# Patient Record
Sex: MALE | Race: BLACK OR AFRICAN AMERICAN | Employment: UNEMPLOYED | URBAN - METROPOLITAN AREA
[De-identification: names, ages, dates, MRNs, and addresses within clinical notes are randomized per-mention and may not be internally consistent; named-entity substitution may affect disease eponyms.]

---

## 2017-08-03 ENCOUNTER — HOSPITAL ENCOUNTER (INPATIENT)
Age: 22
LOS: 7 days | Discharge: HOME | DRG: 750 | End: 2017-08-10
Attending: EMERGENCY MEDICINE | Admitting: PSYCHIATRY & NEUROLOGY
Payer: MEDICAID

## 2017-08-03 DIAGNOSIS — F99 MENTAL HEALTH DISORDER: Primary | ICD-10-CM

## 2017-08-03 LAB
ALBUMIN SERPL BCP-MCNC: 4.1 G/DL (ref 3.4–5)
ALBUMIN/GLOB SERPL: 1.1 {RATIO} (ref 0.8–1.7)
ALP SERPL-CCNC: 88 U/L (ref 45–117)
ALT SERPL-CCNC: 32 U/L (ref 16–61)
AMPHET UR QL SCN: POSITIVE
ANION GAP BLD CALC-SCNC: 7 MMOL/L (ref 3–18)
AST SERPL W P-5'-P-CCNC: 38 U/L (ref 15–37)
BARBITURATES UR QL SCN: NEGATIVE
BASOPHILS # BLD AUTO: 0 K/UL (ref 0–0.06)
BASOPHILS # BLD: 0 % (ref 0–2)
BENZODIAZ UR QL: NEGATIVE
BILIRUB SERPL-MCNC: 0.5 MG/DL (ref 0.2–1)
BUN SERPL-MCNC: 14 MG/DL (ref 7–18)
BUN/CREAT SERPL: 13 (ref 12–20)
CALCIUM SERPL-MCNC: 9 MG/DL (ref 8.5–10.1)
CANNABINOIDS UR QL SCN: NEGATIVE
CHLORIDE SERPL-SCNC: 103 MMOL/L (ref 100–108)
CO2 SERPL-SCNC: 30 MMOL/L (ref 21–32)
COCAINE UR QL SCN: NEGATIVE
CREAT SERPL-MCNC: 1.06 MG/DL (ref 0.6–1.3)
DIFFERENTIAL METHOD BLD: ABNORMAL
EOSINOPHIL # BLD: 0.1 K/UL (ref 0–0.4)
EOSINOPHIL NFR BLD: 1 % (ref 0–5)
ERYTHROCYTE [DISTWIDTH] IN BLOOD BY AUTOMATED COUNT: 12.6 % (ref 11.6–14.5)
ETHANOL SERPL-MCNC: <3 MG/DL (ref 0–3)
GLOBULIN SER CALC-MCNC: 3.6 G/DL (ref 2–4)
GLUCOSE SERPL-MCNC: 86 MG/DL (ref 74–99)
HCT VFR BLD AUTO: 46.1 % (ref 36–48)
HDSCOM,HDSCOM: ABNORMAL
HGB BLD-MCNC: 16 G/DL (ref 13–16)
LYMPHOCYTES # BLD AUTO: 30 % (ref 21–52)
LYMPHOCYTES # BLD: 2.3 K/UL (ref 0.9–3.6)
MCH RBC QN AUTO: 30.1 PG (ref 24–34)
MCHC RBC AUTO-ENTMCNC: 34.7 G/DL (ref 31–37)
MCV RBC AUTO: 86.8 FL (ref 74–97)
METHADONE UR QL: NEGATIVE
MONOCYTES # BLD: 0.9 K/UL (ref 0.05–1.2)
MONOCYTES NFR BLD AUTO: 11 % (ref 3–10)
NEUTS SEG # BLD: 4.4 K/UL (ref 1.8–8)
NEUTS SEG NFR BLD AUTO: 58 % (ref 40–73)
OPIATES UR QL: NEGATIVE
PCP UR QL: NEGATIVE
PLATELET # BLD AUTO: 248 K/UL (ref 135–420)
PMV BLD AUTO: 9.7 FL (ref 9.2–11.8)
POTASSIUM SERPL-SCNC: 3.8 MMOL/L (ref 3.5–5.5)
PROT SERPL-MCNC: 7.7 G/DL (ref 6.4–8.2)
RBC # BLD AUTO: 5.31 M/UL (ref 4.7–5.5)
SODIUM SERPL-SCNC: 140 MMOL/L (ref 136–145)
WBC # BLD AUTO: 7.8 K/UL (ref 4.6–13.2)

## 2017-08-03 PROCEDURE — 74011250637 HC RX REV CODE- 250/637: Performed by: PSYCHIATRY & NEUROLOGY

## 2017-08-03 PROCEDURE — 99283 EMERGENCY DEPT VISIT LOW MDM: CPT

## 2017-08-03 PROCEDURE — 65220000005 HC RM SEMIPRIVATE PSYCH 3 OR 4 BED

## 2017-08-03 PROCEDURE — 80307 DRUG TEST PRSMV CHEM ANLYZR: CPT | Performed by: PHYSICIAN ASSISTANT

## 2017-08-03 PROCEDURE — 85025 COMPLETE CBC W/AUTO DIFF WBC: CPT | Performed by: PHYSICIAN ASSISTANT

## 2017-08-03 PROCEDURE — 80053 COMPREHEN METABOLIC PANEL: CPT | Performed by: PHYSICIAN ASSISTANT

## 2017-08-03 RX ORDER — TRAZODONE HYDROCHLORIDE 50 MG/1
50 TABLET ORAL
Status: DISCONTINUED | OUTPATIENT
Start: 2017-08-03 | End: 2017-08-10 | Stop reason: HOSPADM

## 2017-08-03 RX ORDER — DEXTROAMPHETAMINE SACCHARATE, AMPHETAMINE ASPARTATE, DEXTROAMPHETAMINE SULFATE AND AMPHETAMINE SULFATE 5; 5; 5; 5 MG/1; MG/1; MG/1; MG/1
40 TABLET ORAL DAILY
COMMUNITY
End: 2017-08-10

## 2017-08-03 RX ORDER — HALOPERIDOL 5 MG/1
5 TABLET ORAL
Status: DISCONTINUED | OUTPATIENT
Start: 2017-08-03 | End: 2017-08-10 | Stop reason: HOSPADM

## 2017-08-03 RX ORDER — HALOPERIDOL 5 MG/ML
5 INJECTION INTRAMUSCULAR
Status: DISCONTINUED | OUTPATIENT
Start: 2017-08-03 | End: 2017-08-10 | Stop reason: HOSPADM

## 2017-08-03 RX ORDER — CLONIDINE HYDROCHLORIDE 0.1 MG/1
0.1 TABLET ORAL
COMMUNITY
End: 2017-08-10

## 2017-08-03 RX ORDER — BENZTROPINE MESYLATE 1 MG/1
0.5 TABLET ORAL 2 TIMES DAILY
Status: DISCONTINUED | OUTPATIENT
Start: 2017-08-03 | End: 2017-08-04

## 2017-08-03 RX ORDER — IBUPROFEN 400 MG/1
400 TABLET ORAL
Status: DISCONTINUED | OUTPATIENT
Start: 2017-08-03 | End: 2017-08-10 | Stop reason: HOSPADM

## 2017-08-03 RX ORDER — LORAZEPAM 2 MG/ML
1-2 INJECTION INTRAMUSCULAR
Status: DISCONTINUED | OUTPATIENT
Start: 2017-08-03 | End: 2017-08-10 | Stop reason: HOSPADM

## 2017-08-03 RX ORDER — LORAZEPAM 1 MG/1
1-2 TABLET ORAL
Status: DISCONTINUED | OUTPATIENT
Start: 2017-08-03 | End: 2017-08-10 | Stop reason: HOSPADM

## 2017-08-03 RX ADMIN — BENZTROPINE MESYLATE 0.5 MG: 1 TABLET ORAL at 20:02

## 2017-08-03 RX ADMIN — LORAZEPAM 1 MG: 1 TABLET ORAL at 18:22

## 2017-08-03 RX ADMIN — HALOPERIDOL 5 MG: 5 TABLET ORAL at 18:22

## 2017-08-03 RX ADMIN — RISPERIDONE 1.5 MG: 1 TABLET ORAL at 20:01
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
## 2017-08-03 NOTE — BSMART NOTE
ACTIVITIES THERAPY PROGRESS NOTE    Group time:1220    The patient was not disturbed for group at staff discretion.

## 2017-08-03 NOTE — ED NOTES
Pt denies SI/HI. Hearing voices. \" Voices are not telling me to harm my self or other people. I just need my psych medications. \"

## 2017-08-03 NOTE — IP AVS SNAPSHOT
[address] Patient: Natanael Paulson MRN: [id number] :1995 You are allergic to the following No active allergies Recent Documentation Height Weight BMI Smoking Status 1.956 m 71.7 kg 18.74 kg/m2 Never Smoker Emergency Contacts Name Discharge Info Relation Home Work Mobile Piedad Lowery(Grandmother)  Other Relative [6] [phone] About your hospitalization You were admitted on:  August 3, 2017 You last received care in the:  [facility] You were discharged on:  August 10, 2017 Unit phone number:  [phone] Why you were hospitalized Your primary diagnosis was:  Schizophrenia (Hcc) Providers Seen During Your Hospitalizations Provider Role Specialty Primary office phone Ariana Espinal MD Attending Provider Emergency Medicine [phone] Naga Mccullough MD Attending Provider Psychiatry [phone] Your Primary Care Physician (PCP) Primary Care Physician Office Phone Office Fax NONE ** None ** ** None ** Follow-up Information Follow up With Details Comments Contact Info None   None (395) Patient stated that they have no PCP Pt will return to [place] [address]. . At [address] [place] [address], [facility]. .. for med management with Marie VARNER on 17 @ 10:15am .. Then later same day Adelina SANTOSW @ 2:15pm for therapy Current Discharge Medication List  
  
START taking these medications Dose & Instructions Dispensing Information Comments Morning Noon Evening Bedtime  
 benztropine 0.5 mg tablet Commonly known as:  COGENTIN Your last dose was: Your next dose is:    
   
   
 Dose:  0.5 mg Take 1 Tab by mouth two (2) times a day. Indications: Parkinsonism Quantity:  60 Tab Refills:  0 traZODone 50 mg tablet Commonly known as:  Trei Homme Your last dose was: Your next dose is:    
   
   
 Dose:  50 mg Take 1 Tab by mouth nightly as needed for Sleep. Indications: insomnia associated with depression Quantity:  30 Tab Refills:  0 CONTINUE these medications which have CHANGED Dose & Instructions Dispensing Information Comments Morning Noon Evening Bedtime * risperiDONE 1 mg tablet Commonly known as:  RisperDAL What changed:   
- medication strength 
- how much to take - when to take this - Another medication with the same name was removed. Continue taking this medication, and follow the directions you see here. Your last dose was: Your next dose is:    
   
   
 Dose:  1 mg Take 1 Tab by mouth daily. Indications: SCHIZOPHRENIA Quantity:  30 Tab Refills:  0  
     
   
   
   
  
 * risperiDONE 2 mg tablet Commonly known as:  RisperDAL What changed: You were already taking a medication with the same name, and this prescription was added. Make sure you understand how and when to take each. Replaces:  risperiDONE 0.5 mg disintegrating tablet Your last dose was: Your next dose is:    
   
   
 Dose:  2 mg Take 1 Tab by mouth nightly. Indications: SCHIZOPHRENIA Quantity:  30 Tab Refills:  0  
     
   
   
   
  
 * Notice: This list has 2 medication(s) that are the same as other medications prescribed for you. Read the directions carefully, and ask your doctor or other care provider to review them with you. STOP taking these medications ADDERALL 20 mg tablet Generic drug:  dextroamphetamine-amphetamine  
   
  
 cloNIDine HCl 0.1 mg tablet Commonly known as:  CATAPRES  
   
  
 risperiDONE 0.5 mg disintegrating tablet Commonly known as:  RisperDAL m-tabs Replaced by:  risperiDONE 2 mg tablet You also have another medication with the same name that you need to continue taking as instructed. Where to Get Your Medications Information on where to get these meds will be given to you by the nurse or doctor. ! Ask your nurse or doctor about these medications  
  benztropine 0.5 mg tablet  
 risperiDONE 1 mg tablet  
 risperiDONE 2 mg tablet  
 traZODone 50 mg tablet Discharge Instructions BEHAVIORAL HEALTH NURSING DISCHARGE NOTE The following personal items collected during your admission are returned to you:  
Dental Appliance:   
Vision: Visual Aid: None Hearing Aid:   
Jewelry:   
Clothing:   
Other Valuables: Other Valuables: None Valuables sent to safe:   
 
 
PATIENT INSTRUCTIONS: 
 
 
 
 
The discharge information has been reviewed with the patient. The patient verbalized understanding. Patient armband removed and shredded Discharge Orders None Introducing [address]! Wisam Garcia introduces "[employer] patient portal. Now you can access parts of your medical record, email your doctor's office, and request medication refills online. 1. In your internet browser, go to [email or web address] 2. Click on the First Time User? Click Here link in the Sign In box. You will see the New Member Sign Up page. 3. Enter your "[employer] Access Code exactly as it appears below. You will not need to use this code after youve completed the sign-up process. If you do not sign up before the expiration date, you must request a new code. · "[employer] Access Code: [email or web address] Expires: 11/2/2017  7:48 AM 
 
4. Enter the last four digits of your Social Security Number (xxxx) and Date of Birth (mm/dd/yyyy) as indicated and click Submit. You will be taken to the next sign-up page. 5. Create a "[employer] ID. This will be your "[employer] login ID and cannot be changed, so think of one that is secure and easy to remember. 6. Create a [employer] password. You can change your password at any time. 7. Enter your Password Reset Question and Answer. This can be used at a later time if you forget your password. 8. Enter your e-mail address. You will receive e-mail notification when new information is available in [address] 9. Click Sign Up. You can now view and download portions of your medical record. 10. Click the Download Summary menu link to download a portable copy of your medical information. If you have questions, please visit the Frequently Asked Questions section of the [employer] website. Remember, [employer] is NOT to be used for urgent needs. For medical emergencies, dial 911. Now available from your iPhone and Android! General Information Please provide this summary of care documentation to your next provider. Patient Signature:  ____________________________________________________________ Date:  ____________________________________________________________  
  
[facility] Provider Signature:  ____________________________________________________________ Date:  ____________________________________________________________

## 2017-08-03 NOTE — BH NOTES
GROUP THERAPY PROGRESS NOTE    Ruben Taylor is participating in Recreational Therapy.      Group time: 30 minutes    Personal goal for participation: relaxation   Goal orientation: relaxation    Group therapy participation: minimal    Therapeutic interventions reviewed and discussed: PT was encouraged by staff he listened to musn    Impression of participation:anxious

## 2017-08-03 NOTE — BH NOTES
Patient continued to pace on the unit till his Aunt arrived for visitation, patient was able to receive Haldol 5 mg PO PRN and Lorazepam 1 mg PO PRN for psychosis, patient ate 30% of his dinner during visitation.  Will continue to monitor for safety

## 2017-08-03 NOTE — PROGRESS NOTES
Patient admitted at this time accompanied by his aunt. Patient states he is not depressed or suicidal. Stated he was hearing voices, very guarded. Denies visual hallucinations, some thought blocking noted . Patient denies using drugs or drinking. Stated he was taking his medications. Oriented to unit and guidelines.

## 2017-08-03 NOTE — IP AVS SNAPSHOT
Summary of Care Report The Summary of Care report has been created to help improve care coordination. Users with access to [employer] or [address] (Web-based application) may access additional patient information including the Discharge Summary. If you are not currently a [address] user and need more information, please call the number listed below in the [place] [address] section and ask to be connected with Medical Records. Facility Information Name Address Phone [address] [facility]  [address] [facility] [address] [phone] Patient Information Patient Name Sex VERNELL Burdick ([id number]) Male 1995 Discharge Information Admitting Provider Service Area Unit Sabrina Rivas MD / [address]  [phone] Discharge Provider Discharge Date/Time Discharge Disposition Destination Sabrina Rivas MD / [phone] 8/10/2017 (Pending) AHR (none) Patient Language Language ENGLISH [13] Hospital Problems as of 8/10/2017  Reviewed: 2015  8:40 AM by Vibha Arizmendi MD  
  
  
  
 Class Noted - Resolved Last Modified POA Active Problems * (Principal)Schizophrenia ([facility] [address]  2015 - Present 2017 by Sabrina Rivas MD Yes Entered by Kasey Edwards Non-Hospital Problems as of 8/10/2017  Reviewed: 2015  8:40 AM by Vibha Arizmendi MD  
 None You are allergic to the following No active allergies Current Discharge Medication List  
  
START taking these medications Dose & Instructions Dispensing Information Comments  
 benztropine 0.5 mg tablet Commonly known as:  COGENTIN Dose:  0.5 mg Take 1 Tab by mouth two (2) times a day. Indications: Parkinsonism Quantity:  60 Tab Refills:  0  
   
 traZODone 50 mg tablet Commonly known as:  Gwendlyn Lean  Dose:  50 mg  
 Take 1 Tab by mouth nightly as needed for Sleep. Indications: insomnia associated with depression Quantity:  30 Tab Refills:  0 CONTINUE these medications which have CHANGED Dose & Instructions Dispensing Information Comments * risperiDONE 1 mg tablet Commonly known as:  RisperDAL What changed:   
- medication strength 
- how much to take - when to take this - Another medication with the same name was removed. Continue taking this medication, and follow the directions you see here. Dose:  1 mg Take 1 Tab by mouth daily. Indications: SCHIZOPHRENIA Quantity:  30 Tab Refills:  0  
   
 * risperiDONE 2 mg tablet Commonly known as:  RisperDAL What changed: You were already taking a medication with the same name, and this prescription was added. Make sure you understand how and when to take each. Replaces:  risperiDONE 0.5 mg disintegrating tablet Dose:  2 mg Take 1 Tab by mouth nightly. Indications: SCHIZOPHRENIA Quantity:  30 Tab Refills:  0  
   
 * Notice: This list has 2 medication(s) that are the same as other medications prescribed for you. Read the directions carefully, and ask your doctor or other care provider to review them with you. STOP taking these medications Comments ADDERALL 20 mg tablet Generic drug:  dextroamphetamine-amphetamine  
   
   
 cloNIDine HCl 0.1 mg tablet Commonly known as:  CATAPRES  
   
   
 risperiDONE 0.5 mg disintegrating tablet Commonly known as:  RisperDAL m-tabs Replaced by:  risperiDONE 2 mg tablet You also have another medication with the same name that you need to continue taking as instructed. Follow-up Information Follow up With Details Comments Contact Info None   None (395) Patient stated that they have no PCP Pt will return to [place] [address]. . At [address], [address] [facility] [address] ..  for med management with Abena Blanca Jozef Anika FNP on 8/22/17 @ 10:15am .. Then later same day Julieth Simons LCSW @ 2:15pm for therapy Discharge Instructions BEHAVIORAL HEALTH NURSING DISCHARGE NOTE The following personal items collected during your admission are returned to you:  
Dental Appliance:   
Vision: Visual Aid: None Hearing Aid:   
Jewelry:   
Clothing:   
Other Valuables: Other Valuables: None Valuables sent to safe:   
 
 
PATIENT INSTRUCTIONS: 
 
 
 
 
The discharge information has been reviewed with the patient. The patient verbalized understanding. Patient armband removed and shredded Chart Review Routing History Recipient Method Report Sent By Bassem Ybarra MD  
Fax: [phone] Phone: [phone] Fax IP Auto Routed Donnell Santa [[id number]] 1/18/2015  6:18 AM 01/18/2015 Brissa Ybarra MD  
Fax: [phone] Phone: [phone] Fax IP Auto Routed Donnell Santa [[id number]] 2/9/2015  5:52 PM 02/09/2015 Brissa Ybarra MD  
Fax: [phone] Phone: [phone] Fax IP Auto Routed Donnell Santa [[id number]] 3/4/2015  8:29 AM 03/04/2015

## 2017-08-03 NOTE — ED TRIAGE NOTES
Pt arrived to triage alert & oriented times 2 person & place with aunt. Pt denies SI & HI. Pt has not been taking his psyche meds. Pt is confused in triage & unable to answer all the questions asked.

## 2017-08-03 NOTE — BH NOTES
Patient observed pacing continuously back and forth on the unit. Patient continue to respond to internal stimuli, when asked if he was okay patient states \"I want some cookies\" Patient was encouraged to speak to this writer if feelings of self harm or harm towards others arise.  Will continue to monitor

## 2017-08-03 NOTE — ED PROVIDER NOTES
HPI Comments: 26yo male presenting to ED with hx of schizophrenia, ADHD, anxiety disorder presenting to ED with aunt for evaluation of hallucinations and psychiatric medication refill. Denies HI, SI.  States he has been off his meds for a while and needs to get back on. Aunt reports noting increased abnormal behavior from patient and states he has gone missing for long periods of time with no idea where he goes. Past Medical History:  No date: ADHD (attention deficit hyperactivity disorder)  No date: Anxiety disorder  No date: Depression  No date: Mood disorder (HCC)  No date: Psychiatric disorder  No date: Psychotic disorder  No date: Trauma      Comment: \"His mother  this past November\"       Patient is a 23 y.o. male presenting with mental health disorder. The history is provided by the patient. Mental Health Problem          Past Medical History:   Diagnosis Date    ADHD (attention deficit hyperactivity disorder)     Anxiety disorder     Depression     Mood disorder (HCC)     Psychiatric disorder     Psychotic disorder     Trauma     \"His mother  this past November\"       History reviewed. No pertinent surgical history. Family History:   Problem Relation Age of Onset    Schizophrenia Mother     Schizophrenia Brother     Schizophrenia Sister        Social History     Social History    Marital status: SINGLE     Spouse name: N/A    Number of children: N/A    Years of education: N/A     Occupational History    Not on file. Social History Main Topics    Smoking status: Never Smoker    Smokeless tobacco: Never Used    Alcohol use No    Drug use: No    Sexual activity: No     Other Topics Concern    Not on file     Social History Narrative         ALLERGIES: Review of patient's allergies indicates no known allergies.     Review of Systems    Vitals:    17 0734   BP: 115/78   Pulse: 94   Resp: 17   Temp: 98 °F (36.7 °C)   SpO2: 100%   Weight: 71.7 kg (158 lb)   Height: 6' 5\" (1.956 m)            Physical Exam   Constitutional: He is oriented to person, place, and time. He appears well-developed and well-nourished. HENT:   Head: Normocephalic and atraumatic. Mouth/Throat: Oropharynx is clear and moist.   Eyes: Conjunctivae are normal.   Neck: Normal range of motion. Cardiovascular: Normal rate, regular rhythm and normal heart sounds. Pulmonary/Chest: Effort normal. No respiratory distress. He has no wheezes. He has no rales. Abdominal: Soft. He exhibits no distension. Neurological: He is oriented to person, place, and time. Psychiatric: His behavior is normal. His mood appears anxious. His affect is not angry. His speech is rapid and/or pressured. Thought content is not paranoid and not delusional. He expresses impulsivity. He does not exhibit a depressed mood. He expresses no homicidal and no suicidal ideation. He expresses no suicidal plans and no homicidal plans. Nursing note and vitals reviewed.        MDM  Number of Diagnoses or Management Options    ED Course       Procedures

## 2017-08-03 NOTE — PROGRESS NOTES
TRANSFER - OUT REPORT:    Verbal report given to Americo Fry RN(name) on Rashid Skagit  being transferred to Behavior health(unit) for routine progression of care       Report consisted of patients Situation, Background, Assessment and   Recommendations(SBAR). Information from the following report(s) ED Summary and Recent Results was reviewed with the receiving nurse. Lines:       Opportunity for questions and clarification was provided.       Patient transported with:   [email or web address]

## 2017-08-03 NOTE — IP AVS SNAPSHOT
Nelly Montalvo 
 
 
 [facility] [address] [facility] [address] Patient: Ronnie Olvera MRN: [id number] :1995 Current Discharge Medication List  
  
START taking these medications Dose & Instructions Dispensing Information Comments Morning Noon Evening Bedtime  
 benztropine 0.5 mg tablet Commonly known as:  COGENTIN Your last dose was: Your next dose is:    
   
   
 Dose:  0.5 mg Take 1 Tab by mouth two (2) times a day. Indications: Parkinsonism Quantity:  60 Tab Refills:  0  
     
   
   
   
  
 traZODone 50 mg tablet Commonly known as:  Edmond Barge Your last dose was: Your next dose is:    
   
   
 Dose:  50 mg Take 1 Tab by mouth nightly as needed for Sleep. Indications: insomnia associated with depression Quantity:  30 Tab Refills:  0 CONTINUE these medications which have CHANGED Dose & Instructions Dispensing Information Comments Morning Noon Evening Bedtime * risperiDONE 1 mg tablet Commonly known as:  RisperDAL What changed:   
- medication strength 
- how much to take - when to take this - Another medication with the same name was removed. Continue taking this medication, and follow the directions you see here. Your last dose was: Your next dose is:    
   
   
 Dose:  1 mg Take 1 Tab by mouth daily. Indications: SCHIZOPHRENIA Quantity:  30 Tab Refills:  0  
     
   
   
   
  
 * risperiDONE 2 mg tablet Commonly known as:  RisperDAL What changed: You were already taking a medication with the same name, and this prescription was added. Make sure you understand how and when to take each. Replaces:  risperiDONE 0.5 mg disintegrating tablet Your last dose was: Your next dose is:    
   
   
 Dose:  2 mg Take 1 Tab by mouth nightly. Indications: SCHIZOPHRENIA Quantity:  30 Tab Refills:  0 * Notice: This list has 2 medication(s) that are the same as other medications prescribed for you. Read the directions carefully, and ask your doctor or other care provider to review them with you. STOP taking these medications ADDERALL 20 mg tablet Generic drug:  dextroamphetamine-amphetamine  
   
  
 cloNIDine HCl 0.1 mg tablet Commonly known as:  CATAPRES  
   
  
 risperiDONE 0.5 mg disintegrating tablet Commonly known as:  RisperDAL m-tabs Replaced by:  risperiDONE 2 mg tablet You also have another medication with the same name that you need to continue taking as instructed. Where to Get Your Medications Information on where to get these meds will be given to you by the nurse or doctor. ! Ask your nurse or doctor about these medications  
  benztropine 0.5 mg tablet  
 risperiDONE 1 mg tablet  
 risperiDONE 2 mg tablet  
 traZODone 50 mg tablet

## 2017-08-03 NOTE — H&P
History and Physical        Patient: Bety Hawk               Sex: male          DOA: 8/3/2017         YOB: 1995      Age:  23 y.o.        LOS:  LOS: 0 days        HPI:     Bety Hawk is a 23 y.o. male who was admitted under TDO experiencing delusional thinking and paranoia. Active Problems:    Schizophrenia ([address] (2015)        Past Medical History:   Diagnosis Date    ADHD (attention deficit hyperactivity disorder)     Anxiety disorder     Depression     Mood disorder (HCC)     Psychiatric disorder     Psychotic disorder     Trauma     \"His mother  this past November\"       History reviewed. No pertinent surgical history. Family History   Problem Relation Age of Onset    Schizophrenia Mother     Schizophrenia Brother     Schizophrenia Sister        Social History     Social History    Marital status: SINGLE     Spouse name: N/A    Number of children: NONE    Years of education:      Social History Main Topics    Smoking status: Never Smoker    Smokeless tobacco: Never Used    Alcohol use No    Drug use: No    Sexual activity: No     Other Topics Concern    None     Social History Narrative   Patient states he lives with his brother and his sister in law. States his appetite and sleep have been okay. He receives disability. Prior to Admission medications    Medication Sig Start Date End Date Taking? Authorizing Provider   risperiDONE (RISPERDAL M-TABS) 0.5 mg disintegrating tablet Take 1 Tab by mouth daily. 3/2/15   Erin Pope MD   risperiDONE (RISPERDAL M-TABS) 3 mg TbDL Take 1 Tab by mouth nightly. 3/2/15   Erin Pope MD   risperiDONE (RISPERDAL) 3 mg tablet Take 1 Tab by mouth nightly. 2/6/15   Erin Pope MD       No Known Allergies    Review of Systems  A comprehensive review of systems was negative.       Physical Exam:      Visit Vitals    /78 (BP 1 Location: Left arm, BP Patient Position: At rest)    Pulse 94    Temp 98 °F (36.7 °C)    Resp 17    Ht 6' 5\" (1.956 m)    Wt 158 lb (71.7 kg)    SpO2 100%    BMI 18.74 kg/m2       Physical Exam:    General:  Alert, well developed, well nourished AA male, cooperative, no distress, appears stated age. Delusional thinking evidenced during exam as patient repeatedly referenced \"may people trying to rape his wife\". Eyes:  Conjunctivae/corneas clear. PERRL, EOMs intact. Fundi benign   Ears:  Normal TMs and external ear canals both ears. Nose: Nares normal. Septum midline. Mucosa normal. No drainage or sinus tenderness. Mouth/Throat: Lips, mucosa, and tongue normal. Teeth with poor dentition and gums normal.   Neck: Supple, symmetrical, trachea midline, no adenopathy, thyroid: no enlargement/tenderness/nodules, no carotid bruit and no JVD. Back:   Symmetric, no curvature. ROM normal. No CVA tenderness. Lungs:   Clear to auscultation bilaterally. Heart:  Regular rate and rhythm, S1, S2 normal, no murmur, click, rub or gallop. Abdomen:   Soft, non-tender. Bowel sounds normal. No masses,  No organomegaly. Extremities: Extremities normal, atraumatic, no cyanosis or edema. Pulses: 2+ and symmetric all extremities. Skin: Skin color, texture, turgor normal. No rashes or lesions   Lymph nodes: Cervical, supraclavicular, and axillary nodes normal.   Neurologic: CNII-XII intact. Normal strength, sensation and reflexes throughout.              Assessment/Plan     Delusional  Paranoia  Labs reviewed  Continue treatment per physician's orders

## 2017-08-04 PROCEDURE — 65220000005 HC RM SEMIPRIVATE PSYCH 3 OR 4 BED

## 2017-08-04 PROCEDURE — 74011250637 HC RX REV CODE- 250/637: Performed by: PSYCHIATRY & NEUROLOGY

## 2017-08-04 RX ORDER — BENZTROPINE MESYLATE 1 MG/1
0.5 TABLET ORAL 2 TIMES DAILY
Status: DISCONTINUED | OUTPATIENT
Start: 2017-08-04 | End: 2017-08-10 | Stop reason: HOSPADM

## 2017-08-04 RX ORDER — RISPERIDONE 2 MG/1
2 TABLET, FILM COATED ORAL
Status: DISCONTINUED | OUTPATIENT
Start: 2017-08-04 | End: 2017-08-07

## 2017-08-04 RX ORDER — RISPERIDONE 1 MG/1
1 TABLET, FILM COATED ORAL DAILY
Status: DISCONTINUED | OUTPATIENT
Start: 2017-08-05 | End: 2017-08-07

## 2017-08-04 RX ORDER — BENZTROPINE MESYLATE 1 MG/1
1 TABLET ORAL 2 TIMES DAILY
Status: DISCONTINUED | OUTPATIENT
Start: 2017-08-04 | End: 2017-08-04

## 2017-08-04 RX ADMIN — RISPERIDONE 1.5 MG: 1 TABLET ORAL at 08:53

## 2017-08-04 RX ADMIN — BENZTROPINE MESYLATE 0.5 MG: 1 TABLET ORAL at 08:53

## 2017-08-04 RX ADMIN — RISPERIDONE 2 MG: 2 TABLET ORAL at 20:16

## 2017-08-04 RX ADMIN — BENZTROPINE MESYLATE 0.5 MG: 1 TABLET ORAL at 20:16

## 2017-08-04 NOTE — BH NOTES
GROUP THERAPY PROGRESS NOTE    Reina Raphael is participating in Recreational Therapy. Group time: 45 min    Personal goal for participation: Using exercise as a coping tool. Goal orientation: relaxation    Group therapy participation: active    Therapeutic interventions reviewed and discussed: Maintaining good health. Impression of participation: Pt actively participated in group activity.

## 2017-08-04 NOTE — PROGRESS NOTES
NUTRITION    Nutrition Screen      RECOMMENDATIONS / PLAN:     - Add double vegetables, double meat BID  - Continue RD inpatient monitoring and evaluation      NUTRITION DIAGNOSIS & INTERVENTIONS:     [x] Meals/Snacks: general/healthful diet  [x] Coordination of nutrition care: discussed with RN     Nutrition Diagnosis:  Borderline underweight related to inadequate oral intake as evidenced by BMI 18.7 kg/m^2    ASSESSMENT:     Pt unavailable at time of visit. Has good appetite and meal intake per nursing. Pt reporting always hungry. Noted borderline underweight; BMI 18.7 kg/m^2    Average intake adequate to meet patients estimated nutritional needs:   [x] Yes     [] No      [] Unable to determine at this time    Diet: DIET REGULAR    Food Allergies: none known   Current Appetite:   [x] Good (per nursing)     [] Fair     [] Poor     [] Other:  Appetite/meal intake prior to admission:   [] Good     [] Fair     [] Poor     [x] Other: unknown    Feeding Limitations:  [] Swallowing Difficulty       [] Chewing Difficulty       [] Other   Current Meal Intake: No data found. Gastrointestinal Issues:  [] Yes    [x] No   Skin Integrity:  WDL    Pertinent Medications:  Reviewed   Labs:  Reviewed     Anthropometrics:  Ht Readings from Last 1 Encounters:   08/03/17 6' 5\" (1.956 m)       Last 3 Recorded Weights in this Encounter    08/03/17 0734   Weight: 71.7 kg (158 lb)       Body mass index is 18.74 kg/(m^2).        Weight History:  Weight gain in past 2.5 years PTA per chart hx    Weight Metrics 8/3/2017 2/26/2015 1/31/2015 1/9/2015 1/7/2015 11/29/2013   Weight 158 lb 135 lb 134 lb 7.7 oz 135 lb 131 lb 120 lb   BMI 18.74 kg/m2 - 19.47 kg/m2 19.37 kg/m2 - -       Admitting Diagnosis: Schizophrenia ([facility]  Past Medical History:   Diagnosis Date    ADHD (attention deficit hyperactivity disorder)     Anxiety disorder     Depression     Mood disorder ([facility]     Psychiatric disorder     Psychotic disorder     Trauma \"His mother  this past November\"        Education Needs:        [x] None identified  [] Identified - Not appropriate at this time  []  Identified and addressed - refer to education log  Learning Limitations:   [x] None identified  [] Identified    Cultural, Baptism & ethnic food preferences identified:  [x] None    [] Yes      ESTIMATED NUTRITION NEEDS:     3788-1053 kcal (MSJx1.2-1.3), 76-95 gm protein (0.8-1 gm/kg), 1 mL/kcal  Based on:  95 kg       [] Actual BW      [x] IBW          MONITORING & EVALUATION:     Nutrition Goal(s):   1. Po intake of meals will meet >75% of patient estimated nutritional needs within the next 7 days.   Outcome:   [] Met    []  Not Met   [x] New/Initial Goal    Monitor:  [x] Meal intake    [x] Diet tolerance    [] Supplement intake    Previous Recommendations (for follow-up assessments only):     []   Implemented       []   Not Implemented (RD to address)    [] No Recommendation Made      Discharge Planning: regular diet   [x]  Participated in care planning, discharge planning, & interdisciplinary rounds as appropriate      Nayeli Landa, [address] [place] [address]   Pager: [phone]

## 2017-08-04 NOTE — PROGRESS NOTES
Problem: Altered Thought Process (Adult/Pediatric)  Goal: *STG: Remains safe in hospital  Patient will remain safe during hospitalization. Outcome: Progressing Towards Goal  Pt remains safe free from injury during hospitalization  Goal: *STG: Complies with medication therapy  Patient will be compliant with prescribed medication daily. Outcome: Progressing Towards Goal  Pt compliant with medication therapy  Goal: *STG: Decreased hallucinations  Patient will have decrease or absence of auditory hallucinations prior to discharge. Outcome: Progressing Towards Goal  Pt denies feelings of harm to self or others, denies A/V hallucinations    Comments:   Patient is calm, quiet, bizarre behavior, compliant with medications. He is visible in milieu, eats all meals, and asks for crackers, snacks intermittently. He interacts with staff. Pt is isolative, sits in corner quietly, does not attend groups and activities with staff encouragement. He talks to self, responds to internal stimuli, denies A/V hallucinations, and denies feelings of harm to self or others. Pt is encouraged positive coping strategies. Pt remains safe free from injury. Staff continues to monitor safety and provide supportive environment.

## 2017-08-04 NOTE — BSMART NOTE
OCCUPATIONAL THERAPY PROGRESS NOTE    Group Time:  0720  Attendance: The patient attended 2/3 of group. Participation:  The patient participated with minimal elaboration in the activity. Attention:  The patient needed redirection to activity at least once. Interaction:  The patient acknowledges others or responds to questions,  with no spontaneous interaction. Patient said a friend of his  a short while ago (in response to a question about feeling pressured), accuracy unknown. Minimal elaboration. Got up and left and was looking out the window last part of group.

## 2017-08-04 NOTE — BH NOTES
Pt was very withdrawn and displayed erratic behavior throughout the shift. Pt did participate in group activity and later enjoyed a visit from his family. Staff will continue to monitor for safety and well being.

## 2017-08-04 NOTE — BSMART NOTE
ART THERAPY GROUP PROGRESS NOTE    PATIENT SCHEDULED FOR GROUP AT: 14:00    ATTENDANCE: 1/4    PARTICIPATION LEVEL:  Does not engage in the art process or gives up easily. ATTENTION LEVEL: Unable to attend to task at hand. FOCUS: Problem-solving    SYMBOLIC & THEMATIC CONTENT AS NOTED IN IMAGERY: He appeared drowsy and had difficulty understanding group directives. He had difficulty focusing on the task at hand, and left without warning after 1/4 of group.

## 2017-08-04 NOTE — BH NOTES
GROUP THERAPY PROGRESS NOTE    Deo Adam is participating in [place].      Group time: 20 minutes    Personal goal for participation: talk w/md    Goal orientation: community    Group therapy participation: active    Therapeutic interventions reviewed and discussed: rules and guidelines

## 2017-08-04 NOTE — BSMART NOTE
SW Contact:  Saw pt on unit having been familiar with him in hospital as well as outpt services. Pt mostly pacing & seemed to be responding to internal stimuli. . Reassured him all will be ok & encouraged him to comply with Dr as well as make effort to try & attend unit groups & activities. Family: Spoke to pt's guardian whom he lives with. She reports pt was doing ok until noticed past couple months he was isolating more, with minimal interaction with family. He would confirm he WAS taking his meds but unwilling to compromise on how he could show them he was doing so. ... As for pt commenting to staff he had \"girl friend\" & she may have been \"murdered\". .. Family reports he had girl he liked a lot at the beginning of the year who worked at [employer] but she had no feelings for him & mostly kept her distance, with them not aware of anyone in pt's [place] that may have been harmed.

## 2017-08-04 NOTE — H&P
[address] [facility] [address]  Inpatient Admission Note    Date of Service:  17    Historian(s): patient  Referral Source: self    Chief Complaint   \"I'm here because my girlfriend  3 days ago. \"     History of Present Illness     Deo Silva is a 23 y.o. male with a history of schizophrenia diagnosed at 14yo per pt who presents for inpatient psychiatric hospitalization after because he was experiencing worsening AH telling him to harm himself and other people. The pt shared he stopped taking risperidone about 3 days ago. When asked why he stopped his medications, he shared it was due to the death of his girlfriend. He notes she was \"jumped\" was hospitalized and subsequently . She was cremated per the pt soon after her death. He notes his  girlfriend left him with three 3year old children. They are triplets, 2 boys and 1 girl. The pt shared his children are with their maternal grandmother. The pt denies AVH at this time. He also denies thought insertion, thought withdrawal and thought broadcasting. He denies feeling as if his body is under external control. He denies feeling depressed or sad in general or due to his girlfriend's death. He denies feeling constantly angry or irritable. Pt denies SI, HI and AVH. Psychiatric Review of Systems   Depression:  Denies depressed mood, episodic tearfulness, anhedonia and feelings of guilt, hopelessness, helplessness and worthlessness. Energy is adequate. Denies any thoughts of self-harm or suicidal ideation. Anxiety: Denies any excessive worrying, social anxiety, panic attacks and OCD. Irritability: Denies low threshold of frustration or anger. Bipolar symptoms: Denies history of decreased need for sleep associated with increased energy, racing thoughts, rapid speech and risky behavior. Abuse/Trauma/PTSD: Denies history of verbal, physical or sexual abuse.   Denies avoidant behavior related to trauma triggers, flashbacks, hypervigilance or nightmares. Psychosis: Denies AVH or delusions. Medical Review of Systems     Sleep: adequate and stable  Appetite: adequate and stable  14 point review of systems was completed. Significant findings are found in the HPI or MSE. Psychiatric Treatment History     Self-injurious behavior/risky thoughts or behaviors (past suicidal ideation/attempt): Denies any prior history of thoughts of self-harm or suicidal actions. Violence/Risk to others (past homicidal ideation/attempt):    Denies any prior history of violence or homicidal ideation. Previous psychiatric medication trials: Risperdal    Previous psychiatric hospitalizations: Denies    Current therapist: In [place], [place]     Current psychiatric provider: Psychiatric NP in [place], [place]    Denies ECT in the past.     Allergies    No Known Allergies    Medical History     Past Medical History:   Diagnosis Date    ADHD (attention deficit hyperactivity disorder)     Anxiety disorder     Depression     Mood disorder ([facility]     Psychiatric disorder     Psychotic disorder     Trauma     \"His mother  this past November\"       History of neurological illness: Denies  History of head injuries: Denies     Medication(s)     No current facility-administered medications on file prior to encounter. Current Outpatient Prescriptions on File Prior to Encounter   Medication Sig Dispense Refill    risperiDONE (RISPERDAL M-TABS) 0.5 mg disintegrating tablet Take 1 Tab by mouth daily. 30 Tab 1    risperiDONE (RISPERDAL M-TABS) 3 mg TbDL Take 1 Tab by mouth nightly. 30 Tab 1    risperiDONE (RISPERDAL) 3 mg tablet Take 1 Tab by mouth nightly.  30 Tab 1       Substance Abuse History     Tobacco: denied  Alcohol: denied  Marijuana: denied  Cocaine: denied  Opiate: denied  Benzodiazepine: denied  Other: denied    Consequences: none    History of detox: none    History of substance abuse treatment: none    Family History Medical Family History  Maternal: Denies  Paternal: Denies    Psychiatric Family History  Maternal: Schizophrenia  Paternal: Denies    Family history of suicide? NO    Social History     Living Situation: lives with his aunt Alissa Estevez    Employment: Not currently working    Education: Did not complete high school. Does not have a GED. Relationships/Children: Not in a relationship. 3 children???    Legal: Denies     Spirituality/Taoist: Not asked. Vitals/Labs      Vitals:    08/03/17 0734 08/03/17 1142 08/03/17 1933 08/04/17 0804   BP: 115/78 118/76 120/80 118/78   Pulse: 94 87 86 (!) 103   Resp: 17 18 18 18   Temp: 98 °F (36.7 °C) 98.6 °F (37 °C) 98.2 °F (36.8 °C) 98.3 °F (36.8 °C)   SpO2: 100%      Weight: 71.7 kg (158 lb)      Height: 6' 5\" (1.956 m)          Labs: UDS positive amphetamines  Results for orders placed or performed during the hospital encounter of 08/03/17   CBC WITH AUTOMATED DIFF   Result Value Ref Range    WBC 7.8 4.6 - 13.2 K/uL    RBC 5.31 4.70 - 5.50 M/uL    HGB 16.0 13.0 - 16.0 g/dL    HCT 46.1 36.0 - 48.0 %    MCV 86.8 74.0 - 97.0 FL    MCH 30.1 24.0 - 34.0 PG    MCHC 34.7 31.0 - 37.0 g/dL    RDW 12.6 11.6 - 14.5 %    PLATELET 587 606 - 043 K/uL    MPV 9.7 9.2 - 11.8 FL    NEUTROPHILS 58 40 - 73 %    LYMPHOCYTES 30 21 - 52 %    MONOCYTES 11 (H) 3 - 10 %    EOSINOPHILS 1 0 - 5 %    BASOPHILS 0 0 - 2 %    ABS. NEUTROPHILS 4.4 1.8 - 8.0 K/UL    ABS. LYMPHOCYTES 2.3 0.9 - 3.6 K/UL    ABS. MONOCYTES 0.9 0.05 - 1.2 K/UL    ABS. EOSINOPHILS 0.1 0.0 - 0.4 K/UL    ABS.  BASOPHILS 0.0 0.0 - 0.06 K/UL    DF AUTOMATED     METABOLIC PANEL, COMPREHENSIVE   Result Value Ref Range    Sodium 140 136 - 145 mmol/L    Potassium 3.8 3.5 - 5.5 mmol/L    Chloride 103 100 - 108 mmol/L    CO2 30 21 - 32 mmol/L    Anion gap 7 3.0 - 18 mmol/L    Glucose 86 74 - 99 mg/dL    BUN 14 7.0 - 18 MG/DL    Creatinine 1.06 0.6 - 1.3 MG/DL    BUN/Creatinine ratio 13 12 - 20      GFR est AA >60 >60 ml/min/1.73m2 GFR est non-AA >60 >60 ml/min/1.73m2    Calcium 9.0 8.5 - 10.1 MG/DL    Bilirubin, total 0.5 0.2 - 1.0 MG/DL    ALT (SGPT) 32 16 - 61 U/L    AST (SGOT) 38 (H) 15 - 37 U/L    Alk. phosphatase 88 45 - 117 U/L    Protein, total 7.7 6.4 - 8.2 g/dL    Albumin 4.1 3.4 - 5.0 g/dL    Globulin 3.6 2.0 - 4.0 g/dL    A-G Ratio 1.1 0.8 - 1.7     DRUG SCREEN, URINE   Result Value Ref Range    BENZODIAZEPINE NEGATIVE  NEG      BARBITURATES NEGATIVE  NEG      THC (TH-CANNABINOL) NEGATIVE  NEG      OPIATES NEGATIVE  NEG      PCP(PHENCYCLIDINE) NEGATIVE  NEG      COCAINE NEGATIVE  NEG      AMPHETAMINES POSITIVE (A) NEG      METHADONE NEGATIVE  NEG      HDSCOM (NOTE)    ETHYL ALCOHOL   Result Value Ref Range    ALCOHOL(ETHYL),SERUM <3 0 - 3 MG/DL       Mental Status Examination     Appearance/Hygiene is bizarre, speaks with eyes closed. Poor eye contact. Behavior/Social Relatedness Appropriate   Musculoskeletal Gait/Station: appropriate  Tone (flaccid, cogwheeling, spastic): appropriate  Psychomotor (hyperkinetic, hypokinetic): appropriate   Involuntary movements (tics, dyskinesias, akathisia, stereotypies): none   Speech   Latency and paucity of speech noted. Speech is of low volume, and dysarthric. Mood   \"I'm fine. \"    Affect    Restricted. Thought Process [place] and disorganized.      Vagueness, incoherence, circumstantiality, tangentiality, neologisms, perseveration, flight of ideas, or self-contradictory statements not present on assessment   Thought Content and Perceptual Disturbances + delusions,   + perseveration  + thought blocking      Denies thought blocking, insertion and withdrawal. Pt denies feeling his body is under external control     Denies ideas of reference, overvalued ideas, ruminations, obsession, compulsions, and phobias     Denies self-injurious behavior (SIB), suicidal ideation (SI), aggressive behavior or homicidal ideation (HI)    Denies auditory and visual hallucinations   Sensorium and Cognition  AOx4, attention intact, memory intact, language use appropriate, and fund of knowledge age appropriate   Insight  impaired due to psychosis   Judgment impaired due to psychosis       Suicide Risk Assessment     Admission  Date/Time: 08/04/17    [x] Admission  [] Discharge     Key Factors: medication non-compliance  Current admission precipitated by suicide attempt?   []  Yes     2    [x]  No     1     Suicide Attempt History  [] Past attempts of high lethality    2 []  Past attempts of low lethality    1 [x]  No previous attempts       0   Suicidal Ideation []  Constant suicidal thoughts      2 []  Intermittent or fleeting suicidal  thoughts  1 [x]  Denies current suicidal thoughts    0   Suicide Plan   []  Has plan with actual OR potential access to planned method    2 []  Has plan without access to planned method      1 [x]  No plan            0   Plan Lethality []  Highly lethal plan (Carbon monoxide, gun, hanging, jumping)    2 []  Moderate lethality of plan          1 [x]  Low lethality of plan (biting, head banging, superficial scratching, pillow over face)  0   Safety Plan Agreement  []  Unwilling OR unable to agree due to impaired reality testing   2   [x]  Patient is ambivalent and/or guarded      1 []  Reliably agrees        0   Current Morbid Thoughts (reunion fantasies, preoccupations with death) []  Constantly     2     []  Frequently    1 [x]  Rarely    0   Elopement Risk  []  High risk     2 [x]  Moderate risk    1 []   Low risk    0   Symptoms    []  Hopeless  []  Helpless  []  Anhedonia   []  Guilt/shame  []  Anger/rage  []  Anxiety  []  Insomnia   []  Agitation   []  Impulsivity  []  5-6 symptoms present    2 []  3-4 symptoms present    1  [x]  0-2 symptoms present    0     Total Score: 3  --------------------------------------------------------------------------------------------------------------  Subjective Appraisal of Risk:  []  Patient replies not trustworthy: several non-verbal cues.  []  Patient replies questionable: trustworthy: at least 1 non-verbal cue. [x]  Patient replies appear trustworthy. Protective measures (select all that apply):  []  Successful past responses to stress  []  Spiritual/Rastafari beliefs  []  Capacity for reality testing  [x]  Positive therapeutic relationships  [x]  Social supports/connections  [x]  Positive coping skills  []  Frustration tolerance/optimism  []  Children or pets in the home  []  Sense of responsibility to family  []  Agrees to treatment plan and follow up    High Risk Diagnoses (select all that apply):  []  Depression/Bipolar Disorder  []  Dual Diagnosis  []  Cardiovascular Disease  [x]  Schizophrenia  []  Chronic Pain  []  Epilepsy  []  Cancer  []  Personality Disorder  []  HIV/AIDS  []  Multiple Sclerosis    Dangerousness Assessment (Suicide, homicide, property destruction. ..)    Risk Factors reviewed and risk assessed to be:  [] low  [x] low-moderate  [] moderate   [] moderate-high  [] high     Protection factors reviewed and risk assessed to be:  [x] low  [] low-moderate  [] moderate   [] moderate-high  [] high     Response to treatment and risk assessed to be:  [x] low  [] low-moderate  [] moderate   [] moderate-high  [] high     Support reviewed and risk assessed to be:  [x] low  [] low-moderate  [] moderate   [] moderate-high  [] high     Acceptance of Discharge and outpatient treatment reviewed and risk assessed to be:    [x] low  [] low-moderate  [] moderate   [] moderate-high  [] high   Overall risk assessed to be:  [x] low  [] low-moderate  [] moderate   [] moderate-high  [] high       Assessment and Plan     Psychiatric Diagnoses:   Schizophrenia    Medical Diagnoses:   None identified    Psychosocial and contextual factors:   Medication non-compliance  Educational barriers    Level of impairment/disability:   Severe Ricard Matte is a 23 y.o. who is currently experiencing worsening psychosis in the context of medication non-compliance. Pt takes a stimulant outside of the hospital for ADD/ADHD and was noted to decompensate further on that medication. Stimulant will be discontinued while hospitalized. Will restart pt's Risperdal at 0.5mg po Qam and 1mg po QHS. Considered switching to once daily dosing on [place] but I spoke with pt's current therapist who paying for medication will be an issue for the pt. Will check routine labs. Pt denies SI, HI and AVH. 1. Admit to locked inpatient behavioral health unit. Start milieu, group, art and occupation therapy. 2. Schizophrenia   - Continue risperidone 1mg po Qam and 2mg po QHS. - Pt was fatigued this morning due to medications. 3. EPS:             - Continue benztropine to 0.5mg po BID.            - Consider increasing to 1mg po BID. 4. Routine labs ordered and reviewed by this provider. 5. Reviewed instructions, risks, benefits and side effects. 6. Disposition: SW will assist in coordinating appts  7. Tentative date of discharge: 8-8-2017.        Kirsten Talavera MD  Psychiatrist  [facility]

## 2017-08-05 PROCEDURE — 74011250637 HC RX REV CODE- 250/637: Performed by: PSYCHIATRY & NEUROLOGY

## 2017-08-05 PROCEDURE — 65220000005 HC RM SEMIPRIVATE PSYCH 3 OR 4 BED

## 2017-08-05 RX ADMIN — RISPERIDONE 1 MG: 1 TABLET ORAL at 08:35

## 2017-08-05 RX ADMIN — BENZTROPINE MESYLATE 0.5 MG: 1 TABLET ORAL at 20:21

## 2017-08-05 RX ADMIN — BENZTROPINE MESYLATE 0.5 MG: 1 TABLET ORAL at 08:35

## 2017-08-05 RX ADMIN — RISPERIDONE 2 MG: 2 TABLET ORAL at 20:21

## 2017-08-05 NOTE — BH NOTES
GROUP THERAPY PROGRESS NOTE    Deo Rose is not participating in [place].      Group time: 30 minutes    Personal goal for participation: none    Goal orientation: community    Group therapy participation: passive    Therapeutic interventions reviewed and discussed: goals and procedures    Impression of participation: encouraged

## 2017-08-05 NOTE — BH NOTES
GROUP THERAPY PROGRESS NOTE    Oren Bolanos is participating in Recreational Therapy. Group time: 45 minutes    Goal orientation: community    Group therapy participation: active    Therapeutic interventions reviewed and discussed: Games in recreation area. Impression of participation: Patient participated in group.

## 2017-08-05 NOTE — PROGRESS NOTES
Problem: Altered Thought Process (Adult/Pediatric)  Goal: *STG: Complies with medication therapy  Patient will be compliant with prescribed medication daily. Outcome: Progressing Towards Goal  Patient has been compliant with prescribed medication. Goal: *STG: Decreased hallucinations  Patient will have decrease or absence of auditory hallucinations prior to discharge. Outcome: Progressing Towards Goal  Patient denies auditory hallucinations. Comments:   Patient approached writer stating \"you know they have poisonous candy at [facility] right\". When asked why he believed this to be true patient stated \"yeah I tasted it and it made me sick. It does something that deals with your brain. But I can't tell you because it's impossible for me to get back home, because you may want to keep me in here longer. It tasted like it had peroxide in it\". Patient verbalized initially being fearful of sleeping in room alone however advised writer this AM that he \"slept good with no problems\". Patient has been watchful and pretending to fall when walking throughout milieu this AM. Patient has been compliant with prescribed medication and has been cooperative throughout 1:1. Patient denies auditory hallucinations. Denies suicidal ideation with no safety issues noted this shift. Will continue to monitor for safety with support and education as needed throughout treatment regimen.

## 2017-08-05 NOTE — BH NOTES
Pt was pleasant and calm at the beginning of the shift. Pt later participated in evening group and enjoyed a visit from family. After being med compliant, Pt started displaying erratic behavior by pacing back and forth between the day room and his bed room. Staff asked the Pt if he was o.k. Pt stated that he could not sleep in his room because it was haunted and wanted to move to another room with a roommate. Staff encouraged the Pt to relax and ensured him that the room was not haunted and if he wanted he could sleep with the lights on if that would make him more comfortable. Staff will continue to monitor for safety and well being.

## 2017-08-06 PROCEDURE — 74011250637 HC RX REV CODE- 250/637: Performed by: PSYCHIATRY & NEUROLOGY

## 2017-08-06 PROCEDURE — 65220000005 HC RM SEMIPRIVATE PSYCH 3 OR 4 BED

## 2017-08-06 RX ADMIN — LORAZEPAM 1 MG: 1 TABLET ORAL at 20:12

## 2017-08-06 RX ADMIN — RISPERIDONE 2 MG: 2 TABLET ORAL at 20:12

## 2017-08-06 RX ADMIN — BENZTROPINE MESYLATE 0.5 MG: 1 TABLET ORAL at 20:12

## 2017-08-06 RX ADMIN — BENZTROPINE MESYLATE 0.5 MG: 1 TABLET ORAL at 08:14

## 2017-08-06 RX ADMIN — RISPERIDONE 1 MG: 1 TABLET ORAL at 08:14

## 2017-08-06 NOTE — PROGRESS NOTES
Problem: Altered Thought Process (Adult/Pediatric)  Goal: *STG: Complies with medication therapy  Patient will be compliant with prescribed medication daily. Outcome: Progressing Towards Goal  Patient has been compliant with prescribed medication. Goal: *STG: Decreased hallucinations  Patient will have decrease or absence of auditory hallucinations prior to discharge. Outcome: Progressing Towards Goal  Patient has verbalized denial of auditory hallucinations. Comments:   Patient denies auditory hallucinations and has reported feeling better than before I came in\"  stating \"before I got here I was hearing voices. They were like whispers in your ear\". Patient stated \"I was schizophrenic at [facility] and the candy was poison. I just want to know how I got home\". Patient denies suicidal ideation with no safety issues noted. Patient reported sleeping \"alright\" throughout the night. Patient has been focused on food throughout the morning, eating snacks and juice. Patient advised writer that he had stolen a \"hoody\" from [facility] stating \"I took it because I was cold and didn't have anything. Please don't tell my auntie she'll be upset but I knew you could understand\".

## 2017-08-06 NOTE — BH NOTES
PT participated in recreational group today, he complied with staff and took his medication, he consumed 100% of all meals, staff will continue to monitor for health and safety.

## 2017-08-06 NOTE — BH NOTES
Psychiatry progress note     Chart reviewed, patient interviewed. Patient stated he feels fine and denied concerns. Asked about girlfriend's death and patient doesn't want to talk about it. Feels his meds are good. Patient stated his [place]" is improving and he is \"taking stuff in\". Patient described feeling out of it prior to the hospitalization. Says he is focused on finding employment.     On exam, euthymic. Thought process - difficulty opening up; superficial/vague. No SI, HI or AVH. Insight and judgment fair.     Meds - Risperdal 1 qDay, 2 qHS, Cogentin 0.5 BID     Assessment - schizophrenia. Stabilizing. Patient having some difficulty opening up/discussing current stresses. Plan is to continue current treatment plan.

## 2017-08-06 NOTE — BH NOTES
GROUP THERAPY PROGRESS NOTE    Deo Romero is participating in [place].      Group time: 30 minutes    Personal goal for participation: See his family    Goal orientation: community    Group therapy participation: minimal    Therapeutic interventions reviewed and discussed: goals and procedures    Impression of participation: encouraged

## 2017-08-06 NOTE — BH NOTES
Psychiatry progress note    Chart reviewed, patient interviewed. Patient stated he was hospitalized because he ran away from a few days and his girlfriend . The patient stated he didn't take his meds during that time. Patient stated he does not want to talk about his girlfriend's death. Stated there was a lot that went on. Patient complained he doesn't feel like talking. Says he is back on his meds now. On exam, depressed. No SI, HI or AVH. Insight and judgment fair. Meds - Risperdal 1 qDay, 2 qHS, Cogentin 0.5 BID    Assessment - schizophrenia. Stabilizing. Will continue to monitor affective symptoms. Plan is to continue current treatment plan.

## 2017-08-06 NOTE — BH NOTES
GROUP THERAPY PROGRESS NOTE    Corinne Care is participating in Recreational Therapy. Group time: 45 minutes    Personal goal for participation: relaxation     Goal orientation: relaxation    Group therapy participation: active    Therapeutic interventions reviewed and discussed: PT sat in the activity room he relaxed quietly.     Impression of participation: calm

## 2017-08-07 PROCEDURE — 74011250637 HC RX REV CODE- 250/637: Performed by: PSYCHIATRY & NEUROLOGY

## 2017-08-07 PROCEDURE — 65220000005 HC RM SEMIPRIVATE PSYCH 3 OR 4 BED

## 2017-08-07 RX ORDER — RISPERIDONE 2 MG/1
2 TABLET, FILM COATED ORAL 2 TIMES DAILY
Status: DISCONTINUED | OUTPATIENT
Start: 2017-08-07 | End: 2017-08-08

## 2017-08-07 RX ADMIN — RISPERIDONE 1 MG: 1 TABLET ORAL at 08:22

## 2017-08-07 RX ADMIN — RISPERIDONE 2 MG: 2 TABLET ORAL at 20:10

## 2017-08-07 RX ADMIN — BENZTROPINE MESYLATE 0.5 MG: 1 TABLET ORAL at 08:24

## 2017-08-07 RX ADMIN — BENZTROPINE MESYLATE 0.5 MG: 1 TABLET ORAL at 20:10

## 2017-08-07 NOTE — BH NOTES
GROUP THERAPY PROGRESS NOTE    Deo Rhodesleticia Romero is participating in [place]. Group time: 30 minutes    Personal goal for participation: rules/ regulations    Goal orientation: community    Group therapy participation: active and minimal    Therapeutic interventions reviewed and discussed: He was receptive tot he rules and regulations during group. Impression of participation: He was alert but very quiet during group he was not a management problem during group.

## 2017-08-07 NOTE — BH NOTES
Patient received Ativan 1 mg for anxious and restless behaviors pacing throughout milieu and placing upper body over nursing station for majority of the shift. Patient has received visit from [facility] and cousin with verbalization of satisfaction however when father visited patient requested to have his father escorted out stating \"please have this gentleman assisted out please\". Patient would not elaborate or discuss why he quickly ended visit with father stating \"it's just to long\". Patient was paranoid and delusional stating \"they put poison in my food I got from [facility]. Palma Bence does it to yah know\". Patient talks to writer in a soft voice at a whisper. Patient has been focused on food continually requesting snack after snack throughout the day. Patient denies auditory hallucinations, denies suicidal ideation. Will monitor for safety.

## 2017-08-07 NOTE — BH NOTES
GROUP THERAPY PROGRESS NOTE    Deo Bates is participating in Goals Group.      Group time: 45 minutes    Personal goal for participation:  Making changes worksheet:     Goal orientation: community    Group therapy participation: active    Therapeutic interventions reviewed and discussed:     Impression of participation:

## 2017-08-07 NOTE — PROGRESS NOTES
[address]  Inpatient Progress Note     Date of Service: 17  Hospital Day: 4     Subjective/Interval History   17    Treatment Team Notes:  Notes reviewed and/or discussed and report that Toshia Hernandes is anxious and restless. Pacing unit. Aunt and cousin visited which went well. Father visited but pt had him leave. Paranoia, delusional believing food at a local gas station was poisoned. Focused on food and asking for snacks. Denies SI, HI & AVH. Patient interview: Toshia Hernandes was interviewed by this writer today. Pt is more talkative. thoughts are less disorganized. Pt still believes his girlfriend was killed. Denies he has children but notes his girlfriend was killed when she was pregnant with 3 of his children. Pt focused on how he got home last week when he was off his medications. Remains convinced he was away from home for 3 days but he was gone for 1 day. Pt asked this provider if his throat were cut, would his eyes remain open when he . Pt currently denies SI, HI and AVH. Objective     Vitals:    17 1924 17 0734 17 1904 17 0745   BP: 120/66 123/74 125/65 118/72   Pulse: 81 91 98 92   Resp: 16 18 14 18   Temp: 97.9 °F (36.6 °C) 98.1 °F (36.7 °C) 97 °F (36.1 °C) 97.6 °F (36.4 °C)   SpO2:       Weight:       Height:           Mental Status Examination     Appearance/Hygiene is unkempt   Behavior/Social Relatedness Appropriate   Musculoskeletal Gait/Station: appropriate  Tone (flaccid, cogwheeling, spastic): appropriate  Psychomotor (hyperkinetic, hypokinetic): hypoactive  Involuntary movements (tics, dyskinesias, akathisia, stereotypies): none   Speech   Rate, rhythm, volume, fluency and articulation are appropriate   Mood   anxious   Affect    Full range. Smiling & laughing appropriately.     Thought Process Bizarre but goal directed   Thought Content and Perceptual Disturbances +Delusions    Denies self-injurious behavior (SIB), suicidal ideation (SI), aggressive behavior or homicidal ideation (HI)    Denies auditory and visual hallucinations   Sensorium and Cognition  AOx4, attention intact, memory intact, language use appropriate, and fund of knowledge age appropriate   Insight  poor   Judgment poor     Results for orders placed or performed during the hospital encounter of 08/03/17   CBC WITH AUTOMATED DIFF   Result Value Ref Range    WBC 7.8 4.6 - 13.2 K/uL    RBC 5.31 4.70 - 5.50 M/uL    HGB 16.0 13.0 - 16.0 g/dL    HCT 46.1 36.0 - 48.0 %    MCV 86.8 74.0 - 97.0 FL    MCH 30.1 24.0 - 34.0 PG    MCHC 34.7 31.0 - 37.0 g/dL    RDW 12.6 11.6 - 14.5 %    PLATELET 815 601 - 774 K/uL    MPV 9.7 9.2 - 11.8 FL    NEUTROPHILS 58 40 - 73 %    LYMPHOCYTES 30 21 - 52 %    MONOCYTES 11 (H) 3 - 10 %    EOSINOPHILS 1 0 - 5 %    BASOPHILS 0 0 - 2 %    ABS. NEUTROPHILS 4.4 1.8 - 8.0 K/UL    ABS. LYMPHOCYTES 2.3 0.9 - 3.6 K/UL    ABS. MONOCYTES 0.9 0.05 - 1.2 K/UL    ABS. EOSINOPHILS 0.1 0.0 - 0.4 K/UL    ABS. BASOPHILS 0.0 0.0 - 0.06 K/UL    DF AUTOMATED     METABOLIC PANEL, COMPREHENSIVE   Result Value Ref Range    Sodium 140 136 - 145 mmol/L    Potassium 3.8 3.5 - 5.5 mmol/L    Chloride 103 100 - 108 mmol/L    CO2 30 21 - 32 mmol/L    Anion gap 7 3.0 - 18 mmol/L    Glucose 86 74 - 99 mg/dL    BUN 14 7.0 - 18 MG/DL    Creatinine 1.06 0.6 - 1.3 MG/DL    BUN/Creatinine ratio 13 12 - 20      GFR est AA >60 >60 ml/min/1.73m2    GFR est non-AA >60 >60 ml/min/1.73m2    Calcium 9.0 8.5 - 10.1 MG/DL    Bilirubin, total 0.5 0.2 - 1.0 MG/DL    ALT (SGPT) 32 16 - 61 U/L    AST (SGOT) 38 (H) 15 - 37 U/L    Alk.  phosphatase 88 45 - 117 U/L    Protein, total 7.7 6.4 - 8.2 g/dL    Albumin 4.1 3.4 - 5.0 g/dL    Globulin 3.6 2.0 - 4.0 g/dL    A-G Ratio 1.1 0.8 - 1.7     DRUG SCREEN, URINE   Result Value Ref Range    BENZODIAZEPINE NEGATIVE  NEG      BARBITURATES NEGATIVE  NEG      THC (TH-CANNABINOL) NEGATIVE  NEG      OPIATES NEGATIVE  NEG PCP(PHENCYCLIDINE) NEGATIVE  NEG      COCAINE NEGATIVE  NEG      AMPHETAMINES POSITIVE (A) NEG      METHADONE NEGATIVE  NEG      HDSCOM (NOTE)    ETHYL ALCOHOL   Result Value Ref Range    ALCOHOL(ETHYL),SERUM <3 0 - 3 MG/DL         Assessment/Plan      Psychiatric Diagnoses:   Schizophrenia     Medical Diagnoses:   None identified     Psychosocial and contextual factors:   Medication non-compliance  Educational barriers     Level of impairment/disability:   Severe Antonio D Sylvie Getting is a 23 y.o. who is currently experiencing improving psychosis in the context of medication  compliance. Remains delusional and making some bizarre statements. Pt willing to increase Risperdal. Pt denies SI, HI and AVH. 1.  Schizophrenia: improving  - Continue risperidone 1mg po Qam and 2mg po QHS. - Pt was fatigued this morning due to medications. 2. EPS:  - Continue benztropine to 0.5mg po BID. 3.  Reviewed instructions, risks, benefits and side effects of medications  4.   Disposition/Discharge Date: Home/8-9-2017      MD DR. ALEX[facility]  Psychiatry

## 2017-08-07 NOTE — PROGRESS NOTES
Problem: Altered Thought Process (Adult/Pediatric)  Goal: *STG: Remains safe in hospital  Patient will remain safe during hospitalization. Outcome: Progressing Towards Goal  Patient remains safe, denies suicidal thoughts. Goal: *STG: Complies with medication therapy  Patient will be compliant with prescribed medication daily. Outcome: Progressing Towards Goal  Medication compliance. Goal: *STG: Decreased hallucinations  Patient will have decrease or absence of auditory hallucinations prior to discharge. Outcome: Progressing Towards Goal  Denies auditory and visual hallucinations. Comments:   Patient states he is feeling better today, Not as sad, mood is calm. Denies feeling paranoid. Patient states he is not having hallucinations. Medication compliant.

## 2017-08-07 NOTE — BSMART NOTE
SW Contact:  2nd Collateral with Family: Lydia Ash again clarified that no one to her knowledge in pt's [place] has , let alone murdered. .. Also pt had \"friend\". ..  girl who worked [employer] that he liked, but she was \"not at all' interested in pt. .. As for pt comments. Tamica Rodriguez girlfriend that  had triplets\". .. grandmom confirmed her daughter had \"twins\" x6 months ago & she does take care of them daily at the house. . & occasionally babysits her x3 yr old other grand daughter. ... Relationship with his dad: Historically was minimally involved in pt's life. Carly sandoval that has his own issues\". . Until x2 yrs ago when dad started spending more time with pt, as well as pt visiting dad & even staying over there a couple nights a week. This STOPPED a couple months ago & grandmom not sure why. . With almost no contact & pt not wanting to see Dad, just like during Dad's visitation over the weekend. , when pt asked staff to have \"that gentleman to leave please\". Pt admission: There seems to be a correlation between when pt stopped taking his meds & stopped interacting with his dad about x4 months ago.

## 2017-08-07 NOTE — BSMART NOTE
ACTIVITIES THERAPY PROGRESS NOTE    Group time:1220    Attended less than 10 minutes and left to \"go to the bathroom\" and did not return.

## 2017-08-08 PROCEDURE — 74011250637 HC RX REV CODE- 250/637: Performed by: PSYCHIATRY & NEUROLOGY

## 2017-08-08 PROCEDURE — 65220000003 HC RM SEMIPRIVATE PSYCH

## 2017-08-08 RX ORDER — RISPERIDONE 2 MG/1
2 TABLET, FILM COATED ORAL
Status: DISCONTINUED | OUTPATIENT
Start: 2017-08-08 | End: 2017-08-10 | Stop reason: HOSPADM

## 2017-08-08 RX ORDER — RISPERIDONE 1 MG/1
1 TABLET, FILM COATED ORAL DAILY
Status: DISCONTINUED | OUTPATIENT
Start: 2017-08-09 | End: 2017-08-10 | Stop reason: HOSPADM

## 2017-08-08 RX ADMIN — TRAZODONE HYDROCHLORIDE 50 MG: 50 TABLET ORAL at 20:40

## 2017-08-08 RX ADMIN — RISPERIDONE 2 MG: 2 TABLET ORAL at 20:40

## 2017-08-08 RX ADMIN — RISPERIDONE 2 MG: 2 TABLET ORAL at 08:01

## 2017-08-08 RX ADMIN — BENZTROPINE MESYLATE 0.5 MG: 1 TABLET ORAL at 20:40

## 2017-08-08 RX ADMIN — BENZTROPINE MESYLATE 0.5 MG: 1 TABLET ORAL at 08:00

## 2017-08-08 NOTE — BH NOTES
GROUP THERAPY PROGRESS NOTE    Ronnie Olvera is participating in Recreational Therapy. Group time: 30 minutes    Personal goal for participation: Social, Relaxation    Goal orientation: social    Group therapy participation: minimal    Therapeutic interventions reviewed and discussed: Social,Relaxation    Impression of participation: Pt minimally participated in recreational therapy; walked around for a few minutes and then chose to watch television. Pt was eager to go downstairs for recreation, but then began to focus on what time we would return to the unit. Pt was not social with peers and minimally with this writer.

## 2017-08-08 NOTE — BSMART NOTE
SW Contact:  Focus continues to be on reality orientation with pt with some effort to clarify what contents are real without getting into a debate with him. Also encourage attendance & participation in groups to provide structure & interaction with peers. Pt still somewhat suspicious & still seems fixed on beliefs of girl friend dying & having x3 kids.

## 2017-08-08 NOTE — PROGRESS NOTES
[address]  Inpatient Progress Note     Date of Service: 08/08/17  Hospital Day: 5     Subjective/Interval History   08/08/17    Treatment Team Notes:  Notes reviewed and/or discussed and report that Kaye Terrell states he is feeling better today. Denied paranoia and hallucinations. Pacing on the unit early this morning. Appeared paranoid. Patient interview: Kaye Terrell was interviewed by this writer today. Vital signs reviewed and stable. Pt is less talkative today and seems to be in some distress. He holds his head in his hands and states he rubs his face and head frequently. Pt repeatedly states he is doing well when asked. He whispers and mumbles to himself several times during the interview. Eye contact is poor. Shared he walked into someone's room by mistake this morning as he wanted to look out of the window. Pt got up abruptly during the interview, asked for his SW and left. After leaving, he returned to the room door and asked to see his SWer again. I explained I would relay the message to him. Pt did not answer questions in regards to SI, HI and AVH. Objective     Vitals:    08/06/17 1904 08/07/17 0745 08/07/17 1846 08/08/17 0759   BP: 125/65 118/72 118/73 114/73   Pulse: 98 92 94 95   Resp: 14 18 18 16   Temp: 97 °F (36.1 °C) 97.6 °F (36.4 °C) 96.9 °F (36.1 °C) 97.5 °F (36.4 °C)   SpO2:       Weight:       Height:           Mental Status Examination     Appearance/Hygiene is unkempt, seems to be in distress. Behavior/Social Relatedness Bizarre. Restless. Musculoskeletal Gait/Station: appropriate  Tone (flaccid, cogwheeling, spastic): appropriate  Psychomotor (hyperkinetic, hypokinetic): hypoactive  Involuntary movements (tics, dyskinesias, akathisia, stereotypies): none   Speech   Rate, rhythm, volume, fluency and articulation are appropriate   Mood   anxious   Affect    Full range. Smiling & laughing appropriately.     Thought Process Bizarre but goal directed, disorganized. Thought Content and Perceptual Disturbances Difficult to determine. Pt did not answer several questions. Denies self-injurious behavior (SIB), suicidal ideation (SI), aggressive behavior or homicidal ideation (HI)    Denies auditory and visual hallucinations   Sensorium and Cognition  AOx4, attention intact, memory intact, language use appropriate, and fund of knowledge age appropriate   Insight  poor   Judgment poor     Results for orders placed or performed during the hospital encounter of 08/03/17   CBC WITH AUTOMATED DIFF   Result Value Ref Range    WBC 7.8 4.6 - 13.2 K/uL    RBC 5.31 4.70 - 5.50 M/uL    HGB 16.0 13.0 - 16.0 g/dL    HCT 46.1 36.0 - 48.0 %    MCV 86.8 74.0 - 97.0 FL    MCH 30.1 24.0 - 34.0 PG    MCHC 34.7 31.0 - 37.0 g/dL    RDW 12.6 11.6 - 14.5 %    PLATELET 774 373 - 642 K/uL    MPV 9.7 9.2 - 11.8 FL    NEUTROPHILS 58 40 - 73 %    LYMPHOCYTES 30 21 - 52 %    MONOCYTES 11 (H) 3 - 10 %    EOSINOPHILS 1 0 - 5 %    BASOPHILS 0 0 - 2 %    ABS. NEUTROPHILS 4.4 1.8 - 8.0 K/UL    ABS. LYMPHOCYTES 2.3 0.9 - 3.6 K/UL    ABS. MONOCYTES 0.9 0.05 - 1.2 K/UL    ABS. EOSINOPHILS 0.1 0.0 - 0.4 K/UL    ABS. BASOPHILS 0.0 0.0 - 0.06 K/UL    DF AUTOMATED     METABOLIC PANEL, COMPREHENSIVE   Result Value Ref Range    Sodium 140 136 - 145 mmol/L    Potassium 3.8 3.5 - 5.5 mmol/L    Chloride 103 100 - 108 mmol/L    CO2 30 21 - 32 mmol/L    Anion gap 7 3.0 - 18 mmol/L    Glucose 86 74 - 99 mg/dL    BUN 14 7.0 - 18 MG/DL    Creatinine 1.06 0.6 - 1.3 MG/DL    BUN/Creatinine ratio 13 12 - 20      GFR est AA >60 >60 ml/min/1.73m2    GFR est non-AA >60 >60 ml/min/1.73m2    Calcium 9.0 8.5 - 10.1 MG/DL    Bilirubin, total 0.5 0.2 - 1.0 MG/DL    ALT (SGPT) 32 16 - 61 U/L    AST (SGOT) 38 (H) 15 - 37 U/L    Alk.  phosphatase 88 45 - 117 U/L    Protein, total 7.7 6.4 - 8.2 g/dL    Albumin 4.1 3.4 - 5.0 g/dL    Globulin 3.6 2.0 - 4.0 g/dL    A-G Ratio 1.1 0.8 - 1.7     DRUG SCREEN, URINE Result Value Ref Range    BENZODIAZEPINE NEGATIVE  NEG      BARBITURATES NEGATIVE  NEG      THC (TH-CANNABINOL) NEGATIVE  NEG      OPIATES NEGATIVE  NEG      PCP(PHENCYCLIDINE) NEGATIVE  NEG      COCAINE NEGATIVE  NEG      AMPHETAMINES POSITIVE (A) NEG      METHADONE NEGATIVE  NEG      HDSCOM (NOTE)    ETHYL ALCOHOL   Result Value Ref Range    ALCOHOL(ETHYL),SERUM <3 0 - 3 MG/DL         Assessment/Plan      Psychiatric Diagnoses:   Schizophrenia     Medical Diagnoses:   None identified     Psychosocial and contextual factors:   Medication non-compliance  Educational barriers     Level of impairment/disability:   Severe     Antonio D Kary Skiff is a 23 y.o. who is currently experiencing improving psychosis in the context of medication  compliance. Remains delusional and making some bizarre statements. Pt willing to increase Risperdal. Pt denies SI, HI and AVH. 1.  Schizophrenia: improving   - Decrease risperidone to 1mg po Qam and 2mg po QHS. - Of note, Risperdal was increased to 2mg po BID on 8-7-17. This will be added as an addendum beatrice progress note from 8-7-17. 2. EPS:   - Consider increase benztropine to 1mg po BID. 3.  Reviewed instructions, risks, benefits and side effects of medications  4. Disposition/Discharge Date: Home/8-    5.  Will check a hemoglobin A1c, FLP and EKG as pt is on Alpesh 6, MD DR. [facility]  Psychiatry

## 2017-08-08 NOTE — PROGRESS NOTES
Patient is alert and oriented x 4; guarded/flat affect; denied thoughts of harm to self or others; when asked about hallucinations, patient did not respond; required verbal redirection for trying to hold male peer's hand, then asking why peer was moved to another room; patient received visit from family member this evening; smile and talked with \"aunt\" during visit time; patient is medication compliant; will monitor and maintain safety.

## 2017-08-08 NOTE — PROGRESS NOTES
Problem: Suicide/Homicide (Adult/Pediatric)  Goal: *STG: Remains safe in hospital  Outcome: Progressing Towards Goal  Pt has not engaged in any self injurious behaviors  Goal: *STG/LTG: Complies with medication therapy  Outcome: Progressing Towards Goal  Pt compliant with medications    Comments:   Pt has been pacing through out unit for majority of shift. Pt denies SI. Pt appears to be responding to internal stimuli as pt observed mumbling to self several times. Pt's mood euthymic with constricted affect. Pt has limited interactions with staff and has not interacted with peers. Rounds maintained Q 15 mins for safety.  Staff will continue to offer a safe and supportive environment

## 2017-08-08 NOTE — BH NOTES
GROUP THERAPY PROGRESS NOTE    Deo Lee is not  participating in Goals Group and Community. Staff encouraged pt refused  And sat close by group and was quiet with no participation.

## 2017-08-08 NOTE — BSMART NOTE
OCCUPATIONAL THERAPY PROGRESS NOTE    Group Time:  5147  Attendance:  . The patient attended 2/3 of group. Participation:  The patient participated with minimal elaboration in the activity. Attention:  The patient needed redirection to activity at least once. Interaction:  The patient acknowledges others or responds to questions,  with no spontaneous interaction. Participates primarily as asked with little detail or spontaneous comments.

## 2017-08-08 NOTE — BH NOTES
Patient has paced the hallways and day area several times during the shift, patient acts paranoid at time constantly looking back, will continue to monitor.

## 2017-08-09 LAB
CHOLEST SERPL-MCNC: 181 MG/DL
HBA1C MFR BLD: 5.2 % (ref 4.2–5.6)
HDLC SERPL-MCNC: 69 MG/DL (ref 40–60)
HDLC SERPL: 2.6 {RATIO} (ref 0–5)
LDLC SERPL CALC-MCNC: 96.6 MG/DL (ref 0–100)
LIPID PROFILE,FLP: ABNORMAL
TRIGL SERPL-MCNC: 77 MG/DL (ref ?–150)
VLDLC SERPL CALC-MCNC: 15.4 MG/DL

## 2017-08-09 PROCEDURE — 80061 LIPID PANEL: CPT | Performed by: PSYCHIATRY & NEUROLOGY

## 2017-08-09 PROCEDURE — 36415 COLL VENOUS BLD VENIPUNCTURE: CPT | Performed by: PSYCHIATRY & NEUROLOGY

## 2017-08-09 PROCEDURE — 65220000003 HC RM SEMIPRIVATE PSYCH

## 2017-08-09 PROCEDURE — 83036 HEMOGLOBIN GLYCOSYLATED A1C: CPT | Performed by: PSYCHIATRY & NEUROLOGY

## 2017-08-09 PROCEDURE — 74011250637 HC RX REV CODE- 250/637: Performed by: PSYCHIATRY & NEUROLOGY

## 2017-08-09 RX ADMIN — RISPERIDONE 2 MG: 2 TABLET ORAL at 20:06

## 2017-08-09 RX ADMIN — BENZTROPINE MESYLATE 0.5 MG: 1 TABLET ORAL at 08:10

## 2017-08-09 RX ADMIN — BENZTROPINE MESYLATE 0.5 MG: 1 TABLET ORAL at 20:06

## 2017-08-09 RX ADMIN — RISPERIDONE 1 MG: 1 TABLET ORAL at 08:12

## 2017-08-09 NOTE — BSMART NOTE
ART THERAPY GROUP PROGRESS NOTE    PATIENT SCHEDULED FOR GROUP AT: 14:45    ATTENDANCE: Full    PARTICIPATION LEVEL: Participates in the art process    ATTENTION LEVEL: Able to focus on task    FOCUS: Problem-solving    SYMBOLIC & THEMATIC CONTENT AS NOTED IN IMAGERY: He was initially calm, but became irritable near the end of group, sighing heavily and rolling his eyes (semingly in response to a hyper-verbal group member). He claimed that he was \"fine\" when inquired. He continues to present to be distant and his imagery is detached.

## 2017-08-09 NOTE — PROGRESS NOTES
conducted an initial consultation and Spiritual Assessment for Ruben Taylor, who is a 23 y.o.,male. Patients Primary Language is: [place]. According to the patients EMR Cheondoism Affiliation is: [place]. The reason the Patient came to the hospital is:   Patient Active Problem List    Diagnosis Date Noted    Schizophrenia [facility]) 01/12/2015        The  provided the following Interventions:  Initiated a relationship of care and support. Explored issues of dennis, belief, spirituality and Yarsanism/ritual needs while hospitalized. Listened empathically as patient shared. Provided chaplaincy education. Provided information about Spiritual Care Services. Offered prayer and assurance of continued prayers on patient's behalf. Chart reviewed. The following outcomes where achieved:  Patient shared limited information about both their medical narrative and spiritual journey/beliefs. Patient processed feeling about current hospitalization. Patient expressed gratitude for 's visit. Plan:  Chaplains will continue to follow and will provide pastoral care on an as needed/requested basis.  recommends bedside caregivers page  on duty if patient shows signs of acute spiritual or emotional distress.       Mercedes Blackmon  [address] [facility]  [phone]

## 2017-08-09 NOTE — BH NOTES
GROUP THERAPY PROGRESS NOTE    Deo HARINDER Quintero Clabina is participating in [place]. Group time: 30 minutes    Personal goal for participation: rules/ regulations    Goal orientation: community    Group therapy participation: minimal    Therapeutic interventions reviewed and discussed: He was quiet while group was in session. Impression of participation: The above pt was quiet but alert during group. He was pacing throughout group and appeared not a management problem during group.

## 2017-08-09 NOTE — BH NOTES
Pt has been very polite this evening; mot a management problem. \"I'm doing good\". Pt observed mumbling and whispering to himself throughout the shift; paced backed and forth between his room and the dayroom. Pt will not hold a conversation with staff, but will answer direct questions and end his statements with,\" ma'am/sir\". Pt was visited by his grandmother and cousin this shift. Pt anticipated the visit; but approximately 7 minutes into visitation, Pt became agitated and told his family that he was tired. Pt hugged and kissed his family and went back to his room. Pt was encouraged to continue his visit by staff, but Pt refused. Patient's family took his abrupt departure very well and thanked the staff for taking such good care of him. Pt did not retire to his bed, but stared out of the window in his room. Pt returned to the dayroom approximately 5 minutes after his family left. Later this evening, Pt became agitated because he has roommates. Pt was transferred to another room and stated that he felt better. Pt ate 100% of his dinner meal and snacks. Pt denies SI,HI,VH and AH. Pt is utilizing proper footwear and is free of falls. Pt contracts for safety on the unit. Will continue to monitor.

## 2017-08-09 NOTE — PROGRESS NOTES
conducted an initial consultation and Spiritual Assessment for Tata Romo, who is a 23 y.o.,male. Patients Primary Language is: [place]. According to the patients EMR Adventist Affiliation is: [place]. The reason the Patient came to the hospital is:   Patient Active Problem List    Diagnosis Date Noted    Schizophrenia [facility]) 01/12/2015        The  provided the following Interventions:  Initiated a relationship of care and support. Explored issues of dennis, belief, spirituality. Listened empathically. Offered prayer on patient's behalf. Chart reviewed. The following outcomes where achieved:  Patient processed feelings. Patient expressed gratitude for 's visit. Assessment:  Patient does not have any Judaism/cultural needs that will affect patients preferences in health care. There are no spiritual or Judaism issues which require intervention at this time. Plan:  Chaplains will continue to follow and will provide pastoral care on an as needed/requested basis.  recommends bedside caregivers page  on duty if patient shows signs of acute spiritual or emotional distress. Lory Calixto.  [facility] 9 [phone]

## 2017-08-09 NOTE — BH NOTES
ALETHEA Notes: pt has been complaint but guarded upon approach from staff. Pt ate all meals on shift. Pt hygiene  is fair. Pt did participate in spirituality group upon continuously encouragement from staff. Pt has not been a behavior problem on shift and will continue to be monitored for safety and location.

## 2017-08-10 VITALS
WEIGHT: 158 LBS | SYSTOLIC BLOOD PRESSURE: 114 MMHG | TEMPERATURE: 97.5 F | BODY MASS INDEX: 18.66 KG/M2 | OXYGEN SATURATION: 100 % | DIASTOLIC BLOOD PRESSURE: 75 MMHG | HEART RATE: 86 BPM | RESPIRATION RATE: 18 BRPM | HEIGHT: 77 IN

## 2017-08-10 PROCEDURE — 74011250637 HC RX REV CODE- 250/637: Performed by: PSYCHIATRY & NEUROLOGY

## 2017-08-10 RX ORDER — TRAZODONE HYDROCHLORIDE 50 MG/1
50 TABLET ORAL
Qty: 30 TAB | Refills: 0 | Status: SHIPPED | OUTPATIENT
Start: 2017-08-10 | End: 2017-08-21

## 2017-08-10 RX ORDER — RISPERIDONE 1 MG/1
1 TABLET, FILM COATED ORAL DAILY
Qty: 30 TAB | Refills: 0 | Status: SHIPPED | OUTPATIENT
Start: 2017-08-10 | End: 2017-08-21

## 2017-08-10 RX ORDER — RISPERIDONE 2 MG/1
2 TABLET, FILM COATED ORAL
Qty: 30 TAB | Refills: 0 | Status: SHIPPED | OUTPATIENT
Start: 2017-08-10 | End: 2017-08-21

## 2017-08-10 RX ORDER — BENZTROPINE MESYLATE 0.5 MG/1
0.5 TABLET ORAL 2 TIMES DAILY
Qty: 60 TAB | Refills: 0 | Status: SHIPPED | OUTPATIENT
Start: 2017-08-10 | End: 2017-08-21

## 2017-08-10 RX ADMIN — BENZTROPINE MESYLATE 0.5 MG: 1 TABLET ORAL at 08:27

## 2017-08-10 RX ADMIN — RISPERIDONE 1 MG: 1 TABLET ORAL at 08:27

## 2017-08-10 NOTE — PROGRESS NOTES
Problem: Altered Thought Process (Adult/Pediatric)  Goal: *STG: Remains safe in hospital  Patient will remain safe during hospitalization. Outcome: Progressing Towards Goal  Demonstrated safe behavior on unit; monitored for safety per protocol. Goal: *STG: Complies with medication therapy  Patient will be compliant with prescribed medication daily. Outcome: Progressing Towards Goal  Patient is medication compliant. Problem: Falls - Risk of  Goal: *Absence of falls  Outcome: Progressing Towards Goal  Not falls noted. Comments:   Patient is alert and oriented x 4; still bizarre with staring-like affect at times; denied thoughts of harm to self or others; verbally responds, \"all right\" when asked about progress in the hospital, appetite, personal hygiene, or if he slept throughout the night; not much to say, but verbalized that he had a nice visit with his auntie this evening; stated that he lives with his auntie and plans to return home when discharged.

## 2017-08-10 NOTE — PROGRESS NOTES
[address] [facility] [address]  Inpatient Progress Note     Date of Service: 08/10/17  Hospital Day: 7     Subjective/Interval History   08/10/17    Treatment Team Notes:  Notes reviewed and/or discussed and report that Brittany Rothman continues not to display any disruptive or aggressive behaviors. His aunt visited last evening. Pt noticed staring in the distance. His paranoia is improving. Patient interview: Brittany Rothman was interviewed by this writer today. Pt discussed grieving the loss of his close female friend. He does not want to discuss details of his loss. He also reports absent AVH. He denies any depression but does become sad when discussing his loss. He denies any SI/HI/AVH. Pt is not interested in talking about his loss to family or staff. However, he is interested in discharge. PT plans to return home with his aunt. Pt is compliant on his Risperdal 1mg/2mg without any EPS or TD symptoms. He does admit to non-compliance prior to hospitalization.        Objective     Vitals:    08/08/17 2001 08/09/17 0741 08/09/17 1917 08/10/17 0725   BP: 127/77 122/60 115/67 114/75   Pulse: 96 80 89 86   Resp: 16 18 18 18   Temp:  97.5 °F (36.4 °C) 97.7 °F (36.5 °C) 97.5 °F (36.4 °C)   SpO2:       Weight:       Height:           Mental Status Examination     Appearance/Hygiene 24 yo -american male, unkempt   Behavior/Social Relatedness Non-aggressive, glaring eye contact   Musculoskeletal Gait/Station: appropriate  Tone (flaccid, cogwheeling, spastic): appropriate  Psychomotor (hyperkinetic, hypokinetic): hypoactive  Involuntary movements (tics, dyskinesias, akathisia, stereotypies): none   Speech   Rate, rhythm, volume, fluency and articulation are appropriate   Mood   sad   Affect    stable   Thought Process linear   Thought Content and Perceptual Disturbances Denies SI/HI/AVH   Sensorium and Cognition  AOx4, grossly intact   Insight  fair   Judgment fair           Assessment/Plan Psychiatric Diagnoses:   Schizophrenia     Medical Diagnoses:   None identified     Psychosocial and contextual factors:   Medication non-compliance  Educational barriers     Level of impairment/disability:   Severe     Deo Mosley is a 23 y.o. who is currently displaying on-going symptoms of grieving without any noted psychotic symptoms. He seems to be isolative due to his grieving, not due to psychotic symptoms. PT reports thinking of his female friend periodically during the day which [facility] \"  He denies any delusions, hallucinations or paranoia. Pt is fully compliant on his regimen. He denied interest in long acting injectable. 1.  Schizophrenia: continue Risperdal 1mg/2mg  2. Continue Cogentin 1mg BID for EPS. 3.  Pt refused EKG   4. Reviewed instructions, risks, benefits and side effects of medications  5. Disposition/Discharge Date: today pending collateral from aunt.          Anna Killian MD [facility]  Psychiatry

## 2017-08-10 NOTE — BSMART NOTE
SW Contact:  Clarified for pt his d/c plan as will happen at [employer] [facility][phone]. (see fyi). He will continue to live with supportive family who help him a lot to manage his meds. Safety Plan reviewed.

## 2017-08-10 NOTE — DISCHARGE INSTRUCTIONS
BEHAVIORAL HEALTH NURSING DISCHARGE NOTE      The following personal items collected during your admission are returned to you:   Dental Appliance:    Vision: Visual Aid: None  Hearing Aid:    Jewelry:    Clothing:    Other Valuables: Other Valuables: None  Valuables sent to safe:        PATIENT INSTRUCTIONS:          The discharge information has been reviewed with the patient. The patient verbalized understanding.         Patient armband removed and shredded

## 2017-08-10 NOTE — BH NOTES
Patient was awake from 0315 to 0515. Noted to restless and guarded. Refused offer of prn medication.

## 2017-08-10 NOTE — BH NOTES
GROUP THERAPY PROGRESS NOTE    Deo Hurst is participating in [place].      Group time: 30 minutes    Personal goal for participation: discharged    Goal orientation: community    Group therapy participation: active    Therapeutic interventions reviewed and discussed: unit rules and personal goals

## 2017-08-10 NOTE — PROGRESS NOTES
Problem: Altered Thought Process (Adult/Pediatric)  Goal: *STG: Remains safe in hospital  Patient will remain safe during hospitalization. Outcome: Progressing Towards Goal  Remains safe. Goal: *STG: Complies with medication therapy  Patient will be compliant with prescribed medication daily. Outcome: Progressing Towards Goal  Medication compliant. Goal: *STG: Decreased hallucinations  Patient will have decrease or absence of auditory hallucinations prior to discharge. Outcome: Progressing Towards Goal  Denies auditory hallucinations. Comments:   Patient states he is doing better, denies auditory and visual hallucinations, sleeping better stated \" I miss my friend\". Patient states he is ready for discharge.

## 2017-08-14 NOTE — DISCHARGE SUMMARY
[facility]""  Inpatient Psychiatry   Discharge Summary     Admit date: 8/3/2017    Discharge date and time: 8/10/2017  1:22 PM    Discharge Physician: Carmenza Garcia MD    DISCHARGE DIAGNOSES     Psychiatric Diagnoses:   Schizophrenia      Medical Diagnoses:   None identified      Psychosocial and contextual factors:   Medication non-compliance  Educational barriers      Level of impairment/disability: [address] presented to the inpatient unit voluntarily for command auditory hallucinations in the setting of medication non-compliance. While hospitalized, Mr. Mecca Stacy was restarted on his home regimen of Risperdal 1mg PO qAM and Risperdal 2mg PO qHS. As his psychotic symptoms improved, Mr. Mecca Stacy discussed grieving the loss of a close female . He did not want to discuss this female friend in detail. Pt attended few groups and was not disruptive in the milieu. He complied with medication treatment but refused EKG while hospitalized. Mr. Mecca Stacy was not interested in a long acting injectable while hospitalized. DISPOSITION/FOLLOW-UP     Disposition:  Patient was discharged home with follow-up made below:     Pt will return to [place] [address]. . At [address] [place] [address], [address] [place] [address]. .. for med management with Arianne VARNER on 8/22/17 @ 10:15am .. Then later same day Olivia SANTOSW @ 2:15pm for therapy    MEDICATION CHANGES   Outpatient medications:  No current facility-administered medications on file prior to encounter. No current outpatient prescriptions on file prior to encounter.          Medications discontinued during hospitalization:  Medications Discontinued During This Encounter   Medication Reason    risperiDONE (RisperDAL) tablet 1.5 mg     benztropine (COGENTIN) tablet 0.5 mg     benztropine (COGENTIN) tablet 1 mg     risperiDONE (RisperDAL) tablet 2.5 mg     risperiDONE (RisperDAL) tablet 1 mg  risperiDONE (RisperDAL) tablet 2 mg     risperiDONE (RisperDAL) tablet 2 mg     risperiDONE (RISPERDAL) 3 mg tablet Discontinued at Discharge    risperiDONE (RISPERDAL M-TABS) 0.5 mg disintegrating tablet Discontinued at Discharge    risperiDONE (RISPERDAL M-TABS) 3 mg TbDL Discontinued at Discharge    cloNIDine HCl (CATAPRES) 0.1 mg tablet Discontinued at Discharge    dextroamphetamine-amphetamine (ADDERALL) 20 mg tablet Discontinued at Discharge    risperiDONE (RisperDAL) tablet 1 mg Patient Discharge    risperiDONE (RisperDAL) tablet 2 mg Patient Discharge    benztropine (COGENTIN) tablet 0.5 mg Patient Discharge    ibuprofen (MOTRIN) tablet 400 mg Patient Discharge    traZODone (DESYREL) tablet 50 mg Patient Discharge    LORazepam (ATIVAN) injection 1-2 mg Patient Discharge    haloperidol (HALDOL) tablet 5 mg Patient Discharge    haloperidol lactate (HALDOL) injection 5 mg Patient Discharge    LORazepam (ATIVAN) tablet 1-2 mg Patient Discharge         Discharged medication:  Discharge Medication List as of 8/10/2017 10:37 AM      START taking these medications    Details   traZODone (DESYREL) 50 mg tablet Take 1 Tab by mouth nightly as needed for Sleep. Indications: insomnia associated with depression, Print, Disp-30 Tab, R-0      benztropine (COGENTIN) 0.5 mg tablet Take 1 Tab by mouth two (2) times a day. Indications: Parkinsonism, Print, Disp-60 Tab, R-0         CONTINUE these medications which have CHANGED    Details   !! risperiDONE (RISPERDAL) 1 mg tablet Take 1 Tab by mouth daily. Indications: SCHIZOPHRENIA, Print, Disp-30 Tab, R-0      !! risperiDONE (RISPERDAL) 2 mg tablet Take 1 Tab by mouth nightly. Indications: SCHIZOPHRENIA, Print, Disp-30 Tab, R-0       !! - Potential duplicate medications found. Please discuss with provider.       STOP taking these medications       cloNIDine HCl (CATAPRES) 0.1 mg tablet Comments:   Reason for Stopping:         dextroamphetamine-amphetamine (ADDERALL) 20 mg tablet Comments:   Reason for Stopping:         risperiDONE (RISPERDAL M-TABS) 0.5 mg disintegrating tablet Comments:   Reason for Stopping:         risperiDONE (RISPERDAL M-TABS) 3 mg TbDL Comments:   Reason for Stopping:               Instructions, risks, benefits and side effects were discussed in detail prior to discharge. LABS/IMAGING DURING ADMISSION     Results for orders placed or performed during the hospital encounter of 08/03/17   CBC WITH AUTOMATED DIFF   Result Value Ref Range    WBC 7.8 4.6 - 13.2 K/uL    RBC 5.31 4.70 - 5.50 M/uL    HGB 16.0 13.0 - 16.0 g/dL    HCT 46.1 36.0 - 48.0 %    MCV 86.8 74.0 - 97.0 FL    MCH 30.1 24.0 - 34.0 PG    MCHC 34.7 31.0 - 37.0 g/dL    RDW 12.6 11.6 - 14.5 %    PLATELET 450 256 - 166 K/uL    MPV 9.7 9.2 - 11.8 FL    NEUTROPHILS 58 40 - 73 %    LYMPHOCYTES 30 21 - 52 %    MONOCYTES 11 (H) 3 - 10 %    EOSINOPHILS 1 0 - 5 %    BASOPHILS 0 0 - 2 %    ABS. NEUTROPHILS 4.4 1.8 - 8.0 K/UL    ABS. LYMPHOCYTES 2.3 0.9 - 3.6 K/UL    ABS. MONOCYTES 0.9 0.05 - 1.2 K/UL    ABS. EOSINOPHILS 0.1 0.0 - 0.4 K/UL    ABS. BASOPHILS 0.0 0.0 - 0.06 K/UL    DF AUTOMATED     METABOLIC PANEL, COMPREHENSIVE   Result Value Ref Range    Sodium 140 136 - 145 mmol/L    Potassium 3.8 3.5 - 5.5 mmol/L    Chloride 103 100 - 108 mmol/L    CO2 30 21 - 32 mmol/L    Anion gap 7 3.0 - 18 mmol/L    Glucose 86 74 - 99 mg/dL    BUN 14 7.0 - 18 MG/DL    Creatinine 1.06 0.6 - 1.3 MG/DL    BUN/Creatinine ratio 13 12 - 20      GFR est AA >60 >60 ml/min/1.73m2    GFR est non-AA >60 >60 ml/min/1.73m2    Calcium 9.0 8.5 - 10.1 MG/DL    Bilirubin, total 0.5 0.2 - 1.0 MG/DL    ALT (SGPT) 32 16 - 61 U/L    AST (SGOT) 38 (H) 15 - 37 U/L    Alk.  phosphatase 88 45 - 117 U/L    Protein, total 7.7 6.4 - 8.2 g/dL    Albumin 4.1 3.4 - 5.0 g/dL    Globulin 3.6 2.0 - 4.0 g/dL    A-G Ratio 1.1 0.8 - 1.7     DRUG SCREEN, URINE   Result Value Ref Range    BENZODIAZEPINE NEGATIVE  NEG      BARBITURATES NEGATIVE  NEG      THC (TH-CANNABINOL) NEGATIVE  NEG      OPIATES NEGATIVE  NEG      PCP(PHENCYCLIDINE) NEGATIVE  NEG      COCAINE NEGATIVE  NEG      AMPHETAMINES POSITIVE (A) NEG      METHADONE NEGATIVE  NEG      HDSCOM (NOTE)    ETHYL ALCOHOL   Result Value Ref Range    ALCOHOL(ETHYL),SERUM <3 0 - 3 MG/DL   LIPID PANEL   Result Value Ref Range    LIPID PROFILE          Cholesterol, total 181 <200 MG/DL    Triglyceride 77 <150 MG/DL    HDL Cholesterol 69 (H) 40 - 60 MG/DL    LDL, calculated 96.6 0 - 100 MG/DL    VLDL, calculated 15.4 MG/DL    CHOL/HDL Ratio 2.6 0 - 5.0     HEMOGLOBIN A1C W/O EAG   Result Value Ref Range    Hemoglobin A1c 5.2 4.2 - 5.6 %        DISCHARGE MENTAL STATUS EVALUATION     Appearance/Hygiene 22 yo -american male, unkempt   Behavior/Social Relatedness Non-aggressive, glaring eye contact   Musculoskeletal Gait/Station: appropriate  Tone (flaccid, cogwheeling, spastic): appropriate  Psychomotor (hyperkinetic, hypokinetic): hypoactive  Involuntary movements (tics, dyskinesias, akathisia, stereotypies): none   Speech                          Rate, rhythm, volume, fluency and articulation are appropriate   Mood                          sad   Affect                                                   stable   Thought Process linear   Thought Content and Perceptual Disturbances Denies SI/HI/AVH   Sensorium and Cognition              AOx4, grossly intact   Insight              fair   Judgment fair              SUICIDE RISK ASSESSMENT     [] Admission  [x] Discharge     Key Factors:   Current admission precipitated by suicide attempt?   []  Yes     2    [x]  No     1     Suicide Attempt History  [] Past attempts of high lethality    2 []  Past attempts of low lethality    1 [x]  No previous attempts       0   Suicidal Ideation []  Constant suicidal thoughts      2 []  Intermittent or fleeting suicidal  thoughts  1 [x]  Denies current suicidal thoughts    0   Suicide Plan   []  Has plan with actual OR potential access to planned method    2 []  Has plan without access to planned method      1 [x]  No plan            0   Plan Lethality []  Highly lethal plan (Carbon monoxide, gun, hanging, jumping)    2 []  Moderate lethality of plan          1 [x]  Low lethality of plan (biting, head banging, superficial scratching, pillow over face)  0   Safety Plan Agreement  []  Unwilling OR unable to agree due to impaired reality testing   2   []  Patient is ambivalent and/or guarded      1 [x]  Reliably agrees        0   Current Morbid Thoughts (reunion fantasies, preoccupations with death) []  Constantly     2     []  Frequently    1 [x]  Rarely    0   Elopement Risk  []  High risk     2 []  Moderate risk    1 [x]   Low risk    0   Symptoms    []  Hopeless  []  Helpless  []  Anhedonia   []  Guilt/shame  []  Anger/rage  []  Anxiety  []  Insomnia   []  Agitation   []  Impulsivity  []  5-6 symptoms present    2 []  3-4 symptoms present    1  [x]  0-2 symptoms present    0     Scoring Key:  10 or higher = Imminent Risk (consider 1:1)  4 - 9 = Moderate Risk (consider q 15 minute observation)Attended alcohol, tobacco, prescription and other drug psychoeducation group.   0 - 3 = Low Risk (consider q 30 minute observation)    Total Score: 1  ------------------------------------------------------------------------------------------------------------------  PLEASE ADDRESS THE FOLLOWING 5 ISSUES     Physician's Subjective Appraisal of Risk (check one):  []  Patient replies not trustworthy: several non-verbal cues. []  Patient replies questionable: trustworthy: at least 1 non-verbal cue. [x]  Patient replies appear trustworthy. Family History of Suicide?    []  Yes  [x]  No    Protective measures (select all that apply):  []  Successful past responses to stress  []  Spiritual/Episcopal beliefs  [x]  Capacity for reality testing  [x]  Positive therapeutic relationships  [x]  Social supports/connections  [x]  Positive coping skills  [x]  Frustration tolerance/optimism  []  Children or pets in the home  [x]  Sense of responsibility to family  [x]  Agrees to treatment plan and follow up    Others (list):    High Risk Diagnoses (select all that apply):  []  Depression/Bipolar Disorder  []  Dual Diagnosis  []  Cardiovascular Disease  [x]  Schizophrenia  []  Chronic Pain  []  Epilepsy  []  Cancer  []  Personality Disorder  []  HIV/AIDS  []  Multiple Sclerosis    Dangerousness Assessment (Suicide, homicide, property destruction. ..)    Risk Factors reviewed and risk assessed to be:  [] low  [x] low-moderate  [] moderate   [] moderate-high  [] high     Protection factors reviewed and risk assessed to be:  [x] low  [] low-moderate  [] moderate   [] moderate-high  [] high     Response to treatment and risk assessed to be:  [x] low  [] low-moderate  [] moderate   [] moderate-high  [] high     Support reviewed and risk assessed to be:  [x] low  [] low-moderate  [] moderate   [] moderate-high  [] high     Acceptance of Discharge and outpatient treatment reviewed and risk assessed to be:    [x] low  [] low-moderate  [] moderate   [] moderate-high  [] high   Overall risk assessed to be:  [x] low  [] low-moderate  [] moderate   [] moderate-high  [] high     Completion of discharge was greater than 30 minutes. Over 50% of today's discharge was geared towards counseling and coordination of care.           Connie Coughlin MD  Psychiatry  [facility]

## 2017-08-15 ENCOUNTER — HOSPITAL ENCOUNTER (INPATIENT)
Age: 22
LOS: 5 days | Discharge: HOME | DRG: 750 | End: 2017-08-21
Attending: EMERGENCY MEDICINE | Admitting: PSYCHIATRY & NEUROLOGY
Payer: MEDICAID

## 2017-08-15 DIAGNOSIS — R44.0 AUDITORY HALLUCINATIONS: Primary | ICD-10-CM

## 2017-08-15 LAB
AMPHET UR QL SCN: NEGATIVE
ANION GAP BLD CALC-SCNC: 8 MMOL/L (ref 3–18)
BARBITURATES UR QL SCN: NEGATIVE
BASOPHILS # BLD: 0 K/UL (ref 0–0.1)
BASOPHILS NFR BLD: 0 % (ref 0–2)
BENZODIAZ UR QL: NEGATIVE
BUN SERPL-MCNC: 16 MG/DL (ref 7–18)
BUN/CREAT SERPL: 16 (ref 12–20)
CALCIUM SERPL-MCNC: 8.9 MG/DL (ref 8.5–10.1)
CANNABINOIDS UR QL SCN: NEGATIVE
CHLORIDE SERPL-SCNC: 106 MMOL/L (ref 100–108)
CO2 SERPL-SCNC: 28 MMOL/L (ref 21–32)
COCAINE UR QL SCN: NEGATIVE
CREAT SERPL-MCNC: 1 MG/DL (ref 0.6–1.3)
DIFFERENTIAL METHOD BLD: ABNORMAL
EOSINOPHIL # BLD: 0.1 K/UL (ref 0–0.4)
EOSINOPHIL NFR BLD: 1 % (ref 0–5)
ERYTHROCYTE [DISTWIDTH] IN BLOOD BY AUTOMATED COUNT: 13.1 % (ref 11.6–14.5)
ETHANOL SERPL-MCNC: <3 MG/DL (ref 0–3)
GLUCOSE SERPL-MCNC: 90 MG/DL (ref 74–99)
HCT VFR BLD AUTO: 44.1 % (ref 36–48)
HDSCOM,HDSCOM: NORMAL
HGB BLD-MCNC: 15.5 G/DL (ref 13–16)
LYMPHOCYTES # BLD: 2.4 K/UL (ref 0.9–3.6)
LYMPHOCYTES NFR BLD: 32 % (ref 21–52)
MCH RBC QN AUTO: 30.5 PG (ref 24–34)
MCHC RBC AUTO-ENTMCNC: 35.1 G/DL (ref 31–37)
MCV RBC AUTO: 86.8 FL (ref 74–97)
METHADONE UR QL: NEGATIVE
MONOCYTES # BLD: 1.1 K/UL (ref 0.05–1.2)
MONOCYTES NFR BLD: 15 % (ref 3–10)
NEUTS SEG # BLD: 4 K/UL (ref 1.8–8)
NEUTS SEG NFR BLD: 52 % (ref 40–73)
OPIATES UR QL: NEGATIVE
PCP UR QL: NEGATIVE
PLATELET # BLD AUTO: 220 K/UL (ref 135–420)
PMV BLD AUTO: 10.1 FL (ref 9.2–11.8)
POTASSIUM SERPL-SCNC: 3.9 MMOL/L (ref 3.5–5.5)
RBC # BLD AUTO: 5.08 M/UL (ref 4.7–5.5)
SODIUM SERPL-SCNC: 142 MMOL/L (ref 136–145)
WBC # BLD AUTO: 7.7 K/UL (ref 4.6–13.2)

## 2017-08-15 PROCEDURE — 80307 DRUG TEST PRSMV CHEM ANLYZR: CPT | Performed by: PHYSICIAN ASSISTANT

## 2017-08-15 PROCEDURE — 85025 COMPLETE CBC W/AUTO DIFF WBC: CPT | Performed by: PHYSICIAN ASSISTANT

## 2017-08-15 PROCEDURE — 80048 BASIC METABOLIC PNL TOTAL CA: CPT | Performed by: PHYSICIAN ASSISTANT

## 2017-08-15 PROCEDURE — 99285 EMERGENCY DEPT VISIT HI MDM: CPT

## 2017-08-15 NOTE — ED PROVIDER NOTES
HPI Comments: 28yo M with h/o schizophrenia brought in by mother due to hallucinations and abnormal behavior. He was just discharged from [facility] last week admitted for similar issues. He admits to hearing voices. His mother reports that he disappeared for the past 3 days and her neighbor saw him in the  [address] parking lot sitting in a grocery cart. The patient is not communicative, speaks low with minimal response. He denies suicidal ideation. Denies drug use to me at this time. Patient is a 23 y.o. male presenting with mental health disorder. Mental Health Problem   The primary symptoms include hallucinations. Additional symptoms of the illness do not include no abdominal pain. Past Medical History:   Diagnosis Date    ADHD (attention deficit hyperactivity disorder)     Anxiety disorder     Depression     Mood disorder (HCC)     Psychiatric disorder     Psychotic disorder     Trauma     \"His mother  this past November\"       History reviewed. No pertinent surgical history. Family History:   Problem Relation Age of Onset    Schizophrenia Mother     Schizophrenia Brother     Schizophrenia Sister        Social History     Social History    Marital status: SINGLE     Spouse name: N/A    Number of children: N/A    Years of education: N/A     Occupational History    Not on file. Social History Main Topics    Smoking status: Never Smoker    Smokeless tobacco: Never Used    Alcohol use No    Drug use: No    Sexual activity: No     Other Topics Concern    Not on file     Social History Narrative         ALLERGIES: Review of patient's allergies indicates no known allergies. Review of Systems   Constitutional: Negative for fever. HENT: Negative for facial swelling. Eyes: Negative for visual disturbance. Respiratory: Negative for shortness of breath. Cardiovascular: Negative for chest pain. Gastrointestinal: Negative for abdominal pain.    Genitourinary: Negative for dysuria. Musculoskeletal: Negative for neck pain. Skin: Negative for rash. Neurological: Negative for dizziness. Psychiatric/Behavioral: Positive for hallucinations. Negative for confusion. All other systems reviewed and are negative. Vitals:    08/15/17 1910   BP: 127/77   Pulse: 97   Resp: 20   Temp: 98 °F (36.7 °C)   SpO2: 98%   Weight: 59 kg (130 lb)   Height: 6' (1.829 m)            Physical Exam   Constitutional: He appears well-developed and well-nourished. No distress. HENT:   Head: Normocephalic and atraumatic. Eyes: Conjunctivae are normal.   Neck: Normal range of motion. Neck supple. Cardiovascular: Normal rate. Pulmonary/Chest: Effort normal.   Abdominal: Soft. Musculoskeletal: Normal range of motion. Neurological: He is alert. Skin: Skin is warm and dry. He is not diaphoretic. Psychiatric: Thought content normal. His affect is blunt. He is withdrawn. He is noncommunicative. Nursing note and vitals reviewed. Number of Diagnoses or Management Options  Diagnosis management comments: Patient is having auditory hallucinations and has not been present in the home for the past few days. Mother is concerned that he is not taking care of himself and behaving abnormally. Vitals are stable. Labs pending. 2104: Consulted Crisis. Medically cleared. 2330: Crisis recommends CSB evaluation for TDO due to acute psychosis. 0108: CSB confirms patient will be admitted. Daily meds and diet ordered. Amount and/or Complexity of Data Reviewed  Clinical lab tests: reviewed and ordered      ED Course       Procedures    Diagnosis: No diagnosis found. Disposition: home    Follow-up Information     None          Patient's Medications   Start Taking    No medications on file   Continue Taking    BENZTROPINE (COGENTIN) 0.5 MG TABLET    Take 1 Tab by mouth two (2) times a day.  Indications: Parkinsonism    RISPERIDONE (RISPERDAL) 1 MG TABLET    Take 1 Tab by mouth daily. Indications: SCHIZOPHRENIA    RISPERIDONE (RISPERDAL) 2 MG TABLET    Take 1 Tab by mouth nightly. Indications: SCHIZOPHRENIA    TRAZODONE (DESYREL) 50 MG TABLET    Take 1 Tab by mouth nightly as needed for Sleep.  Indications: insomnia associated with depression   These Medications have changed    No medications on file   Stop Taking    No medications on file     Brad Almanzar PA-C

## 2017-08-15 NOTE — ED TRIAGE NOTES
Pt mom with pt in triage. Pt hearing voices. Pt gone for 2 - 3 days. Pt denies SI & HI.  Pt states he drank something & doesn't know if there was something in it.

## 2017-08-15 NOTE — IP AVS SNAPSHOT
Letha Saleh 
 
 
 [address] Patient: Nila Yin MRN: [id number] :1995 Current Discharge Medication List  
  
CONTINUE these medications which have CHANGED Dose & Instructions Dispensing Information Comments Morning Noon Evening Bedtime  
 benztropine 1 mg tablet Commonly known as:  COGENTIN What changed:   
- medication strength 
- how much to take Your last dose was: Your next dose is:    
   
   
 Dose:  1 mg Take 1 Tab by mouth two (2) times a day. Indications: extrapyramidal disease Quantity:  60 Tab Refills:  0  
     
   
   
   
  
 risperiDONE 2 mg tablet Commonly known as:  RisperDAL What changed:   
- medication strength 
- how much to take - when to take this - Another medication with the same name was removed. Continue taking this medication, and follow the directions you see here. Your last dose was: Your next dose is:    
   
   
 Dose:  2 mg Take 1 Tab by mouth two (2) times a day. Indications: BIPOLAR DISORDER IN REMISSION Quantity:  60 Tab Refills:  0 STOP taking these medications   
 traZODone 50 mg tablet Commonly known as:  Wang Logan Where to Get Your Medications Information on where to get these meds will be given to you by the nurse or doctor. ! Ask your nurse or doctor about these medications  
  benztropine 1 mg tablet  
 risperiDONE 2 mg tablet

## 2017-08-15 NOTE — IP AVS SNAPSHOT
[address] Patient: Elma Sherman MRN: [id number] :1995 You are allergic to the following No active allergies Recent Documentation Height Weight BMI Smoking Status 1.829 m 59 kg 17.63 kg/m2 Never Smoker Emergency Contacts Name Discharge Info Relation Home Work Mobile Piedad Lowery(Grandmother) DISCHARGE CAREGIVER [3] Other Relative [6] [phone] About your hospitalization You were admitted on:  2017 You last received care in the:  [facility] T[facility] 1 You were discharged on:  2017 Unit phone number:  [phone] Why you were hospitalized Your primary diagnosis was:  Not on File Your diagnoses also included:  Schizophrenia (Hcc) Providers Seen During Your Hospitalizations Provider Role Specialty Primary office phone Pipe Mancuso MD Attending Provider Emergency Medicine [phone] Gold Rothman MD Attending Provider Psychiatry [phone] Your Primary Care Physician (PCP) Primary Care Physician Office Phone Office Fax NONE ** None ** ** None ** Follow-up Information Follow up With Details Comments Contact Info None   None (395) Patient stated that they have no PCP Pt will return to [place] [id number]. . At [address] [place] [address], [address] [facility]. .. for med management with Chalo VARNER on 17 @ 10:15am .. Then later same day Janettegoldie Carrillos LCSW @ 2:15pm for therapy  Numbers to remember [place] [address] Emergency [phone] Suicide Prevention [phone](talk)  Pt will return to [address] [place] [address] [phone]. . At [address] [place] [address], [facility]. .. for med management with Chalo Dempsey FNP on 17 @ 10:15am .. Then later same day Janette Cocks LCSW @ 2:15pm for therapy Current Discharge Medication List  
  
CONTINUE these medications which have CHANGED Dose & Instructions Dispensing Information Comments Morning Noon Evening Bedtime  
 benztropine 1 mg tablet Commonly known as:  COGENTIN What changed:   
- medication strength 
- how much to take Your last dose was: Your next dose is:    
   
   
 Dose:  1 mg Take 1 Tab by mouth two (2) times a day. Indications: extrapyramidal disease Quantity:  60 Tab Refills:  0  
     
   
   
   
  
 risperiDONE 2 mg tablet Commonly known as:  RisperDAL What changed:   
- medication strength 
- how much to take - when to take this - Another medication with the same name was removed. Continue taking this medication, and follow the directions you see here. Your last dose was: Your next dose is:    
   
   
 Dose:  2 mg Take 1 Tab by mouth two (2) times a day. Indications: BIPOLAR DISORDER IN REMISSION Quantity:  60 Tab Refills:  0 STOP taking these medications   
 traZODone 50 mg tablet Commonly known as:  Susan Odor Where to Get Your Medications Information on where to get these meds will be given to you by the nurse or doctor. ! Ask your nurse or doctor about these medications  
  benztropine 1 mg tablet  
 risperiDONE 2 mg tablet Discharge Instructions None Discharge Orders None Introducing [facility] SERVICES! Chelsea Chavez introduces ZENT patient portal. Now you can access parts of your medical record, email your doctor's office, and request medication refills online. 1. In your internet browser, go to [email or web address] 2. Click on the First Time User? Click Here link in the Sign In box. You will see the New Member Sign Up page. 3. Enter your ZENT Access Code exactly as it appears below.  You will not need to use this code after youve completed the sign-up process. If you do not sign up before the expiration date, you must request a new code. · [employer] Access Code: [email or web address] Expires: 11/2/2017  7:48 AM 
 
4. Enter the last four digits of your Social Security Number (xxxx) and Date of Birth (mm/dd/yyyy) as indicated and click Submit. You will be taken to the next sign-up page. 5. Create a [employer] ID. This will be your [employer] login ID and cannot be changed, so think of one that is secure and easy to remember. 6. Create a [employer] password. You can change your password at any time. 7. Enter your Password Reset Question and Answer. This can be used at a later time if you forget your password. 8. Enter your e-mail address. You will receive e-mail notification when new information is available in [address] 9. Click Sign Up. You can now view and download portions of your medical record. 10. Click the Download Summary menu link to download a portable copy of your medical information. If you have questions, please visit the Frequently Asked Questions section of the [employer] website. Remember, [employer] is NOT to be used for urgent needs. For medical emergencies, dial 911. Now available from your iPhone and Android! General Information Please provide this summary of care documentation to your next provider. Patient Signature:  ____________________________________________________________ Date:  ____________________________________________________________  
  
Jade Fred Provider Signature:  ____________________________________________________________ Date:  ____________________________________________________________

## 2017-08-15 NOTE — IP AVS SNAPSHOT
Summary of Care Report The Summary of Care report has been created to help improve care coordination. Users with access to [employer] or [address] (Web-based application) may access additional patient information including the Discharge Summary. If you are not currently a [address] user and need more information, please call the number listed below in the [place] [address] section and ask to be connected with Medical Records. Facility Information Name Address Phone [address] [facility] [address] [facility] [address] [phone] Patient Information Patient Name Sex  Karolina Baker ([id number]) Male 1995 Discharge Information Admitting Provider Service Area Unit Benjamin Tesfaye MD / [address] [address]  [phone] Discharge Provider Discharge Date/Time Discharge Disposition Destination (none) 2017 (Pending) AHR (none) Patient Language Language ENGLISH [13] Hospital Problems as of 2017  Reviewed: 2015  8:40 AM by Kaleb Novak MD  
  
  
  
 Class Noted - Resolved Last Modified POA Active Problems Schizophrenia ([facility] [address]  2015 - Present 2017 by Benjamin Tesfaye MD Unknown Entered by Gale Frank Non-Hospital Problems as of 2017  Reviewed: 2015  8:40 AM by Kaleb Novak MD  
 None You are allergic to the following No active allergies Current Discharge Medication List  
  
CONTINUE these medications which have CHANGED Dose & Instructions Dispensing Information Comments  
 benztropine 1 mg tablet Commonly known as:  COGENTIN What changed:   
- medication strength 
- how much to take Dose:  1 mg Take 1 Tab by mouth two (2) times a day. Indications: extrapyramidal disease Quantity:  60 Tab Refills:  0 risperiDONE 2 mg tablet Commonly known as:  RisperDAL What changed:   
- medication strength 
- how much to take - when to take this - Another medication with the same name was removed. Continue taking this medication, and follow the directions you see here. Dose:  2 mg Take 1 Tab by mouth two (2) times a day. Indications: BIPOLAR DISORDER IN REMISSION Quantity:  60 Tab Refills:  0 STOP taking these medications Comments  
 traZODone 50 mg tablet Commonly known as:  Baylee Jose Luis Follow-up Information Follow up With Details Comments Contact Info None   None (395) Patient stated that they have no PCP Pt will return to [place] [address]. . At [address] [place] [address], [address] [place] [address]. .. for med management with Alisa Lynette FNP on 8/22/17 @ 10:15am .. Then later same day Marea Body LCSW @ 2:15pm for therapy  Numbers to remember [place] [address] Emergency [phone] Suicide Prevention [phone](talk)  Pt will return to [address] [place] [address] #[phone]. . At [address] [place] [address], [address] [place] [address]. . for med management with Alisa Lynette FNP on 8/22/17 @ 10:15am .. Then later same day Marea Body LCSW @ 2:15pm for therapy Discharge Instructions None Chart Review Routing History Recipient Method Report Sent By Nicol Zavala MD  
Fax: [phone] Phone: [phone] Fax IP Auto Routed Donnell Santa [[id number]] 1/18/2015  6:18 AM 01/18/2015 Becki Zavala MD  
Fax: [phone] Phone: [phone] Fax IP Auto Routed Donnell Santa [[id number]] 2/9/2015  5:52 PM 02/09/2015 Becki Zavala MD  
Fax: [phone] Phone: [phone] Fax IP Auto Routed Donnell Santa [[id number]] 3/4/2015  8:29 AM 03/04/2015

## 2017-08-16 PROCEDURE — 65220000003 HC RM SEMIPRIVATE PSYCH

## 2017-08-16 PROCEDURE — 74011250636 HC RX REV CODE- 250/636: Performed by: PSYCHIATRY & NEUROLOGY

## 2017-08-16 PROCEDURE — 74011250637 HC RX REV CODE- 250/637: Performed by: PSYCHIATRY & NEUROLOGY

## 2017-08-16 PROCEDURE — 74011250637 HC RX REV CODE- 250/637: Performed by: EMERGENCY MEDICINE

## 2017-08-16 RX ORDER — HYDROXYZINE PAMOATE 50 MG/1
50 CAPSULE ORAL
Status: DISCONTINUED | OUTPATIENT
Start: 2017-08-16 | End: 2017-08-21 | Stop reason: HOSPADM

## 2017-08-16 RX ORDER — RISPERIDONE 1 MG/1
1 TABLET, FILM COATED ORAL DAILY
Status: DISCONTINUED | OUTPATIENT
Start: 2017-08-16 | End: 2017-08-19

## 2017-08-16 RX ORDER — RISPERIDONE 2 MG/1
2 TABLET, FILM COATED ORAL
Status: COMPLETED | OUTPATIENT
Start: 2017-08-16 | End: 2017-08-16

## 2017-08-16 RX ORDER — LORAZEPAM 2 MG/ML
1-2 INJECTION INTRAMUSCULAR
Status: DISCONTINUED | OUTPATIENT
Start: 2017-08-16 | End: 2017-08-21 | Stop reason: HOSPADM

## 2017-08-16 RX ORDER — HALOPERIDOL 5 MG/ML
5 INJECTION INTRAMUSCULAR
Status: DISCONTINUED | OUTPATIENT
Start: 2017-08-16 | End: 2017-08-21 | Stop reason: HOSPADM

## 2017-08-16 RX ORDER — TRAZODONE HYDROCHLORIDE 50 MG/1
50 TABLET ORAL
Status: DISCONTINUED | OUTPATIENT
Start: 2017-08-16 | End: 2017-08-21 | Stop reason: HOSPADM

## 2017-08-16 RX ORDER — IBUPROFEN 600 MG/1
600 TABLET ORAL
Status: DISCONTINUED | OUTPATIENT
Start: 2017-08-16 | End: 2017-08-21 | Stop reason: HOSPADM

## 2017-08-16 RX ORDER — HALOPERIDOL 5 MG/1
5 TABLET ORAL
Status: DISCONTINUED | OUTPATIENT
Start: 2017-08-16 | End: 2017-08-21 | Stop reason: HOSPADM

## 2017-08-16 RX ORDER — BENZTROPINE MESYLATE 1 MG/1
0.5 TABLET ORAL 2 TIMES DAILY
Status: DISCONTINUED | OUTPATIENT
Start: 2017-08-16 | End: 2017-08-19

## 2017-08-16 RX ORDER — RISPERIDONE 2 MG/1
2 TABLET, FILM COATED ORAL
Status: DISCONTINUED | OUTPATIENT
Start: 2017-08-16 | End: 2017-08-19

## 2017-08-16 RX ORDER — RISPERIDONE 2 MG/1
TABLET, FILM COATED ORAL
Status: DISPENSED
Start: 2017-08-16 | End: 2017-08-16

## 2017-08-16 RX ADMIN — BENZTROPINE MESYLATE 0.5 MG: 1 TABLET ORAL at 08:54

## 2017-08-16 RX ADMIN — HALOPERIDOL LACTATE 5 MG: 5 INJECTION, SOLUTION INTRAMUSCULAR at 11:08

## 2017-08-16 RX ADMIN — HALOPERIDOL LACTATE 5 MG: 5 INJECTION, SOLUTION INTRAMUSCULAR at 21:22

## 2017-08-16 RX ADMIN — RISPERIDONE 2 MG: 2 TABLET ORAL at 01:28

## 2017-08-16 RX ADMIN — LORAZEPAM 2 MG: 2 INJECTION INTRAMUSCULAR; INTRAVENOUS at 21:22

## 2017-08-16 NOTE — PROGRESS NOTES
Patient is alert, oriented to person and place; reoriented to time; escorted to [address] [place] [address] unit by ; denied thoughts of harm to self or others; no verbal response when asked about hallucinations; exhibited periods of thought blocking, poverty of thought, loose associations, and paranoia; patient answered some questions for admission assessment, then declined to answer the other questions; tolerated well a snack, then requested to go to bed, patient oriented to room and unit routines; will monitor and maintain safe environment.

## 2017-08-16 NOTE — ED NOTES
Patient still pacing at times had an episode of crying about missing going to [place], still co-operative mother at bedside.

## 2017-08-16 NOTE — BSMART NOTE
Pt seen by Crisis in ED room [facility] by C with his Aunt ( referred to by pt and Aunt as his grandmother) present for interview. CC: bizarre behavior, off medications, psychotic. Pt was not able to answer questions with any degree of comprehension. He appears to be thought blocking and have such pre occupation with his thoughts that he is unable to respond to a question. Pt was admitted on 8/3 of this year and D/C on 8/10 for very similar circumstances. Per Aunt pt is not taking his medications, was missing for 3 days and reported seen by friends of the family at [facility] in a cart. Pt is psychotic and substantially unable to care for himself or make informed consent as to his healthcare needs. Plan: request a TDO evaluation for in pt psychiatric treatment for safety and medication management.   Plan discussed with ER MD.

## 2017-08-16 NOTE — BH NOTES
Has been observed on safety precautions as ordered. Patient has been noted  becoming more paranoid since arriving to the unit for admission. Has been  running up and down the hallway of the unit then came into the nurse's station. With lots of reassurance of his safety and reality orientation patient was kate to be redirected out of the  nurse's station. Responding to internal stimuli, eyes are rapidly moving and at times he has a  fix. stare Patient had to be redirected not to take his clothing off while in th day, affect is flat and angry. Will continue safety precautions as ordered. Provide a safe and structured environment. Establish a trusting relationship and provide reality orientation as needed.

## 2017-08-16 NOTE — PROGRESS NOTES
Patient was downstairs in recreation area start to run in area where the adolescent pts. were, attempted to run in mayes, unable to be redirected. Patient brought back to unit  offered haldol 5 mg po. Pt. refused. Patient start banging on the counter to nurse's station yelling \" Tell the police to kill me\". Security call for assistance. Security arrived to the unit. Patient was cooperative and ambulating to his room. Patient received haldol 5 mg IM. Patient apologize for his behavior.

## 2017-08-16 NOTE — BH NOTES
GROUP THERAPY PROGRESS NOTE    Ruben Taylor is participating in Recreational Therapy. Group time: 30 minutes    Personal goal for participation: fresh air break     Goal orientation: relaxation    Group therapy participation: active    Therapeutic interventions reviewed and discussed: He required frequent re-direction from staff while outside he was receptive to re-direction while outside. Impression of participation: The above pt appeared to enjoy feeling the breeze during this shift and he appeared to require frequent re-direction while outside during this group.

## 2017-08-16 NOTE — H&P
Psychiatry History and Physical    Subjective:     Date of Evaluation:  2017    Reason for Referral:  Rashid Rao was referred to the examiners from ER/MV for [facility]. History of Presenting Problem: 23Y/O AA male in NAD well developed and nourished. TDO admit. Prior admit to [facility]. Medical problems: Schizophrenia, ADHD, Depression, Mood disorder    Patient Active Problem List    Diagnosis Date Noted    Schizophrenia ([address] 2015     Past Medical History:   Diagnosis Date    ADHD (attention deficit hyperactivity disorder)     Anxiety disorder     Depression     Mood disorder (HCC)     Psychiatric disorder     Psychotic disorder     Trauma     \"His mother  this past November\"     History reviewed. No pertinent surgical history. Family History   Problem Relation Age of Onset    Schizophrenia Mother     Schizophrenia Brother     Schizophrenia Sister       Social History   Substance Use Topics    Smoking status: Never Smoker    Smokeless tobacco: Never Used    Alcohol use No     Prior to Admission medications    Medication Sig Start Date End Date Taking? Authorizing Provider   risperiDONE (RISPERDAL) 1 mg tablet Take 1 Tab by mouth daily. Indications: SCHIZOPHRENIA 8/10/17   Keith Hooper MD   risperiDONE (RISPERDAL) 2 mg tablet Take 1 Tab by mouth nightly. Indications: SCHIZOPHRENIA 8/10/17   Keith Hooper MD   traZODone (DESYREL) 50 mg tablet Take 1 Tab by mouth nightly as needed for Sleep. Indications: insomnia associated with depression 8/10/17   Keith Hooper MD   benztropine (COGENTIN) 0.5 mg tablet Take 1 Tab by mouth two (2) times a day.  Indications: Parkinsonism 8/10/17   Keith Hooper MD     No Known Allergies     Review of Systems - History obtained from chart review and the patient  General ROS: positive for  - sleep disturbance  Psychological ROS: positive for - depression, hallucinations and sleep disturbances  Respiratory ROS: no cough, shortness of breath, or wheezing  Cardiovascular ROS: no chest pain or dyspnea on exertion  Gastrointestinal ROS: no abdominal pain, change in bowel habits, or black or bloody stools  Genito-Urinary ROS: no dysuria, trouble voiding, or hematuria  Musculoskeletal ROS: negative  Neurological ROS: no TIA or stroke symptoms  Dermatological ROS: negative      Objective:     Patient Vitals for the past 8 hrs:   BP Temp Pulse Resp SpO2   08/16/17 0400 120/82 98.1 °F (36.7 °C) - 16 -   08/16/17 0140 127/67 97.2 °F (36.2 °C) 77 16 99 %       Mental Status exam: WNL except for    Sensorium  Alert and Oriented x 2   Orientation person and place   Relations guarded   Eye Contact intense   Appearance:  age appropriate and thin & gaunt looking   Motor Behavior:  gait stable and within normal limits   Speech:  normal volume   Vocabulary average   Thought Process: logical   Thought Content free of delusions and hallucinations   Suicidal ideations no plan  and no intention   Homicidal ideations no plan  and no intention   Mood:  depressed   Affect:  depressed   Memory recent  adequate   Memory remote:  adequate   Concentration:  adequate   Abstraction:  concrete   Insight:  limited   Reliability poor   Judgment:  limited           Physical Exam:   Visit Vitals    /82    Pulse 77    Temp 98.1 °F (36.7 °C)    Resp 16    Ht 6' (1.829 m)    Wt 59 kg (130 lb)    SpO2 99%    BMI 17.63 kg/m2     General appearance: alert, cooperative, no distress, appears stated age  Head: Normocephalic, without obvious abnormality, atraumatic  Eyes: negative  Ears: normal TM's and external ear canals AU  Nose: Nares normal. Septum midline. Mucosa normal. No drainage or sinus tenderness. Throat: Lips, mucosa, and tongue normal. Teeth and gums normal  Neck: supple, symmetrical, trachea midline, no adenopathy, thyroid: not enlarged, symmetric, no tenderness/mass/nodules, no carotid bruit and no JVD  Back: symmetric, no curvature.  ROM normal. No CVA tenderness. Lungs: clear to auscultation bilaterally  Chest wall: no tenderness  Heart: regular rate and rhythm, S1, S2 normal, no murmur, click, rub or gallop  Abdomen: soft, non-tender. Bowel sounds normal. No masses,  no organomegaly  Extremities: extremities normal, atraumatic, no cyanosis or edema  Pulses: 2+ and symmetric  Skin: Skin color, texture, turgor normal. No rashes or lesions  Lymph nodes: Cervical, supraclavicular, and axillary nodes normal.  Neurologic: Grossly normal        Impression:    Active Problems:    Schizophrenia ([facility] [address] (1/12/2015)          Plan:     Recommendations for Treatment/Conditions:  Psychiatric treatment recommended while in hospital  Admit to Psych services for                                                   Schizophrenia                                                  Mood disorder                                                  Depression    Referral To: Inpatient psychiatric care    Competency Statement: It is recommended that the family or other concerned individuals be consulted for substituted judgement if deemed incompetent.  [email or web address]      [employer], NP   8/16/2017 7:36 AM

## 2017-08-16 NOTE — ED NOTES
Patient given healthy choice meal and ate 100% of meal served remains with periods of restlessness makes poor eye contact.

## 2017-08-16 NOTE — H&P
[address] [facility] ADMIT NOTE-P    Name:  Radha Majano  MR#:  [id number]  :  1995  Account #:  [de-identified]  Date of Adm:  08/15/2017      HISTORY OF PRESENT ILLNESS: This is a 29-year-old   American male recently discharged from [facility] ACUTE [facility] AT [facility] in the past month, readmitted as a TDO for  worsening psychotic behaviors and disorganized thought process. Patient seen and the ED, found to have a poverty of thought, loose  associations, very paranoid, cannot contract for safety. In assessment  this morning, the patient is very disorganized, impoverished thinking,  not answering questions, acting very bizarre. He is sort of gesturing  with his hands, but not actually speaking with very poor eye contact,  not able to gain much else from initial assessment. PSYCHIATRIC HISTORY: He has been diagnosed with schizophrenia. He sees a nurse practitioner at [facility] "[employer]. He was recently  hospitalized at [facility] in the past month, but no prior  hospitalizations before that. Denies suicide attempts. Taking Risperdal  and Cogentin. PAST SURGICAL HISTORY: None. ALLERGIES: NO KNOWN DRUG ALLERGIES. FAMILY HISTORY: Mental illness. His mother was schizophrenic,  unknown on the father's side. SOCIAL HISTORY: He currently lives with his aunt Bismark Ivan. He is  single, though some chart review reports 3 children, unemployed, and  dropped out of high school, no GED. No high school diploma. No  active legal issues. Substance use: The patient denies at this time. Urine toxicology is negative, though on last admission was positive for  amphetamines. MENTAL STATUS: The patient is fairly groomed, age appropriate, 23  year-old male, tall, thin. He is bizarre in his presentation and not  cooperative, guarded. Speech is  mute. Mood is unable to assess. Affect is bizarre. Thought process is  impoverished.  Thought content unable to assess. Concentration is  poor. Insight and judgment are poor. Risk assessment, no prior  attempts, no lethality of attempt, but unable to assess current ideation  and plan. No substance use and does have housing, but very limited  social support and limited future orientation. ASSESSMENT: This is a 72-year-old [facility] American male, history of  schizophrenia, unspecified psychotic symptoms in the past as well as  likely intellectual disabilities versus ADHD who presents with  worsening behaviors after recent admission. DIAGNOSES:  1. Schizophrenia. 2. Attention deficit hyperactivity disorder by history. 3. Intellectual disabilities, rule out. PLAN:  1. Continue with inpatient psychiatric hospitalization. 2. Continue suicide assault precautions. 3. Continue inpatient group therapy. 4. We will need to contact outside providers and family for  more collateral.  5. Restart home medications for now. Will assess. 6. His labs are reviewed. 7. Social Work to help with disposition. Estimated length of stay 5-7 days. DARRIAN Moreira MD    [place] AYDE Dimas  D:  08/16/2017   12:51  T:  08/16/2017   13:16  Job #:  [id number]

## 2017-08-16 NOTE — PROGRESS NOTES
Problem: Psychosis  Goal: *STG: Remains safe in hospital  AEB no aggressive behavior toward self or others noted during hospitalization. Outcome: Progressing Towards Goal  Pt has not engaged in any self injurious behaviors  Goal: *STG/LTG: Complies with medication therapy  AEB taking all scheduled medications as ordered daily. Outcome: Progressing Towards Goal  Pt compliant with prescribed medications    Comments:   Pt pacing through out the unit. Pt is easily agitated and appears to be responding to internal stimuli as pt noted to run and jump towards window and attempting to go into locked rooms. Pt intrusive with peers and staff and requires frequent redirection. Pt is difficult to engage in meaningful conversation and appears to have some thought blocking. Rounds maintained Q 15 mins for safety.  Staff will continue to offer a safe and supportive environment

## 2017-08-16 NOTE — PROGRESS NOTES
conducted an initial consultation and Spiritual Assessment for Tata Romo, who is a 23 y.o.,male. Patients Primary Language is: [place]. According to the patients EMR Cheondoism Affiliation is: [place]. The reason the Patient came to the hospital is:   Patient Active Problem List    Diagnosis Date Noted    Schizophrenia [facility]) 01/12/2015        The  provided the following Interventions:  Initiated a relationship of care and support. Explored issues of dennis, belief, spirituality and Orthodoxy/ritual needs while hospitalized. Listened empathically. Provided chaplaincy education. Provided information about Spiritual Care Services. Offered prayer and assurance of continued prayers on patient's behalf. Chart reviewed. The following outcomes where achieved:  Patient processed feeling about current hospitalization. Assessment:  Patient does not have any Orthodoxy/cultural needs that will affect patients preferences in health care. There are no spiritual or Orthodoxy issues which require intervention at this time. Plan:  Chaplains will continue to follow and will provide pastoral care on an as needed/requested basis.  recommends bedside caregivers page  on duty if patient shows signs of acute spiritual or emotional distress.       [address] [facility]   [phone]

## 2017-08-16 NOTE — ED NOTES
Pacing mumbling to self at times remains co-operative mother at bedside awaiting crisis worker arrival to interview patient.

## 2017-08-16 NOTE — ED NOTES
Patient is awake and alert seen by crisis worker is currently being seen by Corrigo at this time. Remains with periods of restlessness.

## 2017-08-16 NOTE — BH NOTES
SW Contact:  Pt. Is a 23year old male with history of Schizophrenia , IDD and polysubstance abuse. Pt. Was admits to this facility for mental health decompensation in the community. Pt. Was psychotic, paranoid and disorganized. SW met with pt to discuss discharge planning. Pt. Stated he is currently homeless. Pt . Was disorganized had though blocking and appeared to be responding to internal stimuli. SW could not get any information from the pt. He attempted to respond to questions but could not answer them.  SW will attempt to contact pt.;s grandmother for a collateral.

## 2017-08-16 NOTE — PROGRESS NOTES
NUTRITION    Nutrition Screen      RECOMMENDATIONS / PLAN:     - Send additional protein portions with meals  - Continue inpatient monitoring and evaluation     NUTRITION DIAGNOSIS & INTERVENTIONS:     [x] Meals/Snacks: general/healthful diet-send additional protein portions with meals    Nutrition Diagnosis:  Underweight related to inadequate oral intake as evidenced by BMI 17.6. ASSESSMENT:     Pt quiet, did not answer RD questions. Observed eating lunch, ate about 50% at the time     Average intake adequate to meet patients estimated nutritional needs:   [x] Yes     [] No      [] Unable to determine at this time    Diet: DIET REGULAR    Food Allergies: unknown  Current Appetite:   [] Good     [] Fair     [] Poor     [x] Other: observed pt ate about 50% of lunch   Appetite/meal intake prior to admission:   [] Good     [] Fair     [] Poor     [x] Other: unknown  Feeding Limitations:  [] Swallowing Difficulty       [] Chewing Difficulty       [] Other   Current Meal Intake: No data found. Gastrointestinal Issues:  [] Yes    [x] No   Skin Integrity:  WDL    Pertinent Medications:  Reviewed   Labs:  Reviewed     Anthropometrics:  Ht Readings from Last 1 Encounters:   08/15/17 6' (1.829 m)       Last 3 Recorded Weights in this Encounter    08/15/17 1910   Weight: 59 kg (130 lb)       Body mass index is 17.63 kg/(m^2). Weight History: Varying weights noted. Pt not answering questions at this time.    Weight Metrics 8/15/2017 8/3/2017 2015 2015 2015 2015 2013   Weight 130 lb 158 lb 135 lb 134 lb 7.7 oz 135 lb 131 lb 120 lb   BMI 17.63 kg/m2 18.74 kg/m2 - 19.47 kg/m2 19.37 kg/m2 - -       Admitting Diagnosis: Schizophrenia ([address]  Past Medical History:   Diagnosis Date    ADHD (attention deficit hyperactivity disorder)     Anxiety disorder     Depression     Mood disorder (HCC)     Psychiatric disorder     Psychotic disorder     Trauma     \"His mother  this past November\" Education Needs:        [x] None identified  [] Identified - Not appropriate at this time  []  Identified and addressed - refer to education log  Learning Limitations:   [] None identified  [x] Identified: pt not verbal with RD at this time. Cultural, Yazidism & ethnic food preferences identified:  [x] None    [] Yes      ESTIMATED NUTRITION NEEDS:     9458-7644 kcal (MSJx1.2-1.3), 4759 gm protein (0.8-1 gm/kg), 1 mL/kcal  Based on: 59 kg       [x] Actual BW      [] IBW          MONITORING & EVALUATION:     Nutrition Goal(s):   1. Po intake of meals will meet >75% of patient estimated nutritional needs within the next 7 days.   Outcome:   [] Met    []  Not Met   [x] New/Initial Goal    Monitor:  [x] Meal intake    [x] Diet tolerance    [] Supplement intake    Previous Recommendations (for follow-up assessments only):     []   Implemented       []   Not Implemented (RD to address)    [] No Recommendation Made      Discharge Planning: regular diet   [x]  Participated in care planning, discharge planning, & interdisciplinary rounds as appropriate      Cristian Gill, [address] [place] [address]   Pager: [phone]

## 2017-08-16 NOTE — BSMART NOTE
ART THERAPY GROUP PROGRESS NOTE    Group time:8607    The patient appeared paranoid and did not respond when called and encouraged to join group several times. Instead, he stood over group members and watched, and slowly backed away continuing to watch.

## 2017-08-17 PROCEDURE — 74011250637 HC RX REV CODE- 250/637: Performed by: PSYCHIATRY & NEUROLOGY

## 2017-08-17 PROCEDURE — 65220000003 HC RM SEMIPRIVATE PSYCH

## 2017-08-17 RX ADMIN — BENZTROPINE MESYLATE 0.5 MG: 1 TABLET ORAL at 08:20

## 2017-08-17 RX ADMIN — BENZTROPINE MESYLATE 0.5 MG: 1 TABLET ORAL at 20:32

## 2017-08-17 RX ADMIN — RISPERIDONE 2 MG: 2 TABLET ORAL at 20:32

## 2017-08-17 NOTE — BH NOTES
PT was monitored by dsp during shift he was confused and disoriented he sat throughout the day and fixed his eyes on every patient that was in the day room , staff made several attempts to redirect, he did not respond, he was visited by his grandmother, he only consumed 10% of his meal he ate all his snacks, he complied and took his medication,

## 2017-08-17 NOTE — BH NOTES
Patient has been walking around unit not following direction with multiple attempts. Patient refused P.O.  scheduled medication. He was assisted to sitting position, but became anxious and stood back up. He appeared to be very paranoid and anxious and was focused on staff. He was encouraged to pull up pants in which he did attempt, but then stopped. Patient was educated on proper dress in day area with no evidence of learning. PRN medication provided IM for signs of sever psychosis and anxiety with assist of 2 M.H.T.s and second nurse for safety. Will continue to monitor patient condition and provide 1:1 as needed.

## 2017-08-17 NOTE — BH NOTES
Psychiatric Progress Note    Date: 17  Patient Name: Oren Bolanos  : 1995  MRN: [id number]  Hospital Day: 3      INTERVAL HISTORY:   Patient is improved from yesterday, more conversational, talking more to this provider, only linear for a few moments and then loses train of thought and can't speak anymore. Still weird hand gestures and acting as if he will fall at times, closes eyes intermittently and difficult to redirect. Denies hallucinations. Not suicidal/ homicidal at this time. MENTAL STATUS EXAM:  Appearance: Dressed in casual clothes with fair grooming and hygiene  Behavior: Cooperative with good eye contact  Motor: No psychomotor agitation/retardation  Speech: soft, mumbling, non-comprehensible at times  Mood: \"okay\"  Affect: drowsy, flat, bizarre  Thought Process: thought blocking, impoverished, poor abstract thinking  Thought Content: Denies SI and HI  Perception: Denies AH or VH  Concentration: poor  Memory: impaired  Cognition: Alert and oriented  Insight: Fair  Judgment: Fair    RISK ASSESSMENT:   Prior Attempts: no noted prior  Lethality of Attempts: none noted prior  Current Ideation/Plan: denies  Protective Factors: limited  Future Orientation: limited    ASSESSMENT:   This is 24 yo AAM, hx of schizophrenia who is improving, more conversational and engaged today. Denies hallucinations, but still impaired, unaware of surroundings. Diagnoses:  1. Schizophrenia. 2. Attention deficit hyperactivity disorder by history. 3. Intellectual disabilities, rule out. Plan:  1. Continue with inpatient psychiatric treatment for containment, stabilization and medication management  2. Patient is to continue with group therapy  3. Medications:  Cogentin 0.5 mg po bid  Risperdal 1 mg po qam, 2 mg po qhs  4. SW to help with disposition  5.  ELOS 5-7 days

## 2017-08-17 NOTE — BH NOTES
The case was discussed with the treating psychiatrist     Pt. Is a 23year old male with history of Schizophrenia and polysubstance abuse. Pt. Was admits to this facility for mental health decompensation in the community. BUCK Contact: BUCK met with pt. Today. Pt expressed he was sleepy. The pt. Was able to answer questions. Pt expressed he has been medication compliant in the hospital.  BUCK provided pt with mental health and substance education. BUCK encouraged pt to continue to take medication outside of the hospital once d/c. Pt. Had a short attention span, started falling asleep at the table. BUCK encouraged pt to go to his room and rest.  BUCK will contact pt.'s family for a collateral. BUCK contacted Mrs. Toña Garvey pt.'s grandmother @ [phone]. The grandmother stated pt at one time was able to remain stable on ADHD medication in addition psych medications. The pt. Per grandmother started hearing voices and wandered away from the home. Mrs. Toña Garvey stated once pt. Is stable on medications, he can return to her address. Pt. receives oupt mental health services with [facility].

## 2017-08-17 NOTE — BSMART NOTE
ACTIVITIES THERAPY PROGRESS NOTE    Group time:1220    The patient declined group. .Came for about 3-5 minutes.

## 2017-08-17 NOTE — PROGRESS NOTES
Problem: Psychosis  Goal: *STG/LTG: Complies with medication therapy  AEB taking all scheduled medications as ordered daily. Outcome: Progressing Towards Goal  Patient is compliant with medications. Goal: *STG/LTG: Demonstrates improved thought patterns as evidenced by logical and coherent speech  By discharge. Outcome: Not Progressing Towards Goal  Patient is disorganized and paranoid. Problem: Falls - Risk of  Goal: *Absence of Falls  Document Neena Fall Risk and appropriate interventions in the flowsheet. Outcome: Progressing Towards Goal  Fall Risk Interventions:    remains free from falls. Comments:   Patient out of his room for breakfast. He took his am medications without any problems. His thoughts are disorganized, continued to be guarded. Stated he was tired and returned to bed.

## 2017-08-18 PROCEDURE — 74011250637 HC RX REV CODE- 250/637: Performed by: PSYCHIATRY & NEUROLOGY

## 2017-08-18 PROCEDURE — 65220000005 HC RM SEMIPRIVATE PSYCH 3 OR 4 BED

## 2017-08-18 RX ADMIN — HALOPERIDOL 5 MG: 5 TABLET ORAL at 15:34

## 2017-08-18 RX ADMIN — HYDROXYZINE PAMOATE 50 MG: 50 CAPSULE ORAL at 15:34

## 2017-08-18 RX ADMIN — RISPERIDONE 2 MG: 2 TABLET ORAL at 20:45

## 2017-08-18 RX ADMIN — BENZTROPINE MESYLATE 0.5 MG: 1 TABLET ORAL at 20:44

## 2017-08-18 RX ADMIN — BENZTROPINE MESYLATE 0.5 MG: 1 TABLET ORAL at 08:17

## 2017-08-18 RX ADMIN — RISPERIDONE 1 MG: 1 TABLET ORAL at 08:17

## 2017-08-18 NOTE — PROGRESS NOTES
Problem: Psychosis  Goal: *STG: Seeks staff when feelings of self harm or harm towards others arise  Pt will process feelings of self harm with staff daily as needed   Outcome: Progressing Towards Goal  Patient has verbalized denial of suicidal ideation. Goal: *STG/LTG: Complies with medication therapy  AEB taking all scheduled medications as ordered daily. Outcome: Progressing Towards Goal  Patient has been compliant with prescribed medication. Comments:   Patient has been restless and anxious pacing throughout the milieu all morning. Patient required continue verbal redirection for intrusive behaviors. Patient stated he was confused from \"them putting poison in my huggy. I don't know why though\". Patient denies auditory hallucination, denies suicidal ideation with no safety issues noted. Patient has been continually on phone calling auntie and cousin to bring him clothing items. Will continue to monitor for safety with support as needed throughout treatment regimen.

## 2017-08-18 NOTE — BH NOTES
Patient appeared drowsy and lethargic throughout this shift. Patient speaks in a low tone and speech is often garbled. Patients mother visited in the evening. Will continue to monitor per safety precautions.

## 2017-08-18 NOTE — BH NOTES
Psychiatric Progress Note    Date: 17  Patient Name: Nir Martins  : 1995  MRN: [id number]  Hospital Day: 4      INTERVAL HISTORY:   Patient is more impaired from yesterday, been wandering around unit, tried to get away from staff earlier and elopement risk. Not engaged with others during the day or being too intrusive with other peers and staff members. Not able to communicate needs to me this morning, selectively mute and barely audible when he is speaking. It is difficult to assess mental status at this time, as patient not able or not agreeable to interview. He sits in chair and makes physical gestures as if he is going to sleep, then looks up with bizarre glances toward this provider. Denies hallucinations. Not suicidal/ homicidal at this time. MENTAL STATUS EXAM:  Appearance: Dressed in casual clothes with fair grooming and hygiene  Behavior: Cooperative with good eye contact  Motor: No psychomotor agitation/retardation  Speech: soft, mumbling, selectively mute  Mood: \"okay\"  Affect: drowsy, flat, bizarre  Thought Process: thought blocking, impoverished, poor abstract thinking  Thought Content: Denies SI and HI  Perception: Denies AH or VH  Concentration: poor  Memory: impaired  Cognition: Alert and oriented  Insight: Fair  Judgment: Fair    RISK ASSESSMENT:   Prior Attempts: no noted prior  Lethality of Attempts: none noted prior  Current Ideation/Plan: denies  Protective Factors: limited  Future Orientation: limited    ASSESSMENT:   This is 24 yo AAM, hx of schizophrenia who is improving, more conversational and engaged today. Denies hallucinations, but more impaired today than days prior, unaware of surroundings. Diagnoses:  1. Schizophrenia. 2. Attention deficit hyperactivity disorder by history. 3. Intellectual disabilities, rule out. Plan:  1. Continue with inpatient psychiatric treatment for containment, stabilization and medication management  2.  Patient is to continue with group therapy  3. Medications:  Cogentin 0.5 mg po bid  Risperdal 1 mg po qam, 2 mg po qhs  4. SW to help with disposition  5.  ELOS 5-7 days

## 2017-08-18 NOTE — BH NOTES
The case was discussed with the treating psychiatrist      Pt. Is a 23year old male with history of Schizophrenia and polysubstance abuse. Pt. Was admits to this facility for mental health decompensation in the community. BUCK Contact: SW met with pt. Today. SW redirected pt. For being intrusive towards her( attempting to hug) and a peer ( shadow boxing too close to pt). Pt. Stated it is going to be a revolution. I talked to my cousin. SW ask pt to elaborate. Pt walked away and attempted to shadow box close to a peer. Pt was redirected. Pt. Has been medication complaint but still appears anxious. Pt. Continues to have thought blocking and poor judgement. D/C Plan: Pt. Can return to his current address with the grandmother/great aunt. Has appointment scheduled at [facility] on 8/22/17.

## 2017-08-18 NOTE — BH NOTES
GROUP THERAPY PROGRESS NOTE    Deo HARINDER Funezhumphrey Goldberg is participating in Leisure-Creative Group. Group time: 30 min    Personal goal for participation: Social interaction and leisure activity    Goal orientation: social    Group therapy participation: minimal    Therapeutic interventions reviewed and discussed: Leisure activity and relaxation as coping skill    Impression of participation:Pt. was quiet and interacted minimally but present during activity

## 2017-08-18 NOTE — BH NOTES
Patient has been intrusive and restless with continual verbal redirection throughout the morning. Patient continuously entering peer rooms despite verbal redirection and reorientation to room. Patient received PRN's for increasing agitation, restlessness, and increasing anxiety. Patient became irritated when redirected for poor boundaries and intrusive behaviors towards peers and staff. Patient denies suicidal ideation with no safety issues noted, denies auditory hallucinations however appears to be responding to internal stimuli. Patient at times requires assurance that his peers are not watching him.

## 2017-08-19 PROCEDURE — 74011250637 HC RX REV CODE- 250/637: Performed by: PSYCHIATRY & NEUROLOGY

## 2017-08-19 PROCEDURE — 65220000005 HC RM SEMIPRIVATE PSYCH 3 OR 4 BED

## 2017-08-19 RX ORDER — BENZTROPINE MESYLATE 1 MG/1
1 TABLET ORAL 2 TIMES DAILY
Status: DISCONTINUED | OUTPATIENT
Start: 2017-08-19 | End: 2017-08-21 | Stop reason: HOSPADM

## 2017-08-19 RX ORDER — RISPERIDONE 1 MG/1
2 TABLET, ORALLY DISINTEGRATING ORAL 2 TIMES DAILY
Status: CANCELLED | OUTPATIENT
Start: 2017-08-19

## 2017-08-19 RX ADMIN — BENZTROPINE MESYLATE 1 MG: 1 TABLET ORAL at 21:00

## 2017-08-19 RX ADMIN — RISPERIDONE 1 MG: 1 TABLET ORAL at 08:20

## 2017-08-19 RX ADMIN — RISPERIDONE 3 MG: 1 TABLET, FILM COATED ORAL at 21:00

## 2017-08-19 RX ADMIN — BENZTROPINE MESYLATE 0.5 MG: 1 TABLET ORAL at 08:20

## 2017-08-19 NOTE — BH NOTES
Psychiatry progress note    Chart reviewed, patient interviewed. Patient documented as irritable/psychotic over the past 24 hours. Case discussed extensively with nursing staff. Patient observed to be noncompliant, cheeking meds. Patient stated he was hospitalized for hallucinations, because he ran away and forgot to take his med. Talked about seeing a spider on his bed prior to hospitalization. Denied psychotic symptoms or hallucinations today. Denied acute stresses or problems. Says prior to hospitalization he felt out of it, in another world but now feels better since he is back on his meds. On exam, depressed. No SI, HI or AVH. Meds - Risperdal 1 qAM, 2 qHS, Cogentin 0.5 BID    Assessment - Schizophenia. Not optimally controlled. Will discontinue Risperdal and start Risperdal-M 2 BID due to psychosis, med non-compliance, increase Cogentin to 1 BID and continue current treatment plan.

## 2017-08-19 NOTE — BH NOTES
Patient spent the majority of this shift pacing in day room and hallway. Patient often needed redirection for intrusive behavior with peers as he was often observed not giving others personal space. Patient was offered multiple activities to participate in but he declined. Patient appears to have trouble sitting for more than a few minutes. Will continue to monitor per safety precautions.

## 2017-08-20 PROCEDURE — 65220000005 HC RM SEMIPRIVATE PSYCH 3 OR 4 BED

## 2017-08-20 PROCEDURE — 74011250637 HC RX REV CODE- 250/637: Performed by: PSYCHIATRY & NEUROLOGY

## 2017-08-20 RX ORDER — RISPERIDONE 1 MG/1
2 TABLET, ORALLY DISINTEGRATING ORAL 2 TIMES DAILY
Status: DISCONTINUED | OUTPATIENT
Start: 2017-08-20 | End: 2017-08-21 | Stop reason: HOSPADM

## 2017-08-20 RX ADMIN — BENZTROPINE MESYLATE 1 MG: 1 TABLET ORAL at 20:35

## 2017-08-20 RX ADMIN — BENZTROPINE MESYLATE 1 MG: 1 TABLET ORAL at 08:03

## 2017-08-20 RX ADMIN — RISPERIDONE 3 MG: 1 TABLET, FILM COATED ORAL at 08:03

## 2017-08-20 RX ADMIN — RISPERIDONE 2 MG: 1 TABLET, ORALLY DISINTEGRATING ORAL at 20:35

## 2017-08-20 NOTE — BH NOTES
Psychiatry progress note     Chart reviewed, patient interviewed. In reviewing chart, patient was RX'd Risperdal M 2 BID but order didn't go through. Patient was given Risperdal 3 BID over past 24 hours. Patient documented as being focused on discharge over the past 24 hours. Stated he feels good and is asking about discharge. Says he is now taking his medicine and that it makes him feel better. Says he has been getting good rest. Asked what he is like without medication. Patient stated he feels \"open\" without it.     On exam, calm. Thought process vague. No SI, HI or AVH. Insight and judgment questionable.     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 Will discontinue Risperdal 3 BID and start Risperdal-M 2 BID due non-compliance and continue current treatment plan. Would recommend consulting SW for family intervention, inclusion in care as patient has poor self-report.

## 2017-08-20 NOTE — BH NOTES
GROUP THERAPY PROGRESS NOTE    Deo Rose is participating in [place].      Group time: 30 minutes    Personal goal for participation: none    Goal orientation: community    Group therapy participation: minimal    Therapeutic interventions reviewed and discussed: goals and procedures    Impression of participation: encouraged

## 2017-08-20 NOTE — PROGRESS NOTES
Problem: Psychosis  Goal: *STG: Remains safe in hospital  AEB no aggressive behavior toward self or others noted during hospitalization. Outcome: Progressing Towards Goal  Patient has remained free of harm to self and others while in facility. Goal: *STG/LTG: Complies with medication therapy  AEB taking all scheduled medications as ordered daily. Outcome: Progressing Towards Goal  Patient has been compliant with prescribed medication. Comments:   Patient was compliant with prescribed medication with mouth checked to ensure medication was swallowed related to previous attempt to conceal medication. Patient has been continually at nursing station laying across nursing station. Patient requires verbal redirection at times for wandering into peer space. Patient was focused on discharging and at one point banged on counter top shouting he wanted to go \"I want to go home I don't need to be her\". Patient was open to 1:1 and was able to identify several reasons for his admission and what he needs to accomplish to stay stable out of the hospital. \"I am trying to figure out what I need to go home and stay out of here\". Patient denies auditory hallucination, denies suicidal ideation. Will continue to monitor for safety with support as needed throughout treatment regimen.

## 2017-08-20 NOTE — BH NOTES
Patient reported having difficulty sleeping  due to his room mate snoring. He did sleep some in the day area . Has been pacing frequently .

## 2017-08-20 NOTE — PROGRESS NOTES
Problem: Psychosis  Goal: *STG: Decreased delusional thinking  AEB logical coherent speech by discharge. Outcome: Progressing Towards Goal  Patient was paranoid with verbal statements throughout the shift. Goal: *STG: Remains safe in hospital  AEB no aggressive behavior toward self or others noted during hospitalization. Outcome: Progressing Towards Goal  Patient has remained free of harm to self and others throughout the shift. Comments:   Patient has been intrusive and irritable throughout the shift at one point banging on desk demanding to be discharged. Stating \"I don't unruly to be here did my auntie tell you to give me that medicine, did she?\". Patient was observed pacing throughout day area all shift and continually eating and requesting snacks. Patient received snacks from aunt during visit and had eaten majority of snacks by the end of the evening. Patient denies auditory hallucination, denies suicidal ideation. Patient was observed on previous shift attempting to keep medication in mouth requiring continual encouragement to consume medication. MD on call notified of patient's behaviors. Will continue to monitor for safety with education and support.

## 2017-08-21 VITALS
DIASTOLIC BLOOD PRESSURE: 64 MMHG | HEART RATE: 86 BPM | WEIGHT: 130 LBS | OXYGEN SATURATION: 99 % | SYSTOLIC BLOOD PRESSURE: 111 MMHG | BODY MASS INDEX: 17.61 KG/M2 | RESPIRATION RATE: 16 BRPM | TEMPERATURE: 97.1 F | HEIGHT: 72 IN

## 2017-08-21 PROCEDURE — 74011250637 HC RX REV CODE- 250/637: Performed by: PSYCHIATRY & NEUROLOGY

## 2017-08-21 RX ORDER — BENZTROPINE MESYLATE 1 MG/1
1 TABLET ORAL 2 TIMES DAILY
Qty: 60 TAB | Refills: 0 | Status: SHIPPED | OUTPATIENT
Start: 2017-08-21 | End: 2018-06-11

## 2017-08-21 RX ORDER — RISPERIDONE 2 MG/1
2 TABLET, FILM COATED ORAL 2 TIMES DAILY
Qty: 60 TAB | Refills: 0 | Status: SHIPPED | OUTPATIENT
Start: 2017-08-21 | End: 2018-06-11

## 2017-08-21 RX ADMIN — BENZTROPINE MESYLATE 1 MG: 1 TABLET ORAL at 08:22

## 2017-08-21 RX ADMIN — RISPERIDONE 2 MG: 1 TABLET, ORALLY DISINTEGRATING ORAL at 08:22

## 2017-08-21 NOTE — PROGRESS NOTES
Problem: Psychosis  Goal: *STG: Decreased delusional thinking  AEB logical coherent speech by discharge. Variance: Patient slowly responding  Comments: Paranoid   Goal: *STG: Remains safe in hospital  AEB no aggressive behavior toward self or others noted during hospitalization. Outcome: Progressing Towards Goal  No aggressive behavior   Goal: *STG: Participates in individual and group therapy  AEB attending 3 groups daily. Outcome: Progressing Towards Goal  Participating   Goal: *STG/LTG: Complies with medication therapy  AEB taking all scheduled medications as ordered daily. Outcome: Progressing Towards Goal  Compliant     Comments:   Pt is compliant with medications and attending groups. He frequently paces the unit. Pt asked for soap which was provided. He did not however take a shower. He is very brief with his responses and walked away from this nurse during one to one. He does not spontaneously interact with peers or staff. Pt stated he is here because he ran away again. Pt frequently asks for snacks and also asked nurses for Ensure.

## 2017-08-21 NOTE — BH NOTES
Patient discharged to home, picked up by cousin. Denies thoughts to harm self or others. Patient stated \"I pray I never come back again\" Patient denies any medical complaint, discharge instructions was explained to patient and he verbalized understanding. Patient received his belongings and name band removed prior to exiting the unit.

## 2017-08-21 NOTE — BH NOTES
Patient has slept approximately for a total of only 1 hour during the night. He has been restless with frequent pacing. Patient has refused prn medication.

## 2017-08-21 NOTE — DISCHARGE SUMMARY
Psychiatric Discharge Summary    Date: 17   Patient Name: Elma Sherman  : 1995  MRN: [id number]  Admission Date: 8/15/2017  Discharge Date: 17    HISTORY OF PRESENT ILLNESS: This is a 82-year-old   American male recently discharged from [facility] ACUTE [facility] AT [facility] in the past month, readmitted as a TDO for  worsening psychotic behaviors and disorganized thought process. Patient seen and the ED, found to have a poverty of thought, loose  associations, very paranoid, cannot contract for safety. In assessment  this morning, the patient is very disorganized, impoverished thinking,  not answering questions, acting very bizarre. He is sort of gesturing  with his hands, but not actually speaking with very poor eye contact,  not able to gain much else from initial assessment. HOSPITAL COURSE:   Once on the unit, patient was initially very bizarre. He would act as if he was about to fall down and one time dropped to knees and caught himself with his hands voluntarily. He had poor eye contact and gestured as if he was high or drunk despite tox screen negative. He was restarted on home meds with no changes. Responded well with no side effects. Over a few days, he cleared and was able to communicate. Still spoke somewhat mumbled, but had good eye contact, no bizarre thinking, not psychotic. Went to groups. Slept well. Ate well while on the unit.     On day of discharge, patient was Stable, calm, cooperative, not suicidal, not homicidal, not psychotic    MENTAL STATUS EXAM:  Orientation: oriented to person, place, time, and situation  Appearance: Dressed in hospital gown with fair grooming and hygiene  Behavior: Cooperative with good eye contact  Motor: No psychomotor agitation/retardation  Speech: somewhat mumbled and soft at times  Mood: \"good\"  Affect: constricted  Thought Process: linear, goal-directed  Thought Content: Denies SI and HI  Perception: Denies AH or VH  Concentration: fair  Memory: fair  Cognition: Alert and oriented  Insight: fair  Judgment: fair    ASSESSMENT at time of discharge:   Stable, calm, cooperative, not suicidal, not homicidal, not psychotic    Diagnoses:  1. Schizophrenia. 2. Attention deficit hyperactivity disorder by history. 3. Intellectual disabilities, rule out. Discharge Instructions:  1. Continue psychiatric medications of cogentin 1 mg po bid, risperdal m tabs 2 mg po bid  2. Please make all follow up appointments with doctors and , as provided by inpatient behavioral health . 3. If you feel unsafe or begin experiencing suicidal thoughts again, please call 9-1-1 or return to the nearest emergency department. Disposition:  Home with outpatient follow-up      Dian Patterson MD

## 2017-08-21 NOTE — BH NOTES
Pt. Is a 23year old male with history of Schizophrenia and polysubstance abuse. Pt. Was admits to this facility for mental health decompensation in the community. SW Contact: SW met with pt. Today. Pt. Is denying ideations and hallucinations. Pt was encouraged to follow-up aftercare and take medications. Pt. stated he will. Pt 's family is picking him up after 5:00 today. [pt. Has an appointment on 8/23/17 @ [facility].

## 2017-08-21 NOTE — BH NOTES
PT needed redirecting from staff due to being in other Patient s private space , he was med compliant he took his medication, staff will continue to monitor PT for health and safety.

## 2017-08-21 NOTE — BH NOTES
GROUP THERAPY PROGRESS NOTE    Deo HARINDER MorleyJadonamber Araiza is participating in Goals Group.      Group time: 30 min  Personal goal for participation: Focusing on treatment    Goal orientation: personal    Group therapy participation: minimal    Therapeutic interventions reviewed and discussed: Setting daily goals and reality orientation    Impression of participation: Pt.was quiet and disinterested in group activity but attended after being prompted

## 2018-04-30 ENCOUNTER — HOSPITAL ENCOUNTER (OUTPATIENT)
Dept: INFUSION THERAPY | Age: 23
Discharge: HOME | End: 2018-04-30
Payer: MEDICAID

## 2018-04-30 VITALS
OXYGEN SATURATION: 99 % | TEMPERATURE: 97.9 F | DIASTOLIC BLOOD PRESSURE: 70 MMHG | HEART RATE: 81 BPM | SYSTOLIC BLOOD PRESSURE: 117 MMHG | RESPIRATION RATE: 18 BRPM

## 2018-04-30 PROCEDURE — 96372 THER/PROPH/DIAG INJ SC/IM: CPT

## 2018-04-30 PROCEDURE — 74011250636 HC RX REV CODE- 250/636

## 2018-04-30 RX ADMIN — ARIPIPRAZOLE 300 MG: KIT at 10:26

## 2018-04-30 NOTE — PROGRESS NOTES
[facility] Progress Note    Date: 2018    Name: Mertha Moritz    MRN: [id number]         : 1995    Abilify Injection    Mr. Benedict Logan was assessed and education was provided. Care notes were printed off and reviewed with the patient. A signed copy was scanned into the media tab. Mr. Teresita Hill vitals were reviewed and patient was observed for 5 minutes prior to treatment. Visit Vitals    /70 (BP 1 Location: Left arm, BP Patient Position: Sitting)    Pulse 81    Temp 97.9 °F (36.6 °C)    Resp 18    SpO2 99%       Ability 300 mg was administered IM in the left deltoid. No bleeding or redness noted. Band-aid applied to to the site. Mr. Benedict Logan tolerated well, and had no complaints. Patient politely declined to stay for an observation period at this time. Patient armband removed and shredded. Mr. Benedict Logan was discharged from [place] [address] in stable condition at 1030. He is to return on 2018 at 1000 for his next appointment.     Jimmy Guardado RN  2018

## 2018-05-28 ENCOUNTER — APPOINTMENT (OUTPATIENT)
Dept: INFUSION THERAPY | Age: 23
End: 2018-05-28

## 2018-05-29 ENCOUNTER — APPOINTMENT (OUTPATIENT)
Dept: INFUSION THERAPY | Age: 23
End: 2018-05-29

## 2018-05-29 ENCOUNTER — HOSPITAL ENCOUNTER (OUTPATIENT)
Dept: INFUSION THERAPY | Age: 23
Discharge: HOME | End: 2018-05-29

## 2018-06-01 ENCOUNTER — APPOINTMENT (OUTPATIENT)
Dept: INFUSION THERAPY | Age: 23
End: 2018-06-01
Payer: MEDICAID

## 2018-06-04 ENCOUNTER — HOSPITAL ENCOUNTER (OUTPATIENT)
Dept: INFUSION THERAPY | Age: 23
Discharge: HOME | End: 2018-06-04
Payer: MEDICAID

## 2018-06-04 VITALS
HEART RATE: 99 BPM | TEMPERATURE: 98.5 F | SYSTOLIC BLOOD PRESSURE: 142 MMHG | RESPIRATION RATE: 18 BRPM | DIASTOLIC BLOOD PRESSURE: 93 MMHG

## 2018-06-04 PROCEDURE — 74011250636 HC RX REV CODE- 250/636

## 2018-06-04 PROCEDURE — 96372 THER/PROPH/DIAG INJ SC/IM: CPT

## 2018-06-04 RX ADMIN — ARIPIPRAZOLE 300 MG: KIT at 14:46

## 2018-06-04 NOTE — PROGRESS NOTES
[facility] Progress Note    Date: 2018    Name: Irasema Butler    MRN: [id number]         : 1995     Abilify IM Q 28 days      Mr. Opal Steele to [facility], ambulatory at  accompanied by his mental health , Frankie Gandara. Pt was assessed and education was provided. Patient is not speaking much at all, only answered a very few questions with mumbles. Mr. Cathy Paul reports there was some difficulty in getting a correct order for this visit and, therefore, the patient's appointment was delayed by one week. Mr. Cathy Paul states the patient \"gets like this or gets aggressive\" when he does not receive his antipsychotic meds. Patient calm and cooperative, looking down at floor. Mr. Cathy Paul states the patient lives with his aunt. He states he will take the patient to his aunt's home after [facility] and that the patient will be constantly supervised. Mr. Barrera Backer vitals were reviewed and patient was observed for 5 minutes prior to treatment. Visit Vitals    BP (!) 142/93 (BP 1 Location: Left arm, BP Patient Position: Sitting)    Pulse 99    Temp 98.5 °F (36.9 °C)    Resp 18       Abilify 300 mg was administered deep IM in  Left deltoid. No irritation or bleeding noted at site. Bandaid applied. Patient declined to stay for 30 minutes observation. Mr. Opal Steele tolerated well, and had no complaints. Patient armband removed and shredded. Mr. Opal Steele was discharged from [place] [address] in stable condition at 1450. He is to return on 18 at [id number] for his next Abilify appointment.     Efraín Joshi RN  2018

## 2018-06-05 ENCOUNTER — APPOINTMENT (OUTPATIENT)
Dept: CT IMAGING | Age: 23
End: 2018-06-05
Attending: EMERGENCY MEDICINE
Payer: MEDICAID

## 2018-06-05 ENCOUNTER — HOSPITAL ENCOUNTER (OUTPATIENT)
Age: 23
Setting detail: OBSERVATION
Discharge: PSYCHIATRIC OR SUBSTANCE USE FACILITY | End: 2018-06-07
Attending: EMERGENCY MEDICINE | Admitting: INTERNAL MEDICINE
Payer: MEDICAID

## 2018-06-05 ENCOUNTER — APPOINTMENT (OUTPATIENT)
Dept: GENERAL RADIOLOGY | Age: 23
End: 2018-06-05
Attending: EMERGENCY MEDICINE
Payer: MEDICAID

## 2018-06-05 DIAGNOSIS — R41.82 ALTERED MENTAL STATUS, UNSPECIFIED ALTERED MENTAL STATUS TYPE: Primary | ICD-10-CM

## 2018-06-05 DIAGNOSIS — T43.504A: ICD-10-CM

## 2018-06-05 PROBLEM — T43.501A ANTIPSYCHOTIC OVERDOSE: Status: ACTIVE | Noted: 2018-06-05

## 2018-06-05 LAB
ALBUMIN SERPL-MCNC: 3.8 G/DL (ref 3.4–5)
ALBUMIN/GLOB SERPL: 1.2 {RATIO} (ref 0.8–1.7)
ALP SERPL-CCNC: 84 U/L (ref 45–117)
ALT SERPL-CCNC: 18 U/L (ref 16–61)
ANION GAP SERPL CALC-SCNC: 7 MMOL/L (ref 3–18)
APAP SERPL-MCNC: <2 UG/ML (ref 10–30)
APTT PPP: 25.7 SEC (ref 23–36.4)
AST SERPL-CCNC: 10 U/L (ref 15–37)
ATRIAL RATE: 93 BPM
BASOPHILS # BLD: 0 K/UL (ref 0–0.1)
BASOPHILS NFR BLD: 0 % (ref 0–2)
BILIRUB SERPL-MCNC: 0.6 MG/DL (ref 0.2–1)
BUN SERPL-MCNC: 12 MG/DL (ref 7–18)
BUN/CREAT SERPL: 13 (ref 12–20)
CALCIUM SERPL-MCNC: 8.6 MG/DL (ref 8.5–10.1)
CALCULATED P AXIS, ECG09: 72 DEGREES
CALCULATED R AXIS, ECG10: 75 DEGREES
CALCULATED T AXIS, ECG11: 68 DEGREES
CHLORIDE SERPL-SCNC: 106 MMOL/L (ref 100–108)
CO2 SERPL-SCNC: 30 MMOL/L (ref 21–32)
CREAT SERPL-MCNC: 0.94 MG/DL (ref 0.6–1.3)
DIAGNOSIS, 93000: NORMAL
DIFFERENTIAL METHOD BLD: NORMAL
EOSINOPHIL # BLD: 0 K/UL (ref 0–0.4)
EOSINOPHIL NFR BLD: 0 % (ref 0–5)
ERYTHROCYTE [DISTWIDTH] IN BLOOD BY AUTOMATED COUNT: 12.8 % (ref 11.6–14.5)
ETHANOL SERPL-MCNC: <3 MG/DL (ref 0–3)
GLOBULIN SER CALC-MCNC: 3.1 G/DL (ref 2–4)
GLUCOSE SERPL-MCNC: 101 MG/DL (ref 74–99)
HCT VFR BLD AUTO: 40.9 % (ref 36–48)
HGB BLD-MCNC: 14.3 G/DL (ref 13–16)
INR PPP: 1.1 (ref 0.8–1.2)
LACTATE BLD-SCNC: 0.4 MMOL/L (ref 0.4–2)
LYMPHOCYTES # BLD: 1.5 K/UL (ref 0.9–3.6)
LYMPHOCYTES NFR BLD: 25 % (ref 21–52)
MCH RBC QN AUTO: 28.8 PG (ref 24–34)
MCHC RBC AUTO-ENTMCNC: 35 G/DL (ref 31–37)
MCV RBC AUTO: 82.3 FL (ref 74–97)
MONOCYTES # BLD: 0.6 K/UL (ref 0.05–1.2)
MONOCYTES NFR BLD: 10 % (ref 3–10)
NEUTS SEG # BLD: 4 K/UL (ref 1.8–8)
NEUTS SEG NFR BLD: 65 % (ref 40–73)
P-R INTERVAL, ECG05: 152 MS
PLATELET # BLD AUTO: 188 K/UL (ref 135–420)
PMV BLD AUTO: 9.8 FL (ref 9.2–11.8)
POTASSIUM SERPL-SCNC: 3.2 MMOL/L (ref 3.5–5.5)
PROT SERPL-MCNC: 6.9 G/DL (ref 6.4–8.2)
PROTHROMBIN TIME: 13.5 SEC (ref 11.5–15.2)
Q-T INTERVAL, ECG07: 362 MS
QRS DURATION, ECG06: 92 MS
QTC CALCULATION (BEZET), ECG08: 450 MS
RBC # BLD AUTO: 4.97 M/UL (ref 4.7–5.5)
SALICYLATES SERPL-MCNC: <2.8 MG/DL (ref 2.8–20)
SODIUM SERPL-SCNC: 143 MMOL/L (ref 136–145)
TROPONIN I SERPL-MCNC: <0.02 NG/ML (ref 0–0.04)
VENTRICULAR RATE, ECG03: 93 BPM
WBC # BLD AUTO: 6.2 K/UL (ref 4.6–13.2)

## 2018-06-05 PROCEDURE — 83605 ASSAY OF LACTIC ACID: CPT

## 2018-06-05 PROCEDURE — 74011000250 HC RX REV CODE- 250: Performed by: INTERNAL MEDICINE

## 2018-06-05 PROCEDURE — 71045 X-RAY EXAM CHEST 1 VIEW: CPT

## 2018-06-05 PROCEDURE — 85610 PROTHROMBIN TIME: CPT | Performed by: EMERGENCY MEDICINE

## 2018-06-05 PROCEDURE — 80307 DRUG TEST PRSMV CHEM ANLYZR: CPT | Performed by: EMERGENCY MEDICINE

## 2018-06-05 PROCEDURE — 80053 COMPREHEN METABOLIC PANEL: CPT | Performed by: EMERGENCY MEDICINE

## 2018-06-05 PROCEDURE — 96374 THER/PROPH/DIAG INJ IV PUSH: CPT

## 2018-06-05 PROCEDURE — 99218 HC RM OBSERVATION: CPT

## 2018-06-05 PROCEDURE — 96376 TX/PRO/DX INJ SAME DRUG ADON: CPT

## 2018-06-05 PROCEDURE — 96375 TX/PRO/DX INJ NEW DRUG ADDON: CPT

## 2018-06-05 PROCEDURE — 96361 HYDRATE IV INFUSION ADD-ON: CPT

## 2018-06-05 PROCEDURE — 85730 THROMBOPLASTIN TIME PARTIAL: CPT | Performed by: EMERGENCY MEDICINE

## 2018-06-05 PROCEDURE — 85025 COMPLETE CBC W/AUTO DIFF WBC: CPT | Performed by: EMERGENCY MEDICINE

## 2018-06-05 PROCEDURE — 65610000006 HC RM INTENSIVE CARE

## 2018-06-05 PROCEDURE — 74011250636 HC RX REV CODE- 250/636: Performed by: EMERGENCY MEDICINE

## 2018-06-05 PROCEDURE — 87040 BLOOD CULTURE FOR BACTERIA: CPT | Performed by: EMERGENCY MEDICINE

## 2018-06-05 PROCEDURE — 99285 EMERGENCY DEPT VISIT HI MDM: CPT

## 2018-06-05 PROCEDURE — 84484 ASSAY OF TROPONIN QUANT: CPT | Performed by: EMERGENCY MEDICINE

## 2018-06-05 PROCEDURE — 93005 ELECTROCARDIOGRAM TRACING: CPT

## 2018-06-05 PROCEDURE — 70450 CT HEAD/BRAIN W/O DYE: CPT

## 2018-06-05 PROCEDURE — 74011250636 HC RX REV CODE- 250/636: Performed by: NURSE PRACTITIONER

## 2018-06-05 RX ORDER — LORAZEPAM 2 MG/ML
1-2 INJECTION INTRAMUSCULAR
Status: DISCONTINUED | OUTPATIENT
Start: 2018-06-05 | End: 2018-06-07 | Stop reason: HOSPADM

## 2018-06-05 RX ORDER — LORAZEPAM 2 MG/ML
1 INJECTION INTRAMUSCULAR
Status: COMPLETED | OUTPATIENT
Start: 2018-06-05 | End: 2018-06-05

## 2018-06-05 RX ORDER — ONDANSETRON 2 MG/ML
4 INJECTION INTRAMUSCULAR; INTRAVENOUS
Status: DISCONTINUED | OUTPATIENT
Start: 2018-06-05 | End: 2018-06-07 | Stop reason: HOSPADM

## 2018-06-05 RX ORDER — SODIUM CHLORIDE 0.9 % (FLUSH) 0.9 %
5-10 SYRINGE (ML) INJECTION EVERY 8 HOURS
Status: DISCONTINUED | OUTPATIENT
Start: 2018-06-05 | End: 2018-06-07 | Stop reason: HOSPADM

## 2018-06-05 RX ORDER — SODIUM CHLORIDE 9 MG/ML
125 INJECTION, SOLUTION INTRAVENOUS CONTINUOUS
Status: DISCONTINUED | OUTPATIENT
Start: 2018-06-05 | End: 2018-06-06

## 2018-06-05 RX ORDER — ACETAMINOPHEN 650 MG/1
650 SUPPOSITORY RECTAL
Status: DISCONTINUED | OUTPATIENT
Start: 2018-06-05 | End: 2018-06-07 | Stop reason: HOSPADM

## 2018-06-05 RX ORDER — SODIUM CHLORIDE 0.9 % (FLUSH) 0.9 %
5-10 SYRINGE (ML) INJECTION AS NEEDED
Status: DISCONTINUED | OUTPATIENT
Start: 2018-06-05 | End: 2018-06-07 | Stop reason: HOSPADM

## 2018-06-05 RX ORDER — LORAZEPAM 2 MG/ML
INJECTION INTRAMUSCULAR
Status: DISCONTINUED
Start: 2018-06-05 | End: 2018-06-05

## 2018-06-05 RX ADMIN — SODIUM CHLORIDE 125 ML/HR: 900 INJECTION, SOLUTION INTRAVENOUS at 12:59

## 2018-06-05 RX ADMIN — FAMOTIDINE 20 MG: 10 INJECTION, SOLUTION INTRAVENOUS at 21:09

## 2018-06-05 RX ADMIN — SODIUM CHLORIDE 125 ML/HR: 900 INJECTION, SOLUTION INTRAVENOUS at 20:00

## 2018-06-05 RX ADMIN — LORAZEPAM 1 MG: 2 INJECTION INTRAMUSCULAR; INTRAVENOUS at 23:16

## 2018-06-05 RX ADMIN — Medication 10 ML: at 21:58

## 2018-06-05 RX ADMIN — LORAZEPAM 1 MG: 2 INJECTION INTRAMUSCULAR; INTRAVENOUS at 13:53

## 2018-06-05 RX ADMIN — SODIUM CHLORIDE 1000 ML: 900 INJECTION, SOLUTION INTRAVENOUS at 09:24

## 2018-06-05 NOTE — IP AVS SNAPSHOT
Summary of Care Report The Summary of Care report has been created to help improve care coordination. Users with access to [employer] or "[employer] Northeast (Web-based application) may access additional patient information including the Discharge Summary. If you are not currently a [employer] user and need more information, please call the number listed below in the [place] [address] section and ask to be connected with Medical Records. Facility Information Name Address Phone [address] [facility] [address] [phone] Patient Information Patient Name Sex VERNELL Zamora ([id number]) Male 1995 Discharge Information Admitting Provider Service Area Unit Minna Lenz MD / [phone] [address] [address] / [phone] Discharge Provider Discharge Date/Time Discharge Disposition Destination (none) (none) (none) (none) Patient Language Language ENGLISH [13] Hospital Problems as of 2018  Reviewed: 2015  8:40 AM by Anya Wright MD  
  
  
  
 Class Noted - Resolved Last Modified POA Active Problems Altered mental status  2018 - Present 2018 by Tyler Hope MD Unknown Entered by Tyler Hope MD  
  Antipsychotic overdose  2018 - Present 2018 by Tyler Hope MD Unknown Entered by Tyler Hope MD  
  
Non-Hospital Problems as of 2018  Reviewed: 2015  8:40 AM by Anya Wright MD  
  
  
  
 Class Noted - Resolved Last Modified Active Problems Schizophrenia ([facility] [address]  2015 - Present 2017 by Tylor Dye MD  
  Entered by Cora Ortiz You are allergic to the following No active allergies Current Discharge Medication List  
  
ASK your doctor about these medications Dose & Instructions Dispensing Information Comments ARIPiprazole 300 mg Serr injection Commonly known as:  Phoenix Garcia Dose:  300 mg  
300 mg by IntraMUSCular route every thirty (30) days. Refills:  0  
   
 benztropine 1 mg tablet Commonly known as:  COGENTIN Dose:  1 mg Take 1 Tab by mouth two (2) times a day. Indications: extrapyramidal disease Quantity:  60 Tab Refills:  0  
   
 risperiDONE 2 mg tablet Commonly known as:  RisperDAL Dose:  2 mg Take 1 Tab by mouth two (2) times a day. Indications: BIPOLAR DISORDER IN REMISSION Quantity:  60 Tab Refills:  0 Follow-up Information None Discharge Instructions Altered Mental Status: Care Instructions Your Care Instructions Altered mental status is a change in how well your brain is working. As a result, you may be confused, be less alert than usual, or act in odd ways. This may include seeing or hearing things that aren't really there (hallucinations). A mental status change has many possible causes. For example, it may be the result of an infection, an imbalance of chemicals in the body, or a chronic disease such as diabetes or COPD. It can also be caused by things such as a head injury, taking certain medicines, or using alcohol or drugs. The doctor may do tests to look for the cause. These tests may include urine tests, blood tests, and imaging tests such as a CT scan. Sometimes a clear cause isn't found. But tests can help the doctor rule out a serious cause of your symptoms. A change in mental status can be scary. But mental status will often return to normal when the cause is treated. So it is important to get any follow-up testing or treatment the doctor has suggested. The doctor has checked you carefully, but problems can develop later. If you notice any problems or new symptoms, get medical treatment right away. Follow-up care is a key part of your treatment and safety. Be sure to make and go to all appointments, and call your doctor if you are having problems. It's also a good idea to know your test results and keep a list of the medicines you take. How can you care for yourself at home? · Be safe with medicines. Take your medicines exactly as prescribed. Call your doctor if you think you are having a problem with your medicine. · Have another adult stay with you until you are better. This can help keep you safe. Ask that person to watch for signs that your mental status is getting worse. When should you call for help? Call 911 anytime you think you may need emergency care. For example, call if: 
? · You passed out (lost consciousness). ?Call your doctor now or seek immediate medical care if: 
? · Your mental status is getting worse. ? · You have new symptoms, such as a fever, chills, or shortness of breath. ? · You do not feel safe. ? Watch closely for changes in your health, and be sure to contact your doctor if: 
? · You do not get better as expected. Where can you learn more? Go to http://rajinder-brooke.info/. Enter H554 in the search box to learn more about \"Altered Mental Status: Care Instructions. \" Current as of: October 14, 2016 Content Version: 11.4 © 3627-9052 [employer]wise[employer]. Care instructions adapted under license by [employer] (which disclaims liability or warranty for this information). If you have questions about a medical condition or this instruction, always ask your healthcare professional. [place] [address] any warranty or liability for your use of this information. Accidental Overdose of Medicine: Care Instructions Your Care Instructions Almost any medicine can cause harm if you take too much of it. You have had treatment to help your body get rid of an overdose of a medicine.  This may have been an over-the-counter medicine. Or it might be one that a doctor prescribed. It may even have been a vitamin or a supplement. During treatment, the doctor may have given you fluids and medicine. You also may have had lab tests. Then the doctor made sure that you were well enough to go home. The doctor has checked you carefully, but problems can develop later. If you notice any problems or new symptoms, get medical treatment right away. Follow-up care is a key part of your treatment and safety. Be sure to make and go to all appointments, and call your doctor if you are having problems. It's also a good idea to know your test results and keep a list of the medicines you take. How can you care for yourself at home? Home care · Drink plenty of fluids. If you have kidney, heart, or liver disease and have to limit fluids, talk with your doctor before you increase the amount of fluids you drink. · If you normally take medicines, ask your doctor when you can start taking them again. · Read the information that comes with any medicine. If you have questions, ask your doctor or pharmacist. 
Prevention · Be safe with medicines. Take your medicines exactly as prescribed or as the label directs. Call your doctor if you think you are having a problem with your medicine. · Keep your medicines in the containers they came in. Keep them with the original labels. · Find out what to do if you miss a dose of your medicine. When should you call for help? Call 911 anytime you think you may need emergency care. For example, call if: 
? · You passed out (lost consciousness). ? · You have trouble breathing. ? · You are sleepy or hard to wake up. ?Call your doctor now or seek immediate medical care if: 
? · You are vomiting. ? · You have a new or worse headache. ? · You are dizzy or lightheaded or feel like you may faint. ? Watch closely for changes in your health, and be sure to contact your doctor if: ? · You do not get better as expected. Where can you learn more? Go to http://rjainder-brooke.info/. Enter C165 in the search box to learn more about \"Accidental Overdose of Medicine: Care Instructions. \" Current as of: August 14, 2016 Content Version: 11.4 © 8081-7816 [employer]. Care instructions adapted under license by [employer] (which disclaims liability or warranty for this information). If you have questions about a medical condition or this instruction, always ask your healthcare professional. [address] any warranty or liability for your use of this information. Alcohol, Drug, or Poison Ingestion: Care Instructions Your Care Instructions A person can become very sick, or die, from swallowing or using alcohol, drugs, or poisons. Alcohol poisoning occurs when a person drinks a large amount of alcohol. Alcohol can stop nerve signals that control breathing. It can also stop the gag reflex that prevents choking. Alcohol poisoning is serious. It can lead to brain damage or death if it's not treated right away. Drugs can be used by accident or on purpose. They can be swallowed, inhaled, injected, or absorbed through the skin. Drugs include over-the-counter medicine (such as aspirin or acetaminophen) and prescription medicine. They also include vitamins and supplements. And they include illegal drugs such as cocaine and heroin. And poisons are all around us. They include household , cosmetics, houseplants, and garden chemicals. The doctor has checked you carefully, but problems can develop later. If you notice any problems or new symptoms, get medical treatment right away. Follow-up care is a key part of your treatment and safety. Be sure to make and go to all appointments, and call your doctor if you are having problems. It's also a good idea to know your test results and keep a list of the medicines you take. How can you care for yourself at home? Alcohol problems · Talk to your doctor or counselor about programs that can help you stop using alcohol. · Plan ways to avoid being tempted to drink. ¨ Get rid of all alcohol in your home. ¨ Avoid places where you tend to drink. ¨ Stay away from places or events that offer alcohol. ¨ Stay away from people who drink a lot. Drug problems · Talk to your doctor about programs that can help you stop using drugs. · Get rid of any drugs you might be tempted to misuse. · Learn how to say no when other people use drugs. · Don't spend time with people who use drugs. Poison prevention · Keep products in the containers they came in. Keep them with the original labels. · Be careful when you use cleaning products, paints, solvents, and pesticides. Read labels before use. Use a fan to move strong odors and fumes out of your home. · Do not mix cleaning products. Try to use nontoxic . These include vinegar, lemon juice, and baking soda. When should you call for help? Poison control centers, hospitals, or your doctor can give immediate advice in the case of a poisoning. The [facility] [employer] number is [phone]. Have the poison container with you so you can give complete information to the poison control center, such as what the poison or substance is, how much was taken and when. Do not try to make the person vomit. ?Call 911 anytime you think you may need emergency care. For example, call if you or someone else: 
? · Has used or currently uses alcohol or drugs and is very confused or can't stay awake. ? · Has passed out (lost consciousness). ? · Has severe trouble breathing. ? · Is having a seizure. ?Call your doctor now or seek immediate medical care if you or someone else: 
? · Has new symptoms, or is not acting normally. ? Watch closely for changes in your health, and be sure to contact your doctor if: ? · You do not get better as expected. ? · You need help with drug or alcohol problems. ? · You have problems with depression or other mental health issues. Where can you learn more? Go to http://rajinder-brooke.info/. Enter N551 in the search box to learn more about \"Alcohol, Drug, or Poison Ingestion: Care Instructions. \" Current as of: 2017 Content Version: 11.4 © 0210-7022 [employer]. Care instructions adapted under license by [employer] (which disclaims liability or warranty for this information). If you have questions about a medical condition or this instruction, always ask your healthcare professional. [place] [address] any warranty or liability for your use of this information. Patient armband removed and shredded. Novacem Activation Thank you for requesting access to Novacem. Please follow the instructions below to securely access and download your online medical record. Novacem allows you to send messages to your doctor, view your test results, renew your prescriptions, schedule appointments, and more. How Do I Sign Up? 1. In your internet browser, go to [email or web address] 
2. Click on the First Time User? Click Here link in the Sign In box. You will be redirect to the New Member Sign Up page. 3. Enter your Novacem Access Code exactly as it appears below. You will not need to use this code after youve completed the sign-up process. If you do not sign up before the expiration date, you must request a new code. Novacem Access Code: [email or web address] Expires: 2018 11:57 AM (This is the date your Novacem access code will ) 4. Enter the last four digits of your Social Security Number (xxxx) and Date of Birth (mm/dd/yyyy) as indicated and click Submit. You will be taken to the next sign-up page. 5. Create a Novacem ID.  This will be your Novacem login ID and cannot be changed, so think of one that is secure and easy to remember. 6. Create a FAST FELT password. You can change your password at any time. 7. Enter your Password Reset Question and Answer. This can be used at a later time if you forget your password. 8. Enter your e-mail address. You will receive e-mail notification when new information is available in [address] 9. Click Sign Up. You can now view and download portions of your medical record. 10. Click the Download Summary menu link to download a portable copy of your medical information. Additional Information If you have questions, please visit the Frequently Asked Questions section of the FAST FELT website at [email or web address] Remember, FAST FELT is NOT to be used for urgent needs. For medical emergencies, dial 911. DISCHARGE SUMMARY from Nurse PATIENT INSTRUCTIONS: 
 
 
F-face looks uneven A-arms unable to move or move unevenly S-speech slurred or non-existent T-time-call 911 as soon as signs and symptoms begin-DO NOT go Back to bed or wait to see if you get better-TIME IS BRAIN. Warning Signs of HEART ATTACK Call 911 if you have these symptoms: 
? Chest discomfort. Most heart attacks involve discomfort in the center of the chest that lasts more than a few minutes, or that goes away and comes back. It can feel like uncomfortable pressure, squeezing, fullness, or pain. ? Discomfort in other areas of the upper body. Symptoms can include pain or discomfort in one or both arms, the back, neck, jaw, or stomach. ? Shortness of breath with or without chest discomfort. ? Other signs may include breaking out in a cold sweat, nausea, or lightheadedness. Don't wait more than five minutes to call [address]! Fast action can save your life. Calling 911 is almost always the fastest way to get lifesaving treatment. Emergency Medical Services staff can begin treatment when they arrive  up to an hour sooner than if someone gets to the hospital by car. The discharge information has been reviewed with the patient. The patient verbalized understanding. Discharge medications reviewed with the patient and appropriate educational materials and side effects teaching were provided. ___________________________________________________________________________________________________________________________________ Chart Review Routing History Recipient Method Report Sent By Radha Barbosa MD  
Fax: [phone] Phone: [phone] Fax IP Auto Routed Donnell Santa [[id number]] 1/18/2015  6:18 AM 01/18/2015 Sintia Barbosa MD  
Fax: [phone] Phone: [phone] Fax IP Auto Routed Donnell Santa [[id number]] 2/9/2015  5:52 PM 02/09/2015 Sintia Barbosa MD  
Fax: [phone] Phone: [phone] Fax IP Auto Routed Donnell Santa [[id number]] 3/4/2015  8:29 AM 03/04/2015

## 2018-06-05 NOTE — CONSULTS
[address] [address]  MR#: [id number]  : 1995  ACCOUNT #: [de-identified]   DATE OF SERVICE: 2018    REASON FOR ADMISSION:  Overdose. HISTORY OF PRESENT ILLNESS:  The patient is a 29-year-old gentleman with a history of depression and other psychiatric disorders, who reportedly had been in his usual health up until approximately 1 to 1-1/2 weeks ago. He normally has his psychiatric medication as an injection. Reportedly, it is Abilify, which he receives injected at 300 mg once a month. It was due to be injected approximately 8-10 days ago; however, for unspecified reasons, the injection was not able to be accommodated at that time and the patient had to go an additional week without that. He had his shot, however, yesterday  Over the course of the past 7-10 days, according to his aunt with whom he lives, the patient was having increased issues at home. He at his best with his psychiatric medications appropriately in place still has issues with hearing voices; however, he was hearing more voices and was having more conversations with said voices on a more pronounced level over the course of the past 7-10 days. According to Aunt, he also was noted to be having visual hallucinations during that same time period. He was continuing to escalate through the course of the past one and a half weeks. He was able, however, to get his Abilify injection done yesterday. Last evening, he was unable to sleep, according to his aunt, and this morning, she went to go see him and he was acting more strangely than usual and was more fatigued. He was having trouble ambulating outside of the home and speech was becoming almost unintelligible. She had contacted his psychiatrist and he was instructed to be seen this morning regardless; however, due to worsening of his status, his aunt contacted EMS.   EMS arrived and the patient was almost unresponsive, only responding to painful noxious stimuli to sternal rub. It was noted that he had a bottle of Seroquel, which had 1 tablet left. It is from an old prescription, which the patient had been discontinued by his psychiatrist approximately 2-3 months ago, and these were left over from that. The number of pills in the bottle prior to yesterday is unknown. The aunt believes there may have been anywhere from 6-10. There was only 1 pill left in the bottle, which was discovered by the aunt today. The patient had blood sugar checked and vital signs otherwise were normal with EMS present. The patient received Narcan, multiple doses, without significant improvement. He was brought to the Emergency Room, where he was slightly more arousable for a period of time, then he started to become slightly more somnolent; however, he was protecting his airway and remaining with normal vital signs. He started becoming agitated and almost belligerent and did require 1 dose of Ativan and out of concern for the fact that the patient was not clearing appropriately after monitoring for 6-8 hours, consideration for admission to Intensive Care was considered. Therefore, an evaluation and consultation was required. At the time I interviewed and examined this patient, he was unable to answer any particular questions, having recently received his Ativan. His aunt, however, was present and answered all questions to the best of her ability. She denied any recent fever, chills, nausea, vomiting, diarrhea, constipation other flu-like illnesses or other particular complaints. She denied knowing that he had any issues or complaints of chest pain, lightheadedness, dizziness, headache or palpitations. PAST MEDICAL HISTORY:  Notable for the depression, history of anxiety, history of attention deficit disorder, also unspecified psychiatric/psychotic disorder, history of psychosocial trauma, reportedly, his mother passed away in 11/2017.     ALLERGIES:  NONE KNOWN. PAST SURGICAL HISTORY:  None known. MEDICATIONS:  At home, the patient reportedly was on his monthly injection of Abilify. All other medications had been discontinued. SOCIAL HISTORY:  Lives at home with his aunt. He is a lifelong nonsmoker. Reportedly, did not use any alcohol or illicit drugs. PHYSICAL EXAMINATION:  GENERAL:  Patient is currently sleeping; however, does grimace with noxious stimuli. VITAL SIGNS:  Most recent set of vital signs:  Temperature 97.5, pulse 93, blood pressure 129/87, respiratory rate of 13, oxygen saturations 100% on room air. HEENT:  Unable to assess extraocular muscles or cranial nerves; however, moist oral and ocular mucosa noted. CHEST:  Clear to auscultation anteriorly, though poor cooperation secondary to patient's somnolence. CARDIOVASCULAR:  Regular rate and rhythm. No murmurs, rubs or gallops. ABDOMEN:  Soft, positive bowel sounds present. No tenderness elicited on light palpation. EXTREMITIES:  Cool and dry. No cyanosis, clubbing, edema. Palpable pulses noted. LABORATORY DATA:  Patient had a chemistry panel performed, which was all within normal limits, with the exception of potassium level slightly low at 3.2. CBC is noted to be within normal limits. The patient had a CT scan of the head, which showed no acute intracranial process. Chest x-ray showed normal lung fields, no acute intrathoracic abnormalities. No nodules, masses or infiltrates identified. IMPRESSION:  This is a 61-year-old gentleman with overdose of unspecified etiology, unclear whether this was an accidental overdose or whether this was intention to harm himself. From all accounts available, it sounds most likely to be an accidental overdose due to the fact the patient was having issues with sleeping, though we cannot account for that completely. He is hemodynamically stable and stable from a respiratory standpoint.   No metabolic derangements and no specific abnormalities otherwise. No sign of trauma. No intracranial abnormalities otherwise. PLAN:  At this time will be as follows:  1. Admit the patient to the Intensive Care Unit for monitoring over the next 12-24 hours. 2.  Avoid sedating medications unless absolutely necessary for protection of both the patient and staff should that be required. Otherwise, will attempt to minimize over stimulation to cause the patient to be agitated in any capacity. 3.  Follow the patient's clinical status. If his neurologic status were to deteriorate or show signs of decline in mentation, patient may require endotracheal intubation for airway protection. However, I believe that at this point in time, given the time period between the likely overdose and current period, at this point, I feel he is fairly unlike to require anything else. 4.  Will need to be seen by Psychiatry in the a.m. Thank you for allowing us to participate in the care of the patient. If you have any questions, please feel free to contact me.       MD MADONNA Gonzalez / GM  D: 06/05/2018 17:15     T: 06/05/2018 17:51  JOB #: [id number]

## 2018-06-05 NOTE — ED NOTES
TRANSFER - OUT REPORT:    Verbal report given to Lola Hernandez RN(name) on [place]  being transferred to ICU room 315(unit) for routine progression of care       Report consisted of patients Situation, Background, Assessment and   Recommendations(SBAR). Information from the following report(s) MAR, Recent Results and Cardiac Rhythm sinus rhythm was reviewed with the receiving nurse. Lines:       Opportunity for questions and clarification was provided.       Patient transported with:   Monitor  Registered Nurse

## 2018-06-05 NOTE — IP AVS SNAPSHOT
Shyam Cornejo 
 
 
 [address] [facility] [address] [place] [address] Patient: Jeremy Royal MRN: [id number] :1995 About your hospitalization You were admitted on:  2018 You last received care in the:  [facility] [address] You were discharged on:  2018 Why you were hospitalized Your primary diagnosis was:  Not on File Your diagnoses also included: Altered Mental Status, Antipsychotic Overdose Follow-up Information None Your Scheduled Appointments 2018 10:30 AM EDT INFUSION 30 with HBV FAST TRACK NURSE HBV OP INFUSION ([facility]) [address]  
[phone] Note: Patient must have a  if they take medication(s) that impairs their ability to operate a motor vehicle Discharge Orders None A check johnson indicates which time of day the medication should be taken. My Medications ASK your doctor about these medications Instructions Each Dose to Equal  
 Morning Noon Evening Bedtime ARIPiprazole 300 mg Serr injection Commonly known as:  Orlando Keto Your last dose was: Your next dose is:    
   
   
 300 mg by IntraMUSCular route every thirty (30) days. 300 mg  
    
   
   
   
  
 benztropine 1 mg tablet Commonly known as:  COGENTIN Your last dose was: Your next dose is: Take 1 Tab by mouth two (2) times a day. Indications: extrapyramidal disease 1 mg  
    
   
   
   
  
 risperiDONE 2 mg tablet Commonly known as:  RisperDAL Your last dose was: Your next dose is: Take 1 Tab by mouth two (2) times a day. Indications: BIPOLAR DISORDER IN REMISSION  
 2 mg Discharge Instructions Altered Mental Status: Care Instructions Your Care Instructions Altered mental status is a change in how well your brain is working. As a result, you may be confused, be less alert than usual, or act in odd ways. This may include seeing or hearing things that aren't really there (hallucinations). A mental status change has many possible causes. For example, it may be the result of an infection, an imbalance of chemicals in the body, or a chronic disease such as diabetes or COPD. It can also be caused by things such as a head injury, taking certain medicines, or using alcohol or drugs. The doctor may do tests to look for the cause. These tests may include urine tests, blood tests, and imaging tests such as a CT scan. Sometimes a clear cause isn't found. But tests can help the doctor rule out a serious cause of your symptoms. A change in mental status can be scary. But mental status will often return to normal when the cause is treated. So it is important to get any follow-up testing or treatment the doctor has suggested. The doctor has checked you carefully, but problems can develop later. If you notice any problems or new symptoms, get medical treatment right away. Follow-up care is a key part of your treatment and safety. Be sure to make and go to all appointments, and call your doctor if you are having problems. It's also a good idea to know your test results and keep a list of the medicines you take. How can you care for yourself at home? · Be safe with medicines. Take your medicines exactly as prescribed. Call your doctor if you think you are having a problem with your medicine. · Have another adult stay with you until you are better. This can help keep you safe. Ask that person to watch for signs that your mental status is getting worse. When should you call for help? Call 911 anytime you think you may need emergency care. For example, call if: 
? · You passed out (lost consciousness). ?Call your doctor now or seek immediate medical care if: ? · Your mental status is getting worse. ? · You have new symptoms, such as a fever, chills, or shortness of breath. ? · You do not feel safe. ? Watch closely for changes in your health, and be sure to contact your doctor if: 
? · You do not get better as expected. Where can you learn more? Go to http://rajinder-brooke.info/. Enter Y427 in the search box to learn more about \"Altered Mental Status: Care Instructions. \" Current as of: October 14, 2016 Content Version: 11.4 © 1183-2354 [employer]. Care instructions adapted under license by [employer] (which disclaims liability or warranty for this information). If you have questions about a medical condition or this instruction, always ask your healthcare professional. [address] any warranty or liability for your use of this information. Accidental Overdose of Medicine: Care Instructions Your Care Instructions Almost any medicine can cause harm if you take too much of it. You have had treatment to help your body get rid of an overdose of a medicine. This may have been an over-the-counter medicine. Or it might be one that a doctor prescribed. It may even have been a vitamin or a supplement. During treatment, the doctor may have given you fluids and medicine. You also may have had lab tests. Then the doctor made sure that you were well enough to go home. The doctor has checked you carefully, but problems can develop later. If you notice any problems or new symptoms, get medical treatment right away. Follow-up care is a key part of your treatment and safety. Be sure to make and go to all appointments, and call your doctor if you are having problems. It's also a good idea to know your test results and keep a list of the medicines you take. How can you care for yourself at home? Home care · Drink plenty of fluids.  If you have kidney, heart, or liver disease and have to limit fluids, talk with your doctor before you increase the amount of fluids you drink. · If you normally take medicines, ask your doctor when you can start taking them again. · Read the information that comes with any medicine. If you have questions, ask your doctor or pharmacist. 
Prevention · Be safe with medicines. Take your medicines exactly as prescribed or as the label directs. Call your doctor if you think you are having a problem with your medicine. · Keep your medicines in the containers they came in. Keep them with the original labels. · Find out what to do if you miss a dose of your medicine. When should you call for help? Call 911 anytime you think you may need emergency care. For example, call if: 
? · You passed out (lost consciousness). ? · You have trouble breathing. ? · You are sleepy or hard to wake up. ?Call your doctor now or seek immediate medical care if: 
? · You are vomiting. ? · You have a new or worse headache. ? · You are dizzy or lightheaded or feel like you may faint. ? Watch closely for changes in your health, and be sure to contact your doctor if: 
? · You do not get better as expected. Where can you learn more? Go to http://rajinder-brooke.info/. Enter C165 in the search box to learn more about \"Accidental Overdose of Medicine: Care Instructions. \" Current as of: August 14, 2016 Content Version: 11.4 © 9989-0207 [employer]. Care instructions adapted under license by Let (which disclaims liability or warranty for this information). If you have questions about a medical condition or this instruction, always ask your healthcare professional. [place] [address] any warranty or liability for your use of this information. Alcohol, Drug, or Poison Ingestion: Care Instructions Your Care Instructions A person can become very sick, or die, from swallowing or using alcohol, drugs, or poisons. Alcohol poisoning occurs when a person drinks a large amount of alcohol. Alcohol can stop nerve signals that control breathing. It can also stop the gag reflex that prevents choking. Alcohol poisoning is serious. It can lead to brain damage or death if it's not treated right away. Drugs can be used by accident or on purpose. They can be swallowed, inhaled, injected, or absorbed through the skin. Drugs include over-the-counter medicine (such as aspirin or acetaminophen) and prescription medicine. They also include vitamins and supplements. And they include illegal drugs such as cocaine and heroin. And poisons are all around us. They include household , cosmetics, houseplants, and garden chemicals. The doctor has checked you carefully, but problems can develop later. If you notice any problems or new symptoms, get medical treatment right away. Follow-up care is a key part of your treatment and safety. Be sure to make and go to all appointments, and call your doctor if you are having problems. It's also a good idea to know your test results and keep a list of the medicines you take. How can you care for yourself at home? Alcohol problems · Talk to your doctor or counselor about programs that can help you stop using alcohol. · Plan ways to avoid being tempted to drink. ¨ Get rid of all alcohol in your home. ¨ Avoid places where you tend to drink. ¨ Stay away from places or events that offer alcohol. ¨ Stay away from people who drink a lot. Drug problems · Talk to your doctor about programs that can help you stop using drugs. · Get rid of any drugs you might be tempted to misuse. · Learn how to say no when other people use drugs. · Don't spend time with people who use drugs. Poison prevention · Keep products in the containers they came in. Keep them with the original labels.  
· Be careful when you use cleaning products, paints, solvents, and pesticides. Read labels before use. Use a fan to move strong odors and fumes out of your home. · Do not mix cleaning products. Try to use nontoxic . These include vinegar, lemon juice, and baking soda. When should you call for help? Poison control centers, hospitals, or your doctor can give immediate advice in the case of a poisoning. The [facility] [employer] number is [phone]. Have the poison container with you so you can give complete information to the poison control center, such as what the poison or substance is, how much was taken and when. Do not try to make the person vomit. ?Call 911 anytime you think you may need emergency care. For example, call if you or someone else: 
? · Has used or currently uses alcohol or drugs and is very confused or can't stay awake. ? · Has passed out (lost consciousness). ? · Has severe trouble breathing. ? · Is having a seizure. ?Call your doctor now or seek immediate medical care if you or someone else: 
? · Has new symptoms, or is not acting normally. ? Watch closely for changes in your health, and be sure to contact your doctor if: 
? · You do not get better as expected. ? · You need help with drug or alcohol problems. ? · You have problems with depression or other mental health issues. Where can you learn more? Go to http://rajinder-brooke.info/. Enter M803 in the search box to learn more about \"Alcohol, Drug, or Poison Ingestion: Care Instructions. \" Current as of: March 20, 2017 Content Version: 11.4 © 8388-4039 [employer]. Care instructions adapted under license by [employer] (which disclaims liability or warranty for this information). If you have questions about a medical condition or this instruction, always ask your healthcare professional. [address] any warranty or liability for your use of this information. Patient armband removed and shredded. MyChart Activation Thank you for requesting access to [employer]. Please follow the instructions below to securely access and download your online medical record. [employer] allows you to send messages to your doctor, view your test results, renew your prescriptions, schedule appointments, and more. How Do I Sign Up? 1. In your internet browser, go to [email or web address] 
2. Click on the First Time User? Click Here link in the Sign In box. You will be redirect to the New Member Sign Up page. 3. Enter your [employer] Access Code exactly as it appears below. You will not need to use this code after youve completed the sign-up process. If you do not sign up before the expiration date, you must request a new code. [employer] Access Code: [email or web address] Expires: 2018 11:57 AM (This is the date your [employer] access code will ) 4. Enter the last four digits of your Social Security Number (xxxx) and Date of Birth (mm/dd/yyyy) as indicated and click Submit. You will be taken to the next sign-up page. 5. Create a [employer] ID. This will be your [employer] login ID and cannot be changed, so think of one that is secure and easy to remember. 6. Create a [employer] password. You can change your password at any time. 7. Enter your Password Reset Question and Answer. This can be used at a later time if you forget your password. 8. Enter your e-mail address. You will receive e-mail notification when new information is available in [address] 9. Click Sign Up. You can now view and download portions of your medical record. 10. Click the Download Summary menu link to download a portable copy of your medical information. Additional Information If you have questions, please visit the Frequently Asked Questions section of the [employer] website at [email or web address] Remember, [employer] is NOT to be used for urgent needs. For medical emergencies, dial 911. DISCHARGE SUMMARY from Nurse PATIENT INSTRUCTIONS: 
 
 
F-face looks uneven A-arms unable to move or move unevenly S-speech slurred or non-existent T-time-call 911 as soon as signs and symptoms begin-DO NOT go Back to bed or wait to see if you get better-TIME IS BRAIN. Warning Signs of HEART ATTACK Call 911 if you have these symptoms: 
? Chest discomfort. Most heart attacks involve discomfort in the center of the chest that lasts more than a few minutes, or that goes away and comes back. It can feel like uncomfortable pressure, squeezing, fullness, or pain. ? Discomfort in other areas of the upper body. Symptoms can include pain or discomfort in one or both arms, the back, neck, jaw, or stomach. ? Shortness of breath with or without chest discomfort. ? Other signs may include breaking out in a cold sweat, nausea, or lightheadedness. Don't wait more than five minutes to call [address]! Fast action can save your life. Calling 911 is almost always the fastest way to get lifesaving treatment. Emergency Medical Services staff can begin treatment when they arrive  up to an hour sooner than if someone gets to the hospital by car. The discharge information has been reviewed with the patient. The patient verbalized understanding. Discharge medications reviewed with the patient and appropriate educational materials and side effects teaching were provided. ___________________________________________________________________________________________________________________________________ Introducing [facility]!    
 [employer] introduces [employer] patient portal. Now you can access parts of your medical record, email your doctor's office, and request medication refills online. 1. In your internet browser, go to [email or web address] 2. Click on the First Time User? Click Here link in the Sign In box. You will see the New Member Sign Up page. 3. Enter your Jingit Access Code exactly as it appears below. You will not need to use this code after youve completed the sign-up process. If you do not sign up before the expiration date, you must request a new code. · Jingit Access Code: [email or web address] Expires: 7/17/2018 11:57 AM 
 
4. Enter the last four digits of your Social Security Number (xxxx) and Date of Birth (mm/dd/yyyy) as indicated and click Submit. You will be taken to the next sign-up page. 5. Create a Jingit ID. This will be your Jingit login ID and cannot be changed, so think of one that is secure and easy to remember. 6. Create a Jingit password. You can change your password at any time. 7. Enter your Password Reset Question and Answer. This can be used at a later time if you forget your password. 8. Enter your e-mail address. You will receive e-mail notification when new information is available in [address] 9. Click Sign Up. You can now view and download portions of your medical record. 10. Click the Download Summary menu link to download a portable copy of your medical information. If you have questions, please visit the Frequently Asked Questions section of the Jingit website. Remember, Jingit is NOT to be used for urgent needs. For medical emergencies, dial 911. Now available from your iPhone and Android! Introducing Brian Ling As a [employer] patient, I wanted to make you aware of our electronic visit tool called Brian Ling. [employer] 24/7 allows you to connect within minutes with a medical provider 24 hours a day, seven days a week via a mobile device or tablet or logging into a secure website from your computer. You can access [employer] from anywhere in the [place]. A virtual visit might be right for you when you have a simple condition and feel like you just dont want to get out of bed, or cant get away from work for an appointment, when your regular [employer] provider is not available (evenings, weekends or holidays), or when youre out of town and need minor care. Electronic visits cost only $49 and if the [employer] 24/7 provider determines a prescription is needed to treat your condition, one can be electronically transmitted to a nearby pharmacy*. Please take a moment to enroll today if you have not already done so. The enrollment process is free and takes just a few minutes. To enroll, please download the [employer] 24/7 fang to your tablet or phone, or visit [email or web address] to enroll on your computer. And, as an [address] [place] [address] patient with a [employer] account, the results of your visits will be scanned into your electronic medical record and your primary care provider will be able to view the scanned results. We urge you to continue to see your regular [employer] provider for your ongoing medical care. And while your primary care provider may not be the one available when you seek a [employer] virtual visit, the peace of mind you get from getting a real diagnosis real time can be priceless. For more information on [employer], view our Frequently Asked Questions (FAQs) at [email or web address]. Sincerely, 
 
Anay Yoder MD 
Chief Medical Officer Ayde Jacquie Ratliff *:  certain medications cannot be prescribed via [employer] Unresulted Labs-Please follow up with your PCP about these lab tests Order Current Status CULTURE, BLOOD Preliminary result CULTURE, BLOOD Preliminary result Providers Seen During Your Hospitalization Provider Specialty Primary office phone Alf Holland MD Emergency Medicine [phone] Aylin Pérez MD Internal Medicine [phone] Alexi Harley MD Internal Medicine [phone] Elsi Russell MD Family Practice [phone] Your Primary Care Physician (PCP) Primary Care Physician Office Phone Office Fax NONE ** None ** ** None ** You are allergic to the following No active allergies Recent Documentation Height Weight BMI Smoking Status 1.676 m 61.7 kg 21.95 kg/m2 Never Smoker Emergency Contacts Name Discharge Info Relation Home Work Mobile Piedad Lowery(Grandmother) DISCHARGE CAREGIVER [3] Other Relative [6] [phone] [address] [13] [phone] Patient Belongings The following personal items are in your possession at time of discharge: 
  Dental Appliances: None  Visual Aid: None      Home Medications: None   Jewelry: None  Clothing: Undergarments, Pants, Belt, Shirt    Other Valuables: None Please provide this summary of care documentation to your next provider. Signatures-by signing, you are acknowledging that this After Visit Summary has been reviewed with you and you have received a copy. Patient Signature:  ____________________________________________________________ Date:  ____________________________________________________________  
  
Tavo Velázquez Provider Signature:  ____________________________________________________________ Date:  ____________________________________________________________

## 2018-06-05 NOTE — ED PROVIDER NOTES
EMERGENCY DEPARTMENT HISTORY AND PHYSICAL EXAM    8:15 AM      Date: 6/5/2018  Patient Name: Nila Yin    History of Presenting Illness     Chief Complaint   Patient presents with    Altered mental status         History Provided By: EMS    Chief Complaint: unresponsive   Duration:  Noticed this morning  Timing:  Constant  Location: generalized   Quality:   Severity:   Modifying Factors: improved with ammonia inhaler   Associated Symptoms:      Additional History (Context): Nila Yin is a 26 y.o. male with history of multiple psychiatric disorders who presents via EMS after his mother was unable to wake him up this morning. When EMS arrived at the home patient was sitting on the couch, unresponsive, with HR in 140s. He was given Narcan with no improvement, but was arousal with Ammonia inhaler. EMS report he received an injection for his psychiatric disorders and took unknown amount of Seroquel yesterday.  HPI limited, patient nonverbal.     PCP: None    Current Facility-Administered Medications   Medication Dose Route Frequency Provider Last Rate Last Dose    sodium chloride (NS) flush 5-10 mL  5-10 mL IntraVENous Q8H Jamey Charles MD   10 mL at 06/07/18 0626    sodium chloride (NS) flush 5-10 mL  5-10 mL IntraVENous PRN Jamey Charles MD        famotidine (PF) (PEPCID) 20 mg in sodium chloride 0.9% 10 mL injection  20 mg IntraVENous Q12H Jamey Charles MD   20 mg at 06/07/18 0957    acetaminophen (TYLENOL) suppository 650 mg  650 mg Rectal Q6H PRN Jamey Charles MD        ondansetron [facility]) injection 4 mg  4 mg IntraVENous Q6H PRN Jamey Charles MD        LORazepam (ATIVAN) injection 1-2 mg  1-2 mg IntraVENous Q4H PRN Sonia Fontaine NP   1 mg at 06/05/18 5006       Past History     Past Medical History:  Past Medical History:   Diagnosis Date    ADHD (attention deficit hyperactivity disorder)     Anxiety disorder     Depression     Mood disorder ([facility] [address]     Psychiatric disorder  Psychotic disorder     Trauma     \"His mother  this past November\"       Past Surgical History:  No past surgical history on file. Family History:  Family History   Problem Relation Age of Onset    Schizophrenia Mother    [facility] Schizophrenia Brother     Schizophrenia Sister        Social History:  Social History   Substance Use Topics    Smoking status: Never Smoker    Smokeless tobacco: Never Used    Alcohol use No       Allergies:  No Known Allergies      Review of Systems       Review of Systems   Unable to perform ROS: Patient unresponsive         Physical Exam     Visit Vitals    /75 (BP 1 Location: Left arm, BP Patient Position: At rest)    Pulse 74    Temp 99.5 °F (37.5 °C)    Resp 16    Ht 5' 6\" (1.676 m)    Wt 61.7 kg (136 lb)    SpO2 100%    BMI 21.95 kg/m2         Physical Exam   Constitutional: He appears well-developed and well-nourished. Drowsy but responds to tactile stimuli    HENT:   Head: Normocephalic and atraumatic. Right Ear: External ear normal.   Left Ear: External ear normal.   Nose: Nose normal.   Mouth/Throat: Oropharynx is clear and moist.   Eyes: Conjunctivae are normal. No scleral icterus. Pupils 2mm and reactive, gaze abnormal    Neck: Normal range of motion. Neck supple. No JVD present. No tracheal deviation present. No thyromegaly present. Cardiovascular: Normal rate, regular rhythm, normal heart sounds and intact distal pulses. Exam reveals no gallop and no friction rub. No murmur heard. Pulmonary/Chest: Effort normal and breath sounds normal. He exhibits no tenderness. Abdominal: Soft. Bowel sounds are normal. He exhibits no distension. There is no tenderness. There is no rebound and no guarding. Musculoskeletal: He exhibits no edema or tenderness. Moving all 4 extremities, no clear evidence of trauma    Lymphadenopathy:     He has no cervical adenopathy. Neurological: No cranial nerve deficit.  Coordination normal.   Drowsy, moving extremities, plantars down B    Skin: Skin is warm and dry. Psychiatric:   No family at the bedside, empty bottle of Seroquel noted    Nursing note and vitals reviewed. Diagnostic Study Results     Labs -  Recent Results (from the past 12 hour(s))   CBC WITH AUTOMATED DIFF    Collection Time: 06/05/18  8:16 AM   Result Value Ref Range    WBC 6.2 4.6 - 13.2 K/uL    RBC 4.97 4.70 - 5.50 M/uL    HGB 14.3 13.0 - 16.0 g/dL    HCT 40.9 36.0 - 48.0 %    MCV 82.3 74.0 - 97.0 FL    MCH 28.8 24.0 - 34.0 PG    MCHC 35.0 31.0 - 37.0 g/dL    RDW 12.8 11.6 - 14.5 %    PLATELET 573 663 - 017 K/uL    MPV 9.8 9.2 - 11.8 FL    NEUTROPHILS 65 40 - 73 %    LYMPHOCYTES 25 21 - 52 %    MONOCYTES 10 3 - 10 %    EOSINOPHILS 0 0 - 5 %    BASOPHILS 0 0 - 2 %    ABS. NEUTROPHILS 4.0 1.8 - 8.0 K/UL    ABS. LYMPHOCYTES 1.5 0.9 - 3.6 K/UL    ABS. MONOCYTES 0.6 0.05 - 1.2 K/UL    ABS. EOSINOPHILS 0.0 0.0 - 0.4 K/UL    ABS. BASOPHILS 0.0 0.0 - 0.1 K/UL    DF AUTOMATED     METABOLIC PANEL, COMPREHENSIVE    Collection Time: 06/05/18  8:16 AM   Result Value Ref Range    Sodium 143 136 - 145 mmol/L    Potassium 3.2 (L) 3.5 - 5.5 mmol/L    Chloride 106 100 - 108 mmol/L    CO2 30 21 - 32 mmol/L    Anion gap 7 3.0 - 18 mmol/L    Glucose 101 (H) 74 - 99 mg/dL    BUN 12 7.0 - 18 MG/DL    Creatinine 0.94 0.6 - 1.3 MG/DL    BUN/Creatinine ratio 13 12 - 20      GFR est AA >60 >60 ml/min/1.73m2    GFR est non-AA >60 >60 ml/min/1.73m2    Calcium 8.6 8.5 - 10.1 MG/DL    Bilirubin, total 0.6 0.2 - 1.0 MG/DL    ALT (SGPT) 18 16 - 61 U/L    AST (SGOT) 10 (L) 15 - 37 U/L    Alk.  phosphatase 84 45 - 117 U/L    Protein, total 6.9 6.4 - 8.2 g/dL    Albumin 3.8 3.4 - 5.0 g/dL    Globulin 3.1 2.0 - 4.0 g/dL    A-G Ratio 1.2 0.8 - 1.7     TROPONIN I    Collection Time: 06/05/18  8:16 AM   Result Value Ref Range    Troponin-I, Qt. <0.02 0.0 - 0.045 NG/ML   PROTHROMBIN TIME + INR    Collection Time: 06/05/18  8:16 AM   Result Value Ref Range    Prothrombin time 13.5 11.5 - 15.2 sec    INR 1.1 0.8 - 1.2     PTT    Collection Time: 06/05/18  8:16 AM   Result Value Ref Range    aPTT 25.7 13.9 - 17.2 SEC   SALICYLATE    Collection Time: 06/05/18  8:16 AM   Result Value Ref Range    Salicylate level <8.2 (L) 2.8 - 20.0 MG/DL   ACETAMINOPHEN    Collection Time: 06/05/18  8:16 AM   Result Value Ref Range    Acetaminophen level <2 (L) 10.0 - 30.0 ug/mL   ETHYL ALCOHOL    Collection Time: 06/05/18  8:16 AM   Result Value Ref Range    ALCOHOL(ETHYL),SERUM <3 0 - 3 MG/DL   POC LACTIC ACID    Collection Time: 06/05/18  8:33 AM   Result Value Ref Range    Lactic Acid (POC) 0.4 0.4 - 2.0 mmol/L   EKG, 12 LEAD, INITIAL    Collection Time: 06/05/18  8:34 AM   Result Value Ref Range    Ventricular Rate 93 BPM    Atrial Rate 93 BPM    P-R Interval 152 ms    QRS Duration 92 ms    Q-T Interval 362 ms    QTC Calculation (Bezet) 450 ms    Calculated P Axis 72 degrees    Calculated R Axis 75 degrees    Calculated T Axis 68 degrees    Diagnosis       Normal sinus rhythm  Early repolarization  Normal ECG  No previous ECGs available  Confirmed by Kathya Helms MD, Richard Ivan ([id number]) on 6/5/2018 2:07:00 PM         Radiologic Studies -   XR CHEST SNGL V   Negative chest.   As interpreted by radiology. CT HEAD WO CONT   Negative head CT. Medical Decision Making   I am the first provider for this patient. I reviewed the vital signs, available nursing notes, past medical history, past surgical history, family history and social history. Vital Signs-Reviewed the patient's vital signs. Pulse Oximetry Analysis -  99% on room air (Interpretation)    EKG: Interpreted by the EP. Time Interpreted: 8:34   Rate: 93   Rhythm: Normal Sinus Rhythm   Interpretation: Diffuse ST elevation, likely early repol.      Records Reviewed: Nursing Notes and Old Medical Records (Time of Review: 8:15 AM)    ED Course: Progress Notes, Reevaluation, and Consults:    Provider Notes (Medical Decision Making):  Pt is a 21yo male with a hx of schizophrenia presents after being found poorly responsive at home. It appears he was given abilify injection yesterday for agitation and has a bottle of seroquel at the bedside. He could have taken up to 14 tabs. Will follow tox labs, CT head, hydrate, then reevaluate. Paradise Fermin, DO 8:52 AM    9:10 AM  Patient's Aunt, who he lives with, and his mental elly worker are present in the ED. Mental health worker states that patient was \"not himself\" yesterday, before receiving his monthly Abilify shot. Aunt reports that he was unable to sleep due to hearing voices throughout the night and that she found a Seroquel bottle on the counter, with suspicion that patient had taken more than recommended. 1:22 PM  Discussed case with poison control. At this time, only supportive care is necessary. Will consider admission due to continued drowsiness. 1:45 PM  Patient became alert, agitated ans tachycardic in the 170s. Patient was not following commands and required Ativan as a chemical restraint. No hypoxia or respiratory distress. Suspect that he will remain altered for an extended period of time. Will admit to ICU. Consult:  Discussed care with Dr. Harshal Burdick, ICU. Standard discussion; including history of patients chief complaint, available diagnostic results, and treatment course. Will admit to ICU.   1:58 PM, 6/5/2018     Consult:  Discussed care with Dr. Lorenza Collado, Hospitalist. Standard discussion; including history of patients chief complaint, available diagnostic results, and treatment course. Agrees with admission. 2:02 PM, 6/5/2018       For Hospitalized Patients:    1. Hospitalization Decision Time:  The decision to hospitalize the patient was made by Dr. Debi Peter PM at  on 6/5/2018      Diagnosis     Clinical Impression:   1. Altered mental status, unspecified altered mental status type    2.  Antipsychotic overdose, undetermined intent, initial encounter        Disposition: admit to ICU    Follow-up Information     None           Current Discharge Medication List      CONTINUE these medications which have NOT CHANGED    Details   ARIPiprazole (ABILIFY MAINTENA) 300 mg serr injection 300 mg by IntraMUSCular route every thirty (30) days. benztropine (COGENTIN) 1 mg tablet Take 1 Tab by mouth two (2) times a day. Indications: extrapyramidal disease  Qty: 60 Tab, Refills: 0      risperiDONE (RISPERDAL) 2 mg tablet Take 1 Tab by mouth two (2) times a day. Indications: BIPOLAR DISORDER IN REMISSION  Qty: 60 Tab, Refills: 0           _______________________________    Attestations:  Scribe Attestation     Raynald Moritz acting as a scribe for and in the presence of Soheila Alanis MD      June 05, 2018 at 8:15 AM       Provider Attestation:      I personally performed the services described in the documentation, reviewed the documentation, as recorded by the scribe in my presence, and it accurately and completely records my words and actions.  June 05, 2018 at 8:15 AM - Soheila Alanis MD    _______________________________

## 2018-06-05 NOTE — ED TRIAGE NOTES
From home to room 9. Patient lives with aunt. She stated, yesterday, he had a monthly injection for physiatric diagnosis. ,Abilify 300mg monthly. Yesterday, he couldn't sleep so he took an unknown amount of Seroquel. Bottle had a #14 on label with one tablet left. EMS gave 4g of Narcan, , was tachycardic on scene. Here, patient is slightly aroused to sternal rub. Dr. Baker Mail at bedside.

## 2018-06-05 NOTE — ED NOTES
Awakened and became frightened and crying. Was getting to get out of bed. See MAR. Dr. Isabel Dubon at bedside.

## 2018-06-05 NOTE — IP AVS SNAPSHOT
Vanda Nixon 
 
 
 [address] Patient: Ebony Vasquez MRN: [id number] :1995 A check johnson indicates which time of day the medication should be taken. My Medications ASK your doctor about these medications Instructions Each Dose to Equal  
 Morning Noon Evening Bedtime ARIPiprazole 300 mg Serr injection Commonly known as:  Knute Peak Your last dose was: Your next dose is:    
   
   
 300 mg by IntraMUSCular route every thirty (30) days. 300 mg  
    
   
   
   
  
 benztropine 1 mg tablet Commonly known as:  COGENTIN Your last dose was: Your next dose is: Take 1 Tab by mouth two (2) times a day. Indications: extrapyramidal disease 1 mg  
    
   
   
   
  
 risperiDONE 2 mg tablet Commonly known as:  RisperDAL Your last dose was: Your next dose is: Take 1 Tab by mouth two (2) times a day. Indications: BIPOLAR DISORDER IN REMISSION  
 2 mg

## 2018-06-05 NOTE — ED NOTES
No changes since previous note. Still arouses to shoulder rub. Patient does move lower extremities. See vital sign flow sheet. Aunt at bedside.

## 2018-06-05 NOTE — H&P
History and Physical      NAME:  Brittany Rothman   :   1995   MRN:   [id number]     Date/Time:  2018     CHIEF COMPLAINT: drug overdos     HISTORY OF PRESENT ILLNESS:     Mr. Helen Adam is a 26 y.o.   male with a PMH of depression, anxiety, ADHD who presents to ED with suspected drug overdose. Patient lives with his aunt. According to the family he took unknown amount of Seroquel and Abilify. He had difficulty to sleep last night. He was unresponsive, but now waking in ED and was also agitated and he was given ativan. Unable to get history from the patient. Patient is protecting his airway and not imminent risk of respiratory compromise at this time. He is being admitted to ICU for close monitoring. Past Medical History:   Diagnosis Date    ADHD (attention deficit hyperactivity disorder)     Anxiety disorder     Depression     Mood disorder (HCC)     Psychiatric disorder     Psychotic disorder     Trauma     \"His mother  this past November\"        No past surgical history on file. Social History   Substance Use Topics    Smoking status: Never Smoker    Smokeless tobacco: Never Used    Alcohol use No        Family History   Problem Relation Age of Onset    Schizophrenia Mother     Schizophrenia Brother     Schizophrenia Sister         No Known Allergies     Prior to Admission medications    Medication Sig Start Date End Date Taking? Authorizing Provider   benztropine (COGENTIN) 1 mg tablet Take 1 Tab by mouth two (2) times a day. Indications: extrapyramidal disease 17   Anna Goodrich MD   risperiDONE (RISPERDAL) 2 mg tablet Take 1 Tab by mouth two (2) times a day.  Indications: BIPOLAR DISORDER IN REMISSION 17   Anna Goodrich MD       REVIEW OF SYSTEMS:     Unable to obtain due to change in MS      Physical Exam:    VITALS:    Vital signs reviewed; most recent are:    Visit Vitals    /87    Pulse 96    Temp 97.5 °F (36.4 °C)    Resp 13    SpO2 100%     SpO2 Readings from Last 6 Encounters:   06/05/18 100%   04/30/18 99%   08/16/17 99%   08/03/17 100%   02/26/15 97%   01/31/15 100%        No intake or output data in the 24 hours ending 06/05/18 1637       GENERAL: Not in acute distress  HEENT: pink conjunctiva, un icteric sclera,   NECK: No lymphadenopthy or thyroid swelling, JVD not seen  LYMPH: No supraclavicular or cervical or axillary nodes on both sides  CVS: S1S2, No murmurs, No gallop or rub  RS: CTA, No wheezing or crackles  Abd: Soft, non tender, not distended, No guarding, No rigidity  NEURO:  No focal neurologic deficits   Extrm: no leg edema or swelling   Skin: No rash      Labs:  Recent Results (from the past 24 hour(s))   CBC WITH AUTOMATED DIFF    Collection Time: 06/05/18  8:16 AM   Result Value Ref Range    WBC 6.2 4.6 - 13.2 K/uL    RBC 4.97 4.70 - 5.50 M/uL    HGB 14.3 13.0 - 16.0 g/dL    HCT 40.9 36.0 - 48.0 %    MCV 82.3 74.0 - 97.0 FL    MCH 28.8 24.0 - 34.0 PG    MCHC 35.0 31.0 - 37.0 g/dL    RDW 12.8 11.6 - 14.5 %    PLATELET 400 339 - 995 K/uL    MPV 9.8 9.2 - 11.8 FL    NEUTROPHILS 65 40 - 73 %    LYMPHOCYTES 25 21 - 52 %    MONOCYTES 10 3 - 10 %    EOSINOPHILS 0 0 - 5 %    BASOPHILS 0 0 - 2 %    ABS. NEUTROPHILS 4.0 1.8 - 8.0 K/UL    ABS. LYMPHOCYTES 1.5 0.9 - 3.6 K/UL    ABS. MONOCYTES 0.6 0.05 - 1.2 K/UL    ABS. EOSINOPHILS 0.0 0.0 - 0.4 K/UL    ABS.  BASOPHILS 0.0 0.0 - 0.1 K/UL    DF AUTOMATED     METABOLIC PANEL, COMPREHENSIVE    Collection Time: 06/05/18  8:16 AM   Result Value Ref Range    Sodium 143 136 - 145 mmol/L    Potassium 3.2 (L) 3.5 - 5.5 mmol/L    Chloride 106 100 - 108 mmol/L    CO2 30 21 - 32 mmol/L    Anion gap 7 3.0 - 18 mmol/L    Glucose 101 (H) 74 - 99 mg/dL    BUN 12 7.0 - 18 MG/DL    Creatinine 0.94 0.6 - 1.3 MG/DL    BUN/Creatinine ratio 13 12 - 20      GFR est AA >60 >60 ml/min/1.73m2    GFR est non-AA >60 >60 ml/min/1.73m2    Calcium 8.6 8.5 - 10.1 MG/DL Bilirubin, total 0.6 0.2 - 1.0 MG/DL    ALT (SGPT) 18 16 - 61 U/L    AST (SGOT) 10 (L) 15 - 37 U/L    Alk. phosphatase 84 45 - 117 U/L    Protein, total 6.9 6.4 - 8.2 g/dL    Albumin 3.8 3.4 - 5.0 g/dL    Globulin 3.1 2.0 - 4.0 g/dL    A-G Ratio 1.2 0.8 - 1.7     TROPONIN I    Collection Time: 06/05/18  8:16 AM   Result Value Ref Range    Troponin-I, Qt. <0.02 0.0 - 0.045 NG/ML   PROTHROMBIN TIME + INR    Collection Time: 06/05/18  8:16 AM   Result Value Ref Range    Prothrombin time 13.5 11.5 - 15.2 sec    INR 1.1 0.8 - 1.2     PTT    Collection Time: 06/05/18  8:16 AM   Result Value Ref Range    aPTT 25.7 95.6 - 45.3 SEC   SALICYLATE    Collection Time: 06/05/18  8:16 AM   Result Value Ref Range    Salicylate level <9.5 (L) 2.8 - 20.0 MG/DL   ACETAMINOPHEN    Collection Time: 06/05/18  8:16 AM   Result Value Ref Range    Acetaminophen level <2 (L) 10.0 - 30.0 ug/mL   ETHYL ALCOHOL    Collection Time: 06/05/18  8:16 AM   Result Value Ref Range    ALCOHOL(ETHYL),SERUM <3 0 - 3 MG/DL   POC LACTIC ACID    Collection Time: 06/05/18  8:33 AM   Result Value Ref Range    Lactic Acid (POC) 0.4 0.4 - 2.0 mmol/L   EKG, 12 LEAD, INITIAL    Collection Time: 06/05/18  8:34 AM   Result Value Ref Range    Ventricular Rate 93 BPM    Atrial Rate 93 BPM    P-R Interval 152 ms    QRS Duration 92 ms    Q-T Interval 362 ms    QTC Calculation (Bezet) 450 ms    Calculated P Axis 72 degrees    Calculated R Axis 75 degrees    Calculated T Axis 68 degrees    Diagnosis       Normal sinus rhythm  Early repolarization  Normal ECG  No previous ECGs available  Confirmed by Theodore Serrano MD, Timothy Linn ([id number]) on 6/5/2018 2:07:00 PM           Active Problems:    Altered mental status (6/5/2018)      Antipsychotic overdose (6/5/2018)        Assessment:       1. Altered mental status secondary to #2  2. Drug overdose  3. Depression,   4. Anxiety,   5.  ADHD     Plan:       · Admitting to ICU  · Closely monitor respiratory status  · Minimize sedatives  · Psychiatry consult  · Intensivist also consulted  · ull code  · DVT prophylaxsis    Total time:  62 minutes             _______________________________________________________________________        Attending Physician:  Othel Gowers, MD

## 2018-06-06 LAB
ALBUMIN SERPL-MCNC: 3.4 G/DL (ref 3.4–5)
ALBUMIN/GLOB SERPL: 1.2 {RATIO} (ref 0.8–1.7)
ALP SERPL-CCNC: 77 U/L (ref 45–117)
ALT SERPL-CCNC: 15 U/L (ref 16–61)
AMPHET UR QL SCN: NEGATIVE
ANION GAP SERPL CALC-SCNC: 6 MMOL/L (ref 3–18)
APPEARANCE UR: CLEAR
AST SERPL-CCNC: 8 U/L (ref 15–37)
BACTERIA URNS QL MICRO: NEGATIVE /HPF
BARBITURATES UR QL SCN: NEGATIVE
BASOPHILS # BLD: 0 K/UL (ref 0–0.1)
BASOPHILS NFR BLD: 0 % (ref 0–2)
BENZODIAZ UR QL: NEGATIVE
BILIRUB SERPL-MCNC: 0.9 MG/DL (ref 0.2–1)
BILIRUB UR QL: NEGATIVE
BUN SERPL-MCNC: 9 MG/DL (ref 7–18)
BUN/CREAT SERPL: 10 (ref 12–20)
CALCIUM SERPL-MCNC: 8.7 MG/DL (ref 8.5–10.1)
CANNABINOIDS UR QL SCN: NEGATIVE
CHLORIDE SERPL-SCNC: 110 MMOL/L (ref 100–108)
CO2 SERPL-SCNC: 28 MMOL/L (ref 21–32)
COCAINE UR QL SCN: NEGATIVE
COLOR UR: YELLOW
CREAT SERPL-MCNC: 0.91 MG/DL (ref 0.6–1.3)
DIFFERENTIAL METHOD BLD: NORMAL
EOSINOPHIL # BLD: 0.1 K/UL (ref 0–0.4)
EOSINOPHIL NFR BLD: 1 % (ref 0–5)
EPITH CASTS URNS QL MICRO: NEGATIVE /LPF (ref 0–5)
ERYTHROCYTE [DISTWIDTH] IN BLOOD BY AUTOMATED COUNT: 13.3 % (ref 11.6–14.5)
GLOBULIN SER CALC-MCNC: 2.9 G/DL (ref 2–4)
GLUCOSE BLD STRIP.AUTO-MCNC: 224 MG/DL (ref 70–110)
GLUCOSE BLD STRIP.AUTO-MCNC: 245 MG/DL (ref 70–110)
GLUCOSE SERPL-MCNC: 76 MG/DL (ref 74–99)
GLUCOSE UR STRIP.AUTO-MCNC: NEGATIVE MG/DL
HCT VFR BLD AUTO: 42.1 % (ref 36–48)
HDSCOM,HDSCOM: NORMAL
HGB BLD-MCNC: 14.5 G/DL (ref 13–16)
HGB UR QL STRIP: NEGATIVE
KETONES UR QL STRIP.AUTO: ABNORMAL MG/DL
LEUKOCYTE ESTERASE UR QL STRIP.AUTO: ABNORMAL
LYMPHOCYTES # BLD: 2 K/UL (ref 0.9–3.6)
LYMPHOCYTES NFR BLD: 21 % (ref 21–52)
MAGNESIUM SERPL-MCNC: 2.3 MG/DL (ref 1.6–2.6)
MCH RBC QN AUTO: 29.2 PG (ref 24–34)
MCHC RBC AUTO-ENTMCNC: 34.4 G/DL (ref 31–37)
MCV RBC AUTO: 84.7 FL (ref 74–97)
METHADONE UR QL: NEGATIVE
MONOCYTES # BLD: 1 K/UL (ref 0.05–1.2)
MONOCYTES NFR BLD: 10 % (ref 3–10)
MUCOUS THREADS URNS QL MICRO: ABNORMAL /LPF
NEUTS SEG # BLD: 6.4 K/UL (ref 1.8–8)
NEUTS SEG NFR BLD: 68 % (ref 40–73)
NITRITE UR QL STRIP.AUTO: NEGATIVE
OPIATES UR QL: NEGATIVE
PCP UR QL: NEGATIVE
PH UR STRIP: 6 [PH] (ref 5–8)
PHOSPHATE SERPL-MCNC: 4.1 MG/DL (ref 2.5–4.9)
PLATELET # BLD AUTO: 174 K/UL (ref 135–420)
PMV BLD AUTO: 10.1 FL (ref 9.2–11.8)
POTASSIUM SERPL-SCNC: 4.1 MMOL/L (ref 3.5–5.5)
PROT SERPL-MCNC: 6.3 G/DL (ref 6.4–8.2)
PROT UR STRIP-MCNC: NEGATIVE MG/DL
RBC # BLD AUTO: 4.97 M/UL (ref 4.7–5.5)
RBC #/AREA URNS HPF: NEGATIVE /HPF (ref 0–5)
SODIUM SERPL-SCNC: 144 MMOL/L (ref 136–145)
SP GR UR REFRACTOMETRY: 1.02 (ref 1–1.03)
UROBILINOGEN UR QL STRIP.AUTO: 1 EU/DL (ref 0.2–1)
WBC # BLD AUTO: 9.5 K/UL (ref 4.6–13.2)
WBC URNS QL MICRO: ABNORMAL /HPF (ref 0–4)

## 2018-06-06 PROCEDURE — 85025 COMPLETE CBC W/AUTO DIFF WBC: CPT | Performed by: INTERNAL MEDICINE

## 2018-06-06 PROCEDURE — 99218 HC RM OBSERVATION: CPT

## 2018-06-06 PROCEDURE — 82962 GLUCOSE BLOOD TEST: CPT

## 2018-06-06 PROCEDURE — 65270000029 HC RM PRIVATE

## 2018-06-06 PROCEDURE — 74011000250 HC RX REV CODE- 250: Performed by: INTERNAL MEDICINE

## 2018-06-06 PROCEDURE — 80053 COMPREHEN METABOLIC PANEL: CPT | Performed by: INTERNAL MEDICINE

## 2018-06-06 PROCEDURE — 74011250636 HC RX REV CODE- 250/636: Performed by: EMERGENCY MEDICINE

## 2018-06-06 PROCEDURE — 84100 ASSAY OF PHOSPHORUS: CPT | Performed by: INTERNAL MEDICINE

## 2018-06-06 PROCEDURE — 81001 URINALYSIS AUTO W/SCOPE: CPT | Performed by: EMERGENCY MEDICINE

## 2018-06-06 PROCEDURE — 80307 DRUG TEST PRSMV CHEM ANLYZR: CPT | Performed by: EMERGENCY MEDICINE

## 2018-06-06 PROCEDURE — 96376 TX/PRO/DX INJ SAME DRUG ADON: CPT

## 2018-06-06 PROCEDURE — 36415 COLL VENOUS BLD VENIPUNCTURE: CPT | Performed by: INTERNAL MEDICINE

## 2018-06-06 PROCEDURE — 96361 HYDRATE IV INFUSION ADD-ON: CPT

## 2018-06-06 PROCEDURE — 83735 ASSAY OF MAGNESIUM: CPT | Performed by: INTERNAL MEDICINE

## 2018-06-06 RX ADMIN — Medication 10 ML: at 21:41

## 2018-06-06 RX ADMIN — Medication 10 ML: at 15:44

## 2018-06-06 RX ADMIN — Medication 10 ML: at 06:09

## 2018-06-06 RX ADMIN — SODIUM CHLORIDE 125 ML/HR: 900 INJECTION, SOLUTION INTRAVENOUS at 04:25

## 2018-06-06 RX ADMIN — SODIUM CHLORIDE 125 ML/HR: 900 INJECTION, SOLUTION INTRAVENOUS at 21:49

## 2018-06-06 RX ADMIN — FAMOTIDINE 20 MG: 10 INJECTION, SOLUTION INTRAVENOUS at 08:23

## 2018-06-06 RX ADMIN — FAMOTIDINE 20 MG: 10 INJECTION, SOLUTION INTRAVENOUS at 21:41

## 2018-06-06 NOTE — ROUTINE PROCESS
TRANSFER - OUT REPORT:    Verbal report given to Ketty Haskins RN(name) on [facility]  being transferred to [address] [place] [address](unit) for routine progression of care       Report consisted of patients Situation, Background, Assessment and   Recommendations(SBAR). Information from the following report(s) SBAR, MAR and Recent Results was reviewed with the receiving nurse. Lines:   Peripheral IV 06/05/18 Left Antecubital (Active)   Site Assessment Clean, dry, & intact 6/6/2018 12:00 PM   Phlebitis Assessment 0 6/6/2018 12:00 PM   Infiltration Assessment 0 6/6/2018 12:00 PM   Dressing Status Clean, dry, & intact 6/6/2018 12:00 PM   Dressing Type Transparent;Tape 6/6/2018 12:00 PM   Hub Color/Line Status Flushed;Green 6/6/2018 12:00 PM   Action Taken Dressing changed 6/6/2018 12:00 PM   Alcohol Cap Used Yes 6/6/2018 12:00 PM        Opportunity for questions and clarification was provided.       Patient transported with:   [facility]

## 2018-06-06 NOTE — CDMP QUERY
Please clarify if this patient is being treated/managed for:    =>   acute metabolic encephalopathy  in setting of  AMS after accidental    overdose   with  sitter required. =>Other Explanation of clinical findings    =>Unable to Determine (no explanation of clinical findings)    The medical record reflects the following:    Risk:  psych history    Clinical Indicators:   AMS; Treatment:    sitter   required ;  ICU admit     Please clarify and document your clinical opinion in the progress notes and discharge summary including the definitive and/or presumptive diagnosis, (suspected or probable), related to the above clinical findings. Please include clinical findings supporting your diagnosis. If you DECLINE this query or would like to communicate with [facility], please utilize the \"[facility] message box\" at the TOP of the Progress Note on the right.       Thank you,    Lynsey Herron RN   CCDS  x [phone]

## 2018-06-06 NOTE — PROGRESS NOTES
Hospitalist Progress Note    Patient: Pema Nunez MRN: [id number]  CSN: [id number]    YOB: 1995  Age: 26 y.o. Sex: male    DOA: 6/5/2018 LOS:  LOS: 1 day          Patient denies SI or HI, denies hx suicide attempt. He doesn't think he has a gun in his home. Other than yes or no answers, he is unable to provide a meaningful history. He states he obtains meds from [facility] psychiatrists in the community. Assessment/Plan     1. Overdose ?intentional - continue suicide precautions and sitter. Psych to consult, I discussed the case with Dr. Ramiro Carlisle. 2. Acute encephalopathy ?baseline  3. Schizophrenia dx age 19  1. Admitted 2017 for worsening AH telling him to harm himself and other people, in the setting of noncompliance with risperdal.  5. ADHD by hx  6. Elevated blood glucose - check A1c  7. Full code. Resides at home with his aunt. Additional Notes:      Case discussed with:  [x]Patient  []Family  [x]Nursing  []Case Management  DVT Prophylaxis:  []Lovenox  []Hep SQ  []SCDs  []Coumadin   []On Heparin gtt    Vital signs/Intake and Output:  Visit Vitals    /81 (BP 1 Location: Left arm, BP Patient Position: At rest)    Pulse (!) 109    Temp 99.4 °F (37.4 °C)    Resp 18    Ht 5' 6\" (1.676 m)    Wt 61.7 kg (136 lb)    SpO2 96%    BMI 21.95 kg/m2     Current Shift:  06/06 0701 - 06/06 1900  In: 8268 [P.O.:420; I.V.:750]  Out: 300 [Urine:300]  Last three shifts:  06/04 1901 - 06/06 0700  In: 3629.2 [I.V.:3629.2]  Out: 800 [Urine:800]    Awake and alert. Poor eye contact.    Ncat. perrl  RRR  cta b.l  Soft nt nd nabs  No edema  No focal deficit  No rash    Medications Reviewed      Labs: Results:       Chemistry Recent Labs      06/06/18   0428  06/05/18   0816   GLU  76  101*   NA  144  143   K  4.1  3.2*   CL  110*  106   CO2  28  30   BUN  9  12   CREA  0.91  0.94   CA  8.7  8.6   AGAP  6  7   BUCR  10*  13   AP  77  84   TP  6.3*  6.9   ALB  3.4  3.8   GLOB  2.9  3.1 AGRAT  1.2  1.2      CBC w/Diff Recent Labs      06/06/18 0428  06/05/18 0816   WBC  9.5  6.2   RBC  4.97  4.97   HGB  14.5  14.3   HCT  42.1  40.9   PLT  174  188   GRANS  68  65   LYMPH  21  25   EOS  1  0      Cardiac Enzymes No results for input(s): CPK, CKND1, ZACHARY in the last 72 hours. No lab exists for component: CKRMB, TROIP   Coagulation Recent Labs      06/05/18 0816   PTP  13.5   INR  1.1   APTT  25.7       Lipid Panel Lab Results   Component Value Date/Time    Cholesterol, total 181 08/09/2017 06:10 AM    HDL Cholesterol 69 (H) 08/09/2017 06:10 AM    LDL, calculated 96.6 08/09/2017 06:10 AM    VLDL, calculated 15.4 08/09/2017 06:10 AM    Triglyceride 77 08/09/2017 06:10 AM    CHOL/HDL Ratio 2.6 08/09/2017 06:10 AM      BNP No results for input(s): BNPP in the last 72 hours.    Liver Enzymes Recent Labs      06/06/18 0428   TP  6.3*   ALB  3.4   AP  77   SGOT  8*      Thyroid Studies No results found for: T4, T3U, TSH, TSHEXT     Procedures/imaging: see electronic medical records for all procedures/Xrays and details which were not copied into this note but were reviewed prior to creation of Plan

## 2018-06-06 NOTE — PROGRESS NOTES
attended the interdisciplinary rounds for [facility], who is a 23 y.o.,male. Patients Primary Language is: [place]. According to the patients EMR Faith Affiliation is: [place]. The reason the Patient came to the hospital is:   Patient Active Problem List    Diagnosis Date Noted    Altered mental status 06/05/2018    Antipsychotic overdose 06/05/2018    Schizophrenia ([facility] [address] 01/12/2015      Plan:  Chaplains will continue to follow and will provide pastoral care on an as needed/requested basis.  recommends bedside caregivers page  on duty if patient shows signs of acute spiritual or emotional distress.     [address] [facility]  Board Certified [address] [facility]   [phone]

## 2018-06-07 ENCOUNTER — HOSPITAL ENCOUNTER (INPATIENT)
Age: 23
LOS: 4 days | Discharge: HOME | DRG: 750 | End: 2018-06-11
Attending: PSYCHIATRY & NEUROLOGY | Admitting: PSYCHIATRY & NEUROLOGY
Payer: MEDICAID

## 2018-06-07 VITALS
HEART RATE: 113 BPM | WEIGHT: 136 LBS | TEMPERATURE: 97 F | BODY MASS INDEX: 21.86 KG/M2 | RESPIRATION RATE: 16 BRPM | OXYGEN SATURATION: 100 % | HEIGHT: 66 IN | SYSTOLIC BLOOD PRESSURE: 116 MMHG | DIASTOLIC BLOOD PRESSURE: 80 MMHG

## 2018-06-07 DIAGNOSIS — F20.9 SCHIZOPHRENIA, UNSPECIFIED TYPE (HCC): Primary | ICD-10-CM

## 2018-06-07 LAB
ANION GAP SERPL CALC-SCNC: 5 MMOL/L (ref 3–18)
BASOPHILS # BLD: 0 K/UL (ref 0–0.06)
BASOPHILS NFR BLD: 0 % (ref 0–2)
BUN SERPL-MCNC: 9 MG/DL (ref 7–18)
BUN/CREAT SERPL: 13 (ref 12–20)
CALCIUM SERPL-MCNC: 8.4 MG/DL (ref 8.5–10.1)
CHLORIDE SERPL-SCNC: 108 MMOL/L (ref 100–108)
CO2 SERPL-SCNC: 28 MMOL/L (ref 21–32)
CREAT SERPL-MCNC: 0.72 MG/DL (ref 0.6–1.3)
DIFFERENTIAL METHOD BLD: ABNORMAL
EOSINOPHIL # BLD: 0.2 K/UL (ref 0–0.4)
EOSINOPHIL NFR BLD: 2 % (ref 0–5)
ERYTHROCYTE [DISTWIDTH] IN BLOOD BY AUTOMATED COUNT: 13.2 % (ref 11.6–14.5)
EST. AVERAGE GLUCOSE BLD GHB EST-MCNC: 100 MG/DL
GLUCOSE SERPL-MCNC: 86 MG/DL (ref 74–99)
HBA1C MFR BLD: 5.1 % (ref 4.2–5.6)
HCT VFR BLD AUTO: 40.2 % (ref 36–48)
HGB BLD-MCNC: 13.6 G/DL (ref 13–16)
LYMPHOCYTES # BLD: 2.6 K/UL (ref 0.9–3.6)
LYMPHOCYTES NFR BLD: 33 % (ref 21–52)
MAGNESIUM SERPL-MCNC: 2.1 MG/DL (ref 1.6–2.6)
MCH RBC QN AUTO: 29.2 PG (ref 24–34)
MCHC RBC AUTO-ENTMCNC: 33.8 G/DL (ref 31–37)
MCV RBC AUTO: 86.3 FL (ref 74–97)
MONOCYTES # BLD: 0.9 K/UL (ref 0.05–1.2)
MONOCYTES NFR BLD: 11 % (ref 3–10)
NEUTS SEG # BLD: 4.2 K/UL (ref 1.8–8)
NEUTS SEG NFR BLD: 54 % (ref 40–73)
PLATELET # BLD AUTO: 181 K/UL (ref 135–420)
PMV BLD AUTO: 10.4 FL (ref 9.2–11.8)
POTASSIUM SERPL-SCNC: 3.8 MMOL/L (ref 3.5–5.5)
RBC # BLD AUTO: 4.66 M/UL (ref 4.7–5.5)
SODIUM SERPL-SCNC: 141 MMOL/L (ref 136–145)
WBC # BLD AUTO: 7.9 K/UL (ref 4.6–13.2)

## 2018-06-07 PROCEDURE — 96376 TX/PRO/DX INJ SAME DRUG ADON: CPT

## 2018-06-07 PROCEDURE — 74011000250 HC RX REV CODE- 250: Performed by: INTERNAL MEDICINE

## 2018-06-07 PROCEDURE — 80048 BASIC METABOLIC PNL TOTAL CA: CPT | Performed by: HOSPITALIST

## 2018-06-07 PROCEDURE — 65220000003 HC RM SEMIPRIVATE PSYCH

## 2018-06-07 PROCEDURE — 83036 HEMOGLOBIN GLYCOSYLATED A1C: CPT | Performed by: HOSPITALIST

## 2018-06-07 PROCEDURE — 83735 ASSAY OF MAGNESIUM: CPT | Performed by: HOSPITALIST

## 2018-06-07 PROCEDURE — 85025 COMPLETE CBC W/AUTO DIFF WBC: CPT | Performed by: HOSPITALIST

## 2018-06-07 PROCEDURE — 99218 HC RM OBSERVATION: CPT

## 2018-06-07 PROCEDURE — 36415 COLL VENOUS BLD VENIPUNCTURE: CPT | Performed by: HOSPITALIST

## 2018-06-07 RX ORDER — LORAZEPAM 1 MG/1
1-2 TABLET ORAL
Status: DISCONTINUED | OUTPATIENT
Start: 2018-06-07 | End: 2018-06-09

## 2018-06-07 RX ORDER — ARIPIPRAZOLE 5 MG/1
10 TABLET ORAL DAILY
Status: DISCONTINUED | OUTPATIENT
Start: 2018-06-08 | End: 2018-06-09

## 2018-06-07 RX ORDER — HALOPERIDOL 5 MG/ML
5 INJECTION INTRAMUSCULAR
Status: DISCONTINUED | OUTPATIENT
Start: 2018-06-07 | End: 2018-06-11 | Stop reason: HOSPADM

## 2018-06-07 RX ORDER — IBUPROFEN 200 MG
400 TABLET ORAL
Status: DISCONTINUED | OUTPATIENT
Start: 2018-06-07 | End: 2018-06-11 | Stop reason: HOSPADM

## 2018-06-07 RX ORDER — TRAZODONE HYDROCHLORIDE 50 MG/1
50 TABLET ORAL
Status: DISCONTINUED | OUTPATIENT
Start: 2018-06-07 | End: 2018-06-11 | Stop reason: HOSPADM

## 2018-06-07 RX ORDER — HALOPERIDOL 5 MG/1
5 TABLET ORAL
Status: DISCONTINUED | OUTPATIENT
Start: 2018-06-07 | End: 2018-06-11 | Stop reason: HOSPADM

## 2018-06-07 RX ORDER — LORAZEPAM 2 MG/ML
1-2 INJECTION INTRAMUSCULAR
Status: DISCONTINUED | OUTPATIENT
Start: 2018-06-07 | End: 2018-06-09

## 2018-06-07 RX ADMIN — FAMOTIDINE 20 MG: 10 INJECTION, SOLUTION INTRAVENOUS at 09:57

## 2018-06-07 RX ADMIN — Medication 10 ML: at 06:26

## 2018-06-07 NOTE — PROGRESS NOTES
Patient arrived to unit. Vital signs taken and clothing items check. Will refer to oncoming shift for admission process.

## 2018-06-07 NOTE — ROUTINE PROCESS
Bedside and Verbal shift change report given to  [address] Gayatri (oncoming nurse) by Quentin Padgett RN (offgoing nurse). Report included the following information SBAR, Kardex, MAR and Recent Results.     SITUATION:    Code Status: Full Code   Reason for Admission: Altered mental status   Antipsychotic overdose    Major Hospital day: 2   Problem List:       Hospital Problems  Date Reviewed: 2015          Codes Class Noted POA    Altered mental status ICD-10-CM: R41.82  ICD-9-CM: 780.97  2018 Unknown        Antipsychotic overdose ICD-10-CM: T43.501A  ICD-9-CM: 969.3, E980.3  2018 Unknown              BACKGROUND:    Past Medical History:   Past Medical History:   Diagnosis Date    ADHD (attention deficit hyperactivity disorder)     Anxiety disorder     Depression     [facility] disorder ([facility] [address]     Psychiatric disorder     Psychotic disorder     Trauma     \"His mother  this past November\"         Patient taking anticoagulants no     ASSESSMENT:    Changes in Assessment Throughout Shift: none     Patient has Central Line: no Reasons if yes: n/a   Patient has Britt Cath: no Reasons if yes: n/a      Last Vitals:     Vitals:    18 1525 18 2156 18 0000 18 0400   BP:  122/82 114/64 105/63   Pulse:  (!) 101 85 95   Resp:  19 18 16   Temp:  98.7 °F (37.1 °C) 97 °F (36.1 °C) 98.7 °F (37.1 °C)   SpO2:  100% 100% 100%   Weight: 61.7 kg (136 lb)      Height: 5' 6\" (1.676 m)           IV and DRAINS (will only show if present)   Peripheral IV 18 Left Antecubital-Site Assessment: Clean, dry, & intact     WOUND (if present)   Wound Type:  none   Dressing present Dressing Present : No   Wound Concerns/Notes:  none     PAIN    Pain Assessment    Pain Intensity 1: 0 (18 0329)              Patient Stated Pain Goal: 0  o Interventions for Pain:  No complaint of pain  o Intervention effective: n/a  o Time of last intervention: n/a   o Reassessment Completed: yes      Last 3 Weights:  Last 3 Recorded Weights in this Encounter    06/06/18 1525   Weight: 61.7 kg (136 lb)     Weight change:      INTAKE/OUPUT    Current Shift:      Last three shifts: 06/05 1901 - 06/07 0700  In: 4799.2 [P.O.:420; I.V.:4379.2]  Out: 1100 [Urine:1100]     LAB RESULTS     Recent Labs      06/07/18   0220  06/06/18   0428  06/05/18   0816   WBC  7.9  9.5  6.2   HGB  13.6  14.5  14.3   HCT  40.2  42.1  40.9   PLT  181  174  188        Recent Labs      06/07/18   0220  06/06/18   0428  06/05/18   0816   NA  141  144  143   K  3.8  4.1  3.2*   GLU  86  76  101*   BUN  9  9  12   CREA  0.72  0.91  0.94   CA  8.4*  8.7  8.6   MG  2.1  2.3   --    INR   --    --   1.1       RECOMMENDATIONS AND DISCHARGE PLANNING     1. Pending tests/procedures/ Plan of Care or Other Needs: Psych consult     2. Discharge plan for patient and Needs/Barriers: TBD    3. Estimated Discharge Date: TBD Posted on Whiteboard in Patients Room: yes      4. The patient's care plan was reviewed with the oncoming nurse. \"HEALS\" SAFETY CHECK      Fall Risk    Total Score: 2    Safety Measures: Safety Measures: Bed in low position, Caregiver at bedside, Call light within reach, Bed/Chair-Wheels locked    A safety check occurred in the patient's room between off going nurse and oncoming nurse listed above.     The safety check included the below items  Area Items   H  High Alert Medications - Verify all high alert medication drips (heparin, PCA, etc.)   E  Equipment - Suction is set up for ALL patients (with yanker)  - Red plugs utilized for all equipment (IV pumps, etc.)  - WOWs wiped down at end of shift.  - Room stocked with oxygen, suction, and other unit-specific supplies   A  Alarms - Bed alarm is set for fall risk patients  - Ensure chair alarm is in place and activated if patient is up in a chair   L  Lines - Check IV for any infiltration  - Britt bag is empty if patient has a Britt   - Tubing and IV bags are labeled   S  Safety - Room is clean, patient is clean, and equipment is clean. - Hallways are clear from equipment besides carts. - Fall bracelet on for fall risk patients  - Ensure room is clear and free of clutter  - Suction is set up for ALL patients (with radha)  - Hallways are clear from equipment besides carts.    - Isolation precautions followed, supplies available outside room, sign posted     Quentin Padgett RN

## 2018-06-07 NOTE — PROGRESS NOTES
Problem: Falls - Risk of  Goal: *Absence of Falls  Document Neena Fall Risk and appropriate interventions in the flowsheet.    Outcome: Progressing Towards Goal  Fall Risk Interventions:       Mentation Interventions: Door open when patient unattended    Medication Interventions: Teach patient to arise slowly         History of Falls Interventions: Bed/chair exit alarm

## 2018-06-07 NOTE — IP AVS SNAPSHOT
[address] [facility] Patient: Brittany Rothman MRN: [id number] :1995 A check johnson indicates which time of day the medication should be taken. My Medications START taking these medications Instructions Each Dose to Equal  
 Morning Noon Evening Bedtime  
 clonazePAM 0.5 mg tablet Commonly known as:  Nicole Jones Your last dose was: Your next dose is: Take 0.5 Tabs by mouth two (2) times a day. Max Daily Amount: 0.5 mg. Indications: agitation 0.25 mg CHANGE how you take these medications Instructions Each Dose to Equal  
 Morning Noon Evening Bedtime * ARIPiprazole 300 mg Serr injection Commonly known as:  Nova Garnica What changed:  Another medication with the same name was added. Make sure you understand how and when to take each. Your last dose was: Your next dose is:    
   
   
 300 mg by IntraMUSCular route every thirty (30) days. 300 mg  
    
   
   
   
  
 * ARIPiprazole 15 mg tablet Commonly known as:  ABILIFY What changed: You were already taking a medication with the same name, and this prescription was added. Make sure you understand how and when to take each. Your last dose was: Your next dose is: Take 1 Tab by mouth daily. Indications: Schizophrenia 15 mg  
    
   
   
   
  
 * Notice: This list has 2 medication(s) that are the same as other medications prescribed for you. Read the directions carefully, and ask your doctor or other care provider to review them with you. STOP taking these medications   
 benztropine 1 mg tablet Commonly known as:  COGENTIN  
   
  
 risperiDONE 2 mg tablet Commonly known as:  RisperDAL Where to Get Your Medications Information on where to get these meds will be given to you by the nurse or doctor. ! Ask your nurse or doctor about these medications ARIPiprazole 15 mg tablet  
 clonazePAM 0.5 mg tablet

## 2018-06-07 NOTE — PROGRESS NOTES
Care Management Interventions  PCP Verified by CM: No  Mode of Transport at Discharge: Other (see comment) (wheelchair)  Transition of Care Consult (CM Consult):  Other (psychiatric unit)  Current Support Network: Relative's Home  Confirm Follow Up Transport: Other (see comment)  Discharge Location  Discharge Placement: Psychiatric Unit     Reason for Admission:   Altered mental status, antipsychotic overdose                   RRAT Score:          8           Plan for utilizing home health:     no                     Likelihood of Readmission:  Green/low                         Transition of Care Plan:          Pt has been transferred to Psychiatric unit

## 2018-06-07 NOTE — BH NOTES
GROUP THERAPY PROGRESS NOTE    Reina Raphael is participating in Recreational Therapy.      Group time: 1 hour    Personal goal for participation: Fresh    Goal orientation: social    Group therapy participation: active    Therapeutic interventions reviewed and discussed: Patient enjoyed fresh air and exercise     Impression of participation: Calm

## 2018-06-07 NOTE — IP AVS SNAPSHOT
Summary of Care Report The Summary of Care report has been created to help improve care coordination. Users with access to [employer] or [address] (Web-based application) may access additional patient information including the Discharge Summary. If you are not currently a [address] user and need more information, please call the number listed below in the [place] [address] section and ask to be connected with Medical Records. Facility Information Name Address Phone [address] [facility] [address] [phone] Patient Information Patient Name Sex  Ky Blizzard ([id number]) Male 1995 Discharge Information Admitting Provider Service Area Unit Keith Hooper MD / [address] [address]  [phone] Discharge Provider Discharge Date/Time Discharge Disposition Destination (none) 2018 (Pending) AHR (none) Patient Language Language ENGLISH [13] Hospital Problems as of 2018  Reviewed: 2015  8:40 AM by Al Valerio MD  
  
  
  
 Class Noted - Resolved Last Modified POA Active Problems * (Principal)Schizophrenia ([facility] [address]  2015 - Present 2018 by Keith Hooper MD Unknown Entered by Mony Doyle Non-Hospital Problems as of 2018  Reviewed: 2015  8:40 AM by Al Valerio MD  
  
  
  
 Class Noted - Resolved Last Modified Active Problems Altered mental status  2018 - Present 2018 by Demetrice Espitia MD  
  Entered by Demetrice Espitia MD  
  Antipsychotic overdose  2018 - Present 2018 by Demetrice Espitia MD  
  Entered by Demetrice Espitia MD  
  
You are allergic to the following No active allergies Current Discharge Medication List  
  
START taking these medications Dose & Instructions Dispensing Information Comments  
 clonazePAM 0.5 mg tablet Commonly known as:  Layman Sella Dose:  0.25 mg Take 0.5 Tabs by mouth two (2) times a day. Max Daily Amount: 0.5 mg. Indications: agitation Quantity:  30 Tab Refills:  0 CONTINUE these medications which have CHANGED Dose & Instructions Dispensing Information Comments * ARIPiprazole 300 mg Serr injection Commonly known as:  Nova Melter What changed:  Another medication with the same name was added. Make sure you understand how and when to take each. Dose:  300 mg  
300 mg by IntraMUSCular route every thirty (30) days. Refills:  0  
   
 * ARIPiprazole 15 mg tablet Commonly known as:  ABILIFY What changed: You were already taking a medication with the same name, and this prescription was added. Make sure you understand how and when to take each. Dose:  15 mg Take 1 Tab by mouth daily. Indications: Schizophrenia Quantity:  30 Tab Refills:  0  
   
 * Notice: This list has 2 medication(s) that are the same as other medications prescribed for you. Read the directions carefully, and ask your doctor or other care provider to review them with you. STOP taking these medications Comments  
 benztropine 1 mg tablet Commonly known as:  COGENTIN  
   
   
 risperiDONE 2 mg tablet Commonly known as:  RisperDAL Follow-up Information Follow up With Details Comments Contact Info None   None (395) Patient stated that they have no PCP Patient is scheduled with Sulema Abreu with [address] [place] [address] on 6/13/18 @ 5:30pm  Numbers to remember [address] [place] [address] [phone] Suicide Prevention [phone](talk) Patient is scheduled with Sulema Abreu with [address] [place] [address] on 6/13/18 @ 5:30pm 
[address] [place], [address] [facility] [phone] Discharge Instructions BEHAVIORAL HEALTH NURSING DISCHARGE NOTE The following personal items collected during your admission are returned to you:  
Dental Appliance: Dental Appliances: None Vision: Visual Aid: None Hearing Aid:   
Jewelry: Jewelry: None Clothing: Clothing: Pants, Shirt, Slippers, Undergarments, With patient (2 boxers, flip flop, 1 pants, 3 shirts, pajama pants, 2 sock) Other Valuables: Other Valuables: None Valuables sent to safe:   
 
 
PATIENT INSTRUCTIONS: 
 
 
Follow-up with Patient is scheduled with Joy Rivera with [address] [place] [address] on 18 @ 5:30pm 
[address], [address] [place], [address] [facility]      
[phone] Numbers to remember [facility] Des[facility] [phone] Suicide Prevention [phone](talk) The discharge information has been reviewed with the patient. The patient verbalized understanding. [employer] Activation Thank you for requesting access to [employer]. Please follow the instructions below to securely access and download your online medical record. [employer] allows you to send messages to your doctor, view your test results, renew your prescriptions, schedule appointments, and more. How Do I Sign Up? 1. In your internet browser, go to [email or web address] 
2. Click on the First Time User? Click Here link in the Sign In box. You will be redirect to the New Member Sign Up page. 3. Enter your [employer] Access Code exactly as it appears below. You will not need to use this code after youve completed the sign-up process. If you do not sign up before the expiration date, you must request a new code. [employer] Access Code: [email or web address] Expires: 2018 11:57 AM (This is the date your [employer] access code will ) 4.  Enter the last four digits of your Social Security Number (xxxx) and Date of Birth (mm/dd/yyyy) as indicated and click Submit. You will be taken to the next sign-up page. 5. Create a [employer] ID. This will be your [employer] login ID and cannot be changed, so think of one that is secure and easy to remember. 6. Create a [employer] password. You can change your password at any time. 7. Enter your Password Reset Question and Answer. This can be used at a later time if you forget your password. 8. Enter your e-mail address. You will receive e-mail notification when new information is available in [address] 9. Click Sign Up. You can now view and download portions of your medical record. 10. Click the Download Summary menu link to download a portable copy of your medical information. Additional Information If you have questions, please visit the Frequently Asked Questions section of the [employer] website at [email or web address] Remember, [employer] is NOT to be used for urgent needs. For medical emergencies, dial 911. Patient armband removed and shredded Chart Review Routing History Recipient Method Report Sent By Jomar Grant MD  
Fax: [phone] Phone: [phone] Fax IP Auto Routed Donnell Santa [[id number]] 1/18/2015  6:18 AM 01/18/2015 Isaiah Grant MD  
Fax: [phone] Phone: [phone] Fax IP Auto Routed Donnell Santa [[id number]] 2/9/2015  5:52 PM 02/09/2015 Isaiah Grant MD  
Fax: [phone] Phone: [phone] Fax IP Auto Routed Donnell Santa [[id number]] 3/4/2015  8:29 AM 03/04/2015

## 2018-06-07 NOTE — DISCHARGE INSTRUCTIONS
Altered Mental Status: Care Instructions  Your Care Instructions    Altered mental status is a change in how well your brain is working. As a result, you may be confused, be less alert than usual, or act in odd ways. This may include seeing or hearing things that aren't really there (hallucinations). A mental status change has many possible causes. For example, it may be the result of an infection, an imbalance of chemicals in the body, or a chronic disease such as diabetes or COPD. It can also be caused by things such as a head injury, taking certain medicines, or using alcohol or drugs. The doctor may do tests to look for the cause. These tests may include urine tests, blood tests, and imaging tests such as a CT scan. Sometimes a clear cause isn't found. But tests can help the doctor rule out a serious cause of your symptoms. A change in mental status can be scary. But mental status will often return to normal when the cause is treated. So it is important to get any follow-up testing or treatment the doctor has suggested. The doctor has checked you carefully, but problems can develop later. If you notice any problems or new symptoms, get medical treatment right away. Follow-up care is a key part of your treatment and safety. Be sure to make and go to all appointments, and call your doctor if you are having problems. It's also a good idea to know your test results and keep a list of the medicines you take. How can you care for yourself at home? · Be safe with medicines. Take your medicines exactly as prescribed. Call your doctor if you think you are having a problem with your medicine. · Have another adult stay with you until you are better. This can help keep you safe. Ask that person to watch for signs that your mental status is getting worse. When should you call for help? Call 911 anytime you think you may need emergency care. For example, call if:  ? · You passed out (lost consciousness). ?Call your doctor now or seek immediate medical care if:  ? · Your mental status is getting worse. ? · You have new symptoms, such as a fever, chills, or shortness of breath. ? · You do not feel safe. ? Watch closely for changes in your health, and be sure to contact your doctor if:  ? · You do not get better as expected. Where can you learn more? Go to http://rajinder-brooke.info/. Enter H644 in the search box to learn more about \"Altered Mental Status: Care Instructions. \"  Current as of: October 14, 2016  Content Version: 11.4  © 8303-3802 [employer]. Care instructions adapted under license by [employer] (which disclaims liability or warranty for this information). If you have questions about a medical condition or this instruction, always ask your healthcare professional. [address] any warranty or liability for your use of this information. Accidental Overdose of Medicine: Care Instructions  Your Care Instructions    Almost any medicine can cause harm if you take too much of it. You have had treatment to help your body get rid of an overdose of a medicine. This may have been an over-the-counter medicine. Or it might be one that a doctor prescribed. It may even have been a vitamin or a supplement. During treatment, the doctor may have given you fluids and medicine. You also may have had lab tests. Then the doctor made sure that you were well enough to go home. The doctor has checked you carefully, but problems can develop later. If you notice any problems or new symptoms, get medical treatment right away. Follow-up care is a key part of your treatment and safety. Be sure to make and go to all appointments, and call your doctor if you are having problems. It's also a good idea to know your test results and keep a list of the medicines you take. How can you care for yourself at home? Home care  · Drink plenty of fluids.  If you have kidney, heart, or liver disease and have to limit fluids, talk with your doctor before you increase the amount of fluids you drink. · If you normally take medicines, ask your doctor when you can start taking them again. · Read the information that comes with any medicine. If you have questions, ask your doctor or pharmacist.  Prevention  · Be safe with medicines. Take your medicines exactly as prescribed or as the label directs. Call your doctor if you think you are having a problem with your medicine. · Keep your medicines in the containers they came in. Keep them with the original labels. · Find out what to do if you miss a dose of your medicine. When should you call for help? Call 911 anytime you think you may need emergency care. For example, call if:  ? · You passed out (lost consciousness). ? · You have trouble breathing. ? · You are sleepy or hard to wake up. ?Call your doctor now or seek immediate medical care if:  ? · You are vomiting. ? · You have a new or worse headache. ? · You are dizzy or lightheaded or feel like you may faint. ? Watch closely for changes in your health, and be sure to contact your doctor if:  ? · You do not get better as expected. Where can you learn more? Go to http://rajinder-brooke.info/. Enter C165 in the search box to learn more about \"Accidental Overdose of Medicine: Care Instructions. \"  Current as of: August 14, 2016  Content Version: 11.4  © 5439-5472 [employer]. Care instructions adapted under license by [employer] (which disclaims liability or warranty for this information). If you have questions about a medical condition or this instruction, always ask your healthcare professional. [place] [address] any warranty or liability for your use of this information.          Alcohol, Drug, or Poison Ingestion: Care Instructions  Your Care Instructions    A person can become very sick, or die, from swallowing or using alcohol, drugs, or poisons. Alcohol poisoning occurs when a person drinks a large amount of alcohol. Alcohol can stop nerve signals that control breathing. It can also stop the gag reflex that prevents choking. Alcohol poisoning is serious. It can lead to brain damage or death if it's not treated right away. Drugs can be used by accident or on purpose. They can be swallowed, inhaled, injected, or absorbed through the skin. Drugs include over-the-counter medicine (such as aspirin or acetaminophen) and prescription medicine. They also include vitamins and supplements. And they include illegal drugs such as cocaine and heroin. And poisons are all around us. They include household , cosmetics, houseplants, and garden chemicals. The doctor has checked you carefully, but problems can develop later. If you notice any problems or new symptoms, get medical treatment right away. Follow-up care is a key part of your treatment and safety. Be sure to make and go to all appointments, and call your doctor if you are having problems. It's also a good idea to know your test results and keep a list of the medicines you take. How can you care for yourself at home? Alcohol problems  · Talk to your doctor or counselor about programs that can help you stop using alcohol. · Plan ways to avoid being tempted to drink. ¨ Get rid of all alcohol in your home. ¨ Avoid places where you tend to drink. ¨ Stay away from places or events that offer alcohol. ¨ Stay away from people who drink a lot. Drug problems  · Talk to your doctor about programs that can help you stop using drugs. · Get rid of any drugs you might be tempted to misuse. · Learn how to say no when other people use drugs. · Don't spend time with people who use drugs. Poison prevention  · Keep products in the containers they came in. Keep them with the original labels.   · Be careful when you use cleaning products, paints, solvents, and pesticides. Read labels before use. Use a fan to move strong odors and fumes out of your home. · Do not mix cleaning products. Try to use nontoxic . These include vinegar, lemon juice, and baking soda. When should you call for help? Poison control centers, hospitals, or your doctor can give immediate advice in the case of a poisoning. The [facility] [employer] number is 1-734.479.9424. Have the poison container with you so you can give complete information to the poison control center, such as what the poison or substance is, how much was taken and when. Do not try to make the person vomit. ?Call 911 anytime you think you may need emergency care. For example, call if you or someone else:  ? · Has used or currently uses alcohol or drugs and is very confused or can't stay awake. ? · Has passed out (lost consciousness). ? · Has severe trouble breathing. ? · Is having a seizure. ?Call your doctor now or seek immediate medical care if you or someone else:  ? · Has new symptoms, or is not acting normally. ? Watch closely for changes in your health, and be sure to contact your doctor if:  ? · You do not get better as expected. ? · You need help with drug or alcohol problems. ? · You have problems with depression or other mental health issues. Where can you learn more? Go to http://rajinder-brooke.info/. Enter S026 in the search box to learn more about \"Alcohol, Drug, or Poison Ingestion: Care Instructions. \"  Current as of: March 20, 2017  Content Version: 11.4  © 0108-8706 [employer]. Care instructions adapted under license by [employer] (which disclaims liability or warranty for this information). If you have questions about a medical condition or this instruction, always ask your healthcare professional. [place] [address] any warranty or liability for your use of this information.     Patient popeye removed and shredded. MyChart Activation    Thank you for requesting access to [facility]. Please follow the instructions below to securely access and download your online medical record. [facility] allows you to send messages to your doctor, view your test results, renew your prescriptions, schedule appointments, and more. How Do I Sign Up? 1. In your internet browser, go to [email or web address]  2. Click on the First Time User? Click Here link in the Sign In box. You will be redirect to the New Member Sign Up page. 3. Enter your [facility] Access Code exactly as it appears below. You will not need to use this code after youve completed the sign-up process. If you do not sign up before the expiration date, you must request a new code. [facility] Access Code: [email or web address]  Expires: 2018 11:57 AM (This is the date your [facility] access code will )    4. Enter the last four digits of your Social Security Number (xxxx) and Date of Birth (mm/dd/yyyy) as indicated and click Submit. You will be taken to the next sign-up page. 5. Create a [facility] ID. This will be your [facility] login ID and cannot be changed, so think of one that is secure and easy to remember. 6. Create a [facility] password. You can change your password at any time. 7. Enter your Password Reset Question and Answer. This can be used at a later time if you forget your password. 8. Enter your e-mail address. You will receive e-mail notification when new information is available in [address]. 9. Click Sign Up. You can now view and download portions of your medical record. 10. Click the Download Summary menu link to download a portable copy of your medical information. Additional Information    If you have questions, please visit the Frequently Asked Questions section of the Simple[employer]o website at https://[employer]. VUELOGIC. [employer]Coffee and Powerhart/. Remember, [facility] is NOT to be used for urgent needs. For medical emergencies, dial 911.       DISCHARGE SUMMARY from Nurse    PATIENT INSTRUCTIONS:    After general anesthesia or intravenous sedation, for 24 hours or while taking prescription Narcotics:  · Limit your activities  · Do not drive and operate hazardous machinery  · Do not make important personal or business decisions  · Do  not drink alcoholic beverages  · If you have not urinated within 8 hours after discharge, please contact your surgeon on call. Report the following to your surgeon:  · Excessive pain, swelling, redness or odor of or around the surgical area  · Temperature over 100.5  · Nausea and vomiting lasting longer than 4 hours or if unable to take medications  · Any signs of decreased circulation or nerve impairment to extremity: change in color, persistent  numbness, tingling, coldness or increase pain  · Any questions    What to do at Home:  Recommended activity: Activity as tolerated,    If you experience any of the following symptoms altered mental status, severe depression, anxiety, please follow up with Primary Care Doctor. *  Please give a list of your current medications to your Primary Care Provider. *  Please update this list whenever your medications are discontinued, doses are      changed, or new medications (including over-the-counter products) are added. *  Please carry medication information at all times in case of emergency situations. These are general instructions for a healthy lifestyle:    No smoking/ No tobacco products/ Avoid exposure to second hand smoke  Surgeon General's Warning:  Quitting smoking now greatly reduces serious risk to your health.     Obesity, smoking, and sedentary lifestyle greatly increases your risk for illness    A healthy diet, regular physical exercise & weight monitoring are important for maintaining a healthy lifestyle    You may be retaining fluid if you have a history of heart failure or if you experience any of the following symptoms:  Weight gain of 3 pounds or more overnight or 5 pounds in a week, increased swelling in our hands or feet or shortness of breath while lying flat in bed. Please call your doctor as soon as you notice any of these symptoms; do not wait until your next office visit. Recognize signs and symptoms of STROKE:    F-face looks uneven    A-arms unable to move or move unevenly    S-speech slurred or non-existent    T-time-call 911 as soon as signs and symptoms begin-DO NOT go       Back to bed or wait to see if you get better-TIME IS BRAIN. Warning Signs of HEART ATTACK     Call 911 if you have these symptoms:   Chest discomfort. Most heart attacks involve discomfort in the center of the chest that lasts more than a few minutes, or that goes away and comes back. It can feel like uncomfortable pressure, squeezing, fullness, or pain.  Discomfort in other areas of the upper body. Symptoms can include pain or discomfort in one or both arms, the back, neck, jaw, or stomach.  Shortness of breath with or without chest discomfort.  Other signs may include breaking out in a cold sweat, nausea, or lightheadedness. Don't wait more than five minutes to call 911 - MINUTES MATTER! Fast action can save your life. Calling 911 is almost always the fastest way to get lifesaving treatment. Emergency Medical Services staff can begin treatment when they arrive -- up to an hour sooner than if someone gets to the hospital by car. The discharge information has been reviewed with the patient. The patient verbalized understanding. Discharge medications reviewed with the patient and appropriate educational materials and side effects teaching were provided.   ___________________________________________________________________________________________________________________________________

## 2018-06-07 NOTE — CONSULTS
[address] [facility] [address]  Consultation Note    Date of Service:  06/07/18    Historian(s): Deo and aunt  Referral Source: hospitalist    Chief Complaint   overdose    History of Present Illness     Rosibel Dean is a 26 y.o. BLACK OR  male  with a history of schizophrenia who is referred for a psychiatric consultation after an overdose. Mr. Suzette Dean was found in his room with aunt at bedside. He reported overdosing last night on an old prescription of quetiapine and recent prescription for Abilify due to having difficulty with sleep. Denied thoughts of dying. Says he has had sleep difficulty for several days. Also reports increasing auditory hallucinations with self-harm thoughts. Received Abilify Maintenna 300mg IM on 6/6/2018 through Danuta Gates at [facility]; injection was 2 weeks after scheduled injection. Aunt reported that over the past week, Deo has displayed increasing odd behaviors including hallucinations, running out in the middle of the night and insomnia. Symptoms managed when he took first injection of Abilify. Aunt does not think that [address] intentionally tried to harm himself. Depression: denied  Anxiety: denied  Anger/Irritability: increasing  Bipolar: mood instability  Psychosis: hallucinations, auditoyr  Sleep: poor for 1-2 weeks, difficulty sleep initiation  Appetite: fair    Psychiatric Treatment History     Self-injurious behavior/risky thoughts or behaviors (past suicidal ideation/attempt):   1. Recent accidental overdose  2. Denies prior self-harm attempts or behaviors. Violence/Risk to others (past homicidal ideation/attempt): Denies any prior history of violence or homicidal ideation.     Previous psychiatric medication trials: risperidone    Previous psychiatric hospitalizations: hx hospitalization at [facility], last hospitalized 8/2017    Current therapist: Kenya Valladares,  [phone]    Current psychiatric provider: Danuta Gates NP CPA    Allergies    No Known Allergies    Medical History     Past Medical History:   Diagnosis Date    ADHD (attention deficit hyperactivity disorder)     Anxiety disorder     Depression     Mood disorder (HCC)     Psychiatric disorder     Psychotic disorder     Trauma     \"His mother  this past November\"       Medication(s)     No current facility-administered medications on file prior to encounter. Current Outpatient Prescriptions on File Prior to Encounter   Medication Sig Dispense Refill    benztropine (COGENTIN) 1 mg tablet Take 1 Tab by mouth two (2) times a day. Indications: extrapyramidal disease 60 Tab 0    risperiDONE (RISPERDAL) 2 mg tablet Take 1 Tab by mouth two (2) times a day. Indications: BIPOLAR DISORDER IN REMISSION 60 Tab 0       Substance Abuse History     Tobacco: denied  Alcohol: denied  Marijuana: denied  Cocaine: denied  Opiate: denied  Treatment hx:  Denied    Family History     Family History   Problem Relation Age of Onset    Schizophrenia Mother     Schizophrenia Brother     Schizophrenia Sister       Family history of suicide?  NO    Social History     Living Situation: lives with aunt    Employment: unemployed    Education: high school      Relationships/Children: heterosexual, no current relationship, no children    Legal: denied    Vitals/Labs     Visit Vitals    /80 (BP 1 Location: Left arm, BP Patient Position: Sitting)    Pulse (!) 113    Temp 97 °F (36.1 °C)    Resp 16    Ht 5' 6\" (1.676 m)    Wt 61.7 kg (136 lb)    SpO2 100%    BMI 21.95 kg/m2       Labs: reviewed    Mental Status Examination     Appearance/Hygiene 27 yo AAM  Good hygiene      Attitude/Behavior/Social Relatedness Relates cooperatively and non-aggressivenessly   Musculoskeletal Gait/Station: appropriate  Tone (flaccid, cogwheeling, spastic): not assessed  Psychomotor (hyperkinetic, hypokinetic): appropriate   Involuntary movements (tics, dyskinesias, akathisa, stereotypies): none Speech   Rate, rhythm, volume, fluency and articulation are appropriate   Mood   restricted   Affect    restricted   Thought Process concerte   Thought Content and Perceptual Disturbances Denies self-injurious behavior (SIB), suicidal ideation (SI), aggressive behavior or homicidal ideation (HI). Denies auditory and visual hallucinations   Sensorium and Cognition  AOx4, attention intact, memory intact, language use appropriate, and fund of knowledge age appropriate   Insight  fair   Judgment fair     Assessment and Plan     Psychiatric Diagnoses: schizophrenia    Level of impairment/disability: mild    Tata Romo is a 26 y.o. who has experienced increasing hallucinations and sleep disturbances due to receiving Abilify Maintena 2 weeks after scheduled date. Will restart PO Abilify to assist  Abilify in reaching therapeutic level    1. Start Abilify 10mg daily  2. Will receive Abilify Maintenna 7/4/18 with Louise Cook at [facility]  3. Admit voluntarily to psychiatric unit. Pursue TDO if patient declines admission. He agreed to treatment on face to face conversation    Thank you for the consultation. Psychiatry signing off.         Mena Leon MD  Psychiatrist  [facility]

## 2018-06-07 NOTE — PROGRESS NOTES
[facility] Hospitalist Group  Progress Note    Patient: Aminata Rosenbaum Age: 26 y.o. : 1995 MR#: [id number] SSN: [id number]  Date/Time: 2018 12:39 PM    Subjective/24-hour events:     No acute medical issues overnight. Sitting up in chair at bedside - sitter present in room. Assessment:   Drug overdose  Acute encephalopathy secondary to above  Schizophrenia    Plan:  Psychiatry evaluation pending - Dr. Alexandria Ray discussed case with Dr. Jorge Rosenbaum yesterday. Assistance appreciated in advance. Disposition TBD. Continue sitter in interim. Case discussed with:  [x]Patient  []Family  []Nursing  []Case Management  DVT Prophylaxis:  []Lovenox  []Hep SQ  [x]SCDs  []Coumadin   []On Heparin gtt    Objective:   VS:   Visit Vitals    /75 (BP 1 Location: Left arm, BP Patient Position: At rest)    Pulse 74    Temp 99.5 °F (37.5 °C)    Resp 16    Ht 5' 6\" (1.676 m)    Wt 61.7 kg (136 lb)    SpO2 100%    BMI 21.95 kg/m2      Tmax/24hrs: Temp (24hrs), Av.5 °F (36.9 °C), Min:97 °F (36.1 °C), Max:99.5 °F (37.5 °C)    Intake/Output Summary (Last 24 hours) at 18 1239  Last data filed at 18 0923   Gross per 24 hour   Intake              940 ml   Output                0 ml   Net              940 ml       General:  In NAD. Cardiovascular: RRR. Pulmonary:  Clear, no wheezes. GI:  Abdomen soft, NTTP. Extremities:  Warm, no ischemia.   Neuro:  Awake and alert, motor nonfocal.    Labs:    Recent Results (from the past 24 hour(s))   GLUCOSE, POC    Collection Time: 18  5:05 PM   Result Value Ref Range    Glucose (POC) 245 (H) 70 - 110 mg/dL   HEMOGLOBIN A1C WITH EAG    Collection Time: 18  2:20 AM   Result Value Ref Range    Hemoglobin A1c 5.1 4.2 - 5.6 %    Est. average glucose 100 mg/dL   CBC WITH AUTOMATED DIFF    Collection Time: 18  2:20 AM   Result Value Ref Range    WBC 7.9 4.6 - 13.2 K/uL    RBC 4.66 (L) 4.70 - 5.50 M/uL    HGB 13.6 13.0 - 16.0 g/dL    HCT 40.2 36.0 - 48.0 %    MCV 86.3 74.0 - 97.0 FL    MCH 29.2 24.0 - 34.0 PG    MCHC 33.8 31.0 - 37.0 g/dL    RDW 13.2 11.6 - 14.5 %    PLATELET 895 232 - 954 K/uL    MPV 10.4 9.2 - 11.8 FL    NEUTROPHILS 54 40 - 73 %    LYMPHOCYTES 33 21 - 52 %    MONOCYTES 11 (H) 3 - 10 %    EOSINOPHILS 2 0 - 5 %    BASOPHILS 0 0 - 2 %    ABS. NEUTROPHILS 4.2 1.8 - 8.0 K/UL    ABS. LYMPHOCYTES 2.6 0.9 - 3.6 K/UL    ABS. MONOCYTES 0.9 0.05 - 1.2 K/UL    ABS. EOSINOPHILS 0.2 0.0 - 0.4 K/UL    ABS.  BASOPHILS 0.0 0.0 - 0.06 K/UL    DF AUTOMATED     MAGNESIUM    Collection Time: 06/07/18  2:20 AM   Result Value Ref Range    Magnesium 2.1 1.6 - 2.6 mg/dL   METABOLIC PANEL, BASIC    Collection Time: 06/07/18  2:20 AM   Result Value Ref Range    Sodium 141 136 - 145 mmol/L    Potassium 3.8 3.5 - 5.5 mmol/L    Chloride 108 100 - 108 mmol/L    CO2 28 21 - 32 mmol/L    Anion gap 5 3.0 - 18 mmol/L    Glucose 86 74 - 99 mg/dL    BUN 9 7.0 - 18 MG/DL    Creatinine 0.72 0.6 - 1.3 MG/DL    BUN/Creatinine ratio 13 12 - 20      GFR est AA >60 >60 ml/min/1.73m2    GFR est non-AA >60 >60 ml/min/1.73m2    Calcium 8.4 (L) 8.5 - 10.1 MG/DL       Signed By: Panda Waters MD     June 7, 2018 12:39 PM

## 2018-06-07 NOTE — IP AVS SNAPSHOT
[address] [address] [facility] Karie 66 Patient: Emily Gotti MRN: [id number] :1995 About your hospitalization You were admitted on:  2018 You last received care in the:  [facility] You were discharged on:  2018 Why you were hospitalized Your primary diagnosis was:  Schizophrenia (Hcc) Follow-up Information Follow up With Details Comments Contact Info None   None ([phone]) Patient stated that they have no PCP Patient is scheduled with Sharyn Jha with [address] [place] [address] on 18 @ 5:30pm  Numbers to remember [facility] [address] [address] [phone] Suicide Prevention [phone](talk) Patient is scheduled with Sharyn Jha with [address] [place] [address] on 18 @ 5:30pm 
[address] [facility], [address]      
[phone] Your Scheduled Appointments 2018 10:30 AM EDT INFUSION 30 with HBV FAST TRACK NURSE HBV OP INFUSION ([facility]) [address]  
[phone] Note: Patient must have a  if they take medication(s) that impairs their ability to operate a motor vehicle Discharge Orders None A check johnson indicates which time of day the medication should be taken. My Medications START taking these medications Instructions Each Dose to Equal  
 Morning Noon Evening Bedtime  
 clonazePAM 0.5 mg tablet Commonly known as:  Melly Vasquez Your last dose was: Your next dose is: Take 0.5 Tabs by mouth two (2) times a day. Max Daily Amount: 0.5 mg. Indications: agitation 0.25 mg CHANGE how you take these medications Instructions Each Dose to Equal  
 Morning Noon Evening Bedtime * ARIPiprazole 300 mg Serr injection Commonly known as:  Tatyana Mealing What changed:  Another medication with the same name was added. Make sure you understand how and when to take each. Your last dose was: Your next dose is:    
   
   
 300 mg by IntraMUSCular route every thirty (30) days. 300 mg  
    
   
   
   
  
 * ARIPiprazole 15 mg tablet Commonly known as:  ABILIFY What changed: You were already taking a medication with the same name, and this prescription was added. Make sure you understand how and when to take each. Your last dose was: Your next dose is: Take 1 Tab by mouth daily. Indications: Schizophrenia 15 mg  
    
   
   
   
  
 * Notice: This list has 2 medication(s) that are the same as other medications prescribed for you. Read the directions carefully, and ask your doctor or other care provider to review them with you. STOP taking these medications   
 benztropine 1 mg tablet Commonly known as:  COGENTIN  
   
  
 risperiDONE 2 mg tablet Commonly known as:  RisperDAL Where to Get Your Medications Information on where to get these meds will be given to you by the nurse or doctor. ! Ask your nurse or doctor about these medications ARIPiprazole 15 mg tablet  
 clonazePAM 0.5 mg tablet Discharge Instructions BEHAVIORAL HEALTH NURSING DISCHARGE NOTE The following personal items collected during your admission are returned to you:  
Dental Appliance: Dental Appliances: None Vision: Visual Aid: None Hearing Aid:   
Jewelry: Jewelry: None Clothing: Clothing: Pants, Shirt, Slippers, Undergarments, With patient (2 boxers, flip flop, 1 pants, 3 shirts, pajama pants, 2 sock) Other Valuables: Other Valuables: None Valuables sent to safe:   
 
 
PATIENT INSTRUCTIONS: 
 
 
Follow-up with Patient is scheduled with Jose Blanc with [address] [place] [address] on 18 @ 5:30pm 
[address], [address] [place], [facility]      
[phone] Numbers to remember [facility] [place] [facility] [phone] Suicide Prevention [phone](talk) The discharge information has been reviewed with the patient. The patient verbalized understanding. [employer]en [employer] Activation Thank you for requesting access to [employer]. Please follow the instructions below to securely access and download your online medical record. [employer] allows you to send messages to your doctor, view your test results, renew your prescriptions, schedule appointments, and more. How Do I Sign Up? 1. In your internet browser, go to [email or web address] 
2. Click on the First Time User? Click Here link in the Sign In box. You will be redirect to the New Member Sign Up page. 3. Enter your [employer] Access Code exactly as it appears below. You will not need to use this code after youve completed the sign-up process. If you do not sign up before the expiration date, you must request a new code. [employer] Access Code: [email or web address] Expires: 2018 11:57 AM (This is the date your [employer] access code will ) 4. Enter the last four digits of your Social Security Number (xxxx) and Date of Birth (mm/dd/yyyy) as indicated and click Submit. You will be taken to the next sign-up page. 5. Create a [employer] ID. This will be your [employer] login ID and cannot be changed, so think of one that is secure and easy to remember. 6. Create a [employer] password. You can change your password at any time. 7. Enter your Password Reset Question and Answer. This can be used at a later time if you forget your password. 8. Enter your e-mail address. You will receive e-mail notification when new information is available in [address] 9. Click Sign Up.  You can now view and download portions of your medical record. 10. Click the Download Summary menu link to download a portable copy of your medical information. Additional Information If you have questions, please visit the Frequently Asked Questions section of the OpenHomes website at [email or web address] Remember, OpenHomes is NOT to be used for urgent needs. For medical emergencies, dial 911. Patient armband removed and shredded blur [facility] Announcement We are excited to announce that we are making your provider's discharge notes available to you in OpenHomes. You will see these notes when they are completed and signed by the physician that discharged you from your recent hospital stay. If you have any questions or concerns about any information you see in OpenHomes, please call the Health Information Department where you were seen or reach out to your Primary Care Provider for more information about your plan of care. Introducing [facility]! [facility] introduces OpenHomes patient portal. Now you can access parts of your medical record, email your doctor's office, and request medication refills online. 1. In your internet browser, go to [email or web address] 2. Click on the First Time User? Click Here link in the Sign In box. You will see the New Member Sign Up page. 3. Enter your OpenHomes Access Code exactly as it appears below. You will not need to use this code after youve completed the sign-up process. If you do not sign up before the expiration date, you must request a new code. · OpenHomes Access Code: [email or web address] Expires: 7/17/2018 11:57 AM 
 
4. Enter the last four digits of your Social Security Number (xxxx) and Date of Birth (mm/dd/yyyy) as indicated and click Submit. You will be taken to the next sign-up page. 5. Create a OpenHomes ID. This will be your OpenHomes login ID and cannot be changed, so think of one that is secure and easy to remember. 6. Create a PiPsports password. You can change your password at any time. 7. Enter your Password Reset Question and Answer. This can be used at a later time if you forget your password. 8. Enter your e-mail address. You will receive e-mail notification when new information is available in [address] 9. Click Sign Up. You can now view and download portions of your medical record. 10. Click the Download Summary menu link to download a portable copy of your medical information. If you have questions, please visit the Frequently Asked Questions section of the PiPsports website. Remember, PiPsports is NOT to be used for urgent needs. For medical emergencies, dial 911. Now available from your iPhone and Android! Introducing Brian Ling As a [employer] patient, I wanted to make you aware of our electronic visit tool called Brian Ling. [employer] 24/7 allows you to connect within minutes with a medical provider 24 hours a day, seven days a week via a mobile device or tablet or logging into a secure website from your computer. You can access Brian Ling from anywhere in the [place]. A virtual visit might be right for you when you have a simple condition and feel like you just dont want to get out of bed, or cant get away from work for an appointment, when your regular [employer] provider is not available (evenings, weekends or holidays), or when youre out of town and need minor care. Electronic visits cost only $49 and if the [employer] 24/7 provider determines a prescription is needed to treat your condition, one can be electronically transmitted to a nearby pharmacy*. Please take a moment to enroll today if you have not already done so. The enrollment process is free and takes just a few minutes. To enroll, please download the [employer] 24/7 fang to your tablet or phone, or visit [email or web address] to enroll on your computer. And, as an [address] [place] [address] patient with a Faveous account, the results of your visits will be scanned into your electronic medical record and your primary care provider will be able to view the scanned results. We urge you to continue to see your regular [facility] provider for your ongoing medical care. And while your primary care provider may not be the one available when you seek a Brian Mcgrawmarianfin virtual visit, the peace of mind you get from getting a real diagnosis real time can be priceless. For more information on Brian Ling, view our Frequently Asked Questions (FAQs) at [email or web address]. Sincerely, 
 
Linda Renteria MD 
Chief Medical Officer [employer] *:  certain medications cannot be prescribed via Brian Mcgrawmarianfin Providers Seen During Your Hospitalization Provider Specialty Primary office phone Allyson Calvert MD Psychiatry [phone] Your Primary Care Physician (PCP) Primary Care Physician Office Phone Office Fax NONE ** None ** ** None ** You are allergic to the following No active allergies Recent Documentation Smoking Status Never Smoker Emergency Contacts Name Discharge Info Relation Home Work Mobile Piedad Lowery(Grandmother) DISCHARGE CAREGIVER [3] Other Relative [6] [phone] [address] [13] [phone] Patient Belongings The following personal items are in your possession at time of discharge: 
  Dental Appliances: None  Visual Aid: None      Home Medications: None   Jewelry: None  Clothing: Pants, Shirt, Slippers, Undergarments, With patient (2 boxers, flip flop, 1 pants, 3 shirts, pajama pants, 2 sock)    Other Valuables: None Please provide this summary of care documentation to your next provider.  
  
  
 
  
Signatures-by signing, you are acknowledging that this After Visit Summary has been reviewed with you and you have received a copy. Patient Signature:  ____________________________________________________________ Date:  ____________________________________________________________  
  
Aileen Sumter Provider Signature:  ____________________________________________________________ Date:  ____________________________________________________________

## 2018-06-07 NOTE — ADT AUTH CERT NOTES
Utilization Review           Drug Ingestion or Overdose - Care Day 3 (6/7/2018) by Mey Guzman RN        Review Status Review Entered       Completed 6/7/2018       Details              Care Day: 3 Care Date: 6/7/2018 Level of Care: Inpatient Floor       Guideline Day 2        Clinical Status       ( ) * Mental status at baseline       ( ) * Hemodynamic stability       6/7/2018 2:52 PM EDT by Ashli Ruiz         VS: T 97, P 113, R 16, /80, SPO2 100% RA              ( ) * Toxic manifestations absent or managed       ( ) * Need for continued inpatient monitoring absent       ( ) * Psychiatric risk status acceptable       (X) * Diet tolerated       6/7/2018 2:52 PM EDT by Ashli Ruiz         REGULAR DIET              ( ) * Discharge plans and education understood              Activity       ( ) * Ambulatory              Routes       ( ) * Oral hydration, medications, and diet              Interventions       (X) Psychiatric evaluation completed or not needed       6/7/2018 2:52 PM EDT by Ashli Ruiz         EVAL COMPLETED                     6/7/2018 2:53 PM EDT by Ashli Ruiz       Subject: Additional Clinical Information                MEDS:                famotidine (PF) (PEPCID) 20 mg in sodium chloride 0.9% 10 mL injectionDose: 20 mgFreq: EVERY 12 HOURS Route: IV                LORazepam (ATIVAN) injection 1-2 mgDose: 1-2 mgFreq: EVERY 4 HOURS AS NEEDED Route: IV                ondansetron (ZOFRAN) injection 4 mgDose: 4 mgFreq: EVERY 6 HOURS AS NEEDED Route: IV                LABS: CA 8.4                       6/7/2018 2:52 PM EDT by Ashli Duncombe       Subject: Additional Clinical Information                PSYCH CONSULT:                Assessment and Plan                  Psychiatric Diagnoses: schizophrenia                  Level of impairment/disability: mild                  Deo HARINDER Francisca Tavera is a 26 y.o. who has experienced increasing hallucinations and sleep disturbances due to receiving Abilify Maintena 2 weeks after scheduled date. Will restart PO Abilify to assist   Abilify in reaching therapeutic level                  Start Abilify 10mg dailyWill receive Abilify Maintenna 7/4/18 with Faviola Frias at [facility] voluntarily to psychiatric unit. Pursue TDO if patient declines admission. He agreed to treatment on face to face conversation                                MEDICAL PROGRESS NOTE:                Assessment:Drug overdoseAcute encephalopathy secondary to aboveSchizophrenia                  Plan:Psychiatry evaluation pending - Dr. Alexandria Ray discussed case with Dr. Jorge Rosenbaum yesterday.   Assistance appreciated in advance. Disposition TBD.   Continue sitter in interim.                    ORDERS: SITTER, SUICIDE PRECAUTIONS, REGULAR DIET                                              * Milestone                  6/6/2018 ADDITIONAL CLINICAL by Wendi Farris RN        Review Status Review Entered       In Primary 6/7/2018       Details         6/6/2018     MEDICAL PROGRESS NOTE:  Patient denies SI or HI, denies hx suicide attempt. He doesn't think he has a gun in his home. Other than yes or no answers, he is unable to provide a meaningful history. He states he obtains meds from \"several\" psychiatrists in the community.      Assessment/Plan      1. Overdose ?intentional - continue suicide precautions and sitter. Psych to consult, I discussed the case with Dr. Jorge Rosenbaum. 2. Acute encephalopathy ?baseline  3. Schizophrenia dx age 19  1. Admitted 2017 for worsening AH telling him to harm himself and other people, in the setting of noncompliance with risperdal.  5. ADHD by hx  6. Elevated blood glucose - check A1c  7. Full code.

## 2018-06-07 NOTE — BH NOTES
Patient cooperative with admission process, denied thoughts to harm self or others and contracted for safety. Patient rights was explained and he verbalized understanding of rights. Patient stated that he does not know the reason why he was admitted to this unit, patient was reminded that he overdosed on his medication and needed medical clearance prior to his admission to here. Patient observed nod his head to acknowledge understanding of criteria for admission as he spoke very softly. Patient received unit orientation and unit tour. Patient encouraged to utilize non slip footwear throughout admission for safety.  Patient remain on suicide precaution and every 15 minutes rounding for safety and location

## 2018-06-08 PROCEDURE — 65220000003 HC RM SEMIPRIVATE PSYCH

## 2018-06-08 PROCEDURE — 74011250637 HC RX REV CODE- 250/637: Performed by: PSYCHIATRY & NEUROLOGY

## 2018-06-08 RX ADMIN — ARIPIPRAZOLE 10 MG: 5 TABLET ORAL at 08:17

## 2018-06-08 RX ADMIN — TRAZODONE HYDROCHLORIDE 50 MG: 50 TABLET ORAL at 20:24

## 2018-06-08 NOTE — BSMART NOTE
SOCIAL WORK GROUP THERAPY PROGRESS NOTE    Group Time:  10:15am       Group Topic:  Coping Skills    C D Issues    Group Participation:        Pt minimally involved during group discussion & struggled with being attentive. Still seemed responding to internal stimuli. Had some difficulty following directions. Mumbled & spoke in monitone. Discussion included the process of making \"Change\" by answering questions on handout with an emphasis on strengths & weaknesses to support improving one's self esteem. Only change needed was to get more exercise. Avoided any discussion on medication consistency.

## 2018-06-08 NOTE — BSMART NOTE
OCCUPATIONAL THERAPY PROGRESS NOTE    Group Time:  9324  Attendance: The patient attended 1/2 of group. The patient left and returned to activity at least once. Called out part of group, did not return when available except when encouraged at very end of group to share his activity work. Participation:  The patient participated with minimal elaboration in the activity. Attention:  The patient needed frequent redirection to activity. Interaction:  The patient acknowledges others or responds to questions,  with no spontaneous interaction. Affect flat, voice low and hard to hear, did speak up some when asked. Difficulty participating fully in activity directives. Appeared somewhat preoccupied, interaction only upon approach.

## 2018-06-08 NOTE — H&P
[address]  Inpatient Admission Note    Date of Service:  06/08/18    Historian(s): Sigrid Gupta and chart review  Referral Source: hospitalist    Chief Complaint   overdose    History of Present Illness     Amanda Lund is a 26 y.o. BLACK OR  male with a history of schizophrenia who presented voluntarily after an non-suicidal overdose. He reported overdosing two nights ago on an old prescription of quetiapine and recent prescription for Abilify due to having difficulty with sleep. Denied thoughts of dying. Says he has had sleep difficulty for several days. Also reports increasing auditory hallucinations with self-harm thoughts. Received Abilify Maintenna 300mg IM on 6/6/2018 through Kelley Gentile at [facility]; injection was 2 weeks after scheduled injection.      Aunt reported that over the past week, Deo has displayed increasing odd behaviors including hallucinations, running out in the middle of the night and insomnia. Symptoms managed when he took first injection of Abilify. Aunt does not think that [id number] intentionally tried to harm himself. Medical Review of Systems     Sleep: improving  Appetite: stable    10 point review of systems was completed. Significant findings are found in the HPI or MSE. Psychiatric Treatment History     Self-injurious behavior/risky thoughts or behaviors (past suicidal ideation/attempt):   1. Recent accidental overdose  2.  Denies prior self-harm attempts or behaviors.     Violence/Risk to others (past homicidal ideation/attempt): Denies any prior history of violence or homicidal ideation.     Previous psychiatric medication trials: risperidone     Previous psychiatric hospitalizations: hx hospitalization at [facility], last hospitalized 8/2017     Current therapist: Adarsh Lauren,  [phone]     Current psychiatric provider: Kelley Gentile NP CPA    Allergies    No Known Allergies    Medical History     Past Medical History:   Diagnosis Date    ADHD (attention deficit hyperactivity disorder)     Anxiety disorder     Depression     Mood disorder (HCC)     Psychiatric disorder     Psychotic disorder     Trauma     \"His mother  this past November\"       Medication(s)     Prior to Admission Medications   Prescriptions Last Dose Informant Patient Reported? Taking? ARIPiprazole (ABILIFY MAINTENA) 300 mg serr injection   Yes No   Si mg by IntraMUSCular route every thirty (30) days. benztropine (COGENTIN) 1 mg tablet   No No   Sig: Take 1 Tab by mouth two (2) times a day. Indications: extrapyramidal disease   risperiDONE (RISPERDAL) 2 mg tablet   No No   Sig: Take 1 Tab by mouth two (2) times a day. Indications: BIPOLAR DISORDER IN REMISSION      Facility-Administered Medications: None       Substance Abuse History     Tobacco: denied  Alcohol: denied  Marijuana: denied  Cocaine: denied  Opiate: denied  Benzodiazepine: denied  Other: denied    Consequences: none    History of detox: none    History of substance abuse treatment: none    Family History     Family History   Problem Relation Age of Onset    Schizophrenia Mother     Schizophrenia Brother     Schizophrenia Sister      Family history of suicide?  NO    Social History     Living Situation: lives with aunt     Employment: unemployed     Education: high school      Relationships/Children: heterosexual, no current relationship, no children     Legal: denied    Vitals/Labs      Vitals:    18 1513 18 0756   BP: 129/79 107/73   Pulse: (!) 113 (!) 110   Resp: 16 16   Temp: 98.4 °F (36.9 °C) 97.3 °F (36.3 °C)       Labs:   Results for orders placed or performed during the hospital encounter of 18   CULTURE, BLOOD   Result Value Ref Range    Special Requests: NO SPECIAL REQUESTS      Culture result: NO GROWTH 3 DAYS     CULTURE, BLOOD   Result Value Ref Range    Special Requests: NO SPECIAL REQUESTS      Culture result: NO GROWTH 3 DAYS CBC WITH AUTOMATED DIFF   Result Value Ref Range    WBC 6.2 4.6 - 13.2 K/uL    RBC 4.97 4.70 - 5.50 M/uL    HGB 14.3 13.0 - 16.0 g/dL    HCT 40.9 36.0 - 48.0 %    MCV 82.3 74.0 - 97.0 FL    MCH 28.8 24.0 - 34.0 PG    MCHC 35.0 31.0 - 37.0 g/dL    RDW 12.8 11.6 - 14.5 %    PLATELET 634 531 - 189 K/uL    MPV 9.8 9.2 - 11.8 FL    NEUTROPHILS 65 40 - 73 %    LYMPHOCYTES 25 21 - 52 %    MONOCYTES 10 3 - 10 %    EOSINOPHILS 0 0 - 5 %    BASOPHILS 0 0 - 2 %    ABS. NEUTROPHILS 4.0 1.8 - 8.0 K/UL    ABS. LYMPHOCYTES 1.5 0.9 - 3.6 K/UL    ABS. MONOCYTES 0.6 0.05 - 1.2 K/UL    ABS. EOSINOPHILS 0.0 0.0 - 0.4 K/UL    ABS. BASOPHILS 0.0 0.0 - 0.1 K/UL    DF AUTOMATED     METABOLIC PANEL, COMPREHENSIVE   Result Value Ref Range    Sodium 143 136 - 145 mmol/L    Potassium 3.2 (L) 3.5 - 5.5 mmol/L    Chloride 106 100 - 108 mmol/L    CO2 30 21 - 32 mmol/L    Anion gap 7 3.0 - 18 mmol/L    Glucose 101 (H) 74 - 99 mg/dL    BUN 12 7.0 - 18 MG/DL    Creatinine 0.94 0.6 - 1.3 MG/DL    BUN/Creatinine ratio 13 12 - 20      GFR est AA >60 >60 ml/min/1.73m2    GFR est non-AA >60 >60 ml/min/1.73m2    Calcium 8.6 8.5 - 10.1 MG/DL    Bilirubin, total 0.6 0.2 - 1.0 MG/DL    ALT (SGPT) 18 16 - 61 U/L    AST (SGOT) 10 (L) 15 - 37 U/L    Alk.  phosphatase 84 45 - 117 U/L    Protein, total 6.9 6.4 - 8.2 g/dL    Albumin 3.8 3.4 - 5.0 g/dL    Globulin 3.1 2.0 - 4.0 g/dL    A-G Ratio 1.2 0.8 - 1.7     TROPONIN I   Result Value Ref Range    Troponin-I, Qt. <0.02 0.0 - 0.045 NG/ML   PROTHROMBIN TIME + INR   Result Value Ref Range    Prothrombin time 13.5 11.5 - 15.2 sec    INR 1.1 0.8 - 1.2     PTT   Result Value Ref Range    aPTT 25.7 23.0 - 36.4 SEC   URINALYSIS W/ RFLX MICROSCOPIC   Result Value Ref Range    Color YELLOW      Appearance CLEAR      Specific gravity 1.017 1.005 - 1.030      pH (UA) 6.0 5.0 - 8.0      Protein NEGATIVE  NEG mg/dL    Glucose NEGATIVE  NEG mg/dL    Ketone TRACE (A) NEG mg/dL    Bilirubin NEGATIVE  NEG      Blood NEGATIVE NEG      Urobilinogen 1.0 0.2 - 1.0 EU/dL    Nitrites NEGATIVE  NEG      Leukocyte Esterase TRACE (A) NEG     SALICYLATE   Result Value Ref Range    Salicylate level <3.1 (L) 2.8 - 20.0 MG/DL   DRUG SCREEN, URINE   Result Value Ref Range    BENZODIAZEPINES NEGATIVE  NEG      BARBITURATES NEGATIVE  NEG      THC (TH-CANNABINOL) NEGATIVE  NEG      OPIATES NEGATIVE  NEG      PCP(PHENCYCLIDINE) NEGATIVE  NEG      COCAINE NEGATIVE  NEG      AMPHETAMINES NEGATIVE  NEG      METHADONE NEGATIVE  NEG      HDSCOM (NOTE)    ACETAMINOPHEN   Result Value Ref Range    Acetaminophen level <2 (L) 10.0 - 30.0 ug/mL   ETHYL ALCOHOL   Result Value Ref Range    ALCOHOL(ETHYL),SERUM <3 0 - 3 MG/DL   CBC WITH AUTOMATED DIFF   Result Value Ref Range    WBC 9.5 4.6 - 13.2 K/uL    RBC 4.97 4.70 - 5.50 M/uL    HGB 14.5 13.0 - 16.0 g/dL    HCT 42.1 36.0 - 48.0 %    MCV 84.7 74.0 - 97.0 FL    MCH 29.2 24.0 - 34.0 PG    MCHC 34.4 31.0 - 37.0 g/dL    RDW 13.3 11.6 - 14.5 %    PLATELET 186 687 - 254 K/uL    MPV 10.1 9.2 - 11.8 FL    NEUTROPHILS 68 40 - 73 %    LYMPHOCYTES 21 21 - 52 %    MONOCYTES 10 3 - 10 %    EOSINOPHILS 1 0 - 5 %    BASOPHILS 0 0 - 2 %    ABS. NEUTROPHILS 6.4 1.8 - 8.0 K/UL    ABS. LYMPHOCYTES 2.0 0.9 - 3.6 K/UL    ABS. MONOCYTES 1.0 0.05 - 1.2 K/UL    ABS. EOSINOPHILS 0.1 0.0 - 0.4 K/UL    ABS. BASOPHILS 0.0 0.0 - 0.1 K/UL    DF AUTOMATED     METABOLIC PANEL, COMPREHENSIVE   Result Value Ref Range    Sodium 144 136 - 145 mmol/L    Potassium 4.1 3.5 - 5.5 mmol/L    Chloride 110 (H) 100 - 108 mmol/L    CO2 28 21 - 32 mmol/L    Anion gap 6 3.0 - 18 mmol/L    Glucose 76 74 - 99 mg/dL    BUN 9 7.0 - 18 MG/DL    Creatinine 0.91 0.6 - 1.3 MG/DL    BUN/Creatinine ratio 10 (L) 12 - 20      GFR est AA >60 >60 ml/min/1.73m2    GFR est non-AA >60 >60 ml/min/1.73m2    Calcium 8.7 8.5 - 10.1 MG/DL    Bilirubin, total 0.9 0.2 - 1.0 MG/DL    ALT (SGPT) 15 (L) 16 - 61 U/L    AST (SGOT) 8 (L) 15 - 37 U/L    Alk.  phosphatase 77 45 - 117 U/L Protein, total 6.3 (L) 6.4 - 8.2 g/dL    Albumin 3.4 3.4 - 5.0 g/dL    Globulin 2.9 2.0 - 4.0 g/dL    A-G Ratio 1.2 0.8 - 1.7     URINE MICROSCOPIC ONLY   Result Value Ref Range    WBC 0 to 3 0 - 4 /hpf    RBC NEGATIVE  0 - 5 /hpf    Epithelial cells NEGATIVE  0 - 5 /lpf    Bacteria NEGATIVE  NEG /hpf    Mucus 1+ (A) NEG /lpf   MAGNESIUM   Result Value Ref Range    Magnesium 2.3 1.6 - 2.6 mg/dL   PHOSPHORUS   Result Value Ref Range    Phosphorus 4.1 2.5 - 4.9 MG/DL   HEMOGLOBIN A1C WITH EAG   Result Value Ref Range    Hemoglobin A1c 5.1 4.2 - 5.6 %    Est. average glucose 100 mg/dL   CBC WITH AUTOMATED DIFF   Result Value Ref Range    WBC 7.9 4.6 - 13.2 K/uL    RBC 4.66 (L) 4.70 - 5.50 M/uL    HGB 13.6 13.0 - 16.0 g/dL    HCT 40.2 36.0 - 48.0 %    MCV 86.3 74.0 - 97.0 FL    MCH 29.2 24.0 - 34.0 PG    MCHC 33.8 31.0 - 37.0 g/dL    RDW 13.2 11.6 - 14.5 %    PLATELET 516 588 - 927 K/uL    MPV 10.4 9.2 - 11.8 FL    NEUTROPHILS 54 40 - 73 %    LYMPHOCYTES 33 21 - 52 %    MONOCYTES 11 (H) 3 - 10 %    EOSINOPHILS 2 0 - 5 %    BASOPHILS 0 0 - 2 %    ABS. NEUTROPHILS 4.2 1.8 - 8.0 K/UL    ABS. LYMPHOCYTES 2.6 0.9 - 3.6 K/UL    ABS. MONOCYTES 0.9 0.05 - 1.2 K/UL    ABS. EOSINOPHILS 0.2 0.0 - 0.4 K/UL    ABS.  BASOPHILS 0.0 0.0 - 0.06 K/UL    DF AUTOMATED     MAGNESIUM   Result Value Ref Range    Magnesium 2.1 1.6 - 2.6 mg/dL   METABOLIC PANEL, BASIC   Result Value Ref Range    Sodium 141 136 - 145 mmol/L    Potassium 3.8 3.5 - 5.5 mmol/L    Chloride 108 100 - 108 mmol/L    CO2 28 21 - 32 mmol/L    Anion gap 5 3.0 - 18 mmol/L    Glucose 86 74 - 99 mg/dL    BUN 9 7.0 - 18 MG/DL    Creatinine 0.72 0.6 - 1.3 MG/DL    BUN/Creatinine ratio 13 12 - 20      GFR est AA >60 >60 ml/min/1.73m2    GFR est non-AA >60 >60 ml/min/1.73m2    Calcium 8.4 (L) 8.5 - 10.1 MG/DL   POC LACTIC ACID   Result Value Ref Range    Lactic Acid (POC) 0.4 0.4 - 2.0 mmol/L   GLUCOSE, POC   Result Value Ref Range    Glucose (POC) 224 (H) 70 - 110 mg/dL GLUCOSE, POC   Result Value Ref Range    Glucose (POC) 245 (H) 70 - 110 mg/dL   EKG, 12 LEAD, INITIAL   Result Value Ref Range    Ventricular Rate 93 BPM    Atrial Rate 93 BPM    P-R Interval 152 ms    QRS Duration 92 ms    Q-T Interval 362 ms    QTC Calculation (Bezet) 450 ms    Calculated P Axis 72 degrees    Calculated R Axis 75 degrees    Calculated T Axis 68 degrees    Diagnosis       Normal sinus rhythm  Early repolarization  Normal ECG  No previous ECGs available  Confirmed by Burnie Osgood, MD, John Franklin ([id number]) on 6/5/2018 2:07:00 PM         Mental Status Examination     Appearance/Hygiene 26 y.o. BLACK OR  male  Hygiene: good  Casually dressed     Behavior/Social Relatedness Relates ok, somewhat guarded   Musculoskeletal Gait/Station: appropriate  Tone (flaccid, cogwheeling, spastic): not assessed  Psychomotor (hyperkinetic, hypokinetic): approprioate  Involuntary movements (tics, dyskinesias, akathisa, stereotypies): none   Speech   Rate, rhythm, volume, fluency and articulation are appropriate   Mood   restricted   Affect    restricted   Thought Process Some vagueness   Thought Content and Perceptual Disturbances Denies delusions, ideas of reference, overvalued ideas, ruminations, obsession, compulsions, and phobias    Denies self-injurious behavior (SIB), suicidal ideation (SI), aggressive behavior or homicidal ideation (HI)    Denies auditory and visual hallucinations   Sensorium and Cognition  AOx4, attention intact, memory intact, language use appropriate, and fund of knowledge age appropriate   Insight  fair   Judgment fair       Suicide Risk Assessment     Admission  Date/Time: 06/08/18    [x] Admission  [] Discharge     Key Factors:   Current admission precipitated by suicide attempt?   [x]  Yes     2    []  No     1     Suicide Attempt History  [] Past attempts of high lethality    2 []  Past attempts of low lethality    1 [x]  No previous attempts       0   Suicidal Ideation []  Constant suicidal thoughts      2 []  Intermittent or fleeting suicidal  thoughts  1 [x]  Denies current suicidal thoughts    0   Suicide Plan   []  Has plan with actual OR potential access to planned method    2 []  Has plan without access to planned method      1 [x]  No plan            0   Plan Lethality []  Highly lethal plan (Carbon monoxide, gun, hanging, jumping)    2 []  Moderate lethality of plan          1 [x]  Low lethality of plan (biting, head banging, superficial scratching, pillow over face)  0   Safety Plan Agreement  []  Unwilling OR unable to agree due to impaired reality testing   2   []  Patient is ambivalent and/or guarded      1 [x]  Reliably agrees        0   Current Morbid Thoughts (reunion fantasies, preoccupations with death) []  Constantly     2     []  Frequently    1 [x]  Rarely    0   Elopement Risk  []  High risk     2 []  Moderate risk    1 [x]   Low risk    0   Symptoms    []  Hopeless  []  Helpless  []  Anhedonia   []  Guilt/shame  []  Anger/rage  []  Anxiety  []  Insomnia   []  Agitation   [x]  Impulsivity  []  5-6 symptoms present    2 []  3-4 symptoms present    1  [x]  0-2 symptoms present    0     Total Score: 1  --------------------------------------------------------------------------------------------------------------  Subjective Appraisal of Risk:  []  Patient replies not trustworthy: several non-verbal cues. []  Patient replies questionable: trustworthy: at least 1 non-verbal cue. [x]  Patient replies appear trustworthy.     Protective measures (select all that apply):  []  Successful past responses to stress  []  Spiritual/Pentecostal beliefs  [x]  Capacity for reality testing  []  Positive therapeutic relationships  []  Social supports/connections  []  Positive coping skills  []  Frustration tolerance/optimism  []  Children or pets in the home  []  Sense of responsibility to family  []  Agrees to treatment plan and follow up    High Risk Diagnoses (select all that apply):  [] Depression/Bipolar Disorder  []  Dual Diagnosis  []  Cardiovascular Disease  [x]  Schizophrenia  []  Chronic Pain  []  Epilepsy  []  Cancer  []  Personality Disorder  []  HIV/AIDS  []  Multiple Sclerosis    Dangerousness Assessment (Suicide, homicide, property destruction. ..)    Risk Factors reviewed and risk assessed to be:  [] low  [] low-moderate  [] moderate   [x] moderate-high  [] high     Protection factors reviewed and risk assessed to be:  [] low  [x] low-moderate  [] moderate   [] moderate-high  [] high     Response to treatment and risk assessed to be:  [] low  [] low-moderate  [x] moderate   [] moderate-high  [] high     Support reviewed and risk assessed to be:  [x] low  [] low-moderate  [] moderate   [] moderate-high  [] high     Acceptance of Discharge and outpatient treatment reviewed and risk assessed to be:    [x] low  [] low-moderate  [] moderate   [] moderate-high  [] high   Overall risk assessed to be:  [] low  [] low-moderate  [x] moderate   [] moderate-high  [] high       Assessment and Plan     Psychiatric Diagnoses:   Patient Active Problem List   Diagnosis Code    [facility] F20.9     Level of impairment/disability: moderate    Deo Melvin is a 26 y.o. who is currently requiring acute stabilization after an non-suicidal overdose on medications. His long acting injectable was delayed which contributed to escalating psychotic symptoms over the past 1-2 weeks. 1. Admit to locked inpatient behavioral health unit. Start milieu, group, art and occupation therapy. 2. Started PO Abilify 10mg to complement long acting injectable received this week. 3. SW will assist in ensuring pt has follow up injection for Maintena 300mg week of July 7.   4. Routine labs ordered and reviewed by this provider. 5. Reviewed instructions, risks, benefits and side effects.    6. Tentative date of discharge: 3-5 days       Tylor Dye MD  Psychiatrist  [facility]

## 2018-06-08 NOTE — H&P
History and Physical        Patient: Fabiana Doan               Sex: male          DOA: 2018         YOB: 1995      Age:  26 y.o.        LOS:  LOS: 1 day        HPI:     Fabiana Doan is a 26 y.o. male who was admitted after experiencing a possible intentional medication overdose. Principal Problem:    Schizophrenia ([address] (2015)        Past Medical History:   Diagnosis Date    ADHD (attention deficit hyperactivity disorder)     Anxiety disorder     Depression     Mood disorder (HCC)     Psychiatric disorder     Psychotic disorder     Trauma     \"His mother  this past November\"       No past surgical history on file. Family History   Problem Relation Age of Onset    Schizophrenia Mother     Schizophrenia Brother     Schizophrenia Sister        Social History     Social History    Marital status: SINGLE     Spouse name: N/A    Number of children: NONE    Years of education: 11     Social History Main Topics    Smoking status: Never Smoker    Smokeless tobacco: Never Used    Alcohol use No    Drug use: No    Sexual activity: No     Other Topics Concern    Not on file     Social History Narrative   Lives with his aunt. Appetite okay and sleep has been poor. Receives disability. Prior to Admission medications    Medication Sig Start Date End Date Taking? Authorizing Provider   ARIPiprazole (ABILIFY MAINTENA) 300 mg serr injection 300 mg by IntraMUSCular route every thirty (30) days. 18   Historical Provider   benztropine (COGENTIN) 1 mg tablet Take 1 Tab by mouth two (2) times a day. Indications: extrapyramidal disease 17   Katja Forte MD   risperiDONE (RISPERDAL) 2 mg tablet Take 1 Tab by mouth two (2) times a day. Indications: BIPOLAR DISORDER IN REMISSION 17   Katja Forte MD       No Known Allergies    Review of Systems  A comprehensive review of systems was negative.       Physical Exam:      Visit Vitals    /73 (BP 1 Location: Right arm, BP Patient Position: Sitting)    Pulse (!) 110    Temp 97.3 °F (36.3 °C)    Resp 16       Physical Exam:    General:  Alert, cooperative, well developed, well nourished, AA male, no distress, appears stated age. Eyes:  Conjunctivae/corneas clear. PERRL, EOMs intact. Fundi benign   Ears:  Normal TMs and external ear canals both ears. Nose: Nares normal. Septum midline. Mucosa normal. No drainage or sinus tenderness. Mouth/Throat: Lips, mucosa, and tongue normal. Teeth and gums normal.   Neck: Supple, symmetrical, trachea midline, no adenopathy, thyroid: no enlargement/tenderness/nodules, no carotid bruit and no JVD. Back:   Symmetric, no curvature. ROM normal. No CVA tenderness. Lungs:   Clear to auscultation bilaterally. Heart:  Regular rate and rhythm, S1, S2 normal, no murmur, click, rub or gallop. Abdomen:   Soft, non-tender. Bowel sounds normal. No masses,  No organomegaly. Extremities: Extremities normal, atraumatic, no cyanosis or edema. Pulses: 2+ and symmetric all extremities. Skin: Skin color, texture, turgor normal. No rashes or lesions   Lymph nodes: Cervical, supraclavicular, and axillary nodes normal.   Neurologic: CNII-XII intact. Normal strength, sensation and reflexes throughout.            Assessment/Plan     Suicidal ideation  Insomnia  Labs reviewed  Treatment per physician's orders

## 2018-06-08 NOTE — PROGRESS NOTES
Problem: Suicide/Homicide (Adult/Pediatric)  Goal: *STG: Remains safe in hospital  Patient will remain safe during hospitalization and be assess daily for suicidal ideations. Outcome: Progressing Towards Goal  Patient has remained free of harm to self and others while in facility. Goal: *STG: Attends activities and groups  Patient will take prescribed medication daily through hospital stay   Outcome: Progressing Towards Goal  Patient has been interactive with unit activities. Goal: *STG/LTG: Complies with medication therapy  Patient will take prescribed medication daily through hospital stay   Outcome: Progressing Towards Goal  Patient has been compliant with prescribed medications. Comments: Patient has been observed pacing in and out of room throughout the milieu. Patient has been compliant with prescribed medications. Patient reported feeling \"better\". Patient has reported sleeping well throughout the night. Patient has been open to 1:1. Patient denies auditory/visual hallucinations. Denies suicidal/homicidal ideation with no safety issues noted. Will monitor for safety with support as needed throughout treatment regimen.

## 2018-06-08 NOTE — BSMART NOTE
SW assessment/Intervention:  Chart reviewed and discussed it reports Toshia Hernandes is a 26 y.o. BLACK OR  male with a history of schizophrenia who presented voluntarily after an non-suicidal overdose. SW made contact with patient who reports with a flat mood and affect. Patients voice tone was low and writer had to encourage patient to speak louder. Patient reports he accidentally took pills and had no intentions of killing himself. Patient currently denies SI/HI. Patient reports no AVH. SW will collaborate and family members to complete disposition.     TRE Edwards, LCSW-E    [phone]

## 2018-06-08 NOTE — BH NOTES
GROUP THERAPY PROGRESS NOTE    Deo Wolff is participating in [place].      Group time: 30 minutes    Personal goal for participation: none    Goal orientation: community    Group therapy participation: active    Therapeutic interventions reviewed and discussed: goals and procedures    Impression of participation: encouraged

## 2018-06-09 PROCEDURE — 74011250637 HC RX REV CODE- 250/637: Performed by: PSYCHIATRY & NEUROLOGY

## 2018-06-09 PROCEDURE — 65220000003 HC RM SEMIPRIVATE PSYCH

## 2018-06-09 RX ORDER — LORAZEPAM 2 MG/ML
1 INJECTION INTRAMUSCULAR
Status: DISCONTINUED | OUTPATIENT
Start: 2018-06-09 | End: 2018-06-11 | Stop reason: HOSPADM

## 2018-06-09 RX ORDER — LORAZEPAM 0.5 MG/1
0.5 TABLET ORAL
Status: DISCONTINUED | OUTPATIENT
Start: 2018-06-09 | End: 2018-06-11 | Stop reason: HOSPADM

## 2018-06-09 RX ORDER — ARIPIPRAZOLE 15 MG/1
15 TABLET ORAL DAILY
Status: DISCONTINUED | OUTPATIENT
Start: 2018-06-10 | End: 2018-06-11 | Stop reason: HOSPADM

## 2018-06-09 RX ORDER — CLONAZEPAM 0.5 MG/1
0.25 TABLET ORAL 2 TIMES DAILY
Status: DISCONTINUED | OUTPATIENT
Start: 2018-06-09 | End: 2018-06-11 | Stop reason: HOSPADM

## 2018-06-09 RX ADMIN — ARIPIPRAZOLE 10 MG: 5 TABLET ORAL at 08:04

## 2018-06-09 RX ADMIN — CLONAZEPAM 0.25 MG: 0.5 TABLET ORAL at 20:23

## 2018-06-09 RX ADMIN — TRAZODONE HYDROCHLORIDE 50 MG: 50 TABLET ORAL at 20:23

## 2018-06-09 RX ADMIN — CLONAZEPAM 0.25 MG: 0.5 TABLET ORAL at 11:45

## 2018-06-09 NOTE — PROGRESS NOTES
[address]  Inpatient Progress Note     Date of Service: 06/09/18  Hospital Day: 2     Subjective/Interval History   06/09/18    Treatment Team Notes:  Notes reviewed and/or discussed and report that Reina Raphael is complaint with medications. He received Betina Su for sleep. Staff described pt as agitated      Patient interview: Reina Raphael was interviewed by this writer today. Requested discharge but willing to stay until Monday. Feels ansty and restless on unit. Denies any concerning thoughts or behaviors. Denies SI/HI. Slept poorly due to roommate snoring. Objective     Visit Vitals    /65    Pulse 89    Temp 98.7 °F (37.1 °C)    Resp 16       Mental Status Examination     Appearance/Hygiene 26 y.o. BLACK OR  male  Hygiene: good  Casually dressed     Behavior/Social Relatedness Somewhat withdrawn   Musculoskeletal Gait/Station: appropriate  Tone (flaccid, cogwheeling, spastic): not assessed  Psychomotor (hyperkinetic, hypokinetic): appropriate   Involuntary movements (tics, dyskinesias, akathisa, stereotypies): none   Speech   Low volume   Mood   blunted   Affect    blunted   Thought Process linear   Thought Content and Perceptual Disturbances Internal stimulation? Denies SI/HI/AVH   Sensorium and Cognition  Grossly intact   Insight  fair   Judgment fair        Assessment/Plan      Psychiatric Diagnoses:   Patient Active Problem List   Diagnosis Code    Schizophrenia ([facility] F20.9     Level of impairment/disability: moderate    Deo Hogan is a 26 y.o. who is presents as preoccupied and agitated. 1.  Inc Abilify 15mg daily  2. Start Klonopin 0.25mg BID  3. Reviewed instructions, risks, benefits and side effects of medications  3.   Disposition/Discharge Date: self-care/home, Monday    Anais Beck MD  [facility]  Psychiatry

## 2018-06-09 NOTE — BH NOTES
Pt has been quiet, but visible in and out of the [facility] eau. Pt complies with staff direction and actively participated in recreation therapy by playing the bean bag game. Pt has not required redirection.

## 2018-06-09 NOTE — BH NOTES
Patient has been pacing in and out of room requesting to go outside this evening. Patient was taken outside for recreation with a verbalizing of satisfaction. Patient was less restless and anxious after outside group. Patient received visit from grandmother. Patient was a bit naresh confused near end of shift with uncertainty regarding his medications and unit events. Patient denies auditory hallucinations. Denied suicidal ideation with no safety issues noted. Will monitor for safety with support as needed throughout treatment regimen.

## 2018-06-09 NOTE — BH NOTES
GROUP THERAPY PROGRESS NOTE     Recreational therapy      Group time: 30 minutes     Personal goal for participation: Coping with being off the unit for Recreational Therapy     Goal orientation: socialization     Group therapy participation: active and social, appropriate interaction with peers and staff Pt actively played yu bag game with peers     Therapeutic interventions reviewed and discussed: Patient participated in discusion of the value of recreation therapy     Impression of participation: Patient joined the community for the discussion and responded when asked questions.

## 2018-06-09 NOTE — PROGRESS NOTES
Problem: Suicide/Homicide (Adult/Pediatric)  Goal: *STG: Remains safe in hospital  Patient will remain safe during hospitalization and be assess daily for suicidal ideations. Outcome: Progressing Towards Goal  Patient hs remained free of harm to self and others. Goal: *STG/LTG: Complies with medication therapy  Patient will take prescribed medication daily through hospital stay   Outcome: Progressing Towards Goal  Patient has been compliant with prescribed medications. Comments: Patient reported feeling \"ok\" today and has been compliant with prescribed medications. Patient reported sleeping \"ok, just that snoring was bothering me all night\". Patient stated peer in another room snored so loudly it disturbed him throughout majority of the night. Patient denies auditory hallucinations, denies social ideation stating \"I was before I got here but not now. I feel ok now\". Will monitor for safety with support as needed throughout treatment regimen.

## 2018-06-09 NOTE — BH NOTES
GROUP THERAPY PROGRESS NOTE    Deo PIZANO [address] [address] participating in Coping skills educational group. Group time: 30 minutes    Personal goal for participation: Identify at least 2 coping skills to utilize with increased stressors. Goal orientation: community    Group therapy participation: active    Therapeutic interventions reviewed and discussed: Identifying coping skills to utilize when presented with increased stressors.       Impression of participation: Happy

## 2018-06-10 PROCEDURE — 65220000003 HC RM SEMIPRIVATE PSYCH

## 2018-06-10 PROCEDURE — 74011250637 HC RX REV CODE- 250/637: Performed by: PSYCHIATRY & NEUROLOGY

## 2018-06-10 RX ADMIN — ARIPIPRAZOLE 15 MG: 15 TABLET ORAL at 08:04

## 2018-06-10 RX ADMIN — CLONAZEPAM 0.25 MG: 0.5 TABLET ORAL at 08:04

## 2018-06-10 RX ADMIN — CLONAZEPAM 0.25 MG: 0.5 TABLET ORAL at 20:18

## 2018-06-10 RX ADMIN — TRAZODONE HYDROCHLORIDE 50 MG: 50 TABLET ORAL at 20:18

## 2018-06-10 NOTE — PROGRESS NOTES
Problem: Suicide/Homicide (Adult/Pediatric)  Goal: *STG: Remains safe in hospital  Patient will remain safe during hospitalization and be assess daily for suicidal ideations. Outcome: Progressing Towards Goal  Patient has remained free of harm to others. Goal: *STG/LTG: Complies with medication therapy  Patient will take prescribed medication daily through hospital stay   Outcome: Progressing Towards Goal  Patient hs been compliant with prescribed medications. Comments: Patient has been up in milieu and has reported feeling \"better today. I was nervous when I first came in her because I haven't been here in a while so I felt a little nervous. I feel ok now\". Patient has been focused on discharging and stated he will discuss his options with the MD when he is seen this morning. Patient has been cooperative and pleasant upon approach. Patient reported sleeping \"well\" throughout the night. Patient denies auditory hallucination, denies suicidal ideation \" no more\" with no safety issues noted. Fall precautions decreased per patient explanation. Patient stated he did not fall prior to admission he \"just felt woozy from the medication I took and some people took me to the hospital. No I did not fall down they just helped me to get to the hospital\". Will monitor for safety with support as needed throughout treatment regimen.

## 2018-06-10 NOTE — BH NOTES
GROUP THERAPY PROGRESS NOTE    Deo [place]  is participating in Hygiene and Nutritional group. Group time: 30 minutes    Personal goal for participation: Identify good hygiene tasks, Eating healthy, Nutritional value. Goal orientation: Community    Group therapy participation: active    Therapeutic interventions reviewed and discussed: Proper hygiene care, Eating a balanced diet, health lifestyle. Impression of participation: Active and involved in group activities.

## 2018-06-10 NOTE — PROGRESS NOTES
[address]  Inpatient Progress Note     Date of Service: 06/10/18  Hospital Day: 3     Subjective/Interval History   06/10/18    Treatment Team Notes:  Notes reviewed and/or discussed and report that Toshia Hernandes takes medications. Decreased agitation with addition of klonopin. Patient interview: Toshia Hernandes was interviewed by this writer today. Pt requested discharge tomorrow if possible. Says that he last experienced suicidal ideation Thursday morning (3 days ago). Denies suicidal thoughts during admission. Feels thoughts are clearer now than before hospitalization. Denies EPS on PO Abilify. Klonopin helping with restlessness. Sleeping well. Eating well. Good hygiene. Objective     Visit Vitals    /65 (BP 1 Location: Left arm, BP Patient Position: Sitting)    Pulse 92    Temp 97.1 °F (36.2 °C)    Resp 18       Mental Status Examination     Appearance/Hygiene 26 y.o. BLACK OR  male  Hygiene: good  Casually dressed     Behavior/Social Relatedness reserved   Musculoskeletal Gait/Station: appropriate  Tone (flaccid, cogwheeling, spastic): not assessed  Psychomotor (hyperkinetic, hypokinetic): appropriate   Involuntary movements (tics, dyskinesias, akathisa, stereotypies): none   Speech   Low volume   Mood   restricted   Affect    restricted   Thought Process linear   Thought Content and Perceptual Disturbances Does not appear preoccupied today  Denies SI/HI/AVH   Sensorium and Cognition  Grossly intact   Insight  fair   Judgment fair        Assessment/Plan      Psychiatric Diagnoses:   Patient Active Problem List   Diagnosis Code    [facility] F20.9     Level of impairment/disability: moderate    Deo Vega is a 26 y.o. who is stabilizing. preoccupation yesterday likely related to environmental factors vs Abilify vs underlying mental illness. 1.  Continue Abilify 15mg daily and Klonopin 0.25mg BID  3.   Reviewed instructions, risks, benefits and side effects of medications  3.   Disposition/Discharge Date: self-care/home, Monday    [facility]

## 2018-06-10 NOTE — BH NOTES
GROUP THERAPY PROGRESS NOTE    Deo Yanvalerie Hurst is participating in Hygiene. Group time: 30 minutes    Personal goal for participation: [address] (Patient was non-spoken)    Goal orientation: social    Group therapy participation: active    Therapeutic interventions reviewed and discussed: The importance of hygiene, including washing and maintaining all parts of the body that affect appearance and smell with regular bathing, brushing teeth (dental care) , washing the hands, nails (trimming),good exercise, eating healthy, hair grooming and hair washing. Hygiene is keeping the body clean and it also keeps the body away from germs and bacteria infections.     Impression of participation: Patient has fully participated and was encouraged by staff

## 2018-06-11 VITALS
TEMPERATURE: 97.2 F | RESPIRATION RATE: 16 BRPM | HEART RATE: 103 BPM | DIASTOLIC BLOOD PRESSURE: 66 MMHG | SYSTOLIC BLOOD PRESSURE: 108 MMHG

## 2018-06-11 LAB
BACTERIA SPEC CULT: NORMAL
BACTERIA SPEC CULT: NORMAL
SERVICE CMNT-IMP: NORMAL
SERVICE CMNT-IMP: NORMAL

## 2018-06-11 PROCEDURE — 74011250637 HC RX REV CODE- 250/637: Performed by: PSYCHIATRY & NEUROLOGY

## 2018-06-11 RX ORDER — CLONAZEPAM 0.5 MG/1
0.25 TABLET ORAL 2 TIMES DAILY
Qty: 30 TAB | Refills: 0 | Status: SHIPPED | OUTPATIENT
Start: 2018-06-11 | End: 2018-06-29

## 2018-06-11 RX ORDER — ARIPIPRAZOLE 15 MG/1
15 TABLET ORAL DAILY
Qty: 30 TAB | Refills: 0 | Status: ON HOLD | OUTPATIENT
Start: 2018-06-11 | End: 2018-06-29

## 2018-06-11 RX ADMIN — CLONAZEPAM 0.25 MG: 0.5 TABLET ORAL at 08:38

## 2018-06-11 RX ADMIN — ARIPIPRAZOLE 15 MG: 15 TABLET ORAL at 08:38

## 2018-06-11 NOTE — DISCHARGE INSTRUCTIONS
BEHAVIORAL HEALTH NURSING DISCHARGE NOTE      The following personal items collected during your admission are returned to you:   Dental Appliance: Dental Appliances: None  Vision: Visual Aid: None  Hearing Aid:    Jewelry: Jewelry: None  Clothing: Clothing: Pants, Shirt, Slippers, Undergarments, With patient (2 boxers, flip flop, 1 pants, 3 shirts, pajama pants, 2 sock)  Other Valuables: Other Valuables: None  Valuables sent to safe:        PATIENT INSTRUCTIONS:      Follow-up with Patient is scheduled with Renuka Pierce with [address] [place] [address] on 18 @ 5:30pm  [address], [address] [facility] [address]   [place], [facility]       [phone]    Numbers to remember [facility] Desk [facility] Emergency Services [phone] Suicide Prevention [phone](talk)    The discharge information has been reviewed with the patient. The patient verbalized understanding. [employer] Activation    Thank you for requesting access to [employer]. Please follow the instructions below to securely access and download your online medical record. [employer] allows you to send messages to your doctor, view your test results, renew your prescriptions, schedule appointments, and more. How Do I Sign Up? 1. In your internet browser, go to [email or web address]  2. Click on the First Time User? Click Here link in the Sign In box. You will be redirect to the New Member Sign Up page. 3. Enter your ScanSafe Access Code exactly as it appears below. You will not need to use this code after youve completed the sign-up process. If you do not sign up before the expiration date, you must request a new code. ScanSafe Access Code: [email or web address]  Expires: 2018 11:57 AM (This is the date your ScanSafe access code will )    4. Enter the last four digits of your Social Security Number (xxxx) and Date of Birth (mm/dd/yyyy) as indicated and click Submit.  You will be taken to the next sign-up page. 5. Create a [email or web address] ID. This will be your [email or web address] login ID and cannot be changed, so think of one that is secure and easy to remember. 6. Create a [email or web address] password. You can change your password at any time. 7. Enter your Password Reset Question and Answer. This can be used at a later time if you forget your password. 8. Enter your e-mail address. You will receive e-mail notification when new information is available in [address]. 9. Click Sign Up. You can now view and download portions of your medical record. 10. Click the Download Summary menu link to download a portable copy of your medical information. Additional Information    If you have questions, please visit the Frequently Asked Questions section of the [email or web address] website at [email or web address] Remember, [email or web address] is NOT to be used for urgent needs. For medical emergencies, dial 911.       Patient armband removed and shredded

## 2018-06-11 NOTE — BH NOTES
Observed patient's behavior throughout the shift. The patient has participated in group session (The Importance of Hygiene), had a visitor (aunt) and has eaten all meals and snacks. A very low-speaking patient that has not been a problem to the staff and is very cooperative. Patient appears from the room and sits quietly in the dayroom watching television. Will continue to monitor patient's behavior for the duration of the shift.

## 2018-06-11 NOTE — DISCHARGE SUMMARY
[facility]  Inpatient Psychiatry   Discharge Summary     Admit date: 6/7/2018    Discharge date and time: 6/11/2018    Discharge Physician: Elle Edwards MD    DISCHARGE DIAGNOSES     Psychiatric Diagnoses:   Patient Active Problem List   Diagnosis Code    Schizophrenia [facility]) F20.9     Level of impairment/disability: mild    [address] is a 26 y.o. BLACK OR  male who presented voluntarily to the inpatient unit for management of decompensated psychosis with psychomotor agitation and suicidal ideation. During hospitalization, Mr. Kary Skiff was started on PO Abilify 10mg and increased to 15mg to better assist with psychotic symptoms, notably, paranoia, internal stimulation and agitation. Klonopin 0.25mg BID was also added to assist with agitation. On discharge Mr. Kary Skiff reported resolved thoughts of self-harm with clearer cognition. Denied any psychotic processes; he specifically denied any hallucinations or delusions. He did not appear preoccupied at discharge. Self-care was appropriate during admission. Mr. Kary Skiff was encouraged to continue PO Abilify until he receives his next Abilify Maintena injections in the next 3-4 weeks. He may benefit from an increased Maintena dose of 400mg IM. DISPOSITION/FOLLOW-UP     Disposition: home    Follow-up Appointments:    Follow-up Information     Follow up With Details Comments Contact Info    None   None ([phone]) Patient stated that they have no PCP      Patient is scheduled with Luh Johnson with [address] [place] [address] on 6/13/18 @ 5:30pm  Numbers to remember [place] [address] [phone] Suicide Prevention [phone](talk) Patient is scheduled with Luh Johnson with [address] [place] [address] on 6/13/18 @ 5:30pm  [address], [address] [facility] [address]   [facility], [address] [place] [address]       [phone]              MEDICATION CHANGES Outpatient medications:  No current facility-administered medications on file prior to encounter. Current Outpatient Prescriptions on File Prior to Encounter   Medication Sig Dispense Refill    ARIPiprazole (ABILIFY MAINTENA) 300 mg serr injection 300 mg by IntraMUSCular route every thirty (30) days.  benztropine (COGENTIN) 1 mg tablet Take 1 Tab by mouth two (2) times a day. Indications: extrapyramidal disease 60 Tab 0    risperiDONE (RISPERDAL) 2 mg tablet Take 1 Tab by mouth two (2) times a day. Indications: BIPOLAR DISORDER IN REMISSION 60 Tab 0         Medications discontinued during hospitalization:  Medications Discontinued During This Encounter   Medication Reason    ARIPiprazole (ABILIFY) tablet 10 mg     LORazepam (ATIVAN) injection 1-2 mg     LORazepam (ATIVAN) tablet 1-2 mg          Discharged medication:  Current Discharge Medication List      START taking these medications    Details   ARIPiprazole (ABILIFY) 15 mg tablet Take 1 Tab by mouth daily. Indications: Schizophrenia  Qty: 30 Tab, Refills: 0      clonazePAM (KLONOPIN) 0.5 mg tablet Take 0.5 Tabs by mouth two (2) times a day. Max Daily Amount: 0.5 mg. Indications: agitation  Qty: 30 Tab, Refills: 0    Associated Diagnoses: [facility], [facility] ([facility]         CONTINUE these medications which have NOT CHANGED    Details   ARIPiprazole (ABILIFY MAINTENA) 300 mg serr injection 300 mg by IntraMUSCular route every thirty (30) days. STOP taking these medications       benztropine (COGENTIN) 1 mg tablet Comments:   Reason for Stopping:         risperiDONE (RISPERDAL) 2 mg tablet Comments:   Reason for Stopping:               Instructions, risks (black box warning), benefits and side effects (EPS, TD, NMS) were discussed in detail prior to discharge. Patient denied any adverse medication side effects prior to discharge.        LABS/IMAGING DURING ADMISSION     Results for orders placed or performed during the hospital encounter of 06/05/18   CULTURE, BLOOD   Result Value Ref Range    Special Requests: NO SPECIAL REQUESTS      Culture result: NO GROWTH 6 DAYS     CULTURE, BLOOD   Result Value Ref Range    Special Requests: NO SPECIAL REQUESTS      Culture result: NO GROWTH 6 DAYS     CBC WITH AUTOMATED DIFF   Result Value Ref Range    WBC 6.2 4.6 - 13.2 K/uL    RBC 4.97 4.70 - 5.50 M/uL    HGB 14.3 13.0 - 16.0 g/dL    HCT 40.9 36.0 - 48.0 %    MCV 82.3 74.0 - 97.0 FL    MCH 28.8 24.0 - 34.0 PG    MCHC 35.0 31.0 - 37.0 g/dL    RDW 12.8 11.6 - 14.5 %    PLATELET 761 200 - 271 K/uL    MPV 9.8 9.2 - 11.8 FL    NEUTROPHILS 65 40 - 73 %    LYMPHOCYTES 25 21 - 52 %    MONOCYTES 10 3 - 10 %    EOSINOPHILS 0 0 - 5 %    BASOPHILS 0 0 - 2 %    ABS. NEUTROPHILS 4.0 1.8 - 8.0 K/UL    ABS. LYMPHOCYTES 1.5 0.9 - 3.6 K/UL    ABS. MONOCYTES 0.6 0.05 - 1.2 K/UL    ABS. EOSINOPHILS 0.0 0.0 - 0.4 K/UL    ABS. BASOPHILS 0.0 0.0 - 0.1 K/UL    DF AUTOMATED     METABOLIC PANEL, COMPREHENSIVE   Result Value Ref Range    Sodium 143 136 - 145 mmol/L    Potassium 3.2 (L) 3.5 - 5.5 mmol/L    Chloride 106 100 - 108 mmol/L    CO2 30 21 - 32 mmol/L    Anion gap 7 3.0 - 18 mmol/L    Glucose 101 (H) 74 - 99 mg/dL    BUN 12 7.0 - 18 MG/DL    Creatinine 0.94 0.6 - 1.3 MG/DL    BUN/Creatinine ratio 13 12 - 20      GFR est AA >60 >60 ml/min/1.73m2    GFR est non-AA >60 >60 ml/min/1.73m2    Calcium 8.6 8.5 - 10.1 MG/DL    Bilirubin, total 0.6 0.2 - 1.0 MG/DL    ALT (SGPT) 18 16 - 61 U/L    AST (SGOT) 10 (L) 15 - 37 U/L    Alk.  phosphatase 84 45 - 117 U/L    Protein, total 6.9 6.4 - 8.2 g/dL    Albumin 3.8 3.4 - 5.0 g/dL    Globulin 3.1 2.0 - 4.0 g/dL    A-G Ratio 1.2 0.8 - 1.7     TROPONIN I   Result Value Ref Range    Troponin-I, Qt. <0.02 0.0 - 0.045 NG/ML   PROTHROMBIN TIME + INR   Result Value Ref Range    Prothrombin time 13.5 11.5 - 15.2 sec    INR 1.1 0.8 - 1.2     PTT   Result Value Ref Range    aPTT 25.7 23.0 - 36.4 SEC   URINALYSIS W/ RFLX MICROSCOPIC Result Value Ref Range    Color YELLOW      Appearance CLEAR      Specific gravity 1.017 1.005 - 1.030      pH (UA) 6.0 5.0 - 8.0      Protein NEGATIVE  NEG mg/dL    Glucose NEGATIVE  NEG mg/dL    Ketone TRACE (A) NEG mg/dL    Bilirubin NEGATIVE  NEG      Blood NEGATIVE  NEG      Urobilinogen 1.0 0.2 - 1.0 EU/dL    Nitrites NEGATIVE  NEG      Leukocyte Esterase TRACE (A) NEG     SALICYLATE   Result Value Ref Range    Salicylate level <3.2 (L) 2.8 - 20.0 MG/DL   DRUG SCREEN, URINE   Result Value Ref Range    BENZODIAZEPINES NEGATIVE  NEG      BARBITURATES NEGATIVE  NEG      THC (TH-CANNABINOL) NEGATIVE  NEG      OPIATES NEGATIVE  NEG      PCP(PHENCYCLIDINE) NEGATIVE  NEG      COCAINE NEGATIVE  NEG      AMPHETAMINES NEGATIVE  NEG      METHADONE NEGATIVE  NEG      HDSCOM (NOTE)    ACETAMINOPHEN   Result Value Ref Range    Acetaminophen level <2 (L) 10.0 - 30.0 ug/mL   ETHYL ALCOHOL   Result Value Ref Range    ALCOHOL(ETHYL),SERUM <3 0 - 3 MG/DL   CBC WITH AUTOMATED DIFF   Result Value Ref Range    WBC 9.5 4.6 - 13.2 K/uL    RBC 4.97 4.70 - 5.50 M/uL    HGB 14.5 13.0 - 16.0 g/dL    HCT 42.1 36.0 - 48.0 %    MCV 84.7 74.0 - 97.0 FL    MCH 29.2 24.0 - 34.0 PG    MCHC 34.4 31.0 - 37.0 g/dL    RDW 13.3 11.6 - 14.5 %    PLATELET 559 177 - 456 K/uL    MPV 10.1 9.2 - 11.8 FL    NEUTROPHILS 68 40 - 73 %    LYMPHOCYTES 21 21 - 52 %    MONOCYTES 10 3 - 10 %    EOSINOPHILS 1 0 - 5 %    BASOPHILS 0 0 - 2 %    ABS. NEUTROPHILS 6.4 1.8 - 8.0 K/UL    ABS. LYMPHOCYTES 2.0 0.9 - 3.6 K/UL    ABS. MONOCYTES 1.0 0.05 - 1.2 K/UL    ABS. EOSINOPHILS 0.1 0.0 - 0.4 K/UL    ABS.  BASOPHILS 0.0 0.0 - 0.1 K/UL    DF AUTOMATED     METABOLIC PANEL, COMPREHENSIVE   Result Value Ref Range    Sodium 144 136 - 145 mmol/L    Potassium 4.1 3.5 - 5.5 mmol/L    Chloride 110 (H) 100 - 108 mmol/L    CO2 28 21 - 32 mmol/L    Anion gap 6 3.0 - 18 mmol/L    Glucose 76 74 - 99 mg/dL    BUN 9 7.0 - 18 MG/DL    Creatinine 0.91 0.6 - 1.3 MG/DL    BUN/Creatinine ratio 10 (L) 12 - 20      GFR est AA >60 >60 ml/min/1.73m2    GFR est non-AA >60 >60 ml/min/1.73m2    Calcium 8.7 8.5 - 10.1 MG/DL    Bilirubin, total 0.9 0.2 - 1.0 MG/DL    ALT (SGPT) 15 (L) 16 - 61 U/L    AST (SGOT) 8 (L) 15 - 37 U/L    Alk. phosphatase 77 45 - 117 U/L    Protein, total 6.3 (L) 6.4 - 8.2 g/dL    Albumin 3.4 3.4 - 5.0 g/dL    Globulin 2.9 2.0 - 4.0 g/dL    A-G Ratio 1.2 0.8 - 1.7     URINE MICROSCOPIC ONLY   Result Value Ref Range    WBC 0 to 3 0 - 4 /hpf    RBC NEGATIVE  0 - 5 /hpf    Epithelial cells NEGATIVE  0 - 5 /lpf    Bacteria NEGATIVE  NEG /hpf    Mucus 1+ (A) NEG /lpf   MAGNESIUM   Result Value Ref Range    Magnesium 2.3 1.6 - 2.6 mg/dL   PHOSPHORUS   Result Value Ref Range    Phosphorus 4.1 2.5 - 4.9 MG/DL   HEMOGLOBIN A1C WITH EAG   Result Value Ref Range    Hemoglobin A1c 5.1 4.2 - 5.6 %    Est. average glucose 100 mg/dL   CBC WITH AUTOMATED DIFF   Result Value Ref Range    WBC 7.9 4.6 - 13.2 K/uL    RBC 4.66 (L) 4.70 - 5.50 M/uL    HGB 13.6 13.0 - 16.0 g/dL    HCT 40.2 36.0 - 48.0 %    MCV 86.3 74.0 - 97.0 FL    MCH 29.2 24.0 - 34.0 PG    MCHC 33.8 31.0 - 37.0 g/dL    RDW 13.2 11.6 - 14.5 %    PLATELET 585 355 - 948 K/uL    MPV 10.4 9.2 - 11.8 FL    NEUTROPHILS 54 40 - 73 %    LYMPHOCYTES 33 21 - 52 %    MONOCYTES 11 (H) 3 - 10 %    EOSINOPHILS 2 0 - 5 %    BASOPHILS 0 0 - 2 %    ABS. NEUTROPHILS 4.2 1.8 - 8.0 K/UL    ABS. LYMPHOCYTES 2.6 0.9 - 3.6 K/UL    ABS. MONOCYTES 0.9 0.05 - 1.2 K/UL    ABS. EOSINOPHILS 0.2 0.0 - 0.4 K/UL    ABS.  BASOPHILS 0.0 0.0 - 0.06 K/UL    DF AUTOMATED     MAGNESIUM   Result Value Ref Range    Magnesium 2.1 1.6 - 2.6 mg/dL   METABOLIC PANEL, BASIC   Result Value Ref Range    Sodium 141 136 - 145 mmol/L    Potassium 3.8 3.5 - 5.5 mmol/L    Chloride 108 100 - 108 mmol/L    CO2 28 21 - 32 mmol/L    Anion gap 5 3.0 - 18 mmol/L    Glucose 86 74 - 99 mg/dL    BUN 9 7.0 - 18 MG/DL    Creatinine 0.72 0.6 - 1.3 MG/DL    BUN/Creatinine ratio 13 12 - 20      GFR est AA >60 >60 ml/min/1.73m2    GFR est non-AA >60 >60 ml/min/1.73m2    Calcium 8.4 (L) 8.5 - 10.1 MG/DL   POC LACTIC ACID   Result Value Ref Range    Lactic Acid (POC) 0.4 0.4 - 2.0 mmol/L   GLUCOSE, POC   Result Value Ref Range    Glucose (POC) 224 (H) 70 - 110 mg/dL   GLUCOSE, POC   Result Value Ref Range    Glucose (POC) 245 (H) 70 - 110 mg/dL   EKG, 12 LEAD, INITIAL   Result Value Ref Range    Ventricular Rate 93 BPM    Atrial Rate 93 BPM    P-R Interval 152 ms    QRS Duration 92 ms    Q-T Interval 362 ms    QTC Calculation (Bezet) 450 ms    Calculated P Axis 72 degrees    Calculated R Axis 75 degrees    Calculated T Axis 68 degrees    Diagnosis       Normal sinus rhythm  Early repolarization  Normal ECG  No previous ECGs available  Confirmed by Nicole Tidwell MD, Milli Peng ([id number]) on 6/5/2018 2:07:00 PM          DISCHARGE MENTAL STATUS EVALUATION     Appearance/Hygiene 26 y.o. BLACK OR  male  Hygiene: good   Attitude/Behavior/Social Relatedness Relates better, still reserved but more upbeat   Musculoskeletal Gait/Station: appropriate  Tone (flaccid, cogwheeling, spastic): not assessed  Psychomotor (hyperkinetic, hypokinetic): appropriate   Involuntary movements (tics, dyskinesias, akathisa, stereotypies): none   Speech   Rate, rhythm, volume, fluency and articulation are appropriate   Mood   euthymic   Affect    brighter   Thought Process Linear and goal directed   Thought Content and Perceptual Disturbances Denies self-injurious behavior (SIB), suicidal ideation (SI), aggressive behavior or homicidal ideation (HI)    Denies auditory and visual hallucinations   Sensorium and Cognition  Grossly intact   Insight  good   Judgment good       SUICIDE RISK ASSESSMENT     [] Admission  [x] Discharge     Key Factors:   Current admission precipitated by suicide attempt?   [x]  Yes     2    []  No     1     Suicide Attempt History  [] Past attempts of high lethality    2 []  Past attempts of low lethality    1 [x]  No previous attempts       0   Suicidal Ideation []  Constant suicidal thoughts      2 []  Intermittent or fleeting suicidal  thoughts  1 [x]  Denies current suicidal thoughts    0   Suicide Plan   []  Has plan with actual OR potential access to planned method    2 []  Has plan without access to planned method      1 [x]  No plan            0   Plan Lethality []  Highly lethal plan (Carbon monoxide, gun, hanging, jumping)    2 []  Moderate lethality of plan          1 [x]  Low lethality of plan (biting, head banging, superficial scratching, pillow over face)  0   Safety Plan Agreement  []  Unwilling OR unable to agree due to impaired reality testing   2   []  Patient is ambivalent and/or guarded      1 [x]  Reliably agrees        0   Current Morbid Thoughts (reunion fantasies, preoccupations with death) []  Constantly     2     []  Frequently    1 [x]  Rarely    0   Elopement Risk  []  High risk     2 []  Moderate risk    1 [x]   Low risk    0   Symptoms    []  Hopeless  []  Helpless  []  Anhedonia   []  Guilt/shame  []  Anger/rage  []  Anxiety  []  Insomnia   []  Agitation   []  Impulsivity  []  5-6 symptoms present    2 []  3-4 symptoms present    1  [x]  0-2 symptoms present    0     Scoring Key:  10 or higher = Imminent Risk (consider 1:1)  4 - 9 = Moderate Risk (consider q 15 minute observation)Attended alcohol, tobacco, prescription and other drug psychoeducation group.   0 - 3 = Low Risk (consider q 30 minute observation)    Total Score: 2  ------------------------------------------------------------------------------------------------------------------  PLEASE ADDRESS THE FOLLOWING 5 ISSUES     Physician's Subjective Appraisal of Risk (check one):  []  Patient replies not trustworthy: several non-verbal cues. []  Patient replies questionable: trustworthy: at least 1 non-verbal cue. [x]  Patient replies appear trustworthy. Family History of Suicide?    []  Yes  [x]  No    Protective measures (select all that apply):  [x]  Successful past responses to stress  []  Spiritual/Mormonism beliefs  [x]  Capacity for reality testing  [x]  Positive therapeutic relationships  [x]  Social supports/connections  [x]  Positive coping skills  [x]  Frustration tolerance/optimism  []  Children or pets in the home  [x]  Sense of responsibility to family  [x]  Agrees to treatment plan and follow up    Others (list):    High Risk Diagnoses (select all that apply):  []  Depression/Bipolar Disorder  []  Dual Diagnosis  []  Cardiovascular Disease  [x]  Schizophrenia  []  Chronic Pain  []  Epilepsy  []  Cancer  []  Personality Disorder  []  HIV/AIDS  []  Multiple Sclerosis    Dangerousness Assessment (Suicide, homicide, property destruction. ..)    Risk Factors reviewed and risk assessed to be:  [] low  [x] low-moderate  [] moderate   [] moderate-high  [] high     Protection factors reviewed and risk assessed to be:  [] low  [x] low-moderate  [] moderate   [] moderate-high  [] high     Response to treatment and risk assessed to be:  [x] low  [] low-moderate  [] moderate   [] moderate-high  [] high     Support reviewed and risk assessed to be:  [x] low  [] low-moderate  [] moderate   [] moderate-high  [] high     Acceptance of Discharge and outpatient treatment reviewed and risk assessed to be:    [x] low  [] low-moderate  [] moderate   [] moderate-high  [] high   Overall risk assessed to be:  [] low  [x] low-moderate  [] moderate   [] moderate-high  [] high     Completion of discharge was greater than 30 minutes. Over 50% of today's discharge was geared towards counseling and coordination of care.           Muriel Rouse MD  Psychiatry  [facility]

## 2018-06-11 NOTE — BH NOTES
Discharge instructions reviewed and given to pt along will all belongings and RXs. Follow up intact. All belongings returned to pt. He called for a ride and is waiting in lobby as requested.

## 2018-06-11 NOTE — BSMART NOTE
SOCIAL WORK GROUP THERAPY PROGRESS NOTE    Group Time:  10:15am      Group Topic:  Coping Skills    C D Issues    Group Participation:      Pt minimally involved during group discussion but remained attentive. Still speaks low & mumbled his comments. Looked at the \"Process of setting Goals\", the value of a \"daily\" /  \"weekly\" schedule as tool to build self esteem, sharpen decision making skills and help define one's reality. Discussion included the process of making \"Change\" by answering questions on handout with an emphasis on strengths & weaknesses to support improving one's self esteem. Saw strengths as being born to a good family.

## 2018-06-22 ENCOUNTER — HOSPITAL ENCOUNTER (INPATIENT)
Age: 23
LOS: 7 days | Discharge: HOME | DRG: 750 | End: 2018-06-29
Attending: EMERGENCY MEDICINE | Admitting: PSYCHIATRY & NEUROLOGY
Payer: MEDICAID

## 2018-06-22 DIAGNOSIS — F20.3 UNDIFFERENTIATED SCHIZOPHRENIA (HCC): Primary | ICD-10-CM

## 2018-06-22 LAB
ALBUMIN SERPL-MCNC: 4.1 G/DL (ref 3.4–5)
ALBUMIN/GLOB SERPL: 1.2 {RATIO} (ref 0.8–1.7)
ALP SERPL-CCNC: 82 U/L (ref 45–117)
ALT SERPL-CCNC: 25 U/L (ref 16–61)
AMPHET UR QL SCN: NEGATIVE
ANION GAP SERPL CALC-SCNC: 10 MMOL/L (ref 3–18)
APAP SERPL-MCNC: <2 UG/ML (ref 10–30)
APPEARANCE UR: CLEAR
AST SERPL-CCNC: 28 U/L (ref 15–37)
BARBITURATES UR QL SCN: NEGATIVE
BASOPHILS # BLD: 0 K/UL (ref 0–0.06)
BASOPHILS NFR BLD: 1 % (ref 0–2)
BENZODIAZ UR QL: NEGATIVE
BILIRUB SERPL-MCNC: 0.6 MG/DL (ref 0.2–1)
BILIRUB UR QL: NEGATIVE
BUN SERPL-MCNC: 12 MG/DL (ref 7–18)
BUN/CREAT SERPL: 11 (ref 12–20)
CALCIUM SERPL-MCNC: 8.8 MG/DL (ref 8.5–10.1)
CANNABINOIDS UR QL SCN: NEGATIVE
CHLORIDE SERPL-SCNC: 107 MMOL/L (ref 100–108)
CO2 SERPL-SCNC: 24 MMOL/L (ref 21–32)
COCAINE UR QL SCN: NEGATIVE
COLOR UR: YELLOW
CREAT SERPL-MCNC: 1.14 MG/DL (ref 0.6–1.3)
DIFFERENTIAL METHOD BLD: ABNORMAL
EOSINOPHIL # BLD: 0 K/UL (ref 0–0.4)
EOSINOPHIL NFR BLD: 1 % (ref 0–5)
ERYTHROCYTE [DISTWIDTH] IN BLOOD BY AUTOMATED COUNT: 13.1 % (ref 11.6–14.5)
ETHANOL SERPL-MCNC: <3 MG/DL (ref 0–3)
GLOBULIN SER CALC-MCNC: 3.4 G/DL (ref 2–4)
GLUCOSE SERPL-MCNC: 77 MG/DL (ref 74–99)
GLUCOSE UR STRIP.AUTO-MCNC: NEGATIVE MG/DL
HCT VFR BLD AUTO: 45 % (ref 36–48)
HDSCOM,HDSCOM: NORMAL
HGB BLD-MCNC: 15.9 G/DL (ref 13–16)
HGB UR QL STRIP: NEGATIVE
KETONES UR QL STRIP.AUTO: ABNORMAL MG/DL
LEUKOCYTE ESTERASE UR QL STRIP.AUTO: NEGATIVE
LYMPHOCYTES # BLD: 1.9 K/UL (ref 0.9–3.6)
LYMPHOCYTES NFR BLD: 37 % (ref 21–52)
MCH RBC QN AUTO: 30.3 PG (ref 24–34)
MCHC RBC AUTO-ENTMCNC: 35.3 G/DL (ref 31–37)
MCV RBC AUTO: 85.7 FL (ref 74–97)
METHADONE UR QL: NEGATIVE
MONOCYTES # BLD: 0.7 K/UL (ref 0.05–1.2)
MONOCYTES NFR BLD: 13 % (ref 3–10)
NEUTS SEG # BLD: 2.6 K/UL (ref 1.8–8)
NEUTS SEG NFR BLD: 48 % (ref 40–73)
NITRITE UR QL STRIP.AUTO: NEGATIVE
OPIATES UR QL: NEGATIVE
PCP UR QL: NEGATIVE
PH UR STRIP: 6 [PH] (ref 5–8)
PLATELET # BLD AUTO: 212 K/UL (ref 135–420)
PMV BLD AUTO: 11 FL (ref 9.2–11.8)
POTASSIUM SERPL-SCNC: 4.4 MMOL/L (ref 3.5–5.5)
PROT SERPL-MCNC: 7.5 G/DL (ref 6.4–8.2)
PROT UR STRIP-MCNC: NEGATIVE MG/DL
RBC # BLD AUTO: 5.25 M/UL (ref 4.7–5.5)
SALICYLATES SERPL-MCNC: <2.8 MG/DL (ref 2.8–20)
SODIUM SERPL-SCNC: 141 MMOL/L (ref 136–145)
SP GR UR REFRACTOMETRY: 1.02 (ref 1–1.03)
UROBILINOGEN UR QL STRIP.AUTO: 1 EU/DL (ref 0.2–1)
WBC # BLD AUTO: 5.3 K/UL (ref 4.6–13.2)

## 2018-06-22 PROCEDURE — 80307 DRUG TEST PRSMV CHEM ANLYZR: CPT | Performed by: EMERGENCY MEDICINE

## 2018-06-22 PROCEDURE — 65220000003 HC RM SEMIPRIVATE PSYCH

## 2018-06-22 PROCEDURE — 74011250636 HC RX REV CODE- 250/636: Performed by: EMERGENCY MEDICINE

## 2018-06-22 PROCEDURE — 99285 EMERGENCY DEPT VISIT HI MDM: CPT

## 2018-06-22 PROCEDURE — 80053 COMPREHEN METABOLIC PANEL: CPT | Performed by: EMERGENCY MEDICINE

## 2018-06-22 PROCEDURE — 74011250636 HC RX REV CODE- 250/636: Performed by: PSYCHIATRY & NEUROLOGY

## 2018-06-22 PROCEDURE — 81003 URINALYSIS AUTO W/O SCOPE: CPT | Performed by: EMERGENCY MEDICINE

## 2018-06-22 PROCEDURE — 93005 ELECTROCARDIOGRAM TRACING: CPT

## 2018-06-22 PROCEDURE — 96372 THER/PROPH/DIAG INJ SC/IM: CPT

## 2018-06-22 PROCEDURE — 77030005563 HC CATH URETH INT MMGH -A

## 2018-06-22 PROCEDURE — 74011000250 HC RX REV CODE- 250: Performed by: EMERGENCY MEDICINE

## 2018-06-22 PROCEDURE — 74011250636 HC RX REV CODE- 250/636

## 2018-06-22 PROCEDURE — 85025 COMPLETE CBC W/AUTO DIFF WBC: CPT | Performed by: EMERGENCY MEDICINE

## 2018-06-22 RX ORDER — DIPHENHYDRAMINE HYDROCHLORIDE 50 MG/ML
INJECTION, SOLUTION INTRAMUSCULAR; INTRAVENOUS
Status: COMPLETED
Start: 2018-06-22 | End: 2018-06-22

## 2018-06-22 RX ORDER — IBUPROFEN 200 MG
400 TABLET ORAL
Status: DISCONTINUED | OUTPATIENT
Start: 2018-06-22 | End: 2018-06-29 | Stop reason: HOSPADM

## 2018-06-22 RX ORDER — TRAZODONE HYDROCHLORIDE 50 MG/1
50 TABLET ORAL
Status: DISCONTINUED | OUTPATIENT
Start: 2018-06-22 | End: 2018-06-29

## 2018-06-22 RX ORDER — LORAZEPAM 1 MG/1
1-2 TABLET ORAL
Status: DISCONTINUED | OUTPATIENT
Start: 2018-06-22 | End: 2018-06-29 | Stop reason: HOSPADM

## 2018-06-22 RX ORDER — LORAZEPAM 2 MG/ML
1-2 INJECTION INTRAMUSCULAR
Status: DISCONTINUED | OUTPATIENT
Start: 2018-06-22 | End: 2018-06-23

## 2018-06-22 RX ORDER — HALOPERIDOL 5 MG/ML
5 INJECTION INTRAMUSCULAR
Status: DISCONTINUED | OUTPATIENT
Start: 2018-06-22 | End: 2018-06-29 | Stop reason: HOSPADM

## 2018-06-22 RX ORDER — DIPHENHYDRAMINE HYDROCHLORIDE 50 MG/ML
50 INJECTION, SOLUTION INTRAMUSCULAR; INTRAVENOUS ONCE
Status: ACTIVE | OUTPATIENT
Start: 2018-06-22 | End: 2018-06-23

## 2018-06-22 RX ORDER — HALOPERIDOL 5 MG/1
5 TABLET ORAL
Status: DISCONTINUED | OUTPATIENT
Start: 2018-06-22 | End: 2018-06-29 | Stop reason: HOSPADM

## 2018-06-22 RX ORDER — HALOPERIDOL 5 MG/ML
5 INJECTION INTRAMUSCULAR
Status: COMPLETED | OUTPATIENT
Start: 2018-06-22 | End: 2018-06-22

## 2018-06-22 RX ADMIN — HALOPERIDOL LACTATE 5 MG: 5 INJECTION, SOLUTION INTRAMUSCULAR at 19:02

## 2018-06-22 RX ADMIN — WATER 20 MG: 1 INJECTION INTRAMUSCULAR; INTRAVENOUS; SUBCUTANEOUS at 16:39

## 2018-06-22 RX ADMIN — HALOPERIDOL LACTATE 5 MG: 5 INJECTION, SOLUTION INTRAMUSCULAR at 21:00

## 2018-06-22 RX ADMIN — DIPHENHYDRAMINE HYDROCHLORIDE 50 MG: 50 INJECTION, SOLUTION INTRAMUSCULAR; INTRAVENOUS at 21:00

## 2018-06-22 RX ADMIN — LORAZEPAM 2 MG: 2 INJECTION INTRAMUSCULAR; INTRAVENOUS at 21:00

## 2018-06-22 NOTE — ED PROVIDER NOTES
EMERGENCY DEPARTMENT HISTORY AND PHYSICAL EXAM    4:21 PM      Date: 6/22/2018  Patient Name: Solomon Pride    History of Presenting Illness     Chief Complaint   Patient presents with   [address] Problem         History Provided By: Police    Chief Complaint: Mental Health Problem (ECO)  Duration: Today   Timing:  Acute  Location: Patient did not dictate   Quality: Patient did not dictate   Severity: Patient did not dictate   Modifying Factors: Patient did not dictate   Associated Symptoms: Patient did not dictate       Additional History (Context): Solomon Pride is a 26 y.o. male with PMHx significant for ADHD, Anxiety, Trauma, Psychotic disorder, Depression, and Mood disorder who presents via police with an ECO today. Per police, the patient's cousin states the patient was engaging in aggressive and bizzarre behavior against the older resident who also lives with the patient. Stated that the older resident issued the ECO. ECO reports that the patient was making noises, hearing voices, non-compliance with his medications, punching the walls, and threatening the resident with a balled-up fist. Patient is not supplying any history at this time. No other concerns were expressed at this time. HPI is limited secondary to the patient's psychiatric condition. PCP: None    Current Outpatient Prescriptions   Medication Sig Dispense Refill    ARIPiprazole (ABILIFY) 15 mg tablet Take 1 Tab by mouth daily. Indications: Schizophrenia 30 Tab 0    clonazePAM (KLONOPIN) 0.5 mg tablet Take 0.5 Tabs by mouth two (2) times a day. Max Daily Amount: 0.5 mg. Indications: agitation 30 Tab 0    ARIPiprazole (ABILIFY MAINTENA) 300 mg serr injection 300 mg by IntraMUSCular route every thirty (30) days.          Past History     Past Medical History:  Past Medical History:   Diagnosis Date    ADHD (attention deficit hyperactivity disorder)     Anxiety disorder     Depression     Mood disorder ([facility] [address]     Psychiatric disorder     Psychotic disorder     Trauma     \"His mother  this past November\"       Past Surgical History:  History reviewed. No pertinent surgical history. Family History:  Family History   Problem Relation Age of Onset    Schizophrenia Mother    Felix Bogus Schizophrenia Brother     Schizophrenia Sister        Social History:  Social History   Substance Use Topics    Smoking status: Never Smoker    Smokeless tobacco: Never Used    Alcohol use No       Allergies:  No Known Allergies      Review of Systems       Review of Systems   Unable to perform ROS: Psychiatric disorder         Physical Exam     Visit Vitals    /85 (BP 1 Location: Left arm, BP Patient Position: At rest)    Pulse (!) 104    Temp 98.2 °F (36.8 °C)    Resp 16    SpO2 99%         Physical Exam   Constitutional: He appears well-developed. No distress. Patient will not give any history   Patient intermittently laughs and points  Does not appear to be in any acute distress  Patient is sitting up on the bed, handcuffed to the bed. HENT:   Head: Normocephalic and atraumatic. Eyes:   EOMI    Pulmonary/Chest:   Non-labored breathing    Abdominal: He exhibits no distension. Musculoskeletal: He exhibits no deformity. Neurological:   Patient moves all extremities    Psychiatric:   Patient laughs intermittently          Diagnostic Study Results     Labs -  No results found for this or any previous visit (from the past 12 hour(s)). Radiologic Studies -   No results found. Medical Decision Making   I am the first provider for this patient. I reviewed the vital signs, available nursing notes, past medical history, past surgical history, family history and social history. Vital Signs-Reviewed the patient's vital signs.     Pulse Oximetry Analysis -  99% on room air (Interpretation)    Cardiac Monitor:  Rate:       Records Reviewed: Nursing Notes and Triage notes  (Time of Review: 4:21 PM)      Provider Notes (Medical Decision Making):   ASSESSMENT / PLAN:    22y/o AAM, PMhx of schizophrenia brought in by police with ECO. Pt wont give any hx. ECO reports pt noncompliant with meds, threatening others, punching walls, acting bizzare and hearing voices. Unsure if any etoh or drug use. On exam, normal vitals other than HR ~105. Sitting up in bed, handcuffed to bed, alert/eyes open, no distress, laughs from time to time but wont answer any questions, no signs of trauma, moving all extremities, not toxidromic appearing. Has been admitted to [place] [address] here before. Seems like exacerbation of underlying Mental Health Disorder. Could be intoxication or withdrawal although not toxidromic at this time. Could have occult ingestion/overdose of meds as well. Uremia, Electrolyte disturbance, occult infection/uti, acidosis also on ddx but seem less likely.    -1:1  (police at bedside)  -CBC, CMP  -UA  -UDS  -ETOH, APAP, ASA levels  -EKG  -Mental Health Consult ([facility])  -Anticipate admission        Monico Song MD  EM-IM Physician          ED Course: Progress Notes, Reevaluation, and Consults:  UPDATE 4:33 PM  -Pt began yelling/screaming and thrashing around in bed  -Uncooperative with workup  -Concerned he may have excited delerium if this continues  -Will give 20mg IM Geodon and reassess  -May need stronger chemical restrain    Abhilash Engle MD  EM-IM Physician     5:25 PM : Pt care transferred to Dr. Merceda Primrose  ,ED provider. History of patient complaint(s), available diagnostic reports and current treatment plan has been discussed thoroughly. Bedside rounding on patient occured : yes . Intended disposition of patient : TBD  Pending diagnostics reports and/or labs (please list): Pending medical clearance labs, and waiting for [facility] to do a TDO. Diagnosis     Clinical Impression: No diagnosis found.     Disposition: Signed out to Dr. Merceda Primrose, ED Provider    Follow-up Information     None           Patient's Medications Start Taking    No medications on file   Continue Taking    ARIPIPRAZOLE (ABILIFY MAINTENA) 300 MG SERR INJECTION    300 mg by IntraMUSCular route every thirty (30) days. ARIPIPRAZOLE (ABILIFY) 15 MG TABLET    Take 1 Tab by mouth daily. Indications: Schizophrenia    CLONAZEPAM (KLONOPIN) 0.5 MG TABLET    Take 0.5 Tabs by mouth two (2) times a day. Max Daily Amount: 0.5 mg. Indications: agitation   These Medications have changed    No medications on file   Stop Taking    No medications on file     _______________________________    Attestations:  Scribe Attestation     5 Lilliana Dempsey acting as a scribe for and in the presence of Mike Naranjo MD      June 22, 2018 at 4:21 PM       Provider Attestation:      I personally performed the services described in the documentation, reviewed the documentation, as recorded by the scribe in my presence, and it accurately and completely records my words and actions.  June 22, 2018 at 4:21 PM - Mike Naranjo MD    _____________________  __________

## 2018-06-22 NOTE — ED NOTES
5:39 PM :Pt care assumed from Dr. Sue Miller , ED provider. Pt complaint(s), current treatment plan, progression and available diagnostic results have been discussed thoroughly. Rounding occurred: yes  Intended Disposition: TBD   Pending diagnostic reports and/or labs (please list): labs and awaiting for CSB to do a TDO    EKG: Interpreted by the EP. Time Interpreted: 17:47   Rate: 100   Rhythm: Normal Sinus Rhythm    Interpretation:No Acute ST or T wave changes    8:10 PM : Pt care transferred to Dr. Fanny Martini  ,ED provider. History of patient complaint(s), available diagnostic reports and current treatment plan has been discussed thoroughly. Bedside rounding on patient occured : yes . Intended disposition of patient : TBD  Pending diagnostics reports and/or labs (please list): CSB for TDO      Attestations:  Vika [address] [facility] [address] acting as a scribe for and in the presence of Carolina Stern MD      June 22, 2018 at 5:38 PM       Provider Attestation:      I personally performed the services described in the documentation, reviewed the documentation, as recorded by the scribe in my presence, and it accurately and completely records my words and actions.  June 22, 2018 at 5:38 PM - Carolina Stern MD    _______________________________

## 2018-06-22 NOTE — IP AVS SNAPSHOT
Alexander Graham 
 
 
 [address] Patient: Akua Pineda MRN: [id number] :1995 About your hospitalization You were admitted on:  2018 You last received care in the:  [facility] SPECIAL TRTMT 2 You were discharged on:  2018 Why you were hospitalized Your primary diagnosis was:  Schizophrenia (Hcc) Follow-up Information Follow up With Details Comments Contact Info Behavioral Navigators  On 7/3/2018 Patient has therapy appointment scheduled at [facility] with Carlene Haque on July 3, 2018 at 1:00 pm. [facility] [address] [place] [address], [place], [address] [address] Phone: [phone] Behavioral Healthcare Services On 2018 Patient will follow up with mental health services with intake on 2018 at 9:00 am as a walk in at [address] 
[phone] Your Scheduled Appointments 2018 10:30 AM EDT INFUSION 30 with [facility] FAST TRACK NURSE HBV OP INFUSION ([facility]) [place] [address] [address] [facility]  
[phone] Note: Patient must have a  if they take medication(s) that impairs their ability to operate a motor vehicle Discharge Orders None A check johnson indicates which time of day the medication should be taken. My Medications START taking these medications Instructions Each Dose to Equal  
 Morning Noon Evening Bedtime  
 traZODone 50 mg tablet Commonly known as:  Carisa Manzano Your last dose was: Your next dose is: Take 2 Tabs by mouth nightly as needed for Sleep. Indications: insomnia associated with depression 100 mg CHANGE how you take these medications Instructions Each Dose to Equal  
 Morning Noon Evening Bedtime * ARIPiprazole 15 mg tablet Commonly known as:  ABILIFY What changed:  additional instructions Your last dose was: Your next dose is: Take 1 Tab by mouth daily. Please take until all tablets are gone. Indications: Schizophrenia 15 mg  
    
   
   
   
  
 * ARIPiprazole 400 mg injection Commonly known as:  Hallie Cheney Start taking on:  7/27/2018 What changed:   
- medication strength 
- how much to take - when to take this 
- additional instructions Your last dose was: Your next dose is:    
   
   
 400 mg by IntraMUSCular route once for 1 dose. Please administer on 7-. Indications: Schizophrenia 400 mg * Notice: This list has 2 medication(s) that are the same as other medications prescribed for you. Read the directions carefully, and ask your doctor or other care provider to review them with you. STOP taking these medications   
 clonazePAM 0.5 mg tablet Commonly known as:  Babak Chou Where to Get Your Medications Information on where to get these meds will be given to you by the nurse or doctor. ! Ask your nurse or doctor about these medications ARIPiprazole 15 mg tablet ARIPiprazole 400 mg injection  
 traZODone 50 mg tablet Discharge Instructions BEHAVIORAL HEALTH NURSING DISCHARGE NOTE Emergency Numbers [facility] Desk: [phone] [place] [facility]: [phone] Suicide Prevention Line: [phone] (TALK) The following personal items collected during your admission are returned to you:  
Dental Appliance: Dental Appliances: None Vision: Visual Aid: None Hearing Aid:   
Jewelry: Jewelry: None Clothing:   
Other Valuables: Other Valuables: None Valuables sent to safe: Personal Items Sent to Safe: None The discharge information has been reviewed with the patient. The patient verbalized understanding. [employer] Activation Thank you for requesting access to [employer]. Please follow the instructions below to securely access and download your online medical record. [employer] allows you to send messages to your doctor, view your test results, renew your prescriptions, schedule appointments, and more. How Do I Sign Up? 1. In your internet browser, go to [email or web address] 
2. Click on the First Time User? Click Here link in the Sign In box. You will be redirect to the New Member Sign Up page. 3. Enter your [employer] Access Code exactly as it appears below. You will not need to use this code after youve completed the sign-up process. If you do not sign up before the expiration date, you must request a new code. [employer] Access Code: [email or web address] Expires: 2018 11:57 AM (This is the date your [employer] access code will ) 4. Enter the last four digits of your Social Security Number (xxxx) and Date of Birth (mm/dd/yyyy) as indicated and click Submit. You will be taken to the next sign-up page. 5. Create a [employer] ID. This will be your [employer] login ID and cannot be changed, so think of one that is secure and easy to remember. 6. Create a [employer] password. You can change your password at any time. 7. Enter your Password Reset Question and Answer. This can be used at a later time if you forget your password. 8. Enter your e-mail address. You will receive e-mail notification when new information is available in [address] 9. Click Sign Up. You can now view and download portions of your medical record. 10. Click the Download Summary menu link to download a portable copy of your medical information. Additional Information If you have questions, please visit the Frequently Asked Questions section of the [employer] website at [email or web address] Remember, [employer] is NOT to be used for urgent needs. For medical emergencies, dial 911. Patient armband removed and shredded Introducing [facility] HEALTH SERVICES! Yahaira Mike introduces Satellogict patient portal. Now you can access parts of your medical record, email your doctor's office, and request medication refills online. 1. In your internet browser, go to [email or web address] 2. Click on the First Time User? Click Here link in the Sign In box. You will see the New Member Sign Up page. 3. Enter your [employer] Access Code exactly as it appears below. You will not need to use this code after youve completed the sign-up process. If you do not sign up before the expiration date, you must request a new code. · [employer] Access Code: [email or web address] Expires: 7/17/2018 11:57 AM 
 
4. Enter the last four digits of your Social Security Number (xxxx) and Date of Birth (mm/dd/yyyy) as indicated and click Submit. You will be taken to the next sign-up page. 5. Create a [employer] ID. This will be your [employer] login ID and cannot be changed, so think of one that is secure and easy to remember. 6. Create a [employer] password. You can change your password at any time. 7. Enter your Password Reset Question and Answer. This can be used at a later time if you forget your password. 8. Enter your e-mail address. You will receive e-mail notification when new information is available in [address] 9. Click Sign Up. You can now view and download portions of your medical record. 10. Click the Download Summary menu link to download a portable copy of your medical information. If you have questions, please visit the Frequently Asked Questions section of the [employer] website. Remember, [employer] is NOT to be used for urgent needs. For medical emergencies, dial 911. Now available from your iPhone and Android! Introducing Brian Ling As a Yahaira Mike patient, I wanted to make you aware of our electronic visit tool called Brian Ling. Yahaira Ebonys 24/7 allows you to connect within minutes with a medical provider 24 hours a day, seven days a week via a mobile device or tablet or logging into a secure website from your computer. You can access StyleSaint from anywhere in the [place]. A virtual visit might be right for you when you have a simple condition and feel like you just dont want to get out of bed, or cant get away from work for an appointment, when your regular [facility] provider is not available (evenings, weekends or holidays), or when youre out of town and need minor care. Electronic visits cost only $49 and if the Ozuna[facility]ecter 24/Tavern provider determines a prescription is needed to treat your condition, one can be electronically transmitted to a nearby pharmacy*. Please take a moment to enroll today if you have not already done so. The enrollment process is free and takes just a few minutes. To enroll, please download the [employer] fang to your tablet or phone, or visit [email or web address] to enroll on your computer. And, as an [address] [place] [address] patient with a BillGuard account, the results of your visits will be scanned into your electronic medical record and your primary care provider will be able to view the scanned results. We urge you to continue to see your regular [facility] provider for your ongoing medical care. And while your primary care provider may not be the one available when you seek a Brian Ling virtual visit, the peace of mind you get from getting a real diagnosis real time can be priceless. For more information on Brian [email or web address], view our Frequently Asked Questions (FAQs) at [email or web address]. Sincerely, 
 
Glory Mark MD 
Chief Medical Officer Alison Ratliff *:  certain medications cannot be prescribed via Slip Stoppersdevora Providers Seen During Your Hospitalization Provider Specialty Primary office phone Elsa Morales MD Emergency Medicine [phone] Hai Ruiz MD Psychiatry [phone] Your Primary Care Physician (PCP) Primary Care Physician Office Phone Office Fax NONE ** None ** ** None ** You are allergic to the following No active allergies Recent Documentation Height Weight BMI Smoking Status 1.676 m 63.5 kg 22.6 kg/m2 Never Smoker Emergency Contacts Name Discharge Info Relation Home Work Mobile Piedad Lowery(Grandmother) DISCHARGE CAREGIVER [3] Other Relative [6] [phone] Brenda Manus DECLINED CAREGIVER [4] Caregiver [13] [phone] Patient Belongings The following personal items are in your possession at time of discharge: 
  Dental Appliances: None  Visual Aid: None      Home Medications: None   Jewelry: None       Other Valuables: None  Personal Items Sent to Safe: None Please provide this summary of care documentation to your next provider. Signatures-by signing, you are acknowledging that this After Visit Summary has been reviewed with you and you have received a copy. Patient Signature:  ____________________________________________________________ Date:  ____________________________________________________________  
  
Gino Goodman Provider Signature:  ____________________________________________________________ Date:  ____________________________________________________________

## 2018-06-22 NOTE — ED TRIAGE NOTES
Pt arrived w/ police escort. Pt has ECO. Pt hearing voices, threatening w/ balled fist, Non compliant w/ meds, Therapists called care taker and recommended an ECO.

## 2018-06-22 NOTE — IP AVS SNAPSHOT
[address] Patient: Say Alvarez MRN: [id number] :1995 A check johnson indicates which time of day the medication should be taken. My Medications START taking these medications Instructions Each Dose to Equal  
 Morning Noon Evening Bedtime  
 traZODone 50 mg tablet Commonly known as:  Mesa Dargalileo Your last dose was: Your next dose is: Take 2 Tabs by mouth nightly as needed for Sleep. Indications: insomnia associated with depression 100 mg CHANGE how you take these medications Instructions Each Dose to Equal  
 Morning Noon Evening Bedtime * ARIPiprazole 15 mg tablet Commonly known as:  ABILIFY What changed:  additional instructions Your last dose was: Your next dose is: Take 1 Tab by mouth daily. Please take until all tablets are gone. Indications: Schizophrenia 15 mg  
    
   
   
   
  
 * ARIPiprazole 400 mg injection Commonly known as:  Dominic Baron Start taking on:  2018 What changed:   
- medication strength 
- how much to take - when to take this 
- additional instructions Your last dose was: Your next dose is:    
   
   
 400 mg by IntraMUSCular route once for 1 dose. Please administer on 2018. Indications: Schizophrenia 400 mg * Notice: This list has 2 medication(s) that are the same as other medications prescribed for you. Read the directions carefully, and ask your doctor or other care provider to review them with you. STOP taking these medications   
 clonazePAM 0.5 mg tablet Commonly known as:  Kristian Ortiz Where to Get Your Medications Information on where to get these meds will be given to you by the nurse or doctor. ! Ask your nurse or doctor about these medications ARIPiprazole 15 mg tablet ARIPiprazole 400 mg injection  
 traZODone 50 mg tablet

## 2018-06-22 NOTE — IP AVS SNAPSHOT
Summary of Care Report The Summary of Care report has been created to help improve care coordination. Users with access to VirtualLogix or [address] (Web-based application) may access additional patient information including the Discharge Summary. If you are not currently a [address] user and need more information, please call the number listed below in the [place] [address] section and ask to be connected with Medical Records. Facility Information Name Address Phone [address] [facility] [address] [phone] Patient Information Patient Name Sex VERNELL Donaldson ([id number]) Male 1995 Discharge Information Admitting Provider Service Area Unit Murphy Gunn MD / [address]  [phone] Discharge Provider Discharge Date/Time Discharge Disposition Destination (none) 2018 (Pending) AHR (none) Patient Language Language ENGLISH [13] Hospital Problems as of 2018  Reviewed: 2015  8:40 AM by Dandre Poon MD  
  
  
  
 Class Noted - Resolved Last Modified POA Active Problems * (Principal)Schizophrenia ([facility] [address]  2015 - Present 2018 by Leslie Morse MD Yes Entered by Inocencia Leyva Non-Hospital Problems as of 2018  Reviewed: 2015  8:40 AM by Dandre Poon MD  
 None You are allergic to the following No active allergies Current Discharge Medication List  
  
START taking these medications Dose & Instructions Dispensing Information Comments  
 traZODone 50 mg tablet Commonly known as:  Sadia Sorto Dose:  100 mg Take 2 Tabs by mouth nightly as needed for Sleep. Indications: insomnia associated with depression Quantity:  30 Tab Refills:  0 CONTINUE these medications which have CHANGED Dose & Instructions Dispensing Information Comments * ARIPiprazole 15 mg tablet Commonly known as:  ABILIFY What changed:  additional instructions Dose:  15 mg Take 1 Tab by mouth daily. Please take until all tablets are gone. Indications: Schizophrenia Quantity:  12 Tab Refills:  0  
   
 * ARIPiprazole 400 mg injection Commonly known as:  Darren Limg Start taking on:  7/27/2018 What changed:   
- medication strength 
- how much to take - when to take this 
- additional instructions Dose:  400 mg  
400 mg by IntraMUSCular route once for 1 dose. Please administer on 7-. Indications: Schizophrenia Quantity:  400 mg Refills:  0  
   
 * Notice: This list has 2 medication(s) that are the same as other medications prescribed for you. Read the directions carefully, and ask your doctor or other care provider to review them with you. STOP taking these medications Comments  
 clonazePAM 0.5 mg tablet Commonly known as:  Timo Nielsen Follow-up Information Follow up With Details Comments Contact Info Behavioral Navigators  On 7/3/2018 Patient has therapy appointment scheduled at [facility] with Anitra Cowden on July 3, 2018 at 1:00 pm. [facility] [address] [place] [address], [place], [address] [address] Phone: [phone] Behavioral Healthcare Services On 7/1/2018 Patient will follow up with mental health services with intake on July 1, 2018 at 9:00 am as a walk in at [address] 
[phone] Discharge Instructions BEHAVIORAL HEALTH NURSING DISCHARGE NOTE Emergency Numbers 7300 [facility] Desk: [phone] [facility] Emergency Services: [phone] Suicide Prevention Line: [phone] (TALK) The following personal items collected during your admission are returned to you:  
Dental Appliance: Dental Appliances: None Vision: Visual Aid: None Hearing Aid:   
Jewelry: Jewelry: None Clothing:   
Other Valuables: Other Valuables: None Valuables sent to safe: Personal Items Sent to Safe: None The discharge information has been reviewed with the patient. The patient verbalized understanding. MyChart Activation Thank you for requesting access to [employer]. Please follow the instructions below to securely access and download your online medical record. [employer] allows you to send messages to your doctor, view your test results, renew your prescriptions, schedule appointments, and more. How Do I Sign Up? 1. In your internet browser, go to [email or web address] 
2. Click on the First Time User? Click Here link in the Sign In box. You will be redirect to the New Member Sign Up page. 3. Enter your [employer] Access Code exactly as it appears below. You will not need to use this code after youve completed the sign-up process. If you do not sign up before the expiration date, you must request a new code. [employer] Access Code: [email or web address] Expires: 2018 11:57 AM (This is the date your [employer] access code will ) 4. Enter the last four digits of your Social Security Number (xxxx) and Date of Birth (mm/dd/yyyy) as indicated and click Submit. You will be taken to the next sign-up page. 5. Create a [employer] ID. This will be your [employer] login ID and cannot be changed, so think of one that is secure and easy to remember. 6. Create a [employer] password. You can change your password at any time. 7. Enter your Password Reset Question and Answer. This can be used at a later time if you forget your password. 8. Enter your e-mail address. You will receive e-mail notification when new information is available in [address] 9. Click Sign Up. You can now view and download portions of your medical record. 10. Click the Download Summary menu link to download a portable copy of your medical information. Additional Information If you have questions, please visit the Frequently Asked Questions section of the MyChart website at [email or web address] Remember, BPThart is NOT to be used for urgent needs. For medical emergencies, dial 911. Patient armband removed and shredded Chart Review Routing History Recipient Method Report Sent By Vaishali Leyva MD  
Fax: [phone] Phone: [phone] Fax IP Auto Routed Donnell Santa [[id number]] 1/18/2015  6:18 AM 01/18/2015 Inocencia Leyva MD  
Fax: [phone] Phone: [phone] Fax IP Auto Routed Donnell Santa [[id number]] 2/9/2015  5:52 PM 02/09/2015 Inocencia Leyva MD  
Fax: [phone] Phone: [phone] Fax IP Auto Routed Donnell Santa [[id number]] 3/4/2015  8:29 AM 03/04/2015

## 2018-06-23 LAB
ATRIAL RATE: 100 BPM
CALCULATED P AXIS, ECG09: 76 DEGREES
CALCULATED R AXIS, ECG10: 86 DEGREES
CALCULATED T AXIS, ECG11: 74 DEGREES
DIAGNOSIS, 93000: NORMAL
P-R INTERVAL, ECG05: 150 MS
Q-T INTERVAL, ECG07: 342 MS
QRS DURATION, ECG06: 82 MS
QTC CALCULATION (BEZET), ECG08: 441 MS
VENTRICULAR RATE, ECG03: 100 BPM

## 2018-06-23 PROCEDURE — 74011250637 HC RX REV CODE- 250/637: Performed by: PSYCHIATRY & NEUROLOGY

## 2018-06-23 PROCEDURE — 65220000003 HC RM SEMIPRIVATE PSYCH

## 2018-06-23 RX ORDER — DIPHENHYDRAMINE HYDROCHLORIDE 50 MG/ML
50 INJECTION, SOLUTION INTRAMUSCULAR; INTRAVENOUS
Status: DISCONTINUED | OUTPATIENT
Start: 2018-06-23 | End: 2018-06-29 | Stop reason: HOSPADM

## 2018-06-23 RX ORDER — DIPHENHYDRAMINE HCL 25 MG
50 CAPSULE ORAL
Status: DISCONTINUED | OUTPATIENT
Start: 2018-06-23 | End: 2018-06-29 | Stop reason: HOSPADM

## 2018-06-23 RX ORDER — RISPERIDONE 0.25 MG/1
0.5 TABLET, FILM COATED ORAL 2 TIMES DAILY
Status: DISCONTINUED | OUTPATIENT
Start: 2018-06-23 | End: 2018-06-24

## 2018-06-23 RX ORDER — BENZTROPINE MESYLATE 1 MG/1
0.5 TABLET ORAL 2 TIMES DAILY
Status: DISCONTINUED | OUTPATIENT
Start: 2018-06-23 | End: 2018-06-29 | Stop reason: HOSPADM

## 2018-06-23 RX ADMIN — RISPERIDONE 0.5 MG: 0.25 TABLET ORAL at 20:38

## 2018-06-23 RX ADMIN — LORAZEPAM 2 MG: 1 TABLET ORAL at 09:41

## 2018-06-23 RX ADMIN — HALOPERIDOL 5 MG: 5 TABLET ORAL at 09:41

## 2018-06-23 RX ADMIN — BENZTROPINE MESYLATE 0.5 MG: 1 TABLET ORAL at 20:38

## 2018-06-23 NOTE — BH NOTES
Patient arrived on unit secured to chair with visible agitation and aggressive behaviors noted. Patient was not receptive to verbal responses or redirection. Patient continued to escalate with agitation and aggression. Patient attempting to lunge at police and staff present. Patient escorted to timeout room for safety. Writer attempting to communicate with patient with patient not receptive and increasing with agitation and aggressive gestures. Writer observed patient presenting with aggressive behaviors towards unit staff and police present. MD contacted with patient to receive IM PRN's with one time order IM Benadryl  50 mg once. Patient continued to shout and threaten with psychotic behaviors for nearly two hours after receiving medication. Patient is currently resting quietly in time out room for safety. Writer was able to obtain some responses to nursing assessment while patient was resting however patient continues to be irritable. Patient rested for brief period and was escorted back to unit where he was provided with meal per request with full meal eaten plus snacks and provided additional nursing assessment responses. Patient allergies: NKDA. Patient denies medical history. Patient denies substance abuse history: UDSC (-). Pt's belongings checked in and documented, patient rights given however will be reinforced again in the morning. Patient admission paper work signed with unit tour partially completed (patient requesting to go to bed). Patient provided with and encouraged use of non slip footwear to ambulate. Will monitor for safety, comfort and agitation. Patient will be provided with education and support as needed throughout treatment regimen.

## 2018-06-23 NOTE — H&P
[facility]  PSYCH ADMIT NOTE      Christiane HACKETT  MR#: [id number]  : 1995  ACCOUNT #: [de-identified]   ADMIT DATE: 2018    IDENTIFYING INFORMATION:  The patient is a 21-year-old -American male admitted to the behavioral health unit of [facility] on 2018, under the care of Dr. Eh Stuart. CHIEF COMPLAINT:  None offered by the patient. HISTORY OF PRESENTING ILLNESS:  The patient is a limited historian. He was admitted from the emergency room last night after he presented with \"aggressive and bizarre behavior. \"    Patient was brought to the ER by police under an ECO. According to the information from the ER physician's note, the patient's cousin stated that patient was engaging in aggressive and bizarre behaviors against the older resident who lives with the patient. At that time, it was stated that the patient had been noncompliant with his medications, was punching the walls, and was threatening the other resident with a balled up fist.    When I questioned the patient on this history, he chose not to answer most of my questions. He was obviously distracted by perceptual symptoms and appeared to be responding to auditory hallucinations. He also had a difficult time focusing on my questions and kept getting up from the chair. He did not endorse any symptoms of suicidal or homicidal ideation. PAST PSYCHIATRIC HISTORY:  Patient has been admitted to this facility and possibly other psychiatric facilities multiple times in the past, under similar circumstances. He has history of nonadherence to medication and often becomes aggressive and agitated upon discontinuing his medications. His prior diagnoses have included bipolar disorder and schizoaffective disorder. It is not clear if he has any history of suicidal or homicidal behavior. SUBSTANCE USE HISTORY:  Patient denied any history of illegal drug use.   He acknowledges smoking cigarettes from time to time. Urine drug screen was negative. LEGAL HISTORY:  Denied by the patient. PAST MEDICAL HISTORY:  Patient denied any acute physical health concerns. PAST SURGICAL HISTORY:  Denied by the patient. ALLERGIES TO MEDICATIONS:  NO KNOWN DRUG ALLERGIES. REVIEW OF SYSTEMS:  The patient acknowledges changes to sleep and appetite. He does not answer questions regarding history of seizures, head or neck trauma, sexually transmitted disease, etc.    FAMILY PSYCHIATRIC HISTORY:  Not known. PSYCHOSOCIAL HISTORY:  The patient claims to be born and raised in [place], [place]. According to the ER physician's note, he was living in a setting with an older resident. The patient tells me that he has been living off and on with an aunt. He has a 10th grade education. He is unemployed, and he denies being  or having any children. VITAL SIGNS:  Most recent vitals show temperature of 96.2, pulse rate of 97, respiratory rate of 18, blood pressure of 108/70, weight of 63.5 kilograms, and height of 5 feet 6 inches. LABORATORY DATA:  Most recent labs showed a negative drug screen, normal hematology, and normal chemistry. DIAGNOSIS:    1. History of schizoaffective disorder. 2.  Medication noncompliance. LEVEL OF IMPAIRMENT:  Severe. RISK ASSESSMENT:  The patient has history of agitation in context of his psychosis. However, on improvement of his psychotic symptoms, his agitated and aggressive behaviors tend to improve. EXPECTED LENGTH OF STAY:  5-7 days. PLAN:  1. Admit to structured inpatient unit. 2.  Monitor for safety. 3.  Full history and physical.  4.  Psychiatry intake to be completed. 5.  Lab work is pending  6. Medications:  Restart Risperdal and Cogentin and continue p.r.n. Benadryl and Haldol. 7.  Obtain collateral history from family and outside sources. 8.  Try to gather previous treatment records.     9.  Group, individual, and milieu counseling as appropriate. 10.  Discharge planning and rest of the treatment per primary treatment team.      Kelvin Vega MD dictating on behalf of MD NANCI Schilling/  D: 06/23/2018 17:22     T: 06/23/2018 18:11  JOB #: [id number]

## 2018-06-23 NOTE — ED NOTES
TRANSFER - OUT REPORT:    Verbal report given to LENNOX Colbert(name) on [place] [facility]  being transferred to [facility] (unit) for routine progression of care       Report consisted of patients Situation, Background, Assessment and   Recommendations(SBAR). Information from the following report(s) SBAR, ED Summary and MAR was reviewed with the receiving nurse. Lines:       Opportunity for questions and clarification was provided.       Patient transported with:   Registered Nurse [address]

## 2018-06-23 NOTE — PROGRESS NOTES
Problem: Psychosis  Goal: *STG: Decreased hallucinations  Patient will verbalize denial of auditory/visual hallucinations every shift throughout hospital stay. Outcome: Progressing Towards Goal  Patient admits to auditory hallucinations. Goal: *STG: Decreased delusional thinking  Patient will show a decrease or be free of delusional statements assessed every shift throughout hospital stay. Outcome: Progressing Towards Goal  Patient continues to make delusional statements. Goal: *STG/LTG: Complies with medication therapy  Patient will take medication as prescribed every shift throughout hospital stay. Outcome: Progressing Towards Goal  Patient has been compliant with PRN's offered. Comments: Patient observed laughing as he paced throughout day area. Patient required verbal redirection for peer boundaries and comments to unit staff. Patient appears anxious and easily irritated stating \"when am I leaving here, now\". Patient observed standing up at table eating food from food tray with his hands. Patient appears preoccupied and responding to internal stimuli with inappropriate laughing. Patient spoke of \"taking them down\" when referencing the police and security staff that restrained him in the emergency room \"they had these here and them there\" pointing towards wrists and ankle areas. Patient admits to auditory hallucinations, denies SI/HI. Patient offered PRN with success (see MAR). Patient educated regarding seeking staff support when in need. Will monitor for safety with education and support as needed throughout treatment regimen.

## 2018-06-23 NOTE — BH NOTES
Patient's aunt into visit with patient and has reported to writer that she is fearful of patient at this point and he is not able to return to her home. Patient's aunt verbalized her concerns regarding patient's non compliance with medications. Patient's aunt stated that patient does not eat when home and attempted to throw her water and items out just after he threw his items (computer and phone) in the trash. Aunt stated that upon patient's discharge he is irritable and refused to take medication. Last discharge patient's aunt stated that patient when given \"pill threw it in the water and said he was not taking that medication in a loud and angry voice\". Patient's aunt will attempt to meet with social workers and has advised writer to inform treatment team that patient is not able to return to her home related to her fear of him and his increasing agitation and aggression. Patient was removed from home by police this admission per aunt. Patient has been in and out of room this shift.

## 2018-06-23 NOTE — H&P
Psychiatry History and Physical    Subjective:     Date of Evaluation:  2018    Reason for Referral:  Main Artis was referred to the examiners from ER/MV for [facility]. History of Presenting Problem: 22Y/O AA male in NAD well developed and nourished. TDO admit. Reports is not suicidal. Medical problems: Depression, Schizophrenia, Mood disorder, NATHAN, ADHD, NON-COMPLIANT WITH MEDS. Prior admit to BM admit. Patient Active Problem List    Diagnosis Date Noted    Altered mental status 2018    Antipsychotic overdose 2018    Schizophrenia ([address] 2015     Past Medical History:   Diagnosis Date    ADHD (attention deficit hyperactivity disorder)     Anxiety disorder     Depression     Mood disorder ([address]     Psychiatric disorder     Psychotic disorder     Trauma     \"His mother  this past November\"     History reviewed. No pertinent surgical history. Family History   Problem Relation Age of Onset    Schizophrenia Mother     Schizophrenia Brother     Schizophrenia Sister       Social History   Substance Use Topics    Smoking status: Never Smoker    Smokeless tobacco: Never Used    Alcohol use No     Prior to Admission medications    Medication Sig Start Date End Date Taking? Authorizing Provider   ARIPiprazole (ABILIFY) 15 mg tablet Take 1 Tab by mouth daily. Indications: Schizophrenia 18   Evelyne Rivera MD   clonazePAM Gonzales Prema) 0.5 mg tablet Take 0.5 Tabs by mouth two (2) times a day. Max Daily Amount: 0.5 mg. Indications: agitation 18   Evelyne Rivera MD   ARIPiprazole (ABILIFY MAINTENA) 300 mg serr injection 300 mg by IntraMUSCular route every thirty (30) days.  18   Historical Provider     No Known Allergies     Review of Systems - History obtained from chart review and the patient  General ROS: positive for  - sleep disturbance  Psychological ROS: positive for - depression and sleep disturbances  Respiratory ROS: no cough, shortness of breath, or wheezing  Cardiovascular ROS: no chest pain or dyspnea on exertion  Gastrointestinal ROS: no abdominal pain, change in bowel habits, or black or bloody stools  Genito-Urinary ROS: no dysuria, trouble voiding, or hematuria  Musculoskeletal ROS: negative  Neurological ROS: no TIA or stroke symptoms  Dermatological ROS: negative      Objective:     Patient Vitals for the past 8 hrs:   BP Temp Pulse Resp   06/23/18 0741 108/70 96.2 °F (35.7 °C) 97 18       Mental Status exam: WNL except for    Sensorium  Alert and Oriented x 2   Orientation person and place   Relations cooperative   Eye Contact poor   Appearance:  age appropriate and within normal Limits   Motor Behavior:  gait stable and within normal limits   Speech:  normal volume   Vocabulary average   Thought Process: logical   Thought Content free of delusions and free of hallucinations   Suicidal ideations no plan  and no intention   Homicidal ideations no plan  and no intention   Mood:  depressed   Affect:  depressed   Memory recent  adequate   Memory remote:  adequate   Concentration:  adequate   Abstraction:  concrete   Insight:  fair   Reliability fair   Judgment:  fair         Physical Exam:   Visit Vitals    /70 (BP 1 Location: Right arm, BP Patient Position: Sitting)    Pulse 97    Temp 96.2 °F (35.7 °C)    Resp 18    Ht 5' 6\" (1.676 m)    Wt 63.5 kg (140 lb)    SpO2 99%    BMI 22.6 kg/m2     General appearance: alert, cooperative, no distress, appears stated age  Head: Normocephalic, without obvious abnormality, atraumatic  Eyes: negative  Ears: normal TM's and external ear canals AU  Nose: Nares normal. Septum midline. Mucosa normal. No drainage or sinus tenderness. Throat: Lips, mucosa, and tongue normal. Teeth and gums normal  Neck: supple, symmetrical, trachea midline, no adenopathy, thyroid: not enlarged, symmetric, no tenderness/mass/nodules, no carotid bruit and no JVD  Back: symmetric, no curvature.  ROM normal. No CVA tenderness. Lungs: clear to auscultation bilaterally  Chest wall: no tenderness  Heart: regular rate and rhythm, S1, S2 normal, no murmur, click, rub or gallop  Abdomen: soft, non-tender. Bowel sounds normal. No masses,  no organomegaly  Extremities: extremities normal, atraumatic, no cyanosis or edema  Pulses: 2+ and symmetric  Skin: Skin color, texture, turgor normal. No rashes or lesions  Lymph nodes: Cervical, supraclavicular, and axillary nodes normal.  Neurologic: Grossly normal        Impression:      Active Problems:    Schizophrenia ([address] (1/12/2015)          Plan:     Recommendations for Treatment/Conditions:  Psychiatric treatment recommended while in hospital  Admit to Psych services for [facility]                                                  Schizophrenia                                                  Depression    Referral To: Inpatient psychiatric care    Competency Statement: It is recommended that the family or other concerned individuals be consulted for substituted judgement if deemed incompetent.  [email or web address]      [employer], NP   6/23/2018 8:13 AM

## 2018-06-23 NOTE — BH NOTES
GROUP THERAPY PROGRESS NOTE    Deo Mckinney is not participating in [place].      Group time: 30 minutes    Personal goal for participation: none    Goal orientation: community    Group therapy participation: none    Therapeutic interventions reviewed and discussed: goals and procedures    Impression of participation: encouraged

## 2018-06-24 PROCEDURE — 74011250637 HC RX REV CODE- 250/637: Performed by: PSYCHIATRY & NEUROLOGY

## 2018-06-24 PROCEDURE — 65220000003 HC RM SEMIPRIVATE PSYCH

## 2018-06-24 RX ORDER — RISPERIDONE 1 MG/1
1 TABLET, FILM COATED ORAL 2 TIMES DAILY
Status: DISCONTINUED | OUTPATIENT
Start: 2018-06-24 | End: 2018-06-25

## 2018-06-24 RX ADMIN — BENZTROPINE MESYLATE 0.5 MG: 1 TABLET ORAL at 08:30

## 2018-06-24 RX ADMIN — RISPERIDONE 1 MG: 1 TABLET ORAL at 20:11

## 2018-06-24 RX ADMIN — BENZTROPINE MESYLATE 0.5 MG: 1 TABLET ORAL at 20:11

## 2018-06-24 RX ADMIN — LORAZEPAM 2 MG: 1 TABLET ORAL at 23:49

## 2018-06-24 RX ADMIN — RISPERIDONE 0.5 MG: 0.25 TABLET ORAL at 08:30

## 2018-06-24 NOTE — BH NOTES
GROUP THERAPY PROGRESS NOTE    Dixon Casillas is participating in [facility] educational group. Group time: 30 minutes    Personal goal for participation: Identify at least 2 coping skills to utilize with increased stressors. Goal orientation: community    Group therapy participation: active    Therapeutic interventions reviewed and discussed: Identifying coping skills to utilize when presented with increased stressors.       Impression of participation: happy

## 2018-06-24 NOTE — PROGRESS NOTES
Problem: Psychosis  Goal: *STG: Remains safe in hospital  Patient will remain free of harm to self and others every shift throughout hospitalization. Outcome: Progressing Towards Goal  Patient has remained free of harm to self and others while in facility. Goal: *STG/LTG: Complies with medication therapy  Patient will take medication as prescribed every shift throughout hospital stay. Outcome: Progressing Towards Goal  Patient has been compliant with prescribed medications. Comments: Patient is restless and anxious and has been observed pacing in and out of his room throughout the shift. Patient is continuously eating throughout the day with unit food requests and has eaten all of his snacks. Patient has required verbal redirection several times for entering female peers room. Patient is easily redirectable and continues to focus on going home with continuous visits to nursing station Alexis Ellsworth can, I go home Tuesday. I want to go outside. They lied. I didn't do anything. I just said, yes sir and they took me\". Patient at times requires verbal redirection for reaching for items on nursing desk. Patient denies SI/HI/, Denies A/VH. Will continue to monitor for safety and comfort with education and support as needed throughout treatment regimen.

## 2018-06-24 NOTE — BH NOTES
[address]  Inpatient Progress Note     Date of Service: 06/24/18  Hospital Day: 2     Subjective/Interval History   06/24/18    Treatment Team Notes:  Notes reviewed and/or discussed and report that Say Alvarez Required verbal redirection: standing in middle of group, asking non-topic questions, requesting various items from unit throughout group. Very disruptive behavior. Reality orientation provided throughout group. Patient has not completed hygiene care since admission as of yet. Aunt reports being worried about his behavior. Patient interview: Say Alvarez was interviewed by this writer today. He continues to exhibit loose associations of thought and paranoia. He is still responding to perceptual symptoms. Will increase Risperdal. Patient denies SI or HI      Objective     Visit Vitals    /67 (BP 1 Location: Right arm, BP Patient Position: Sitting)    Pulse 91    Temp 97 °F (36.1 °C)    Resp 16    Ht 5' 6\" (1.676 m)    Wt 63.5 kg (140 lb)    SpO2 99%    BMI 22.6 kg/m2       Mental Status Examination     Appearance/Hygiene 26 y.o.  BLACK OR  male  Hygiene: poor   Behavior/Social Relatedness Appropriate   Musculoskeletal Gait/Station: appropriate  Tone (flaccid, cogwheeling, spastic): not assessed  Psychomotor (hyperkinetic, hypokinetic): appropriate   Involuntary movements (tics, dyskinesias, akathisa, stereotypies): none   Speech   Rate, rhythm, volume, fluency and articulation are appropriate   Mood   labile   Affect    anxious   Thought Process Linear and goal directed   Thought Content and Perceptual Disturbances Denies self-injurious behavior (SIB), suicidal ideation (SI), aggressive behavior or homicidal ideation (HI)    Denies auditory and visual hallucinations   Sensorium and Cognition  Grossly intact   Insight  limited   Judgment limited        Assessment/Plan      Psychiatric Diagnoses:   Patient Active Problem List   Diagnosis Code    Schizophrenia ([facility] F20.9    Altered mental status R41.82    Antipsychotic overdose T43.501A       Medical Diagnoses: No Acute Concerns    Psychosocial and contextual factors: Aunt has expressed reservations about taking him back to live in her house    Level of impairment/disability: Moderate to Severe    Yeni Ernst is a 26 y.o. who is currently being treated for psychosis      1. Increase Risperdal. Continue Monitoring  2. Reviewed instructions, risks, benefits and side effects of medications  3.   Disposition/Discharge Date: self-care/home, per primary attending    [address], MD  [facility]  Psychiatry

## 2018-06-24 NOTE — DISCHARGE SUMMARY
Discharge Summary    Patient: Margie Redding MRN: [id number]  CSN: [id number]    YOB: 1995  Age: 26 y.o. Sex: male    DOA: 6/5/2018 LOS:  LOS: 0 days   Discharge Date: 6/7/2018     Admission Diagnoses:   AMS    Discharge Diagnoses:    Drug overdose  Acute encephalopathy secondary to above, resolved  Schizophrenia    Discharge Condition: Stable    PHYSICAL EXAM  Visit Vitals    /80 (BP 1 Location: Left arm, BP Patient Position: Sitting)    Pulse (!) 113    Temp 97 °F (36.1 °C)    Resp 16    Ht 5' 6\" (1.676 m)    Wt 61.7 kg (136 lb)    SpO2 100%    BMI 21.95 kg/m2       General: In NAD. Nontoxic-appearing. HEENT: NCAT. Sclerae anicteric. EOMI. Lungs:  Clear, no wheezes. Effort nonlabored. Heart:  Regular, mildly tachy. Abdomen: Soft, NTTP. Extremities: Warm, no ischemia or edema. Psych:   Mood normal, no agitation. Neurologic:  Awake and alert. Motor grossly nonfocal.      Hospital Course:   See admission H&P for full details of HPI. Patient admitted to ICU from ED for observation - he was subsequently transferred out to a telemetry bed with sitter as medical status remained completely stable. Psychiatry evaluation has been obtained and patient is being transferred to the behavioral medicine unit for further management and psychiatric stabilization. Consults:   [facility]  Psychiatry    Significant Diagnostic Studies:   CXR:  IMPRESSION:     Negative chest.        CT head:  IMPRESSION:     Negative head CT.       Activity: As tolerated    Diet: Regular Diet      Royal Marques MD  [facility]

## 2018-06-24 NOTE — BH NOTES
GROUP THERAPY PROGRESS NOTE    Deo HARINDER Quintero Clabina is not participating in [place].      Group time: 30 minutes    Personal goal for participation: have a good day    Goal orientation: community    Group therapy participation: minimal    Therapeutic interventions reviewed and discussed: goals and procedures    Impression of participation: encouraged

## 2018-06-24 NOTE — BH NOTES
Zip tie retrieved from patient. Patient requesting zip tie on previous shift (I want to play with it) and was educated and denied use. Zip placed on patient's pant loops and secured. Patient laughed as he showed writer how he removed zip tie. Will pass to oncoming staff to avoid replacing zip ties without fully clipping edges to avoid patient removal. Patient has been intrusive with elevated mood. Patient paced throughout milieu majority of the shift.

## 2018-06-24 NOTE — BH NOTES
GROUP THERAPY PROGRESS NOTE    Deo PIZANO Leonid Malin is participating in hygiene and nutritional group. Group time: 30 minutes    Personal goal for participation: Identify good hygiene tasks, eating healthy. Goal orientation: Community    Group therapy participation: active    Therapeutic interventions reviewed and discussed: Proper hygiene care, Eating a balanced diet, health lifestyle. Impression of participation: Required verbal redirection: standing in middle of group, asking non-topic questions, requesting various items from unit throughout group. Very disruptive behavior. Reality orientation provided throughout group. Patient has not completed hygiene care since admission as of yet. Patient eats throughout entire day with education provided.

## 2018-06-24 NOTE — BH NOTES
GROUP THERAPY PROGRESS NOTE    Deo López is participating in [place]. Group time: 30 minutes    Personal goal for participation: The Pt will review unit guidelines and community goals. Goal orientation: community    Group therapy participation: active    Therapeutic interventions reviewed and discussed:     Impression of participation: The Pt participated fully in group.

## 2018-06-24 NOTE — BH NOTES
Patient's aunt stated that patient does not eat when home and attempted to throw her water and items out as he did his prior to his admission. Patient's aunt has informed writer that patient can not return to her home related to his agitation and aggressive behaviors.

## 2018-06-25 PROBLEM — R41.82 ALTERED MENTAL STATUS: Status: RESOLVED | Noted: 2018-06-05 | Resolved: 2018-06-25

## 2018-06-25 PROBLEM — T43.501A ANTIPSYCHOTIC OVERDOSE: Status: RESOLVED | Noted: 2018-06-05 | Resolved: 2018-06-25

## 2018-06-25 PROCEDURE — 65220000003 HC RM SEMIPRIVATE PSYCH

## 2018-06-25 PROCEDURE — 74011250637 HC RX REV CODE- 250/637: Performed by: PSYCHIATRY & NEUROLOGY

## 2018-06-25 RX ORDER — RISPERIDONE 1 MG/1
1 TABLET, FILM COATED ORAL 2 TIMES DAILY
Status: COMPLETED | OUTPATIENT
Start: 2018-06-25 | End: 2018-06-25

## 2018-06-25 RX ORDER — ARIPIPRAZOLE 5 MG/1
5 TABLET ORAL DAILY
Status: DISCONTINUED | OUTPATIENT
Start: 2018-06-26 | End: 2018-06-25

## 2018-06-25 RX ADMIN — RISPERIDONE 1 MG: 1 TABLET ORAL at 20:14

## 2018-06-25 RX ADMIN — TRAZODONE HYDROCHLORIDE 50 MG: 50 TABLET ORAL at 20:16

## 2018-06-25 RX ADMIN — BENZTROPINE MESYLATE 0.5 MG: 1 TABLET ORAL at 20:14

## 2018-06-25 RX ADMIN — BENZTROPINE MESYLATE 0.5 MG: 1 TABLET ORAL at 08:33

## 2018-06-25 RX ADMIN — RISPERIDONE 1 MG: 1 TABLET ORAL at 08:34

## 2018-06-25 NOTE — BH NOTES
GROUP THERAPY PROGRESS NOTE    Deo Silva is participating in Positive thoughts, Recreational group. Group time: 30 minutes    Personal goal for participation: Identify good thoughts and goals, enjoying sharing time with peers. Goal orientation: Positive thoughts and goals, Discharge goals. Group therapy participation: active    Therapeutic interventions reviewed and discussed: Enjoying self and feeling positive, Positive time with peers, Discharge goals/plans. Impression of participation: Patient walked in and out of group with no input noted.

## 2018-06-25 NOTE — BSMART NOTE
Luciojose g Osmany (aunt) called [phone] to say Deo could not return to her home at this time. Wanted MV  to call one of his case workers, \" Aniceto Campbell \" at [phone].

## 2018-06-25 NOTE — BH NOTES
Patient is currently pacing around the day area,smiling and laughing, patient given Ativan for anxiety per patient request will continue to monitor.

## 2018-06-25 NOTE — BH NOTES
GROUP THERAPY PROGRESS NOTE    Guicho Villa is participating in Self-care issues. Group time: 45 minutes    Personal goal for participation: Follow up on the top 10 self-care issue strategies that can keep you functioning well and ready for life's challenges. Goal orientation: social    Group therapy participation: active    Therapeutic interventions reviewed and discussed: : How well can you (a person) function and be ready for life's challenges and the importance of ten self-care issues that can be healthy for the body, the mind, and getting stress free.     Impression of participation: Patient has fully participated

## 2018-06-25 NOTE — BSMART NOTE
ART ACTIVITY PROGRESS NOTE    PATIENT SCHEDULED FOR GROUP AT : 1400    ART ACTIVITY: woven paper card    ATTENDANCE : patient attended the full session. PARTICIPATION LEVEL :  Participates fully in the art process. ATTENTION LEVEL : Needs occasional re-direction. Patient joined the group spontaneously. He engaged in the activity and had some problems following directions and in doing the simple weaving. Patient was willing to take out the errors and did complete the activity with good results. Affect was not always appropriate to expressed thought as he sometimes laughed inappropriately in response to group stimuli. Mood appeared blunted.

## 2018-06-25 NOTE — BSMART NOTE
OCCUPATIONAL THERAPY PROGRESS NOTE    Group Time:  3404  Attendance: The patient attended full group. .  Participation:  The patient participated with minimal elaboration in the activity. Attention:  The patient needed frequent redirection to activity. Interaction:  The patient acknowledges others or responds to questions,  with no spontaneous interaction. Many responses related to wanting discharge and could not readily attempt activity to ID some goals for self even with help, comments general and vague. Does not appear to understand or recall events leading to hospitalization, believes he will return home. Thinking disorganized.

## 2018-06-25 NOTE — BH NOTES
GROUP THERAPY PROGRESS NOTE    Deo Martinez was encouraged by staff but refused to participate in  Tyler.

## 2018-06-25 NOTE — BH NOTES
Patient was anxious and restless throughout the entire shift, he attended group therapy today , he was redirected by this writer for getting into other patients personal space, patient did well with verbal direction, he ate took his medication and rested in his room staff will continue to monitor patient for health and safety.

## 2018-06-25 NOTE — BSMART NOTE
SOCIAL WORK GROUP THERAPY PROGRESS NOTE    Group Time:  10:15am    Group Topic:  Coping Skills    Group Participation:     Pt was unavailable for group due to meeting with  for discussion of meds. Afterward pt called family to get their advice about what direction he should go regarding medication options.

## 2018-06-25 NOTE — PROGRESS NOTES
Problem: Psychosis  Goal: *STG: Decreased hallucinations  Patient will verbalize denial of auditory/visual hallucinations every shift throughout hospital stay. Outcome: Progressing Towards Goal  Patient denies auditory hallucinations. Goal: *STG: Remains safe in hospital  Patient will remain free of harm to self and others every shift throughout hospitalization. Outcome: Progressing Towards Goal  Patient hs remained free of harm to self and others. Goal: *STG/LTG: Complies with medication therapy  Patient will take medication as prescribed every shift throughout hospital stay. Outcome: Progressing Towards Goal  Patient has been compliant with prescribed medications this morning. Comments: Patient has been IC in court this am and has been observed pacing throughout milieu since arriving back on unit. Patient has been compliant with prescribed medications. Patient provided with additional meal per request will all meal eaten. Patient denies SI/HI, Denies A/VH however appears preoccupied. Will monitor for safety with support as needed throughout treatment regimen.

## 2018-06-25 NOTE — PROGRESS NOTES
[address]  Inpatient Progress Note     Date of Service: 06/25/18  Hospital Day: 3     Subjective/Interval History   06/25/18    Treatment Team Notes:  Notes reviewed and/or discussed and report that Nila Yin is intrusive, perseverative and has an elevated mood. Described as \"happy\" in group. Patient interview: Nila Yin was interviewed by this writer today. Pt is perseverative re: discharge and does not respond to redirection well in this manner. Pacing and somewhat agitated. Pt states he was taking Abilify on an outpt basis and his injection is due soon. Pt denies SI, HI and AVH. Objective     Vitals:    06/23/18 0741 06/24/18 0753 06/24/18 2159 06/25/18 0730   BP: 108/70 110/67 128/82 118/72   Pulse: 97 91 89 89   Resp: 18 16 18 18   Temp: 96.2 °F (35.7 °C) 97 °F (36.1 °C) 98.5 °F (36.9 °C) 97.7 °F (36.5 °C)   SpO2:       Weight:       Height:            Results for orders placed or performed during the hospital encounter of 06/22/18   CBC WITH AUTOMATED DIFF   Result Value Ref Range    WBC 5.3 4.6 - 13.2 K/uL    RBC 5.25 4.70 - 5.50 M/uL    HGB 15.9 13.0 - 16.0 g/dL    HCT 45.0 36.0 - 48.0 %    MCV 85.7 74.0 - 97.0 FL    MCH 30.3 24.0 - 34.0 PG    MCHC 35.3 31.0 - 37.0 g/dL    RDW 13.1 11.6 - 14.5 %    PLATELET 925 359 - 662 K/uL    MPV 11.0 9.2 - 11.8 FL    NEUTROPHILS 48 40 - 73 %    LYMPHOCYTES 37 21 - 52 %    MONOCYTES 13 (H) 3 - 10 %    EOSINOPHILS 1 0 - 5 %    BASOPHILS 1 0 - 2 %    ABS. NEUTROPHILS 2.6 1.8 - 8.0 K/UL    ABS. LYMPHOCYTES 1.9 0.9 - 3.6 K/UL    ABS. MONOCYTES 0.7 0.05 - 1.2 K/UL    ABS. EOSINOPHILS 0.0 0.0 - 0.4 K/UL    ABS.  BASOPHILS 0.0 0.0 - 0.06 K/UL    DF AUTOMATED     METABOLIC PANEL, COMPREHENSIVE   Result Value Ref Range    Sodium 141 136 - 145 mmol/L    Potassium 4.4 3.5 - 5.5 mmol/L    Chloride 107 100 - 108 mmol/L    CO2 24 21 - 32 mmol/L    Anion gap 10 3.0 - 18 mmol/L    Glucose 77 74 - 99 mg/dL    BUN 12 7.0 - 18 MG/DL Creatinine 1.14 0.6 - 1.3 MG/DL    BUN/Creatinine ratio 11 (L) 12 - 20      GFR est AA >60 >60 ml/min/1.73m2    GFR est non-AA >60 >60 ml/min/1.73m2    Calcium 8.8 8.5 - 10.1 MG/DL    Bilirubin, total 0.6 0.2 - 1.0 MG/DL    ALT (SGPT) 25 16 - 61 U/L    AST (SGOT) 28 15 - 37 U/L    Alk.  phosphatase 82 45 - 117 U/L    Protein, total 7.5 6.4 - 8.2 g/dL    Albumin 4.1 3.4 - 5.0 g/dL    Globulin 3.4 2.0 - 4.0 g/dL    A-G Ratio 1.2 0.8 - 1.7     SALICYLATE   Result Value Ref Range    Salicylate level <6.1 (L) 2.8 - 20.0 MG/DL   URINALYSIS W/ RFLX MICROSCOPIC   Result Value Ref Range    Color YELLOW      Appearance CLEAR      Specific gravity 1.023 1.005 - 1.030      pH (UA) 6.0 5.0 - 8.0      Protein NEGATIVE  NEG mg/dL    Glucose NEGATIVE  NEG mg/dL    Ketone TRACE (A) NEG mg/dL    Bilirubin NEGATIVE  NEG      Blood NEGATIVE  NEG      Urobilinogen 1.0 0.2 - 1.0 EU/dL    Nitrites NEGATIVE  NEG      Leukocyte Esterase NEGATIVE  NEG     DRUG SCREEN, URINE   Result Value Ref Range    BENZODIAZEPINES NEGATIVE  NEG      BARBITURATES NEGATIVE  NEG      THC (TH-CANNABINOL) NEGATIVE  NEG      OPIATES NEGATIVE  NEG      PCP(PHENCYCLIDINE) NEGATIVE  NEG      COCAINE NEGATIVE  NEG      AMPHETAMINES NEGATIVE  NEG      METHADONE NEGATIVE  NEG      HDSCOM (NOTE)    ETHYL ALCOHOL   Result Value Ref Range    ALCOHOL(ETHYL),SERUM <3 0 - 3 MG/DL   ACETAMINOPHEN   Result Value Ref Range    Acetaminophen level <2 (L) 10.0 - 30.0 ug/mL   EKG, 12 LEAD, INITIAL   Result Value Ref Range    Ventricular Rate 100 BPM    Atrial Rate 100 BPM    P-R Interval 150 ms    QRS Duration 82 ms    Q-T Interval 342 ms    QTC Calculation (Bezet) 441 ms    Calculated P Axis 76 degrees    Calculated R Axis 86 degrees    Calculated T Axis 74 degrees    Diagnosis       Normal sinus rhythm  Cannot rule out Septal infarct , age undetermined  Abnormal ECG  When compared with ECG of 05-JUN-2018 08:34,  Septal infarct is now present  ST less elevated in Anterior leads  Confirmed by Milli Saavedra MD, Shady Gardner ([id number]) on 6/23/2018 4:52:32 PM          Mental Status Examination     Appearance/Hygiene 26 y.o. BLACK OR  male  Hygiene: fair    Behavior/Social Relatedness Fairly cooperative    Musculoskeletal Gait/Station: appropriate  Tone (flaccid, cogwheeling, spastic): not assessed  Psychomotor (hyperkinetic, hypokinetic): pacing   Involuntary movements (tics, dyskinesias, akathisia, stereotypies): none   Speech   Rate, rhythm, volume, fluency and articulation are appropriate   Mood   \"I'm fine. \"    Affect    Restricted    Thought Process Perseverative    Thought Content and Perceptual Disturbances Denies self-injurious behavior (SIB), suicidal ideation (SI), aggressive behavior or homicidal ideation (HI)    Denies auditory and visual hallucinations   Sensorium and Cognition  Grossly intact   Insight  Poor    Judgment Poor         Assessment/Plan      Psychiatric Diagnoses:   Patient Active Problem List   Diagnosis Code    Schizophrenia ([facility] [address] F20.9       Medical Diagnoses: None identified     Psychosocial and contextual factors:    1. Medication non-compliance     Level of impairment/disability: Severe Antonio D Harvy Eden is a 26 y.o. who is currently unstable. Pt's Abilify Maintena injection is due 7-2-2018. Will switch pt from oral risperidone to oral Abilify on 6-. Pt is perseverative re: discharge. He is agitated and pacing at times. 1.  Schizophrenia   - continue risperidone 1mg po BID x1 dose tonight then D/C.    - Start Abilify 7mg po daily on 6-. - Will try to give Abilify Maintena this week prior to discharge. 2.  Reviewed instructions, risks, benefits and side effects of medications  3. Disposition/Discharge Date: self-care/home, 6-.      Jude Connell MD DR. [facility]  [facility]iatry

## 2018-06-25 NOTE — BSMART NOTE
Pt. Is a 26year old male with history of Schizoaffective  Disorder and ADHD. Pt was admitted to this a facility on a TDO for  Psychosis and aggressive behaviors in the community. BUCK discussed case with the treating psychiatrist and unit charge nurse. BUCK Contact:   SW met with pt. To discuss this admission. The pt. Stated his cousin was acting awkward towards him. Pt stated than everyone started coming towards hm. Pt was guarded and focused on d/c.  SW ask pt about his compliance with medications in the community. The pt. stated he has been . Pt was focused on d/c today. Pt. Was informed he will not be d/c today. Pt was disorganized, lacks insight and has poor judgement. BUCK will contact pt.'s family for a collateral.       BUCK Collateral:  BUCK attempted to talk Mrs. John Bears  pt.'s aunt for a collateral @ [phone]. The aunt was not available. BUCK will attempt to contact the aunt at a later time.

## 2018-06-26 LAB
CHOLEST SERPL-MCNC: 174 MG/DL
HDLC SERPL-MCNC: 52 MG/DL (ref 40–60)
HDLC SERPL: 3.3 {RATIO} (ref 0–5)
LDLC SERPL CALC-MCNC: 108.8 MG/DL (ref 0–100)
LIPID PROFILE,FLP: ABNORMAL
TRIGL SERPL-MCNC: 66 MG/DL (ref ?–150)
VLDLC SERPL CALC-MCNC: 13.2 MG/DL

## 2018-06-26 PROCEDURE — 65220000003 HC RM SEMIPRIVATE PSYCH

## 2018-06-26 PROCEDURE — 80061 LIPID PANEL: CPT | Performed by: PSYCHIATRY & NEUROLOGY

## 2018-06-26 PROCEDURE — 36415 COLL VENOUS BLD VENIPUNCTURE: CPT | Performed by: PSYCHIATRY & NEUROLOGY

## 2018-06-26 PROCEDURE — 74011250637 HC RX REV CODE- 250/637: Performed by: PSYCHIATRY & NEUROLOGY

## 2018-06-26 RX ORDER — ARIPIPRAZOLE 15 MG/1
15 TABLET ORAL DAILY
Status: DISCONTINUED | OUTPATIENT
Start: 2018-06-27 | End: 2018-06-29 | Stop reason: HOSPADM

## 2018-06-26 RX ORDER — ARIPIPRAZOLE 400 MG
400 KIT INTRAMUSCULAR ONCE
Status: COMPLETED | OUTPATIENT
Start: 2018-06-28 | End: 2018-06-28

## 2018-06-26 RX ADMIN — LORAZEPAM 2 MG: 1 TABLET ORAL at 01:14

## 2018-06-26 RX ADMIN — BENZTROPINE MESYLATE 0.5 MG: 1 TABLET ORAL at 08:48

## 2018-06-26 RX ADMIN — DIPHENHYDRAMINE HYDROCHLORIDE 50 MG: 25 CAPSULE ORAL at 23:39

## 2018-06-26 RX ADMIN — BENZTROPINE MESYLATE 0.5 MG: 1 TABLET ORAL at 20:44

## 2018-06-26 RX ADMIN — ARIPIPRAZOLE 7 MG: 5 TABLET ORAL at 08:48

## 2018-06-26 RX ADMIN — TRAZODONE HYDROCHLORIDE 50 MG: 50 TABLET ORAL at 20:47

## 2018-06-26 NOTE — BH NOTES
ALETHEA Note: -S/O The above pt has been pleasant the entire shift but he at times has been rambling at times when talking to his peers or staff. He verbalized \"Can I go swing from the tree\". During the conversation he will have good eye contact however when communicating with staff he would mumble with very low voice tone during the conversation during this conversation. He has been compliant with medications this shift. He has not been a management problem or aggressive behaviors this shift. He has been appropriate around his peers and staff during this shift. He has been on the phone talking to family and friends which appears to have went well this shift. He has been educated on being medication compliance and also learning his triggers when he is upset when outside of the hospital. He appeared receptive of this inormation during our conversation. He verbally contract for safety and denies any self-harm at this time. -A-Problem #1 cont. -P-Maintain a safe and supportive enviorment.

## 2018-06-26 NOTE — BSMART NOTE
SOCIAL WORK GROUP THERAPY PROGRESS NOTE    Group Time:  10:15am   1:15pm    Group Topic:  Coping Skills    C D Issues    Group Participation:      Pt affect some brighter but still tends to mumble when speaking. He moderately involved during group discussion and remained attentive for the most part. Still easily distracted. Needed some redirection. \"Seven Steps\" for taking responsibility for our Happiness was reviewed including commitment to change, self-care, setting limits, goal setting & letting go. Admits needing not to allow others to manipulate him. Upbeat that cousin is a positive support. Kerline Ratliff

## 2018-06-26 NOTE — BSMART NOTE
OCCUPATIONAL THERAPY PROGRESS NOTE    Group Time:  6060  Attendance: The patient attended full group. .  Participation:  The patient participated with moderate elaboration in the activity. Attention:  The patient needed redirection to activity at least once. Interaction:  The patient acknowledges others or responds to questions,  with no spontaneous interaction. Answers to activity questions more reality oriented in concrete manner. Seems to have limited recall of events leading to hospitalization.

## 2018-06-26 NOTE — BH NOTES
GROUP THERAPY PROGRESS NOTE    Deo HARINDER Michaudiliana Lee is participating in Positive thoughts. Group time: 45 minutes    Personal goal for participation: Overcoming Negative Thoughts And Getting Positive Thoughts    Goal orientation: social    Group therapy participation: active    Therapeutic interventions reviewed and discussed: How the top 10 positive tips overcome negative thoughts and focusing on the positive that defeats the negative. Impression of participation: Patient has participated for about 10 minutes and then left and began pacing in the dayroom and his room.

## 2018-06-26 NOTE — BH NOTES
Observed patient's behavior throughout the shift. The patient has had visitors, he has eaten all meals and snacks and has fully participated in group session (Self-Care Issues). During the group session, the patient was getting out of line by interrupting the group session, and having a bizarre behavior. The staff has asked patient to stop interrupting the group session. The patient stopped and apologized after staff had to redirect him. Will continue to monitor patient's behavior and safety for the duration of the shift.

## 2018-06-26 NOTE — BH NOTES
GROUP THERAPY PROGRESS NOTE    Deo Floode is participating in Occupational Therapy.      Group time: 30 minutes    Personal goal for participation: have a good day    Goal orientation: community    Group therapy participation: active    Therapeutic interventions reviewed and discussed: goals and procedures    Impression of participation: encouraged

## 2018-06-26 NOTE — BSMART NOTE
Pt. Is a 26year old male with history of Schizoaffective  Disorder and ADHD. Pt was admitted to this a facility on a TDO for  Psychosis and aggressive behaviors in the community. SW discussed case with the treating psychiatrist.      BUCK Contact:   BUCK met with pt. . SW discussed benefits of continued medication and treatment compliance in the community. SW provided verbal information on mental health services, job programs , psychosocial programs in the community. Pt. stated he would like to participate in the above  services. Pt. stated he is currently being mentored by his . Pt continues to  lacks insight , continues to be focused on d/c and is cooperative.

## 2018-06-26 NOTE — BH NOTES
Observed patient's behavior throughout the shift. Patient continues to have a bizarre behavior. Patient has asked staff \" who is the president\"?, and then in the same sentence, he asked the staff \" Is Trump still the president'? Patient has displaying a bizarre behavior throughout the shift and has been pacing back and forth in room and the dayroom area. Will continue to monitor patient's behavior and safety for the duration of the shift.

## 2018-06-26 NOTE — PROGRESS NOTES
[address]  Inpatient Progress Note     Date of Service: 06/26/18  Hospital Day: 4     Subjective/Interval History   06/26/18    Treatment Team Notes:  Notes reviewed and/or discussed and report that Jesus Garcia is disruptive during group sessions and pt required redirection. Appeared preoccupied. Pt's aunt called and he is not able to return home. Patient interview: Jesus Garcia was interviewed by this writer today. Pt remains perseverative re: returning home. Pt was unaware that he possibly can not return home. Appears agitated at times. Paces less than previously noted. Pt denies SI, HI and AVH. Objective     Vitals:    06/24/18 0753 06/24/18 2159 06/25/18 0730 06/26/18 0743   BP: 110/67 128/82 118/72 116/72   Pulse: 91 89 89 98   Resp: 16 18 18 16   Temp: 97 °F (36.1 °C) 98.5 °F (36.9 °C) 97.7 °F (36.5 °C) 97.6 °F (36.4 °C)   SpO2:       Weight:       Height:            Results for orders placed or performed during the hospital encounter of 06/22/18   CBC WITH AUTOMATED DIFF   Result Value Ref Range    WBC 5.3 4.6 - 13.2 K/uL    RBC 5.25 4.70 - 5.50 M/uL    HGB 15.9 13.0 - 16.0 g/dL    HCT 45.0 36.0 - 48.0 %    MCV 85.7 74.0 - 97.0 FL    MCH 30.3 24.0 - 34.0 PG    MCHC 35.3 31.0 - 37.0 g/dL    RDW 13.1 11.6 - 14.5 %    PLATELET 301 749 - 711 K/uL    MPV 11.0 9.2 - 11.8 FL    NEUTROPHILS 48 40 - 73 %    LYMPHOCYTES 37 21 - 52 %    MONOCYTES 13 (H) 3 - 10 %    EOSINOPHILS 1 0 - 5 %    BASOPHILS 1 0 - 2 %    ABS. NEUTROPHILS 2.6 1.8 - 8.0 K/UL    ABS. LYMPHOCYTES 1.9 0.9 - 3.6 K/UL    ABS. MONOCYTES 0.7 0.05 - 1.2 K/UL    ABS. EOSINOPHILS 0.0 0.0 - 0.4 K/UL    ABS.  BASOPHILS 0.0 0.0 - 0.06 K/UL    DF AUTOMATED     METABOLIC PANEL, COMPREHENSIVE   Result Value Ref Range    Sodium 141 136 - 145 mmol/L    Potassium 4.4 3.5 - 5.5 mmol/L    Chloride 107 100 - 108 mmol/L    CO2 24 21 - 32 mmol/L    Anion gap 10 3.0 - 18 mmol/L    Glucose 77 74 - 99 mg/dL    BUN 12 7.0 - 18 MG/DL    Creatinine 1.14 0.6 - 1.3 MG/DL    BUN/Creatinine ratio 11 (L) 12 - 20      GFR est AA >60 >60 ml/min/1.73m2    GFR est non-AA >60 >60 ml/min/1.73m2    Calcium 8.8 8.5 - 10.1 MG/DL    Bilirubin, total 0.6 0.2 - 1.0 MG/DL    ALT (SGPT) 25 16 - 61 U/L    AST (SGOT) 28 15 - 37 U/L    Alk.  phosphatase 82 45 - 117 U/L    Protein, total 7.5 6.4 - 8.2 g/dL    Albumin 4.1 3.4 - 5.0 g/dL    Globulin 3.4 2.0 - 4.0 g/dL    A-G Ratio 1.2 0.8 - 1.7     SALICYLATE   Result Value Ref Range    Salicylate level <7.6 (L) 2.8 - 20.0 MG/DL   URINALYSIS W/ RFLX MICROSCOPIC   Result Value Ref Range    Color YELLOW      Appearance CLEAR      Specific gravity 1.023 1.005 - 1.030      pH (UA) 6.0 5.0 - 8.0      Protein NEGATIVE  NEG mg/dL    Glucose NEGATIVE  NEG mg/dL    Ketone TRACE (A) NEG mg/dL    Bilirubin NEGATIVE  NEG      Blood NEGATIVE  NEG      Urobilinogen 1.0 0.2 - 1.0 EU/dL    Nitrites NEGATIVE  NEG      Leukocyte Esterase NEGATIVE  NEG     DRUG SCREEN, URINE   Result Value Ref Range    BENZODIAZEPINES NEGATIVE  NEG      BARBITURATES NEGATIVE  NEG      THC (TH-CANNABINOL) NEGATIVE  NEG      OPIATES NEGATIVE  NEG      PCP(PHENCYCLIDINE) NEGATIVE  NEG      COCAINE NEGATIVE  NEG      AMPHETAMINES NEGATIVE  NEG      METHADONE NEGATIVE  NEG      HDSCOM (NOTE)    ETHYL ALCOHOL   Result Value Ref Range    ALCOHOL(ETHYL),SERUM <3 0 - 3 MG/DL   ACETAMINOPHEN   Result Value Ref Range    Acetaminophen level <2 (L) 10.0 - 30.0 ug/mL   LIPID PANEL   Result Value Ref Range    LIPID PROFILE          Cholesterol, total 174 <200 MG/DL    Triglyceride 66 <150 MG/DL    HDL Cholesterol 52 40 - 60 MG/DL    LDL, calculated 108.8 (H) 0 - 100 MG/DL    VLDL, calculated 13.2 MG/DL    CHOL/HDL Ratio 3.3 0 - 5.0     EKG, 12 LEAD, INITIAL   Result Value Ref Range    Ventricular Rate 100 BPM    Atrial Rate 100 BPM    P-R Interval 150 ms    QRS Duration 82 ms    Q-T Interval 342 ms    QTC Calculation (Bezet) 441 ms    Calculated P Axis 76 degrees    Calculated R Axis 86 degrees    Calculated T Axis 74 degrees    Diagnosis       Normal sinus rhythm  Cannot rule out Septal infarct , age undetermined  Abnormal ECG  When compared with ECG of 05-JUN-2018 08:34,  Septal infarct is now present  ST less elevated in Anterior leads  Confirmed by Trip Arevalo MD, Soco Crabtree ([id number]) on 6/23/2018 4:52:32 PM          Mental Status Examination     Appearance/Hygiene 26 y.o. BLACK OR  male  Hygiene: fair    Behavior/Social Relatedness Fairly cooperative    Musculoskeletal Gait/Station: appropriate  Tone (flaccid, cogwheeling, spastic): not assessed  Psychomotor (hyperkinetic, hypokinetic): less pacing noted. Involuntary movements (tics, dyskinesias, akathisia, stereotypies): none   Speech   Rate, rhythm, volume, fluency and articulation are appropriate   Mood   \"I'm fine. \"    Affect    Appears agitated at times. Thought Process Perseverative re: discharge and medications. Thought Content and Perceptual Disturbances Denies self-injurious behavior (SIB), suicidal ideation (SI), aggressive behavior or homicidal ideation (HI)    Denies auditory and visual hallucinations   Sensorium and Cognition  Grossly intact   Insight  Poor    Judgment Poor         Assessment/Plan      Psychiatric Diagnoses:   Patient Active Problem List   Diagnosis Code    Schizophrenia ([facility] [address] F20.9       Medical Diagnoses: None identified     Psychosocial and contextual factors:    1. Medication non-compliance     Level of impairment/disability: Severe Antonio D Elnora Fiedler is a 26 y.o. who is currently unstable. Pt is currently appears agitated. He is focused on discharge. Pt denies SI, HI and AVH. 1.  Schizophrenia   - Continue Abilify 7mg po daily on 6-. - Jane Buys can be administered on 6-. Will likely prescribe 400mg IM. 2.  Reviewed instructions, risks, benefits and side effects of medications  3.   Disposition/Discharge Date: self-care/home, 6-.      Melquiades Lee MD DR. [facility]  Psychiatry

## 2018-06-26 NOTE — PROGRESS NOTES
Problem: Falls - Risk of  Goal: *Absence of Falls  Patient will remain free of falls every shift throughout hospital stay. Patient will verbalize understanding regarding safety and fall prevention measures to be utilized every shift throughout hospital stay. Document Jeanne Rg Fall Risk and appropriate interventions in the flowsheet. Outcome: Progressing Towards Goal  Fall Risk Interventions:        Medication Interventions: Teach patient to arise slowly      Patient remain free from falls. Problem: Psychosis  Goal: *STG: Decreased hallucinations  Patient will verbalize denial of auditory/visual hallucinations every shift throughout hospital stay. Outcome: Progressing Towards Goal  Patient denied hallucinations at this time. Goal: *STG: Remains safe in hospital  Patient will remain free of harm to self and others every shift throughout hospitalization. Outcome: Progressing Towards Goal  Demonstrated safe behavior on unit. Goal: *STG: Participates in individual and group therapy  Patient will attend at least 50% or 2 of 4 groups daily throughout hospital stay. Outcome: Progressing Towards Goal  Attended group for a short time, then left and paced in the milieu. Goal: *STG/LTG: Complies with medication therapy  Patient will take medication as prescribed every shift throughout hospital stay. Outcome: Progressing Towards Goal  Patient has bee medication compliant. Comments: Patient is alert and oriented x 4, cooperative, flat/irritable affect, bizarre, asking this writer to [place] for my cousin\" when asked the reason for his prayer request; patient declined to respond, then demonstrated a perplexed affect with thought blocking noted. Patient stated that he showered last night and appetite is \"good,\" but he did not sleep well last night, \"had took 2 sleeping pills. \" Staff will continue to monitor and maintain safe environment.

## 2018-06-27 PROCEDURE — 65220000003 HC RM SEMIPRIVATE PSYCH

## 2018-06-27 PROCEDURE — 74011250637 HC RX REV CODE- 250/637: Performed by: PSYCHIATRY & NEUROLOGY

## 2018-06-27 RX ADMIN — BENZTROPINE MESYLATE 0.5 MG: 1 TABLET ORAL at 08:28

## 2018-06-27 RX ADMIN — BENZTROPINE MESYLATE 0.5 MG: 1 TABLET ORAL at 20:17

## 2018-06-27 RX ADMIN — TRAZODONE HYDROCHLORIDE 50 MG: 50 TABLET ORAL at 20:18

## 2018-06-27 RX ADMIN — ARIPIPRAZOLE 15 MG: 15 TABLET ORAL at 08:28

## 2018-06-27 NOTE — BSMART NOTE
OCCUPATIONAL THERAPY PROGRESS NOTE    Group Time:  1100  Attendance: The patient attended 3/4 of group. The patient left and returned to activity at least once. Participation:  The patient participated with minimal elaboration in the activity. Attention:  The patient needed frequent redirection to activity. Interaction:  The patient acknowledges others or responds to questions,  with no spontaneous interaction. Continues to insist he will be returning home on D/C which he also thinks is soon. States his \"grandmother\" told him he could come home and on questioning it appeared he was referring to his aunt and he then said he could call her \"mother\" if he wanted, so disorganized, loose in responses at times, preoccupied and needed frequent redirection to activity.

## 2018-06-27 NOTE — PROGRESS NOTES
Problem: Psychosis  Goal: *STG: Decreased hallucinations  Patient will verbalize denial of auditory/visual hallucinations every shift throughout hospital stay. Outcome: Progressing Towards Goal  Patient denied auditory hallucinations. Goal: *STG: Remains safe in hospital  Patient will remain free of harm to self and others every shift throughout hospitalization. Outcome: Progressing Towards Goal  Patient has remained free of harm to self and others while in facility. Goal: *STG/LTG: Complies with medication therapy  Patient will take medication as prescribed every shift throughout hospital stay. Outcome: Progressing Towards Goal  Patient has been compliant with prescribed medications. Comments: Patient has been observed pacing throughout milieu and has been compliant with prescribed medications. Patient has showered and completed daily hygiene care. Patient hs been social and interactive with peers in milieu. Patient has been focused on discharging continually asking Lars Rizzo is the doctor I can leave after my shot right. So do I have to wait for my shot\". Patient denies SI/HI, denies A/VH, Will monitor for safety with support as needed throughout treatment regimen.

## 2018-06-27 NOTE — BH NOTES
GROUP THERAPY PROGRESS NOTE    Deo Mathur is participating in Positive thoughts. Group time: 35 minutes    Personal goal for participation: Positive thoughts    Goal orientation: community    Group therapy participation: active    Therapeutic interventions reviewed and discussed: Staff ask pt to discuss what are the changes they want to see within themselves. Pt also had to discuss what are the things they are thankful for. in their life.     Impression of participation: Pt participate

## 2018-06-27 NOTE — BSMART NOTE
Pt. Is a 26year old male with history of Schizoaffective  Disorder and ADHD. Pt was admitted to this a facility on a TDO for  Psychosis and aggressive behaviors in the community.       Case was discussed with  the treating psychiatrist.      BUCK Contact:   BUCK met with pt. To discuss d/c planning. Pt. Is focused on d/c.  SW explained to the pt. , he is not being d./c today. pt stated My grandmother stated I can stay with her. SW informed the pt., this SW has attempted to contact his family. Pt.'s family has not return this SW call to confirm pt can return. SW encouraged continued miction compliance, positive  coping skills and aftercare. Pt continues to  lacks insight , continues to be focused on d/c and is cooperative.

## 2018-06-27 NOTE — BH NOTES
Patient given Benadryl per patient request due to patient stating he was feeling restless will continue to monitor.

## 2018-06-27 NOTE — BSMART NOTE
ART THERAPY GROUP PROGRESS NOTE    PATIENT SCHEDULED FOR GROUP AT: 1400    ATTENDANCE: Patient attended the full group. PARTICIPATION LEVEL: Participated fully in the art process and was willing and open to share about his artwork. He engaged well with the materials and directive after initial resistance to join group. ATTENTION LEVEL: Patient's attention level was appropriate and on task. His verbal communication was appropriate within the group and was able to engage with other members about their artwork. FOCUS: Able to focus on task and maintain communication with group and therapists. SYMBOLIC & THEMATIC CONTENT AS NOTED IN IMAGERY: Group focused on exploring a card that they selected that focused on coping mechanisms and memory recall through artwork imagery and writing. Patient's card focused on naming and drawing about a time that made him feel happy. He recalled his neighbor children making him feel happy when they are playing outside. Patient was able to join the group a few minutes late and merge well with the process.

## 2018-06-27 NOTE — BH NOTES
GROUP THERAPY PROGRESS NOTE    Deo HARINDER Storm Michaeladri is participating in Self-esteem. Group time: 45 minutes    Personal goal for participation: Preparing For Having A Healthy Self-Esteem    Goal orientation: social    Group therapy participation: active    Therapeutic interventions reviewed and discussed:   What does self-esteem means to you, and getting and having a healthy self-esteem. How 25 self-esteem activities and challenges help rewire your (a person's mind) mind and help increase your daily positivity one step at a time.     Impression of participation: Patient has fully participated

## 2018-06-27 NOTE — PROGRESS NOTES
[address] [facility] [address]  Inpatient Progress Note     Date of Service: 06/27/18  Hospital Day: 5     Subjective/Interval History   06/27/18    Treatment Team Notes:  Notes reviewed and/or discussed and report that Yolanda Vogt is bizarre and disorganized at times. Will mumble under his breath. Patient interview: Yolanda Vogt was interviewed by this writer today. Pt remains perseverative re: returning home and states several times during the interview he can go to his grandmother's home. Pt tells this provider several times that he will be discharged on 6- after he receives [facility]. Pt becomes agitated and irritable when discussing disposition and the cause of hospitalization. Pt states he is tolerating medications well and denies medication side effects. Insight and judgement are poor. Pt denies SI, HI and AVH. Objective     Vitals:    06/24/18 2159 06/25/18 0730 06/26/18 0743 06/27/18 0830   BP: 128/82 118/72 116/72 113/69   Pulse: 89 89 98 75   Resp: 18 18 16 16   Temp: 98.5 °F (36.9 °C) 97.7 °F (36.5 °C) 97.6 °F (36.4 °C) 97.6 °F (36.4 °C)   SpO2:       Weight:       Height:            Results for orders placed or performed during the hospital encounter of 06/22/18   CBC WITH AUTOMATED DIFF   Result Value Ref Range    WBC 5.3 4.6 - 13.2 K/uL    RBC 5.25 4.70 - 5.50 M/uL    HGB 15.9 13.0 - 16.0 g/dL    HCT 45.0 36.0 - 48.0 %    MCV 85.7 74.0 - 97.0 FL    MCH 30.3 24.0 - 34.0 PG    MCHC 35.3 31.0 - 37.0 g/dL    RDW 13.1 11.6 - 14.5 %    PLATELET 737 593 - 128 K/uL    MPV 11.0 9.2 - 11.8 FL    NEUTROPHILS 48 40 - 73 %    LYMPHOCYTES 37 21 - 52 %    MONOCYTES 13 (H) 3 - 10 %    EOSINOPHILS 1 0 - 5 %    BASOPHILS 1 0 - 2 %    ABS. NEUTROPHILS 2.6 1.8 - 8.0 K/UL    ABS. LYMPHOCYTES 1.9 0.9 - 3.6 K/UL    ABS. MONOCYTES 0.7 0.05 - 1.2 K/UL    ABS. EOSINOPHILS 0.0 0.0 - 0.4 K/UL    ABS.  BASOPHILS 0.0 0.0 - 0.06 K/UL    DF AUTOMATED     METABOLIC PANEL, COMPREHENSIVE   Result Value Ref Range    Sodium 141 136 - 145 mmol/L    Potassium 4.4 3.5 - 5.5 mmol/L    Chloride 107 100 - 108 mmol/L    CO2 24 21 - 32 mmol/L    Anion gap 10 3.0 - 18 mmol/L    Glucose 77 74 - 99 mg/dL    BUN 12 7.0 - 18 MG/DL    Creatinine 1.14 0.6 - 1.3 MG/DL    BUN/Creatinine ratio 11 (L) 12 - 20      GFR est AA >60 >60 ml/min/1.73m2    GFR est non-AA >60 >60 ml/min/1.73m2    Calcium 8.8 8.5 - 10.1 MG/DL    Bilirubin, total 0.6 0.2 - 1.0 MG/DL    ALT (SGPT) 25 16 - 61 U/L    AST (SGOT) 28 15 - 37 U/L    Alk.  phosphatase 82 45 - 117 U/L    Protein, total 7.5 6.4 - 8.2 g/dL    Albumin 4.1 3.4 - 5.0 g/dL    Globulin 3.4 2.0 - 4.0 g/dL    A-G Ratio 1.2 0.8 - 1.7     SALICYLATE   Result Value Ref Range    Salicylate level <2.7 (L) 2.8 - 20.0 MG/DL   URINALYSIS W/ RFLX MICROSCOPIC   Result Value Ref Range    Color YELLOW      Appearance CLEAR      Specific gravity 1.023 1.005 - 1.030      pH (UA) 6.0 5.0 - 8.0      Protein NEGATIVE  NEG mg/dL    Glucose NEGATIVE  NEG mg/dL    Ketone TRACE (A) NEG mg/dL    Bilirubin NEGATIVE  NEG      Blood NEGATIVE  NEG      Urobilinogen 1.0 0.2 - 1.0 EU/dL    Nitrites NEGATIVE  NEG      Leukocyte Esterase NEGATIVE  NEG     DRUG SCREEN, URINE   Result Value Ref Range    BENZODIAZEPINES NEGATIVE  NEG      BARBITURATES NEGATIVE  NEG      THC (TH-CANNABINOL) NEGATIVE  NEG      OPIATES NEGATIVE  NEG      PCP(PHENCYCLIDINE) NEGATIVE  NEG      COCAINE NEGATIVE  NEG      AMPHETAMINES NEGATIVE  NEG      METHADONE NEGATIVE  NEG      HDSCOM (NOTE)    ETHYL ALCOHOL   Result Value Ref Range    ALCOHOL(ETHYL),SERUM <3 0 - 3 MG/DL   ACETAMINOPHEN   Result Value Ref Range    Acetaminophen level <2 (L) 10.0 - 30.0 ug/mL   LIPID PANEL   Result Value Ref Range    LIPID PROFILE          Cholesterol, total 174 <200 MG/DL    Triglyceride 66 <150 MG/DL    HDL Cholesterol 52 40 - 60 MG/DL    LDL, calculated 108.8 (H) 0 - 100 MG/DL    VLDL, calculated 13.2 MG/DL    CHOL/HDL Ratio 3.3 0 - 5.0     EKG, 12 LEAD, INITIAL   Result Value Ref Range    Ventricular Rate 100 BPM    Atrial Rate 100 BPM    P-R Interval 150 ms    QRS Duration 82 ms    Q-T Interval 342 ms    QTC Calculation (Bezet) 441 ms    Calculated P Axis 76 degrees    Calculated R Axis 86 degrees    Calculated T Axis 74 degrees    Diagnosis       Normal sinus rhythm  Cannot rule out Septal infarct , age undetermined  Abnormal ECG  When compared with ECG of 05-JUN-2018 08:34,  Septal infarct is now present  ST less elevated in Anterior leads  Confirmed by Chase Whiting MD, [facility] ([id number]) on 6/23/2018 4:52:32 PM          Mental Status Examination     Appearance/Hygiene 26 y.o. BLACK OR  male  Hygiene: fair    Behavior/Social Relatedness Fairly cooperative    Musculoskeletal Gait/Station: appropriate  Tone (flaccid, cogwheeling, spastic): not assessed  Psychomotor (hyperkinetic, hypokinetic): less pacing noted. Involuntary movements (tics, dyskinesias, akathisia, stereotypies): none   Speech   Rate, rhythm, volume, fluency and articulation are appropriate   Mood   \"I'm fine. \"    Affect    Appears agitated at times. Thought Process Perseverative re: discharge and medications. Thought Content and Perceptual Disturbances Denies self-injurious behavior (SIB), suicidal ideation (SI), aggressive behavior or homicidal ideation (HI)    Denies auditory and visual hallucinations   Sensorium and Cognition  Grossly intact   Insight  Poor    Judgment Poor         Assessment/Plan      Psychiatric Diagnoses:   Patient Active Problem List   Diagnosis Code    [facility] F20.9       Medical Diagnoses: None identified     Psychosocial and contextual factors:    1. Medication non-compliance     Level of impairment/disability: Severe Antonio D Tristan Laming is a 26 y.o. who is currently unstable. Pt is currently appears agitated. He is focused on discharge.   Pt has a long h/o medication non-compliance and will likely need to remain hospitalized for several more days to insure oral medication compliance after receiving Ability Maintena on 6-. Pt denies SI, HI and AVH. 1.  Schizophrenia   - increase Abilify to 15mg po daily    - Abilify Maintena 400mg IM will be administered on 6-. 2.  Reviewed instructions, risks, benefits and side effects of medications  3. Disposition/Discharge Date: self-care/home, 6-.      Stewart Hernandez MD DR. [facility]  Psychiatry

## 2018-06-28 PROCEDURE — 74011250637 HC RX REV CODE- 250/637: Performed by: PSYCHIATRY & NEUROLOGY

## 2018-06-28 PROCEDURE — 74011250636 HC RX REV CODE- 250/636: Performed by: PSYCHIATRY & NEUROLOGY

## 2018-06-28 PROCEDURE — 65220000003 HC RM SEMIPRIVATE PSYCH

## 2018-06-28 RX ADMIN — HALOPERIDOL 5 MG: 5 TABLET ORAL at 01:27

## 2018-06-28 RX ADMIN — DIPHENHYDRAMINE HYDROCHLORIDE 50 MG: 25 CAPSULE ORAL at 01:27

## 2018-06-28 RX ADMIN — TRAZODONE HYDROCHLORIDE 50 MG: 50 TABLET ORAL at 20:27

## 2018-06-28 RX ADMIN — BENZTROPINE MESYLATE 0.5 MG: 1 TABLET ORAL at 20:27

## 2018-06-28 RX ADMIN — ARIPIPRAZOLE 400 MG: KIT at 10:56

## 2018-06-28 RX ADMIN — BENZTROPINE MESYLATE 0.5 MG: 1 TABLET ORAL at 08:08

## 2018-06-28 RX ADMIN — ARIPIPRAZOLE 15 MG: 15 TABLET ORAL at 08:08

## 2018-06-28 NOTE — PROGRESS NOTES
Problem: Psychosis  Goal: *STG: Decreased delusional thinking  Patient will show a decrease or be free of delusional statements assessed every shift throughout hospital stay. Outcome: Not Progressing Towards Goal  Patient continues to make delusional statements regarding his aunt being changed out of her body. Goal: *STG: Remains safe in hospital  Patient will remain free of harm to self and others every shift throughout hospitalization. Outcome: Progressing Towards Goal  Patient hs remained free of harm to self and others. Goal: *STG/LTG: Complies with medication therapy  Patient will take medication as prescribed every shift throughout hospital stay. Outcome: Progressing Towards Goal  Patient has been compliant with prescribed medications. Comments: Patient has received ARIPiprazole (ABILIFY MAINTENA) injection 400 mg:Dose 400 mg:IntraMUSCular. With no problems noted. Patient has been observed pacing throughout milieu and has required encouragement to participate in unit group. Patient continues to make delusional statements regarding his Preston Mirza has changed places. It's not her I don't know who changed her out\". Patient denies auditory hallucinations however appears preoccupied. Patient denies SI/HI, Will monitor for safety with support as needed throughout treatment jonatan

## 2018-06-28 NOTE — BSMART NOTE
SOCIAL WORK GROUP THERAPY PROGRESS NOTE    Group Time:  10:15am      Group Topic:  Coping Skills    C D Issues    Group Participation:      Pt moderately involved during group discussion taking time to try to read parts of handout. Affect some brighter but struggled at times to remain attentive. Emphasis of session was presentation of basic \"CBT\" principles as well as list of typical cognitive distortions. Pt said he needs to stop jumping to conclusions & minimizing.

## 2018-06-28 NOTE — BH NOTES
Patient came out of his room requesting a snack patient given a snack and is currently sitting in the day area will continue to monitor.

## 2018-06-28 NOTE — BH NOTES
GROUP THERAPY PROGRESS NOTE    Deo Lund is participating in Anger Management. Group time: 30 minutes    Personal goal for participation:  How to manage your anger. Goal orientation: community    Group therapy participation: active    Therapeutic interventions reviewed and discussed: Coping during angry situation, patient was encouraged by staff.     Impression of participation: Calm

## 2018-06-28 NOTE — BSMART NOTE
OCCUPATIONAL THERAPY PROGRESS NOTE    Group Time:  8360  Attendance: The patient attended full group. .  Participation:  The patient participated with minimal elaboration in the activity. Attention:  The patient needed redirection to activity at least once. Interaction:  The patient acknowledges others or responds to questions,  with no spontaneous interaction. Slightly less disorganized.

## 2018-06-28 NOTE — BH NOTES
Patient stated he was unable to sleep due to him thinking about his cousin that was acting like a new person, patient given Benadryl and Haldol for agitation will continue to monitor.

## 2018-06-28 NOTE — BH NOTES
Patient is constantly pacing around in his room then coming in and out of his room standing to the door will continue to monitor.

## 2018-06-28 NOTE — BSMART NOTE
Pt. Is a 26year old male with history of Schizoaffective  Disorder and ADHD. Pt was admitted to this a facility on a TDO for  Psychosis and aggressive behaviors in the community.        BUCK discussed case with the  treating psychiatrist        BUCK Collateral:  BUCK talked to Mrs. Azar Huff  pt.'s aunt for a collateral @ [phone]. Mrs. Azar Huff stated pt can return to her home once he has completed the program in which his oupt counselor has recommended. SW will contact counselor to discuss program.    Mrs. Azar Huff stated pt can return to her home and participate in psychosocial day treatment. BUCK Contact:   BUCK met with pt. to discuss d/c planning. Pt. Wanted to know if he was going to be d/c. Pt. Also stated he came return to his aunt's home. BUCK informed pt about the above conversation. Pt. Has agreed to participate in programs such as psycho social day treatment, mental health supports and PACT services. BUCK discussed continued medication and treatment compliance. Pt. Is denying ideations and hallucinations. BUCK encouraged pt to utilize positive coping skills. Pt. cooperative and insight is fair.

## 2018-06-28 NOTE — PROGRESS NOTES
[address]  Inpatient Progress Note     Date of Service: 06/28/18  Hospital Day: 6     Subjective/Interval History   06/28/18    Treatment Team Notes:  Notes reviewed and/or discussed and report that Yolanda Vogt remains disorganized. One  described his as loose at times. Perseverative re: returning home to his aunt's home. Pt states he was unable to sleep. Patient interview: Yolanda Vogt was interviewed by this writer today. Pt is better organized today. He is less irritable and agitated. Less perseveration noted and much more polite. Asked about an update for discharge rather than focusing on receiving Abilify Maintena and being discharged. Pt is medication compliant and denies medication side effects including TD, EPS, akathisia and mood derangements. Pt is hopeful to go to his grandmother's home. He feels Abilify if helpful for thought organization. Pt states he is sleeping and eating well. Pt denies current thoughts of SI, HI and AVH. Objective     Vitals:    06/26/18 0743 06/27/18 0830 06/27/18 2009 06/28/18 0742   BP: 116/72 113/69 121/80 124/78   Pulse: 98 75 94 76   Resp: 16 16 18 20   Temp: 97.6 °F (36.4 °C) 97.6 °F (36.4 °C) 98.2 °F (36.8 °C) 98.5 °F (36.9 °C)   SpO2:       Weight:       Height:            Results for orders placed or performed during the hospital encounter of 06/22/18   CBC WITH AUTOMATED DIFF   Result Value Ref Range    WBC 5.3 4.6 - 13.2 K/uL    RBC 5.25 4.70 - 5.50 M/uL    HGB 15.9 13.0 - 16.0 g/dL    HCT 45.0 36.0 - 48.0 %    MCV 85.7 74.0 - 97.0 FL    MCH 30.3 24.0 - 34.0 PG    MCHC 35.3 31.0 - 37.0 g/dL    RDW 13.1 11.6 - 14.5 %    PLATELET 296 326 - 832 K/uL    MPV 11.0 9.2 - 11.8 FL    NEUTROPHILS 48 40 - 73 %    LYMPHOCYTES 37 21 - 52 %    MONOCYTES 13 (H) 3 - 10 %    EOSINOPHILS 1 0 - 5 %    BASOPHILS 1 0 - 2 %    ABS. NEUTROPHILS 2.6 1.8 - 8.0 K/UL    ABS. LYMPHOCYTES 1.9 0.9 - 3.6 K/UL    ABS.  MONOCYTES 0.7 0.05 - 1.2 K/UL    ABS. EOSINOPHILS 0.0 0.0 - 0.4 K/UL    ABS. BASOPHILS 0.0 0.0 - 0.06 K/UL    DF AUTOMATED     METABOLIC PANEL, COMPREHENSIVE   Result Value Ref Range    Sodium 141 136 - 145 mmol/L    Potassium 4.4 3.5 - 5.5 mmol/L    Chloride 107 100 - 108 mmol/L    CO2 24 21 - 32 mmol/L    Anion gap 10 3.0 - 18 mmol/L    Glucose 77 74 - 99 mg/dL    BUN 12 7.0 - 18 MG/DL    Creatinine 1.14 0.6 - 1.3 MG/DL    BUN/Creatinine ratio 11 (L) 12 - 20      GFR est AA >60 >60 ml/min/1.73m2    GFR est non-AA >60 >60 ml/min/1.73m2    Calcium 8.8 8.5 - 10.1 MG/DL    Bilirubin, total 0.6 0.2 - 1.0 MG/DL    ALT (SGPT) 25 16 - 61 U/L    AST (SGOT) 28 15 - 37 U/L    Alk.  phosphatase 82 45 - 117 U/L    Protein, total 7.5 6.4 - 8.2 g/dL    Albumin 4.1 3.4 - 5.0 g/dL    Globulin 3.4 2.0 - 4.0 g/dL    A-G Ratio 1.2 0.8 - 1.7     SALICYLATE   Result Value Ref Range    Salicylate level <4.7 (L) 2.8 - 20.0 MG/DL   URINALYSIS W/ RFLX MICROSCOPIC   Result Value Ref Range    Color YELLOW      Appearance CLEAR      Specific gravity 1.023 1.005 - 1.030      pH (UA) 6.0 5.0 - 8.0      Protein NEGATIVE  NEG mg/dL    Glucose NEGATIVE  NEG mg/dL    Ketone TRACE (A) NEG mg/dL    Bilirubin NEGATIVE  NEG      Blood NEGATIVE  NEG      Urobilinogen 1.0 0.2 - 1.0 EU/dL    Nitrites NEGATIVE  NEG      Leukocyte Esterase NEGATIVE  NEG     DRUG SCREEN, URINE   Result Value Ref Range    BENZODIAZEPINES NEGATIVE  NEG      BARBITURATES NEGATIVE  NEG      THC (TH-CANNABINOL) NEGATIVE  NEG      OPIATES NEGATIVE  NEG      PCP(PHENCYCLIDINE) NEGATIVE  NEG      COCAINE NEGATIVE  NEG      AMPHETAMINES NEGATIVE  NEG      METHADONE NEGATIVE  NEG      HDSCOM (NOTE)    ETHYL ALCOHOL   Result Value Ref Range    ALCOHOL(ETHYL),SERUM <3 0 - 3 MG/DL   ACETAMINOPHEN   Result Value Ref Range    Acetaminophen level <2 (L) 10.0 - 30.0 ug/mL   LIPID PANEL   Result Value Ref Range    LIPID PROFILE          Cholesterol, total 174 <200 MG/DL    Triglyceride 66 <150 MG/DL HDL Cholesterol 52 40 - 60 MG/DL    LDL, calculated 108.8 (H) 0 - 100 MG/DL    VLDL, calculated 13.2 MG/DL    CHOL/HDL Ratio 3.3 0 - 5.0     EKG, 12 LEAD, INITIAL   Result Value Ref Range    Ventricular Rate 100 BPM    Atrial Rate 100 BPM    P-R Interval 150 ms    QRS Duration 82 ms    Q-T Interval 342 ms    QTC Calculation (Bezet) 441 ms    Calculated P Axis 76 degrees    Calculated R Axis 86 degrees    Calculated T Axis 74 degrees    Diagnosis       Normal sinus rhythm  Cannot rule out Septal infarct , age undetermined  Abnormal ECG  When compared with ECG of 05-JUN-2018 08:34,  Septal infarct is now present  ST less elevated in Anterior leads  Confirmed by Jessie Burnette MD, [facility] ([id number]) on 6/23/2018 4:52:32 PM          Mental Status Examination     Appearance/Hygiene 26 y.o. BLACK OR  male  Hygiene: fair    Behavior/Social Relatedness Cooperative & polite    Musculoskeletal Gait/Station: appropriate  Tone (flaccid, cogwheeling, spastic): not assessed  Psychomotor (hyperkinetic, hypokinetic): calm    Involuntary movements (tics, dyskinesias, akathisia, stereotypies): none   Speech   Rate, rhythm, volume, fluency and articulation are appropriate   Mood   \"I'm fine. \"    Affect    Restricted    Thought Process Perseverative re: discharge and medications. Thought Content and Perceptual Disturbances Denies self-injurious behavior (SIB), suicidal ideation (SI), aggressive behavior or homicidal ideation (HI)    Denies auditory and visual hallucinations   Sensorium and Cognition  Grossly intact   Insight  Fair    Judgment Fair         Assessment/Plan      Psychiatric Diagnoses:   Patient Active Problem List   Diagnosis Code    [facility] F20.9       Medical Diagnoses: None identified     Psychosocial and contextual factors:    1. Medication non-compliance     Level of impairment/disability: Severe Antonio D Vona Bunting is a 26 y.o. who is currently improving.   Due to a h/o medication non-compliance, pt will receive a higher dosage of Abilify Maintena today, 400mg IM. He will continue Abilify 15mg po every day. The pt needs an adequate safety plan in place piror to discharge including a place to live and appts. SW is contacting the pt's grandmother to determine if he can reside there after discharge. Pt denies SI, HI and AVH. 1.  Schizophrenia   - Continue Abilify 15mg po daily    - Abilify Maintena 400mg IM will be administered on 6-. 2.  Reviewed instructions, risks, benefits and side effects of medications  3. Disposition/Discharge Date: self-care/home, 6-.      Jaiden Moscoso MD DR. [facility]  [facility]iatry

## 2018-06-28 NOTE — BH NOTES
Observed patient's behavior throughout the shift. Patient was at ease and calm today. Pt has fully participated in group sessions and other activities with other peers. Patient has eaten all meals and snacks and has taken his medication. Will continue to monitor patient's behavior and safety for the duration of the shift.

## 2018-06-29 VITALS
SYSTOLIC BLOOD PRESSURE: 131 MMHG | HEART RATE: 94 BPM | DIASTOLIC BLOOD PRESSURE: 77 MMHG | BODY MASS INDEX: 22.5 KG/M2 | OXYGEN SATURATION: 99 % | RESPIRATION RATE: 18 BRPM | HEIGHT: 66 IN | TEMPERATURE: 97.9 F | WEIGHT: 140 LBS

## 2018-06-29 PROCEDURE — 74011250637 HC RX REV CODE- 250/637: Performed by: PSYCHIATRY & NEUROLOGY

## 2018-06-29 RX ORDER — ARIPIPRAZOLE 15 MG/1
15 TABLET ORAL DAILY
Qty: 12 TAB | Refills: 0 | Status: SHIPPED | OUTPATIENT
Start: 2018-06-29 | End: 2018-08-24

## 2018-06-29 RX ORDER — TRAZODONE HYDROCHLORIDE 100 MG/1
100 TABLET ORAL
Status: DISCONTINUED | OUTPATIENT
Start: 2018-06-29 | End: 2018-06-29 | Stop reason: HOSPADM

## 2018-06-29 RX ORDER — TRAZODONE HYDROCHLORIDE 50 MG/1
100 TABLET ORAL
Qty: 30 TAB | Refills: 0 | Status: SHIPPED | OUTPATIENT
Start: 2018-06-29 | End: 2018-10-09

## 2018-06-29 RX ORDER — ARIPIPRAZOLE 400 MG
400 KIT INTRAMUSCULAR ONCE
Qty: 400 MG | Refills: 0 | Status: SHIPPED | OUTPATIENT
Start: 2018-07-27 | End: 2018-07-27

## 2018-06-29 RX ADMIN — ARIPIPRAZOLE 15 MG: 15 TABLET ORAL at 08:34

## 2018-06-29 RX ADMIN — BENZTROPINE MESYLATE 0.5 MG: 1 TABLET ORAL at 08:34

## 2018-06-29 NOTE — BSMART NOTE
Pt. Is a 26year old male with history of Schizoaffective  Disorder and ADHD. Pt was admitted to this a facility on a TDO for  Psychosis and aggressive behaviors in the community. SW discussed case with the treating psychiatrist. Pt. will be d/c.        SW Contact:   SW met with pt. to discuss d/c. SW discussed continued medication and treatment compliance. SW also encouraged pt to follow-up with aftercare appointments in the community. Pt. denies ideations and hallucinations. Pt. Will follow up with [facility] for ID case management service. Pt. Needs to go to intake. SW will refer pt. To [facility]. Pt. will follow-up with therapy with Carlene Haque on 7/3/18 @ 1:00 @ [facility]. Pt.  Needs to go to intake on 7/1/18 @ 9:00 as a walk in at [facility]

## 2018-06-29 NOTE — BSMART NOTE
SOCIAL WORK GROUP THERAPY PROGRESS NOTE    Group Time:  10:15am       Group Topic:  Coping Skills    C D Issues    Group Participation:      Pt minimally involved during group discussion. Did get up couple times to use bathroom & speak to Dr. Lisa frausto. Members discussed handout on the role of \"neurotransmitters\" and how they regulate different aspects of one's moods, cognitions & related behavior. Emphasis of session was presentation of basic \"CBT\" principles including diagram of the process, as well as list of typical cognitive distortions.

## 2018-06-29 NOTE — BH NOTES
Patient is pending discharge to self and will follow up with therapy  appointment scheduled with Emanuel Jay on 7/3/18 @ 1:00 @ [facility] [address] [place] [address] [address] Janeth mcgowan, [address]. Phone: [phone]. Pt. Needs to go to intake on 7/1/18 @ 9:00 as a walk in at [address], [facility] [phone]. Patient has been provided with information regarding mental health follow up care, with (scripts) Provided. Patient has been educated regarding seeking additional support if needed with  information listed on discharge paper work and emergency card provided. Patient is pending  for transport to home.

## 2018-06-29 NOTE — BH NOTES
Patient discharged to home, discharge instructions explained to patient and his mental health worker Angel Shin from [facility] and they both verbalized understanding of instructions. Patient also received his medication scripts, discharge papers and belongings. Name band was removed prior to exiting the unit. Patient denied any medical problems.

## 2018-06-29 NOTE — BH NOTES
GROUP THERAPY PROGRESS NOTE    Dixon Casillas is participating in AA/Emotions Anonymous Group    Group time: [id number]    Personal goal for participation:  How to know When to Seek Treatment for Alcoholism. Anyone experiencing emotional difficulties. Goal orientation: active    Group therapy participation: fully participated    Therapeutic interventions reviewed and discussed: When people talk about what is going on in their lives it allows them to release some of their pent up stress. To gain knowledge and support from others who have had or are currently experiencing similar issues. Impression of participation:  [employer] members reviewed a film, then  Discussed the importance of sharing at [facility] , help yourself out of depression, leave anxiety behind, and controlling your fears. .  Pt.  Received information  for both programs and shared while in group

## 2018-06-29 NOTE — DISCHARGE INSTRUCTIONS
BEHAVIORAL HEALTH NURSING DISCHARGE NOTE      Emergency Numbers    : [facility] Emergency Services: [phone]  Suicide Prevention Line: 7 [phone] (TALK)      The following personal items collected during your admission are returned to you:   Dental Appliance: Dental Appliances: None  Vision: Visual Aid: None  Hearing Aid:    Jewelry: Jewelry: None  Clothing:    Other Valuables: Other Valuables: None  Valuables sent to safe: Personal Items Sent to Safe: None        The discharge information has been reviewed with the patient. The patient verbalized understanding. [employer] Activation    Thank you for requesting access to [facility]. Please follow the instructions below to securely access and download your online medical record. [facility] allows you to send messages to your doctor, view your test results, renew your prescriptions, schedule appointments, and more. How Do I Sign Up? 1. In your internet browser, go to [email or web address]  2. Click on the First Time User? Click Here link in the Sign In box. You will be redirect to the New Member Sign Up page. 3. Enter your [facility] Access Code exactly as it appears below. You will not need to use this code after youve completed the sign-up process. If you do not sign up before the expiration date, you must request a new code. [facility] Access Code: [email or web address]  Expires: 2018 11:57 AM (This is the date your [facility] access code will )    4. Enter the last four digits of your Social Security Number (xxxx) and Date of Birth (mm/dd/yyyy) as indicated and click Submit. You will be taken to the next sign-up page. 5. Create a [facility] ID. This will be your [facility] login ID and cannot be changed, so think of one that is secure and easy to remember. 6. Create a [facility] password. You can change your password at any time. 7. Enter your Password Reset Question and Answer.  This can be used at a later time if you forget your password. 8. Enter your e-mail address. You will receive e-mail notification when new information is available in [address] 9. Click Sign Up. You can now view and download portions of your medical record. 10. Click the Download Summary menu link to download a portable copy of your medical information. Additional Information    If you have questions, please visit the Frequently Asked Questions section of the [employer] website at [email or web address] [employer]. [employer]/. Remember, [facility] is NOT to be used for urgent needs. For medical emergencies, dial 911.     Patient armband removed and shredded

## 2018-06-29 NOTE — BSMART NOTE
ART ACTIVITY PROGRESS NOTE    PATIENT SCHEDULED FOR GROUP AT :  1330    ART ACTIVITY: Paper mosaic    ATTENDANCE : Patient attended about 50% of the group, leaving and returning at least twice. PARTICIPATION LEVEL :  Needs only minimal encouragement. ATTENTION LEVEL :  Needs occasional re-direction. Patient came to group with encouragement, and reluctantly engaged in the activity. He worked steadily to completion on a simple random color placement of the paper tiles. Patient followed directions fairly well. He did not interact with peers. He interacted with staff only minimally and only in relation to the task at hand. Patient left when finished. He was invited later to just come and sit with the group. He did so for just a few minutes before getting up and moving away. Patient's mood appeared anxious and guarded.

## 2018-06-29 NOTE — BH NOTES
GROUP THERAPY PROGRESS NOTE    Deo Mosley is participating in [place].      Group time: 30 minutes    Personal goal for participation: none    Goal orientation: community    Group therapy participation: minimal    Therapeutic interventions reviewed and discussed: goals and procedures    Impression of participation: encouraged

## 2018-06-29 NOTE — BH NOTES
Patient remained calm and quiet but food focus this shift. Patient was able to contract for safety, denied thoughts to harm self gillian others. Patient ate 100% dinner, participated in unit group. Patient's mother visited and visitation went well. Patient received his medications as prescribed and utilized non slip footwear for safety.  Will continue to monitor
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
## 2018-07-02 ENCOUNTER — HOSPITAL ENCOUNTER (OUTPATIENT)
Dept: INFUSION THERAPY | Age: 23
End: 2018-07-02
Payer: MEDICAID

## 2018-07-11 ENCOUNTER — HOSPITAL ENCOUNTER (EMERGENCY)
Age: 23
Discharge: PSYCHIATRIC OR SUBSTANCE USE FACILITY | End: 2018-07-12
Attending: EMERGENCY MEDICINE
Payer: MEDICAID

## 2018-07-11 DIAGNOSIS — R46.89 AGGRESSIVE BEHAVIOR: Primary | ICD-10-CM

## 2018-07-11 DIAGNOSIS — F20.3 UNDIFFERENTIATED SCHIZOPHRENIA (HCC): ICD-10-CM

## 2018-07-11 PROCEDURE — 80307 DRUG TEST PRSMV CHEM ANLYZR: CPT | Performed by: EMERGENCY MEDICINE

## 2018-07-11 PROCEDURE — 99284 EMERGENCY DEPT VISIT MOD MDM: CPT

## 2018-07-11 PROCEDURE — 85025 COMPLETE CBC W/AUTO DIFF WBC: CPT | Performed by: EMERGENCY MEDICINE

## 2018-07-11 PROCEDURE — 80053 COMPREHEN METABOLIC PANEL: CPT | Performed by: EMERGENCY MEDICINE

## 2018-07-12 VITALS
RESPIRATION RATE: 16 BRPM | SYSTOLIC BLOOD PRESSURE: 115 MMHG | DIASTOLIC BLOOD PRESSURE: 71 MMHG | OXYGEN SATURATION: 98 % | HEART RATE: 100 BPM | TEMPERATURE: 97.6 F

## 2018-07-12 LAB
ALBUMIN SERPL-MCNC: 4 G/DL (ref 3.4–5)
ALBUMIN/GLOB SERPL: 1.3 {RATIO} (ref 0.8–1.7)
ALP SERPL-CCNC: 85 U/L (ref 45–117)
ALT SERPL-CCNC: 23 U/L (ref 16–61)
AMPHET UR QL SCN: NEGATIVE
ANION GAP SERPL CALC-SCNC: 5 MMOL/L (ref 3–18)
AST SERPL-CCNC: 14 U/L (ref 15–37)
BARBITURATES UR QL SCN: NEGATIVE
BASOPHILS # BLD: 0 K/UL (ref 0–0.1)
BASOPHILS NFR BLD: 0 % (ref 0–2)
BENZODIAZ UR QL: NEGATIVE
BILIRUB SERPL-MCNC: 0.4 MG/DL (ref 0.2–1)
BUN SERPL-MCNC: 10 MG/DL (ref 7–18)
BUN/CREAT SERPL: 9 (ref 12–20)
CALCIUM SERPL-MCNC: 9.1 MG/DL (ref 8.5–10.1)
CANNABINOIDS UR QL SCN: NEGATIVE
CHLORIDE SERPL-SCNC: 108 MMOL/L (ref 100–108)
CO2 SERPL-SCNC: 31 MMOL/L (ref 21–32)
COCAINE UR QL SCN: NEGATIVE
CREAT SERPL-MCNC: 1.11 MG/DL (ref 0.6–1.3)
DIFFERENTIAL METHOD BLD: NORMAL
EOSINOPHIL # BLD: 0.1 K/UL (ref 0–0.4)
EOSINOPHIL NFR BLD: 1 % (ref 0–5)
ERYTHROCYTE [DISTWIDTH] IN BLOOD BY AUTOMATED COUNT: 12.7 % (ref 11.6–14.5)
ETHANOL SERPL-MCNC: <3 MG/DL (ref 0–3)
GLOBULIN SER CALC-MCNC: 3.2 G/DL (ref 2–4)
GLUCOSE SERPL-MCNC: 93 MG/DL (ref 74–99)
HCT VFR BLD AUTO: 43.3 % (ref 36–48)
HDSCOM,HDSCOM: NORMAL
HGB BLD-MCNC: 15.2 G/DL (ref 13–16)
LYMPHOCYTES # BLD: 2.4 K/UL (ref 0.9–3.6)
LYMPHOCYTES NFR BLD: 36 % (ref 21–52)
MCH RBC QN AUTO: 29.8 PG (ref 24–34)
MCHC RBC AUTO-ENTMCNC: 35.1 G/DL (ref 31–37)
MCV RBC AUTO: 84.9 FL (ref 74–97)
METHADONE UR QL: NEGATIVE
MONOCYTES # BLD: 0.7 K/UL (ref 0.05–1.2)
MONOCYTES NFR BLD: 10 % (ref 3–10)
NEUTS SEG # BLD: 3.4 K/UL (ref 1.8–8)
NEUTS SEG NFR BLD: 53 % (ref 40–73)
OPIATES UR QL: NEGATIVE
PCP UR QL: NEGATIVE
PLATELET # BLD AUTO: 200 K/UL (ref 135–420)
PMV BLD AUTO: 10.2 FL (ref 9.2–11.8)
POTASSIUM SERPL-SCNC: 3.5 MMOL/L (ref 3.5–5.5)
PROT SERPL-MCNC: 7.2 G/DL (ref 6.4–8.2)
RBC # BLD AUTO: 5.1 M/UL (ref 4.7–5.5)
SODIUM SERPL-SCNC: 144 MMOL/L (ref 136–145)
WBC # BLD AUTO: 6.6 K/UL (ref 4.6–13.2)

## 2018-07-12 PROCEDURE — 80307 DRUG TEST PRSMV CHEM ANLYZR: CPT | Performed by: EMERGENCY MEDICINE
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
## 2018-07-24 RX ORDER — ARIPIPRAZOLE 400 MG
400 KIT INTRAMUSCULAR ONCE
Status: COMPLETED | OUTPATIENT
Start: 2018-07-27 | End: 2018-07-27

## 2018-07-27 ENCOUNTER — HOSPITAL ENCOUNTER (OUTPATIENT)
Dept: INFUSION THERAPY | Age: 23
Discharge: HOME | End: 2018-07-27
Payer: MEDICAID

## 2018-07-27 VITALS
TEMPERATURE: 98.1 F | RESPIRATION RATE: 18 BRPM | OXYGEN SATURATION: 100 % | SYSTOLIC BLOOD PRESSURE: 122 MMHG | HEART RATE: 89 BPM | DIASTOLIC BLOOD PRESSURE: 75 MMHG

## 2018-07-27 PROCEDURE — 74011250636 HC RX REV CODE- 250/636: Performed by: PSYCHIATRY & NEUROLOGY

## 2018-07-27 PROCEDURE — 96372 THER/PROPH/DIAG INJ SC/IM: CPT

## 2018-07-27 RX ADMIN — ARIPIPRAZOLE 400 MG: KIT at 13:36

## 2018-07-27 NOTE — PROGRESS NOTES
[facility] Progress Note Date: 2018 Name: Krystal Geller MRN: [id number] : 1995 Abilify Injection Arrived ambulatory to [facility] at 1310, accompanied by family member. No complaints or concerns voiced Mr. Jeremy Vázquez was assessed and education was provided. Mr. Dante Herrmann vitals were reviewed and patient was observed for 5 minutes prior to treatment. Visit Vitals  /75 (BP 1 Location: Left arm, BP Patient Position: Sitting)  Pulse 89  Temp 98.1 °F (36.7 °C)  Resp 18  SpO2 100% Ability 400 mg was administered IM in the left deltoid. No bleeding or redness noted. Band-aid applied to to the site. Mr. Jeremy Vázquez tolerated well, and had no complaints. Patient armband removed and shredded. Mr. Jeremy Vázquez was discharged from [place] [address] in stable condition at 1340. He is to return on 2018 at 1330 for his next appointment. Pia Wolfe RN 
2018

## 2018-08-24 ENCOUNTER — HOSPITAL ENCOUNTER (OUTPATIENT)
Dept: INFUSION THERAPY | Age: 23
Discharge: HOME | End: 2018-08-24
Payer: MEDICAID

## 2018-08-24 VITALS
RESPIRATION RATE: 18 BRPM | DIASTOLIC BLOOD PRESSURE: 83 MMHG | HEART RATE: 74 BPM | SYSTOLIC BLOOD PRESSURE: 123 MMHG | TEMPERATURE: 97.6 F | OXYGEN SATURATION: 99 %

## 2018-08-24 PROCEDURE — 96372 THER/PROPH/DIAG INJ SC/IM: CPT

## 2018-08-24 PROCEDURE — 74011250636 HC RX REV CODE- 250/636

## 2018-08-24 RX ORDER — ARIPIPRAZOLE 400 MG
400 KIT INTRAMUSCULAR ONCE
Status: ON HOLD | COMMUNITY
End: 2019-02-26 | Stop reason: SDUPTHER

## 2018-08-24 RX ORDER — ARIPIPRAZOLE 400 MG
400 KIT INTRAMUSCULAR ONCE
Status: COMPLETED | OUTPATIENT
Start: 2018-08-24 | End: 2018-08-24

## 2018-08-24 RX ADMIN — ARIPIPRAZOLE 400 MG: KIT at 13:26

## 2018-08-24 NOTE — PROGRESS NOTES
[facility] Progress Note    Date: 2018    Name: Georgina Mcgraw    MRN: [id number]         : 1995    Abilify Injection    Mr. Raza Buchanan was assessed and education was provided. Mr. Kasey Courtney vitals were reviewed and patient was observed for 5 minutes prior to treatment. Visit Vitals    /83 (BP 1 Location: Left arm, BP Patient Position: Sitting)    Pulse 74    Temp 97.6 °F (36.4 °C)    Resp 18    SpO2 99%       Ability 300 mg was administered IM in the right deltoid. No bleeding or redness noted. Band-aid applied to to the site. Mr. Raza Buchanan tolerated well, and had no complaints. Patient politely declined to stay for an observation period at this time. Patient armband removed and shredded. Mr. Raza Buchanan was discharged from [place] [address] in stable condition at 1335. He is to return on 2018 at 1330 for his next appointment.     Delon Sears RN  2018

## 2018-09-13 RX ORDER — ARIPIPRAZOLE 400 MG
400 KIT INTRAMUSCULAR ONCE
Status: COMPLETED | OUTPATIENT
Start: 2018-09-21 | End: 2018-09-21

## 2018-09-21 ENCOUNTER — HOSPITAL ENCOUNTER (OUTPATIENT)
Dept: INFUSION THERAPY | Age: 23
Discharge: HOME | End: 2018-09-21
Payer: MEDICAID

## 2018-09-21 VITALS
RESPIRATION RATE: 18 BRPM | SYSTOLIC BLOOD PRESSURE: 121 MMHG | DIASTOLIC BLOOD PRESSURE: 74 MMHG | OXYGEN SATURATION: 99 % | HEART RATE: 68 BPM | TEMPERATURE: 98.5 F

## 2018-09-21 PROCEDURE — 96372 THER/PROPH/DIAG INJ SC/IM: CPT

## 2018-09-21 PROCEDURE — 74011250636 HC RX REV CODE- 250/636

## 2018-09-21 RX ADMIN — ARIPIPRAZOLE 400 MG: KIT at 13:41

## 2018-09-21 NOTE — PROGRESS NOTES
[facility] Progress Note Date: 2018 Name: Erika Tompkins MRN: [id number] : 1995 Abilify Injection Mr. Surekha Rooney was assessed and education was provided. Mr. Ruvalcaba Landon vitals were reviewed and patient was observed for 5 minutes prior to treatment. Visit Vitals  /74 (BP 1 Location: Left arm, BP Patient Position: At rest;Sitting)  Pulse 68  Temp 98.5 °F (36.9 °C)  Resp 18  SpO2 99% Ability 300 mg was administered IM in the left deltoid. No bleeding or redness noted. Band-aid applied to to the site. Mr. Surekha Rooney tolerated well, and had no complaints. Patient politely declined to stay for an observation period at this time. Patient armband removed and shredded. Mr. Surekha Rooney was discharged from [place] [address] in stable condition at [phone]. He is to return on 10/19/2018 at 1330 for his next appointment. Enma Escalante RN 2018 [address] [place] [address],[address]

## 2018-10-02 ENCOUNTER — HOSPITAL ENCOUNTER (INPATIENT)
Age: 23
LOS: 7 days | Discharge: HOME | DRG: 750 | End: 2018-10-09
Attending: EMERGENCY MEDICINE | Admitting: PSYCHIATRY & NEUROLOGY
Payer: MEDICAID

## 2018-10-02 DIAGNOSIS — R46.89 AGGRESSIVE BEHAVIOR: Primary | ICD-10-CM

## 2018-10-02 DIAGNOSIS — Z91.14 NONCOMPLIANCE WITH MEDICATION REGIMEN: ICD-10-CM

## 2018-10-02 LAB
AMPHET UR QL SCN: NEGATIVE
ANION GAP SERPL CALC-SCNC: 5 MMOL/L (ref 3–18)
BARBITURATES UR QL SCN: NEGATIVE
BASOPHILS # BLD: 0 K/UL (ref 0–0.1)
BASOPHILS NFR BLD: 0 % (ref 0–2)
BENZODIAZ UR QL: NEGATIVE
BUN SERPL-MCNC: 13 MG/DL (ref 7–18)
BUN/CREAT SERPL: 12 (ref 12–20)
CALCIUM SERPL-MCNC: 8.9 MG/DL (ref 8.5–10.1)
CANNABINOIDS UR QL SCN: NEGATIVE
CHLORIDE SERPL-SCNC: 105 MMOL/L (ref 100–108)
CO2 SERPL-SCNC: 31 MMOL/L (ref 21–32)
COCAINE UR QL SCN: NEGATIVE
CREAT SERPL-MCNC: 1.12 MG/DL (ref 0.6–1.3)
DIFFERENTIAL METHOD BLD: ABNORMAL
EOSINOPHIL # BLD: 0.2 K/UL (ref 0–0.4)
EOSINOPHIL NFR BLD: 3 % (ref 0–5)
ERYTHROCYTE [DISTWIDTH] IN BLOOD BY AUTOMATED COUNT: 12.1 % (ref 11.6–14.5)
ETHANOL SERPL-MCNC: <3 MG/DL (ref 0–3)
GLUCOSE SERPL-MCNC: 84 MG/DL (ref 74–99)
HCT VFR BLD AUTO: 41.7 % (ref 36–48)
HDSCOM,HDSCOM: NORMAL
HGB BLD-MCNC: 14.9 G/DL (ref 13–16)
LYMPHOCYTES # BLD: 4.5 K/UL (ref 0.9–3.6)
LYMPHOCYTES NFR BLD: 59 % (ref 21–52)
MCH RBC QN AUTO: 29.4 PG (ref 24–34)
MCHC RBC AUTO-ENTMCNC: 35.7 G/DL (ref 31–37)
MCV RBC AUTO: 82.4 FL (ref 74–97)
METHADONE UR QL: NEGATIVE
MONOCYTES # BLD: 0.7 K/UL (ref 0.05–1.2)
MONOCYTES NFR BLD: 9 % (ref 3–10)
NEUTS SEG # BLD: 2.2 K/UL (ref 1.8–8)
NEUTS SEG NFR BLD: 29 % (ref 40–73)
OPIATES UR QL: NEGATIVE
PCP UR QL: NEGATIVE
PLATELET # BLD AUTO: 244 K/UL (ref 135–420)
PMV BLD AUTO: 10.4 FL (ref 9.2–11.8)
POTASSIUM SERPL-SCNC: 3.9 MMOL/L (ref 3.5–5.5)
RBC # BLD AUTO: 5.06 M/UL (ref 4.7–5.5)
SODIUM SERPL-SCNC: 141 MMOL/L (ref 136–145)
WBC # BLD AUTO: 7.6 K/UL (ref 4.6–13.2)

## 2018-10-02 PROCEDURE — 74011250637 HC RX REV CODE- 250/637: Performed by: PSYCHIATRY & NEUROLOGY

## 2018-10-02 PROCEDURE — 99285 EMERGENCY DEPT VISIT HI MDM: CPT

## 2018-10-02 PROCEDURE — 80307 DRUG TEST PRSMV CHEM ANLYZR: CPT | Performed by: EMERGENCY MEDICINE

## 2018-10-02 PROCEDURE — 65220000003 HC RM SEMIPRIVATE PSYCH

## 2018-10-02 PROCEDURE — 80048 BASIC METABOLIC PNL TOTAL CA: CPT | Performed by: EMERGENCY MEDICINE

## 2018-10-02 PROCEDURE — 85025 COMPLETE CBC W/AUTO DIFF WBC: CPT | Performed by: EMERGENCY MEDICINE

## 2018-10-02 RX ORDER — TRAZODONE HYDROCHLORIDE 50 MG/1
50 TABLET ORAL
Status: DISCONTINUED | OUTPATIENT
Start: 2018-10-02 | End: 2018-10-09 | Stop reason: HOSPADM

## 2018-10-02 RX ORDER — HALOPERIDOL 5 MG/1
5 TABLET ORAL
Status: DISCONTINUED | OUTPATIENT
Start: 2018-10-02 | End: 2018-10-09 | Stop reason: HOSPADM

## 2018-10-02 RX ORDER — BENZTROPINE MESYLATE 1 MG/ML
1 INJECTION INTRAMUSCULAR; INTRAVENOUS
Status: DISCONTINUED | OUTPATIENT
Start: 2018-10-02 | End: 2018-10-09 | Stop reason: HOSPADM

## 2018-10-02 RX ORDER — LORAZEPAM 2 MG/ML
1 INJECTION INTRAMUSCULAR
Status: DISCONTINUED | OUTPATIENT
Start: 2018-10-02 | End: 2018-10-09 | Stop reason: HOSPADM

## 2018-10-02 RX ORDER — IBUPROFEN 200 MG
400 TABLET ORAL
Status: DISCONTINUED | OUTPATIENT
Start: 2018-10-02 | End: 2018-10-09 | Stop reason: HOSPADM

## 2018-10-02 RX ORDER — ARIPIPRAZOLE 15 MG/1
15 TABLET ORAL DAILY
Status: DISCONTINUED | OUTPATIENT
Start: 2018-10-03 | End: 2018-10-03

## 2018-10-02 RX ORDER — ARIPIPRAZOLE 5 MG/1
5 TABLET ORAL DAILY
Status: DISCONTINUED | OUTPATIENT
Start: 2018-10-03 | End: 2018-10-02

## 2018-10-02 RX ORDER — LORAZEPAM 1 MG/1
1 TABLET ORAL
Status: DISCONTINUED | OUTPATIENT
Start: 2018-10-02 | End: 2018-10-09 | Stop reason: HOSPADM

## 2018-10-02 RX ORDER — CLONAZEPAM 0.5 MG/1
0.5 TABLET ORAL 2 TIMES DAILY
Status: DISCONTINUED | OUTPATIENT
Start: 2018-10-02 | End: 2018-10-09 | Stop reason: HOSPADM

## 2018-10-02 RX ORDER — BENZTROPINE MESYLATE 1 MG/1
1 TABLET ORAL
Status: DISCONTINUED | OUTPATIENT
Start: 2018-10-02 | End: 2018-10-09 | Stop reason: HOSPADM

## 2018-10-02 RX ORDER — HALOPERIDOL 5 MG/ML
5 INJECTION INTRAMUSCULAR
Status: DISCONTINUED | OUTPATIENT
Start: 2018-10-02 | End: 2018-10-09 | Stop reason: HOSPADM

## 2018-10-02 RX ADMIN — CLONAZEPAM 0.5 MG: 0.5 TABLET ORAL at 20:20

## 2018-10-02 RX ADMIN — CLONAZEPAM 0.5 MG: 0.5 TABLET ORAL at 16:44

## 2018-10-02 NOTE — IP AVS SNAPSHOT
Summary of Care Report The Summary of Care report has been created to help improve care coordination. Users with access to [employer] or [email or web address] [facility] (Web-based application) may access additional patient information including the Discharge Summary. If you are not currently a [address] user and need more information, please call the number listed below in the [place] section and ask to be connected with Medical Records. Facility Information Name Address Phone [address] [id number] [phone] Patient Information Patient Name Sex  Sonya Poe ([id number]) Male 1995 Discharge Information Admitting Provider Service Area Unit Letha Reis MD / [address] / [phone] Discharge Provider Discharge Date/Time Discharge Disposition Destination (none) 10/9/2018 (Pending) AHR (none) Patient Language Language ENGLISH [13] Hospital Problems as of 10/9/2018  Reviewed: 2015  8:40 AM by Sim Bonilla MD  
  
  
  
 Class Noted - Resolved Last Modified POA Active Problems Schizophrenia ([facility] [address]  2015 - Present 10/2/2018 by Letha Reis MD Unknown Entered by Nhung Chow Non-Hospital Problems as of 10/9/2018  Reviewed: 2015  8:40 AM by Sim Bonilla MD  
 None You are allergic to the following No active allergies Current Discharge Medication List  
  
START taking these medications Dose & Instructions Dispensing Information Comments  
 divalproex  mg ER tablet Commonly known as:  DEPAKOTE ER Dose:  500 mg Take 1 Tab by mouth two (2) times a day. Indications: mood stability. Quantity:  30 Tab Refills:  1 CONTINUE these medications which have NOT CHANGED Dose & Instructions Dispensing Information Comments ABILIFY MAINTENA 400 mg injection Generic drug:  ARIPiprazole Dose:  400 mg  
400 mg by IntraMUSCular route once. Refills:  0 STOP taking these medications Comments  
 traZODone 50 mg tablet Commonly known as:  Nitin Walters Current Immunizations Name Date Influenza Vaccine (Quad) PF  Deferred () Follow-up Information Follow up With Details Comments Contact Info 10/10/18 at 4 pm at [address] [place] [address]. The address and contact number is [address] [place] [address]; Phone: [phone]. Discharge Instructions BEHAVIORAL HEALTH NURSING DISCHARGE NOTE The following personal items collected during your admission are returned to you:  
Dental Appliance: Dental Appliances: None Vision: Visual Aid: None Hearing Aid:   
Jewelry: Jewelry: None Clothing: Clothing: Belt, Footwear, Jacket/Coat, Pants, Shirt, Undergarments Other Valuables: Other Valuables: None Valuables sent to safe: Personal Items Sent to Safe: none PATIENT INSTRUCTIONS: 
 
 
  
 
 
The discharge information has been reviewed with the patient. The patient verbalized understanding. Patient armband removed and shredded Chart Review Routing History Recipient Method Report Sent By Jessica Rodriguez MD  
Fax: [phone] Phone: [phone] Fax IP Auto Routed Donnell Santa [[id number]] 1/18/2015  6:18 AM 01/18/2015 Lynsey Rodriguez MD  
Fax: [phone] Phone: [phone] Fax IP Auto Routed Donnell Santa [[id number]] 2/9/2015  5:52 PM 02/09/2015 Lynsey Rodriguez MD  
Fax: [phone] Phone: [phone] Fax IP Auto Routed Donnell Santa [[id number]] 3/4/2015  8:29 AM 03/04/2015

## 2018-10-02 NOTE — BH NOTES
GROUP THERAPY PROGRESS NOTE Jessica Sanders is not participating in Recreational Therapy.  
  
Group time: 45 minutes 
  
Personal goal for participation: To Make Adjustments In [address], Also To Reduce Obstacles To Leisure Behavior & Address The Patient's Other Treatment Goals By Improving The Patient's Physical, Psychological, Social, And Emotional Well-Being & Solving Problems That Represent Barriers To Participation (Patient Did Not Participate). 
  
Goal orientation: social 
  
Group therapy participation: Patient Did Not Participate  
  
Therapeutic interventions reviewed and discussed: Teaching a client(s) to analyze and select appropriate activities for him/herself. 
  
Impression of participation: Staff encouraged patient to participate but patient refused.

## 2018-10-02 NOTE — H&P
[address] Inpatient Admission Note Date of Service:  10/02/18 Historian(s): Deo Shafer and chart review Referral Source: ED Chief Complaint \"I need my Adderall bad. Homero Grossman \" History of Present Illness Juliana Monson is a 26 y.o. BLACK OR  male with a history of Shcizophrenia, who presented under TDO. ED note: \"Deo Shafer is a 26 y.o. male with  Psychiatric disorder, and anxiety, mood disorder who presents with PPD on TDO due to mental health evaluation. Per [facility] worker the pt was making outburst and being aggressive towards his aunt who is guardian. Pt has also having auditory hallucination and refusing to take medications. Per the pt he has been aggressive towards his aunt but while presenting denying HI/SI, self injury, and hallucinations. No ETOH abuse or illicit drug abuse. Denies fever, chills, N/V, abdominal pain, back pain, neck pain, urinary sx, or any other associated sx. No other complaints or concerns. Homero Grossman \". Pt is not a very good historian. Pt was at [facility] in June of 2018, stabilized on Abilify and PRN Clonazepam, received Abilify Sustenna injection. Pt stated he was later in [address] [facility] [address]. Pt stated he and his aunt argued, because he \"did not like his medication, and refused to take it. .. \". It is not clear what medication it was. Pt stated he wants to be on Adderall, as it makes him think clear, but this medication was never prescribed to him. Psychiatric Review of Systems Depression:   non lethal suicide by hx Anxiety: Denied any excessive worrying, social anxiety, panic attacks and OCD. Irritability: yes Bipolar symptoms: Denied history of decreased need for sleep associated with increased energy, racing thoughts, rapid speech and risky behavior. Abuse/Trauma/PTSD: Denied history of verbal, physical or sexual abuse. Denies avoidant behavior related to trauma triggers, flashbacks, hypervigilance or nightmares. Psychosis: has hx delusions, hallucinations Medical Review of Systems Sleep: decreased Appetite: fair 10 point review of systems was completed. Significant findings are found in the HPI or MSE. Psychiatric Treatment History Self-injurious behavior/risky thoughts or behaviors (past suicidal ideation/attempt): has hx of non lethal suicide attempt Violence/Risk to others (past homicidal ideation/attempt): has hx of anger outbursts and threats Previous psychiatric medication trials: Abilify; Clonazepam 
 
Previous psychiatric hospitalizations: several, due to psychosis, agitation, SI 
Current therapist: [address] [place] [address] Current psychiatric provider: [address] [place] [address] Allergies No Known Allergies Medical History Past Medical History:  
Diagnosis Date  ADHD (attention deficit hyperactivity disorder)  Anxiety disorder  Depression  Mood disorder ([facility] [address]  Psychiatric disorder  Psychotic disorder ([facility]  Trauma \"His mother  this past November\" History of neurological illness: denied History of head injuries: denied Medication(s) Prior to Admission Medications Prescriptions Last Dose Informant Patient Reported? Taking? ARIPiprazole (ABILIFY MAINTENA) 400 mg injection Not Taking at Unknown time  Yes No  
Si mg by IntraMUSCular route once. traZODone (DESYREL) 50 mg tablet Not Taking at Unknown time  No No  
Sig: Take 2 Tabs by mouth nightly as needed for Sleep. Indications: insomnia associated with depression Facility-Administered Medications: None Substance Abuse History UDS - negative Tobacco: denied Alcohol: denied Marijuana: denied Cocaine: denied Opiate: denied Benzodiazepine: denied Other: denied Consequences: none History of detox: none History of substance abuse treatment: none Family History Family History Problem Relation Age of Onset [facility] Schizophrenia Mother  Schizophrenia Brother  Schizophrenia Sister Family history of suicide? No 
 
Social History Lives with aunt. Education: HS 
 Service: n.a. Employment: unemployed Relationships/Children: no 
Legal: denied Spirituality/Holiness: not contributory Vitals/Labs Vitals:  
 10/02/18 5233 10/02/18 0530 10/02/18 1642 10/02/18 5894 BP: 111/67 111/68  111/70 Pulse: 66 73  76 Resp: 16 18 18 Temp: 97.8 °F (36.6 °C) 96.1 °F (35.6 °C)  97 °F (36.1 °C) SpO2: 98% 98% Weight:   61.2 kg (135 lb) Height:   5' 10\" (1.778 m) Labs:  
Results for orders placed or performed during the hospital encounter of 10/02/18 CBC WITH AUTOMATED DIFF Result Value Ref Range WBC 7.6 4.6 - 13.2 K/uL  
 RBC 5.06 4.70 - 5.50 M/uL  
 HGB 14.9 13.0 - 16.0 g/dL HCT 41.7 36.0 - 48.0 % MCV 82.4 74.0 - 97.0 FL  
 MCH 29.4 24.0 - 34.0 PG  
 MCHC 35.7 31.0 - 37.0 g/dL  
 RDW 12.1 11.6 - 14.5 % PLATELET 805 001 - 092 K/uL MPV 10.4 9.2 - 11.8 FL  
 NEUTROPHILS 29 (L) 40 - 73 % LYMPHOCYTES 59 (H) 21 - 52 % MONOCYTES 9 3 - 10 % EOSINOPHILS 3 0 - 5 % BASOPHILS 0 0 - 2 %  
 ABS. NEUTROPHILS 2.2 1.8 - 8.0 K/UL  
 ABS. LYMPHOCYTES 4.5 (H) 0.9 - 3.6 K/UL  
 ABS. MONOCYTES 0.7 0.05 - 1.2 K/UL  
 ABS. EOSINOPHILS 0.2 0.0 - 0.4 K/UL  
 ABS. BASOPHILS 0.0 0.0 - 0.1 K/UL  
 DF AUTOMATED METABOLIC PANEL, BASIC Result Value Ref Range Sodium 141 136 - 145 mmol/L Potassium 3.9 3.5 - 5.5 mmol/L Chloride 105 100 - 108 mmol/L  
 CO2 31 21 - 32 mmol/L Anion gap 5 3.0 - 18 mmol/L Glucose 84 74 - 99 mg/dL BUN 13 7.0 - 18 MG/DL Creatinine 1.12 0.6 - 1.3 MG/DL  
 BUN/Creatinine ratio 12 12 - 20 GFR est AA >60 >60 ml/min/1.73m2 GFR est non-AA >60 >60 ml/min/1.73m2 Calcium 8.9 8.5 - 10.1 MG/DL  
ETHYL ALCOHOL Result Value Ref Range ALCOHOL(ETHYL),SERUM <3 0 - 3 MG/DL  
DRUG SCREEN, URINE Result Value Ref Range BENZODIAZEPINES NEGATIVE  NEG    
 BARBITURATES NEGATIVE  NEG    
 THC (TH-CANNABINOL) NEGATIVE  NEG    
 OPIATES NEGATIVE  NEG    
 PCP(PHENCYCLIDINE) NEGATIVE  NEG    
 COCAINE NEGATIVE  NEG    
 AMPHETAMINES NEGATIVE  NEG METHADONE NEGATIVE  NEG HDSCOM (NOTE) Mental Status Examination Appearance/Hygiene 26 y.o. BLACK OR  male Hygiene: wnl  
Orientation Cognition AO to self/place/date/reason for evaluation No gross impalement of concentration/memory Behavior/Social Relatedness cooperative Musculoskeletal Gait/Station: appropriate Tone (flaccid, cogwheeling, spastic): not assessed Psychomotor (hyperkinetic, hypokinetic): calm Involuntary movements (tics, dyskinesias, akathisa, stereotypies): none Speech   Rate, rhythm, volume, fluency and articulation are appropriate Mood   Doing ok. Kindra Rotunda Kindra Rotunda I need my Adderall bad Affect    guarded Thought Process W/loose associations Thought Content and Perceptual Disturbances Denies delusions, ideas of reference, overvalued ideas, ruminations, obsession, compulsions, and phobias Denies self-injurious behavior/thoughts (SIB), suicidal ideation (SI), aggressive behavior or homicidal ideation (HI) Denies auditory and visual hallucinations Insight  marginal  
Judgment marginal  
 
 
Suicide Risk Assessment Admission  Date/Time: 10/02/18 [x] Admission  [] Discharge Key Factors:  
Current admission precipitated by suicide attempt? []  Yes 2    [x]  No  
 
1 Suicide Attempt History  [] Past attempts of high lethality 2 [x]  Past attempts of low lethality 1 []  No previous attempts  
 
 
0 Suicidal Ideation []  Constant suicidal thoughts 
 
 
2 []  Intermittent or fleeting suicidal  thoughts 1 [x]  Denies current suicidal thoughts 
 
0 Suicide Plan   []  Has plan with actual OR potential access to planned method 
 
2 []  Has plan without access to planned method 1 [x]  No plan 0  
Plan Lethality []  Highly lethal plan (Carbon monoxide, gun, hanging, jumping) 2 []  Moderate lethality of plan 1 [x]  Low lethality of plan (biting, head banging, superficial scratching, pillow over face) 0 Safety Plan Agreement  []  Unwilling OR unable to agree due to impaired reality testing 2   []  Patient is ambivalent and/or guarded 1 [x]  Reliably agrees 
 
 
 
0 Current Morbid Thoughts (reunion fantasies, preoccupations with death) []  Constantly 2 []  Frequently 1 [x]  Rarely 0 Elopement Risk  []  High risk 2 []  Moderate risk 1 [x]   Low risk 0 Symptoms   
[]  Hopeless 
[]  Helpless 
[]  Anhedonia  
[]  Guilt/shame 
[]  Anger/rage [x]  Anxiety [x]  Insomnia [x]  Agitation  
[]  Impulsivity  []  5-6 symptoms present 2 []  3-4 symptoms present 1  [x]  0-2 symptoms present 
 
0 Total Score: 1-------------------------------------------------------------------------------------------------------------- Subjective Appraisal of Risk: 
[]  Patient replies not trustworthy: several non-verbal cues. [x]  Patient replies questionable: trustworthy: at least 1 non-verbal cue. 
[]  Patient replies appear trustworthy. Protective measures (select all that apply): 
[]  Successful past responses to stress 
[]  Spiritual/Episcopalian beliefs []  Capacity for reality testing 
[]  Positive therapeutic relationships 
[]  Social supports/connections []  Positive coping skills []  Frustration tolerance/optimism 
[]  Children or pets in the home 
[]  Sense of responsibility to family 
[x]  Agrees to treatment plan and follow up High Risk Diagnoses (select all that apply): 
[]  Depression/Bipolar Disorder 
[]  Dual Diagnosis 
[]  Cardiovascular Disease [x]  Schizophrenia 
[]  Chronic Pain 
[]  Epilepsy 
[]  Cancer 
[]  Personality Disorder 
[]  HIV/AIDS []  Multiple Sclerosis Dangerousness Assessment (Suicide, homicide, property destruction. ..) Risk Factors reviewed and risk assessed to be:  [] low  [] low-moderate  [x] moderate 
 [] moderate-high  [] high Protection factors reviewed and risk assessed to be:  [] low  [] low-moderate  [x] moderate 
 [] moderate-high  [] high Response to treatment and risk assessed to be:  [] low  [] low-moderate  [] moderate [x] moderate-high  [] high Support reviewed and risk assessed to be:  [] low  [] low-moderate  [x] moderate 
 [] moderate-high  [] high Acceptance of Discharge and outpatient treatment reviewed and risk assessed to be: 
  [] low  [] low-moderate  [] moderate [x] moderate-high  [] high Overall risk assessed to be:  []low  [] low-moderate  [] moderate [x] moderate-high  [] high Assessment and Plan Psychiatric Diagnoses:  
Schizophrenia, exacerbation Medical Diagnoses: n.a. Psychosocial and contextual factors: SPMI; tx non compliance Level of impairment/disability: severe Chetan Serra is a 26 y.o. who is currently requiring acute stabilization after TDO to [facility]. 1. Admit to locked inpatient behavioral health unit. Start milieu, group, art and occupation therapy. - start Abilify 15mg qhs 
- start Clonazepam 0.5mg bid/short term 2. Routine labs ordered and reviewed by this provider. 3. Reviewed instructions, risks, benefits and side effects. 4. Start disposition planning; verify upcoming outpatient appointments with therapist and/or psychiatric medication prescriber. 5. Tentative date of discharge: 5-7 days Gavino Siddiqui MD 
Psychiatrist 
[facility]

## 2018-10-02 NOTE — BH NOTES
Observed patient's behavior throughout the shift. Patient has eaten all meals and snacks. During this shift, the patient was on the phone cursing and raising his voice saying \" Eriberto Cancer need to do a DNA test so I could find out who's my real mother, because the lady claims to be my mother, I don't believe she's my mother. \" Staff has asked patient to lower his voice and the patient apologized and went into his room. Staff has encouraged patient to participate in group session (Recreational Therapy), but patient refused. Patient has been free of falls and other harms, and staff will continue to monitor patient's safety and behavior for the duration of the shift.

## 2018-10-02 NOTE — BH NOTES
MHT Note:  Patient has participated in all unit activities this shift. He is somewhat social with peers and is cooperative with staff. Patient has been observed pacing frequently. When questioned was he anxious about something, he stated his grandmother was getting ready to let go of her lease and he would not have anywhere to live. He was encouraged to talk with the  for information about possible facilities in his area. He denies current thoughts of self harm or auditory or visual hallucinations. He ate his breakfast and lunch. Staff will continue to monitor for safety and location and will provide support and education as needed.

## 2018-10-02 NOTE — BH NOTES
Patient admitted to NANCY-2 unit; [facility] Patient restless yet polite during admission assessment. Patient states he was angry at his aunt and has not been taking his prescribed medications. Patient did not answer some admission questions in entirely, grew physically restless, moving legs frequently, then got up to walk to his room. Denied hallucinations to writer. Patient quietly pacing on the unit; declined needing any medications when offered.

## 2018-10-02 NOTE — IP AVS SNAPSHOT
Krystle Camejo 
 
 
 [address] Patient: Derrell Dunlap MRN: [id number] :1995 A check johnson indicates which time of day the medication should be taken. My Medications START taking these medications Instructions Each Dose to Equal  
 Morning Noon Evening Bedtime  
 divalproex  mg ER tablet Commonly known as:  DEPAKOTE ER Your last dose was: Your next dose is: Take 1 Tab by mouth two (2) times a day. Indications: mood stability. 500 mg CONTINUE taking these medications Instructions Each Dose to Equal  
 Morning Noon Evening Bedtime ABILIFY MAINTENA 400 mg injection Generic drug:  ARIPiprazole Your last dose was: Your next dose is:    
   
   
 400 mg by IntraMUSCular route once. 400 mg  
    
   
   
   
  
  
STOP taking these medications   
 traZODone 50 mg tablet Commonly known as:  Lulu [place] Where to Get Your Medications Information on where to get these meds will be given to you by the nurse or doctor. ! Ask your nurse or doctor about these medications  
  divalproex  mg ER tablet

## 2018-10-02 NOTE — IP AVS SNAPSHOT
[facility] 
 
 
 [address] Patient: Rakan Torres MRN: [id number] :1995 About your hospitalization You were admitted on:  2018 You last received care in the:  [facility] SPECIAL TRT 2 You were discharged on:  2018 Why you were hospitalized Your primary diagnosis was:  Not on File Your diagnoses also included:  Schizophrenia (Hcc) Follow-up Information Follow up With Details Comments Contact Info 10/10/18 at 4 pm at [facility]. The address and contact number is [address] [place] [address] Street; Phone: [phone]. Your Scheduled Appointments 2018  1:30 PM EDT INFUSION 30 with [facility] FAST TRACK NURSE [facility] OP INFUSION (Jose Raul Montanez) [address]  
[phone] Note: Patient must have a  if they take medication(s) that impairs their ability to operate a motor vehicle Discharge Orders None A check johnson indicates which time of day the medication should be taken. My Medications START taking these medications Instructions Each Dose to Equal  
 Morning Noon Evening Bedtime  
 divalproex  mg ER tablet Commonly known as:  DEPAKOTE ER Your last dose was: Your next dose is: Take 1 Tab by mouth two (2) times a day. Indications: mood stability. 500 mg CONTINUE taking these medications Instructions Each Dose to Equal  
 Morning Noon Evening Bedtime ABILIFY MAINTENA 400 mg injection Generic drug:  ARIPiprazole Your last dose was: Your next dose is:    
   
   
 400 mg by IntraMUSCular route once. 400 mg  
    
   
   
   
  
  
STOP taking these medications   
 traZODone 50 mg tablet Commonly known as:  Ashley Manjarrez  
   
  
  
  
 Where to Get Your Medications Information on where to get these meds will be given to you by the nurse or doctor. ! Ask your nurse or doctor about these medications  
  divalproex  mg ER tablet Discharge Instructions BEHAVIORAL HEALTH NURSING DISCHARGE NOTE The following personal items collected during your admission are returned to you:  
Dental Appliance: Dental Appliances: None Vision: Visual Aid: None Hearing Aid:   
Jewelry: Jewelry: None Clothing: Clothing: Belt, Footwear, Jacket/Coat, Pants, Shirt, Undergarments Other Valuables: Other Valuables: None Valuables sent to safe: Personal Items Sent to Safe: none PATIENT INSTRUCTIONS: 
 
 
  
 
 
The discharge information has been reviewed with the patient. The patient verbalized understanding. Patient armband removed and shredded Introducing [facility] SERVICES! Kriss Kwan introduces [employer] patient portal. Now you can access parts of your medical record, email your doctor's office, and request medication refills online. 1. In your internet browser, go to https://[employer]. [employer] 2. Click on the First Time User? Click Here link in the Sign In box. You will see the New Member Sign Up page. 3. Enter your [employer] Access Code exactly as it appears below. You will not need to use this code after youve completed the sign-up process. If you do not sign up before the expiration date, you must request a new code. · [employer] Access Code: [email or web address] Expires: 10/21/2018  1:20 PM 
 
4. Enter the last four digits of your Social Security Number (xxxx) and Date of Birth (mm/dd/yyyy) as indicated and click Submit. You will be taken to the next sign-up page. 5. Create a [employer] ID. This will be your [employer] login ID and cannot be changed, so think of one that is secure and easy to remember. 6. Create a [employer] password. You can change your password at any time. 7. Enter your Password Reset Question and Answer. This can be used at a later time if you forget your password. 8. Enter your e-mail address. You will receive e-mail notification when new information is available in [address] 9. Click Sign Up. You can now view and download portions of your medical record. 10. Click the Download Summary menu link to download a portable copy of your medical information. If you have questions, please visit the Frequently Asked Questions section of the [employer] website. Remember, [employer] is NOT to be used for urgent needs. For medical emergencies, dial 911. Now available from your iPhone and Android! Introducing Brian Ling As a [employer] patient, I wanted to make you aware of our electronic visit tool called Brian Ling. [employer] 24/7 allows you to connect within minutes with a medical provider 24 hours a day, seven days a week via a mobile device or tablet or logging into a secure website from your computer. You can access Brian Ling from anywhere in the [place]. A virtual visit might be right for you when you have a simple condition and feel like you just dont want to get out of bed, or cant get away from work for an appointment, when your regular [employer] provider is not available (evenings, weekends or holidays), or when youre out of town and need minor care. Electronic visits cost only $49 and if the [employer] 24/7 provider determines a prescription is needed to treat your condition, one can be electronically transmitted to a nearby pharmacy*. Please take a moment to enroll today if you have not already done so. The enrollment process is free and takes just a few minutes. To enroll, please download the [employer] 24/7 fang to your tablet or phone, or visit [email or web address] to enroll on your computer.    
And, as an [address] [place] [address] patient with a [employer] account, the results of your visits will be scanned into your electronic medical record and your primary care provider will be able to view the scanned results. We urge you to continue to see your regular [employer] provider for your ongoing medical care. And while your primary care provider may not be the one available when you seek a [employer] virtual visit, the peace of mind you get from getting a real diagnosis real time can be priceless. For more information on [employer], view our Frequently Asked Questions (FAQs) at [email or web address]. Sincerely, 
 
Rosalinda Silveira MD 
Chief Medical Officer [employer] *:  certain medications cannot be prescribed via [employer] Providers Seen During Your Hospitalization Provider Specialty Primary office phone Roger Grubbs MD Emergency Medicine [phone] Nik Blevins MD Psychiatry [phone] Destinee Vazquez MD Psychiatry [phone] Immunizations Administered for This Admission Name Date Influenza Vaccine (Quad) PF  Deferred () Your Primary Care Physician (PCP) Primary Care Physician Office Phone Office Fax NONE ** None ** ** None ** You are allergic to the following No active allergies Recent Documentation Height Weight BMI Smoking Status 1.778 m 61.2 kg 19.37 kg/m2 Never Smoker Emergency Contacts Name Discharge Info Relation Home Work Mobile Piedad Lowery(Grandmother) DISCHARGE CAREGIVER [3] Other Relative [6] [phone] Saige East DECLINED CAREGIVER [4] Caregiver [13] [phone] Patient Belongings The following personal items are in your possession at time of discharge: 
  Dental Appliances: None  Visual Aid: None      Home Medications: None   Jewelry: None  Clothing: Belt, Footwear, Jacket/Coat, Pants, Shirt, Undergarments    Other Valuables: None  Personal Items Sent to Safe: none Please provide this summary of care documentation to your next provider. Signatures-by signing, you are acknowledging that this After Visit Summary has been reviewed with you and you have received a copy. Patient Signature:  ____________________________________________________________ Date:  ____________________________________________________________  
  
Celester Rota Provider Signature:  ____________________________________________________________ Date:  ____________________________________________________________

## 2018-10-02 NOTE — ED NOTES
TRANSFER - OUT REPORT: 
Verbal report given to Alfonso Borrego RN(name) on Manny Andrew  being transferred to Adult(unit) for routine progression of care   Report consisted of patients Situation, Background, Assessment and Recommendations(SBAR). Information from the following report(s) SBAR, ED Summary, Procedure Summary, MAR and Recent Results was reviewed with the receiving nurse. Opportunity for questions and clarification was provided. Patient transported with: Registered Nurse

## 2018-10-02 NOTE — BSMART NOTE
SW ENCOUNTER: The patient is a 26year old  single male with a reported history of Schizophrenia whom was admitted due to exhibiting increased aggression due to medication non-compliance. The patient paced the room as he answered questions and exhibited poor insight and eye contact. He denied current SI/HI, intent, AVH and depressive symptoms; however, expressed concern regarding housing as his aunt (whom he resides with) is moving. The patient is currently unemployed and residing SSI benefits. He reported that he smokes \"black and mild's every other day (age of onset 14); last use of alcohol (age of onset 8) was 2 months ago. The patient denied further illicit substance use. He has a history of multiple psychiatric hospitalizations and has tried many medications. During the interview the patient abruptly walked away from the conversation and went into the day area.

## 2018-10-02 NOTE — BSMART NOTE
SOCIAL WORK GROUP THERAPY PROGRESS NOTE Group Time:  10:15am   
 
Group Topic:  Coping Skills    C D Issues Group Participation:   
 
Pt moderately involved during group discussion but remained attentive. Looked at tool to build self esteem, sharpen decision making skills and help define one's reality. Discussion included the process of making \"Change\" by answering questions on handout with an emphasis on strengths & weaknesses to support improving one's self esteem. Pt wants more support & needs to be more consistent in outpt tx.

## 2018-10-02 NOTE — ED PROVIDER NOTES
EMERGENCY DEPARTMENT HISTORY AND PHYSICAL EXAM 
 
3:12 AM 
 
 
Date: 10/2/2018 Patient Name: Kath Naylor History of Presenting Illness Chief Complaint Patient presents with  Mental Health Problem  Aggressive behavior History Provided By: Patient and CSB worker Chief Complaint: mental health evaluation Duration:  N/A Timing:  N/A Location: psych Quality: n/a Severity: N/A Modifying Factors: none Associated Symptoms: denies any other associated signs or symptoms Additional History (Context): Kath Naylor is a 26 y.o. male with  Psychiatric disorder, and anxiety, mood disorder who presents with PPD on TDO due to mental health evaluation. Per CSB worker the pt was making outburst and being aggressive towards his aunt who is guardian. Pt has also having auditory hallucination and refusing to take medications. Per the pt he has been aggressive towards his aunt but while presenting denying HI/SI, self injury, and hallucinations. No ETOH abuse or illicit drug abuse. Denies fever, chills, N/V, abdominal pain, back pain, neck pain, urinary sx, or any other associated sx. No other complaints or concerns. PCP: None Current Facility-Administered Medications Medication Dose Route Frequency Provider Last Rate Last Dose  benztropine (COGENTIN) tablet 1 mg  1 mg Oral Q6H PRN Juan Antonio Market, MD      
 benztropine (COGENTIN) injection 1 mg  1 mg IntraMUSCular Q6H PRN Juan Antonio Market, MD      
 haloperidol (HALDOL) tablet 5 mg  5 mg Oral Q4H PRN Juan Antonio Market, MD      
 haloperidol lactate (HALDOL) injection 5 mg  5 mg IntraMUSCular Q4H PRN Juan Antonio Market, MD      
 ibuprofen (MOTRIN) tablet 400 mg  400 mg Oral Q6H PRN Juan Antonio Market, MD      
 LORazepam (ATIVAN) tablet 1 mg  1 mg Oral Q4H PRN Juan Antonio Market, MD      
 LORazepam (ATIVAN) injection 1 mg  1 mg IntraMUSCular Q4H PRN Juan Antonio Market, MD      
  traZODone (DESYREL) tablet 50 mg  50 mg Oral QHS PRN Tresa President, MD      
 influenza vaccine 2018-19 (6 mos+)(PF) (FLUARIX QUAD/FLULAVAL QUAD) injection 0.5 mL  0.5 mL IntraMUSCular PRIOR TO DISCHARGE Shauna Taylor MD      
 
 
Past History Past Medical History: 
Past Medical History:  
Diagnosis Date  ADHD (attention deficit hyperactivity disorder)  Anxiety disorder  Depression  Mood disorder ([facility] [address]  Psychiatric disorder  Psychotic disorder ([facility] [address]  Trauma \"His mother  this past November\" Past Surgical History: 
History reviewed. No pertinent surgical history. Family History: 
Family History Problem Relation Age of Onset 24 [facility] Gaudencio Schizophrenia Mother  Schizophrenia Brother  Schizophrenia Sister Social History: 
Social History Substance Use Topics  Smoking status: Never Smoker  Smokeless tobacco: Never Used  Alcohol use No  
 
 
Allergies: 
No Known Allergies Review of Systems Review of Systems Constitutional: Negative for chills, fatigue and fever. HENT: Negative. Negative for sore throat. Eyes: Negative. Respiratory: Negative for cough and shortness of breath. Cardiovascular: Negative for chest pain and palpitations. Gastrointestinal: Negative for abdominal pain, nausea and vomiting. Genitourinary: Negative for dysuria. Musculoskeletal: Negative. Skin: Negative. Neurological: Negative for dizziness, weakness, light-headedness and headaches. Psychiatric/Behavioral: Negative. Negative for hallucinations, self-injury and suicidal ideas. All other systems reviewed and are negative. Visit Vitals  /70 (BP 1 Location: Left arm, BP Patient Position: Sitting)  Pulse 76  Temp 97 °F (36.1 °C)  Resp 18  Ht 5' 10\" (1.778 m)  Wt 61.2 kg (135 lb)  SpO2 98%  BMI 19.37 kg/m2 Physical Exam / Medical Decision Making I am the first provider for this patient. I reviewed the vital signs, available nursing notes, past medical history, past surgical history, family history and social history. Vital Signs-Reviewed the patient's vital signs. Physical exam: 
General:  Well-developed, well-nourished, no apparent distress. Head:  Normocephalic atraumatic. Eyes:  Pupils midrange extraocular movements intact. No pallor or conjunctival injection. Nose:  No rhinorrhea, inspection grossly normal.   
Ears:  Grossly normal to inspection, no discharge. Mouth:  Mucous membranes moist, no appreciable intraoral lesion. Neck/Back:  Trachea midline, no asymmetry. Chest:  Grossly normal inspection, symmetric chest rise. Pulmonary:  Clear to auscultation bilaterally no wheezes rhonchi or rales. Cardiovascular:  S1-S2 no murmurs rubs or gallops. Abdomen: Soft, nontender, nondistended no guarding rebound or peritoneal signs. Extremities:  Grossly normal to inspection, peripheral pulses intact Neurologic:  Alert and oriented no appreciable focal neurologic deficit Skin:  Warm and dry Psychiatric:  Grossly normal mood and affect cooperative Nursing note reviewed, vital signs reviewed. ED course: 
Patient brought in on ECO aggressive behavior and noncompliant with his psychiatric medication. He is cooperative no toxidrome and normal vital signs saturation is normal on room air Labs unremarkable Based upon my review of the labs, imaging and current status of patient there does not appear to be any acute medical condition that would prevent transfer to a mental health facility. Consult:  Discussed care with KATY  Standard discussion; including history of patients chief complaint, available diagnostic results, and treatment course. Patient will be evaluated, they will proceed with TDO Disposition: 
 
 patient accepted to behavioral health Portions of this chart were created with Dragon medical speech to text program.   Unrecognized errors may be present. Diagnostic Study Results Labs - Recent Results (from the past 12 hour(s)) DRUG SCREEN, URINE Collection Time: 10/02/18  3:00 AM  
Result Value Ref Range BENZODIAZEPINES NEGATIVE  NEG    
 BARBITURATES NEGATIVE  NEG    
 THC (TH-CANNABINOL) NEGATIVE  NEG    
 OPIATES NEGATIVE  NEG    
 PCP(PHENCYCLIDINE) NEGATIVE  NEG    
 COCAINE NEGATIVE  NEG    
 AMPHETAMINES NEGATIVE  NEG METHADONE NEGATIVE  NEG HDSCOM (NOTE) CBC WITH AUTOMATED DIFF Collection Time: 10/02/18  3:10 AM  
Result Value Ref Range WBC 7.6 4.6 - 13.2 K/uL  
 RBC 5.06 4.70 - 5.50 M/uL  
 HGB 14.9 13.0 - 16.0 g/dL HCT 41.7 36.0 - 48.0 % MCV 82.4 74.0 - 97.0 FL  
 MCH 29.4 24.0 - 34.0 PG  
 MCHC 35.7 31.0 - 37.0 g/dL  
 RDW 12.1 11.6 - 14.5 % PLATELET 813 497 - 313 K/uL MPV 10.4 9.2 - 11.8 FL  
 NEUTROPHILS 29 (L) 40 - 73 % LYMPHOCYTES 59 (H) 21 - 52 % MONOCYTES 9 3 - 10 % EOSINOPHILS 3 0 - 5 % BASOPHILS 0 0 - 2 %  
 ABS. NEUTROPHILS 2.2 1.8 - 8.0 K/UL  
 ABS. LYMPHOCYTES 4.5 (H) 0.9 - 3.6 K/UL  
 ABS. MONOCYTES 0.7 0.05 - 1.2 K/UL  
 ABS. EOSINOPHILS 0.2 0.0 - 0.4 K/UL  
 ABS. BASOPHILS 0.0 0.0 - 0.1 K/UL  
 DF AUTOMATED METABOLIC PANEL, BASIC Collection Time: 10/02/18  3:10 AM  
Result Value Ref Range Sodium 141 136 - 145 mmol/L Potassium 3.9 3.5 - 5.5 mmol/L Chloride 105 100 - 108 mmol/L  
 CO2 31 21 - 32 mmol/L Anion gap 5 3.0 - 18 mmol/L Glucose 84 74 - 99 mg/dL BUN 13 7.0 - 18 MG/DL Creatinine 1.12 0.6 - 1.3 MG/DL  
 BUN/Creatinine ratio 12 12 - 20 GFR est AA >60 >60 ml/min/1.73m2 GFR est non-AA >60 >60 ml/min/1.73m2 Calcium 8.9 8.5 - 10.1 MG/DL  
ETHYL ALCOHOL Collection Time: 10/02/18  3:10 AM  
Result Value Ref Range ALCOHOL(ETHYL),SERUM <3 0 - 3 MG/DL Radiologic Studies - No orders to display Diagnosis Clinical Impression: 1. Aggressive behavior 2. Noncompliance with medication regimen Follow-up Information None Current Discharge Medication List  
  
CONTINUE these medications which have NOT CHANGED Details ARIPiprazole (ABILIFY MAINTENA) 400 mg injection 400 mg by IntraMUSCular route once. traZODone (DESYREL) 50 mg tablet Take 2 Tabs by mouth nightly as needed for Sleep. Indications: insomnia associated with depression 
Qty: 30 Tab, Refills: 0  
  
  
 
_______________________________ Attestations: 
Scribe Attestation Allyson Katz acting as a scribe for and in the presence of Weston Quigley MD     
October 02, 2018 at 3:12 AM 
    
Provider Attestation:     
I personally performed the services described in the documentation, reviewed the documentation, as recorded by the scribe in my presence, and it accurately and completely records my words and actions. October 02, 2018 at 3:12 AM - Westno Quigley MD   
_______________________________

## 2018-10-03 PROCEDURE — 65220000003 HC RM SEMIPRIVATE PSYCH

## 2018-10-03 PROCEDURE — 74011250637 HC RX REV CODE- 250/637: Performed by: PSYCHIATRY & NEUROLOGY

## 2018-10-03 RX ORDER — DIVALPROEX SODIUM 250 MG/1
500 TABLET, EXTENDED RELEASE ORAL 2 TIMES DAILY
Status: DISCONTINUED | OUTPATIENT
Start: 2018-10-03 | End: 2018-10-09 | Stop reason: HOSPADM

## 2018-10-03 RX ADMIN — ARIPIPRAZOLE 15 MG: 15 TABLET ORAL at 08:19

## 2018-10-03 RX ADMIN — CLONAZEPAM 0.5 MG: 0.5 TABLET ORAL at 20:43

## 2018-10-03 RX ADMIN — CLONAZEPAM 0.5 MG: 0.5 TABLET ORAL at 08:19

## 2018-10-03 NOTE — BSMART NOTE
OCCUPATIONAL THERAPY PROGRESS NOTE Group Time:  [id number] Attendance: The patient attended full group. Participation: The patient participated with minimal elaboration in the activity. . 
Attention: The patient needed redirection to activity at least once. Interaction: The patient acknowledges others or responds to questions,  with no spontaneous interaction. The patient shows limited awareness of others. Participated as called on. Preoccupied at times. Minimal awareness of what others said.

## 2018-10-03 NOTE — BSMART NOTE
Recreation [facility]:[id number] He presented with a bright affect and actively participate in activity with peer.

## 2018-10-03 NOTE — PROGRESS NOTES
[address] [facility] [address] Inpatient Progress Note Date of Service: 10/03/18 Hospital Day: 1 Subjective/Interval History 10/03/18 Jessica Sanders is a 26 y.o. BLACK OR  male with a history of Shcizophrenia, who presented under TDO. ED note: \"Deo Lazcano is a 26 y. o. male with  Psychiatric disorder, and anxiety, mood disorder who presents with PPD on TDO due to mental health evaluation. Per CSB worker the pt was making outburst and being aggressive towards his aunt who is guardian. Pt has also having auditory hallucination and refusing to take medications. Per the pt he has been aggressive towards his aunt but while presenting denying HI/SI, self injury, and hallucinations. No ETOH abuse or illicit drug abuse. Denies fever, chills, N/V, abdominal pain, back pain, neck pain, urinary sx, or any other associated sx. No other complaints or concerns. Sydnee Funez \". Pt is not a very good historian. Pt was at [facility] in June of 2018, stabilized on Abilify and PRN Clonazepam, received Abilify Sustenna injection. Pt stated he was later in [address]. Pt stated he and his aunt argued, because he \"did not like his medication, and refused to take it. .. \". It is not clear what medication it was. Pt stated he wants to be on Adderall, as it makes him think clear, but this medication was never prescribed to him. 
  
 
Patient interview: Jessica Sanders was interviewed by this writer today. Pt had hearing and was committed to tx. His case managed was also contacted, who reported pt continued to be on Abilify injection and had the last one on 09/28/2018. CM also reported pt gets always agitated on the day of injection. CM also reported that pt continued to be RX oral Abilify he refused to take. Pt conformed that he did not want to take oral medication, but could not recall the name. Pt agreed to augmentation with mood stabilized and will be initiated on Depakote 500 mg bid. Oral Abilify will be discontinued. Objective Visit Vitals  /65 (BP 1 Location: Right arm, BP Patient Position: At rest)  Pulse 91  Temp 98.6 °F (37 °C)  Resp 18  Ht 5' 10\" (1.778 m)  Wt 61.2 kg (135 lb)  SpO2 98%  BMI 19.37 kg/m2 No results found for this or any previous visit (from the past 24 hour(s)). Active Orders Diet DIET REGULAR Frequency: DIET EFFECTIVE NOW Number of Occurrences:  Until Specified Nursing UP AD YAMILETH Frequency: CONTINUOUS Number of Occurrences:  Until Specified VITAL SIGNS PER UNIT ROUTINE Frequency: CONTINUOUS Number of Occurrences:  Until Specified Precaution SEIZURE PRECAUTIONS Frequency: CONTINUOUS Number of Occurrences:  Until Specified SUICIDE PRECAUTIONS Frequency: CONTINUOUS Number of Occurrences:  Until Specified SUICIDE PRECAUTIONS Frequency: CONTINUOUS Number of Occurrences:  Until Specified Medications ARIPiprazole (ABILIFY) tablet 15 mg  
  Frequency: DAILY Dose: 15 mg  
  Route: Oral  
 benztropine (COGENTIN) injection 1 mg Frequency: Q6H PRN Dose: 1 mg Route: IntraMUSCular  
 benztropine (COGENTIN) tablet 1 mg Frequency: Q6H PRN Dose: 1 mg Route: Oral  
 clonazePAM (KlonoPIN) tablet 0.5 mg  
  Frequency: BID Dose: 0.5 mg  
  Route: Oral  
 haloperidol (HALDOL) tablet 5 mg Frequency: Q4H PRN Dose: 5 mg Route: Oral  
 haloperidol lactate (HALDOL) injection 5 mg Frequency: Q4H PRN Dose: 5 mg Route: IntraMUSCular  
 ibuprofen (MOTRIN) tablet 400 mg Frequency: Q6H PRN Dose: 400 mg  
  Route: Oral  
 influenza vaccine 2018-19 (6 mos+)(PF) (FLUARIX QUAD/FLULAVAL QUAD) injection 0.5 mL Frequency: PRIOR TO DISCHARGE Dose: 0.5 mL Route: IntraMUSCular LORazepam (ATIVAN) injection 1 mg Frequency: Q4H PRN Dose: 1 mg Route: IntraMUSCular LORazepam (ATIVAN) tablet 1 mg Frequency: Q4H PRN Dose: 1 mg Route: Oral  
 traZODone (DESYREL) tablet 50 mg  
  Frequency: QHS PRN Dose: 50 mg  
  Route: Oral  
Ancillary Consult BEHAVIORAL HEALTH H&P CONSULT Frequency: ONE TIME Number of Occurrences:  1 Occurrences Mental Status Examination Orientation/Appearance/Hygiene Cognition 26 y.o. BLACK OR  male Hygiene: wnl No gross impairment of concentration/memory Behavior/Social Relatedness Appropriate Musculoskeletal Gait/Station: appropriate Tone (flaccid, cogwheeling, spastic): not assessed Psychomotor (hyperkinetic, hypokinetic): calm Involuntary movements (tics, dyskinesias, akathisa, stereotypies): none Speech   Rate, rhythm, volume, fluency and articulation are appropriate Mood   \"ok. Theadore Guanaco Theadore Guanaco \" Affect    constricted Thought Process marginally Organized Thought Content  Denies self-injurious behavior/thoughts (SIB), suicidal ideation (SI), aggressive behavior or homicidal ideation (HI) Denied delusions Sensorium and Perceptual Disturbances Denied auditory/visual hallucinations Insight  improved Judgment improved Assessment/Plan Psychiatric Diagnoses:  Schizophrenia ([facility] [address] F20.9 Medical Diagnoses: n.a. Psychosocial and contextual factors: SPMI Level of impairment/disability: severe Bashir Boggs is a 26 y.o. who is currently on IPU/commited. 1.  Continue current pharmacotherapy 
- start Depakote 500mg bid 
- d/c Abilify 2. Reviewed instructions, risks, benefits and side effects of medications 3. Encouraged to participate in milieu and group therapy sessions 4. Disposition/Discharge Date: 3-5 days Joi Goodman MD DR. [facility]

## 2018-10-03 NOTE — BSMART NOTE
SW ENCOUNTER: The patient paced his room back and forth for the duration of the interview; however, he was pleasant and responsive; amenable. The patient denied current SI/HI, intent and AVH. We discussed ways to stay calm, anger management and interpersonal effectiveness within the family. Additionally, we addressed medication compliance; the patient expressed readiness for discharge but agreed to stay to appease his grandmother whom feels that he is not ready to be discharged.

## 2018-10-03 NOTE — ROUTINE PROCESS
Treatment team [facility] - Medical Director: _____present Psychiatrist: __x___present Charge nurse: _x____present MSW: __x___present : __x___present Nurse Manager: __x___present Student RNs: _____present Medical Students: _____present Art Therapist: __x___present Clinical Coordinator: _x__present Occupational Therapist: ___x__present : _______ present UR  ___x____ present Crisis Supervisor____x___present Plan of care discussed and updated as appropriate.

## 2018-10-03 NOTE — BH NOTES
GROUP THERAPY PROGRESS NOTE Bashir Boggs is participating in [facility]. Group time: 30 minutes Personal goal for participation: rules/ regulations Goal orientation: community Group therapy participation: minimal 
 
Therapeutic interventions reviewed and discussed: He was educated on the importance of learning your medications and taking your medications after discharge.   
 
Impression of participation: He was alert and calm and cooperative during group he paced while in group he focused on educating himself on his medications and informing his family of any chagres while in the hospital.

## 2018-10-03 NOTE — BH NOTES
Patient stated that he thought that he will be discharged today but guess that his Aunt don't want him at home yet. Patient remained calm on the unit through the shift, denies thoughts to harm self or others and contracted for safety. Patient observed pace on the unit sometimes as he interacted with peer. Patient ate 100% dinner, participated in groups and received his medications as prescribed. Will continue to monitor

## 2018-10-03 NOTE — PROGRESS NOTES
Problem: Suicide/Homicide (Adult/Pediatric) Goal: *STG: Seeks staff when feelings of self harm or harm towards others arise Patient will talk with staff every shift while awake re: feelings of harm should they arise throughout hospital stay. Outcome: Progressing Towards Goal 
Verbally contracts for safety Goal: *STG: Attends activities and groups Patient will attend at least 2 groups/activities every day throughout hospital stay. Outcome: Not Progressing Towards Goal 
Variance: Patient Condition Comments: Not participating Goal: *STG/LTG: Complies with medication therapy Patient will take prescribed medications as ordered every day throughout hospital stay. Outcome: Progressing Towards Goal 
Compliant Comments: Pt is compliant with medication. He went to court this AM and was involuntarily committed. He came back to unit and was calm pt is pacing some and frequently making calls. He has made no attempts to harm self or others. His focus is on discharge. Pt asked for his medication to be written down for him which was done. He asked if the clonazepam helped with aggression. This nurse explained that it helps to keep him calm and therefore helps with aggression. He stated I think I feel it and it is working.

## 2018-10-03 NOTE — BH NOTES
GROUP THERAPY PROGRESS NOTE Chiara Amador is participating in [employer]. Group time: 30 minutes Personal goal for participation: understanding unit rules Goal orientation: community Group therapy participation: active Therapeutic interventions reviewed and discussed: unit rules were read and explained to patient. Impression of participation: calm

## 2018-10-04 PROCEDURE — 74011250637 HC RX REV CODE- 250/637: Performed by: PSYCHIATRY & NEUROLOGY

## 2018-10-04 PROCEDURE — 65220000003 HC RM SEMIPRIVATE PSYCH

## 2018-10-04 RX ADMIN — CLONAZEPAM 0.5 MG: 0.5 TABLET ORAL at 08:02

## 2018-10-04 RX ADMIN — DIVALPROEX SODIUM 500 MG: 250 TABLET, FILM COATED, EXTENDED RELEASE ORAL at 20:03

## 2018-10-04 RX ADMIN — DIVALPROEX SODIUM 500 MG: 250 TABLET, FILM COATED, EXTENDED RELEASE ORAL at 08:02

## 2018-10-04 RX ADMIN — CLONAZEPAM 0.5 MG: 0.5 TABLET ORAL at 20:04

## 2018-10-04 NOTE — BH NOTES
Pt told this nurse he does not want to take that medicine anymore referring to Depakote. Pt was advised to speak with his MD about that.

## 2018-10-04 NOTE — BH NOTES
GROUP THERAPY PROGRESS NOTE Chiara Amador is not participating in Recreational Therapy.  
   
Group time: 30 minutes 
   
Personal goal for participation: To Make Adjustments In Life-Style For Leisure Activities And Recreational Activities, Also To Reduce Obstacles To Leisure Behavior & Address The Patient's Other Treatment Goals By Improving The Patient's Physical, Psychological, Social, And Emotional Well-Being & Solving Problems That Represent Barriers To Participation (Patient Did Not Participate).    
Goal orientation: social 
   
Group therapy participation: Patient Did Not Participate 
   
Therapeutic interventions reviewed and discussed: Teaching a client(s) to analyze and select appropriate activities for him/herself. 
   
Impression of participation: Staff encouraged patient to participate, but patient refused.

## 2018-10-04 NOTE — BH NOTES
Patient refused his Depakote, he stated that the doctor did not talk to him about prescribing him Depakote and he is going to talk to his psychiatrist and his grandma about the medication.

## 2018-10-04 NOTE — BSMART NOTE
Recreation Pancho Sullivan His thought process was disorganized and associations had a paranoid quality. He spoke about wanting a \"gene test\" because he didn't believe his family was really his family, and went on to talk loosely about not knowing if his birthday is his actual birthday. His associations were loose in content, he was difficult to follow, and often jumped from topic to topic.

## 2018-10-04 NOTE — BSMART NOTE
SW ENCOUNTER: The patient paced the room as we dicussed his feelings regarding mediations, expectations of family members, and self-care. The patient expressed that he would like to be discharged tomorrow; denied current SI/HI, intent and AVH. SW COMMUNITY CONTACT: The patient has a medication appointment with Ms. Fariba Gauthier on 10/9/18 at [address] at [facility] ([facility]). The address and contact number is [address] [place] [address]; Phone: [phone].

## 2018-10-04 NOTE — PROGRESS NOTES
[address] [facility] [address] Inpatient Progress Note Date of Service: 10/04/18 Hospital Day: 2 Subjective/Interval History 10/04/18 Rasheeda Solis a 26 y.o. BLACK OR  male with a history of Shcizophrenia, who presented under TDO. ED note: \"Deo Lazcano is a 26 y. o. male with  Psychiatric disorder, and anxiety, mood disorder who presents with PPD on TDO due to mental health evaluation. Per CSB worker the pt was making outburst and being aggressive towards his aunt who is guardian. Pt has also having auditory hallucination and refusing to take medications. Per the pt he has been aggressive towards his aunt but while presenting denying HI/SI, self injury, and hallucinations. No ETOH abuse or illicit drug abuse. Denies fever, chills, N/V, abdominal pain, back pain, neck pain, urinary sx, or any other associated sx. No other complaints or concerns. Juan David España \". Pt is not a very good historian.  Pt was at [facility] in June of 2018, stabilized on Abilify and PRN Clonazepam, received Abilify Sustenna injection.  Pt stated he was later in [facility]. Pt stated he and his aunt argued, because he \"did not like his medication, and refused to take it. .. \".  It is not clear what medication it was. Pt stated he wants to be on Adderall, as it makes him think clear, but this medication was never prescribed to him. 
  
  
Patient interview: Danae Garrettshaw was interviewed by this writer today. Pt had hearing on 10/03/2018 and was committed to tx. On 10/03/2018 pts CM reported pt continued to be on Abilify injection and had the last one on 09/28/2018. CM also reported pt gets always agitated on the day of injection. CM also reported that pt continued to be RX oral Abilify he refused to take. Pt conformed that he did not want to take oral medication, but could not recall the name; CM indicated it was also Abilify.  
Pt agreed to augmentation with mood stabilized and will be initiated on Depakote 500 mg bid. Pt reported he felt somewhat sedated, but was reassured. Will check Depakote level 10/07/2018. Objective Visit Vitals  /72  Pulse 77  Temp 97.1 °F (36.2 °C)  Resp 16  
 Ht 5' 10\" (1.778 m)  Wt 61.2 kg (135 lb)  SpO2 98%  BMI 19.37 kg/m2 No results found for this or any previous visit (from the past 24 hour(s)). Active Orders Diet DIET REGULAR Frequency: DIET EFFECTIVE NOW Number of Occurrences:  Until Specified Nursing UP AD YAMILETH Frequency: CONTINUOUS Number of Occurrences:  Until Specified VITAL SIGNS PER UNIT ROUTINE Frequency: CONTINUOUS Number of Occurrences:  Until Specified Precaution SEIZURE PRECAUTIONS Frequency: CONTINUOUS Number of Occurrences:  Until Specified SUICIDE PRECAUTIONS Frequency: CONTINUOUS Number of Occurrences:  Until Specified SUICIDE PRECAUTIONS Frequency: CONTINUOUS Number of Occurrences:  Until Specified Medications  
 benztropine (COGENTIN) injection 1 mg Frequency: Q6H PRN Dose: 1 mg Route: IntraMUSCular  
 benztropine (COGENTIN) tablet 1 mg Frequency: Q6H PRN Dose: 1 mg Route: Oral  
 clonazePAM (KlonoPIN) tablet 0.5 mg  
  Frequency: BID Dose: 0.5 mg  
  Route: Oral  
 divalproex ER (DEPAKOTE ER) 24 hour tablet 500 mg Frequency: BID Dose: 500 mg  
  Route: Oral  
 haloperidol (HALDOL) tablet 5 mg Frequency: Q4H PRN Dose: 5 mg Route: Oral  
 haloperidol lactate (HALDOL) injection 5 mg Frequency: Q4H PRN Dose: 5 mg Route: IntraMUSCular  
 ibuprofen (MOTRIN) tablet 400 mg Frequency: Q6H PRN Dose: 400 mg  
  Route: Oral  
 influenza vaccine 2018-19 (6 mos+)(PF) (FLUARIX QUAD/FLULAVAL QUAD) injection 0.5 mL Frequency: PRIOR TO DISCHARGE Dose: 0.5 mL Route: IntraMUSCular LORazepam (ATIVAN) injection 1 mg Frequency: Q4H PRN Dose: 1 mg Route: IntraMUSCular LORazepam (ATIVAN) tablet 1 mg Frequency: Q4H PRN Dose: 1 mg Route: Oral  
 traZODone (DESYREL) tablet 50 mg  
  Frequency: QHS PRN Dose: 50 mg  
  Route: Oral  
Ancillary Consult BEHAVIORAL HEALTH H&P CONSULT Frequency: ONE TIME Number of Occurrences:  1 Occurrences  
 
  
Mental Status Examination  
  
Orientation/Appearance/Hygiene Cognition 26 y.o. BLACK OR  male Hygiene: wnl No gross impairment of concentration/memory Behavior/Social Relatedness Appropriate Musculoskeletal Gait/Station: appropriate Tone (flaccid, cogwheeling, spastic): not assessed Psychomotor (hyperkinetic, hypokinetic): calm Involuntary movements (tics, dyskinesias, akathisa, stereotypies): none Speech                          Rate, rhythm, volume, fluency and articulation are appropriate Mood                          \"doing ok. Theadore Guanaco Theadore Guanaco \" Affect                                                   constricted Thought Process marginally Organized w/loose associations Thought Content  Denies self-injurious behavior/thoughts (SIB), suicidal ideation (SI), aggressive behavior or homicidal ideation (HI) Denied delusions Sensorium and Perceptual Disturbances Denied auditory/visual hallucinations Insight              improved Judgment improved  
  
   
Assessment/Plan  
   
Psychiatric Diagnoses:  
  
 Schizophrenia ([facility] [address] F20.9  
  
  
  
Medical Diagnoses: n.a. 
  
Psychosocial and contextual factors: SPMI 
  
Level of impairment/disability: severe 
  
Deo Rosario is a 26 y.o. who is currently on IPU/commited.  
  
1. Continue current pharmacotherapy 
- continue Depakote 500mg bid - Depakote level 10/07/2018Abilify 2. Reviewed instructions, risks, benefits and side effects of medications 3. Encouraged to participate in milieu and group therapy sessions 4. Disposition/Discharge Date: 3-5 days 
  
Joi Goodman MD [facility]

## 2018-10-04 NOTE — BH NOTES
Pt asked MHT why we don't do gene checks here. It was explained to pt that is not something we need to do here. Pt came back stating he needs a gene check to find out who his mother is. He states his aunt says Liyah Herman is his mother but he is not sure and needs a gene check to find out. He stated everyone should get a gene check to find out who their parents are. Pt states he doesn't think Liyah Herman is his mother because he doesn't look like her and the person they say is his father has no traits in common with him. He stated his cousins don't really know if they are adopted or not and should get a gene check. Then he stated Liyah Herman is dead.

## 2018-10-04 NOTE — PROGRESS NOTES
Problem: Suicide/Homicide (Adult/Pediatric) Goal: *STG: Seeks staff when feelings of self harm or harm towards others arise Patient will talk with staff every shift while awake re: feelings of harm should they arise throughout hospital stay. Outcome: Progressing Towards Goal 
Verbally contracts for safety Goal: *STG: Attends activities and groups Patient will attend at least 2 groups/activities every day throughout hospital stay. Outcome: Progressing Towards Goal 
Attends most groups Goal: *STG/LTG: Complies with medication therapy Patient will take prescribed medications as ordered every day throughout hospital stay. Outcome: Progressing Towards Goal 
Compliant Problem: Falls - Risk of 
Goal: *Absence of Falls Document [place] Fall Risk and appropriate interventions in the flowsheet. Patient will wear anti-slip footwear and keep floor clear of personal belongings every shift throughout hospital stay. Outcome: Progressing Towards Goal 
Fall Risk Interventions: 
  
 
  
 
Medication Interventions: Teach patient to arise slowly Comments: Pt pacing some and frequently making phone calls. He is compliant with medications and participating in most groups. He remains focused on discharge and came to the desk asking this nurse since he volunteered to stay can't he write a note to leave. This nurse explained to pt he was involuntarily committed yesterday by the . Pt denies any hallucinations or thoughts of harming self or others. He states he can go home but his aunt is giving up her lease. He states his aunt and CM are going to help him find a place to live. He asked questions about the change in his medication from Abilify to Depakote but did take the Depakote.

## 2018-10-04 NOTE — BH NOTES
Observed patient's behavior throughout the shift. The patient has been pacing back and forth, from the dayroom area to his room. Patient has eaten all meals,, snacks, but refuse to participate in group session (Recreational Therapy). Patient has taken a shower and has been cooperative with the staff during this shift. During this shift, the patient was on the phone talking loud, saying \" I need to know who is my real mother, I need a DNA sample. \" After the conversation on the phone, the patient went back into his room and started pacing again. Patient has been free of falls and other harms and staff will continue to monitor patient's behavior for the duration of the shift.

## 2018-10-05 PROCEDURE — 74011250637 HC RX REV CODE- 250/637: Performed by: PSYCHIATRY & NEUROLOGY

## 2018-10-05 PROCEDURE — 65220000003 HC RM SEMIPRIVATE PSYCH

## 2018-10-05 RX ADMIN — CLONAZEPAM 0.5 MG: 0.5 TABLET ORAL at 08:50

## 2018-10-05 RX ADMIN — CLONAZEPAM 0.5 MG: 0.5 TABLET ORAL at 20:01

## 2018-10-05 RX ADMIN — DIVALPROEX SODIUM 500 MG: 250 TABLET, FILM COATED, EXTENDED RELEASE ORAL at 20:00

## 2018-10-05 RX ADMIN — DIVALPROEX SODIUM 500 MG: 250 TABLET, FILM COATED, EXTENDED RELEASE ORAL at 08:50

## 2018-10-05 NOTE — BH NOTES
Pt appeared to have slept for approximately 8 hours thus far; woke up a couple of times and sat in the day area.  Pt requested juice and went back to his room to rest.

## 2018-10-05 NOTE — BH NOTES
The patient was monitored for safety. Mood was calm. Able to socialize well with his peers. No issues with any behavioral problems. Encouraged to attend all groups. Able to eat all meals. Took all meds. Staff will continue to monitor for safety and locations.

## 2018-10-05 NOTE — BH NOTES
Pt appeared to have slept for approximately 5.5 hours this shift. Pt woke up a couple times, requested juice and sat in the day area.

## 2018-10-05 NOTE — BH NOTES
Patient is currently in the dayarea staring, patient is responding to internal stimuli, will continue to monitor.

## 2018-10-05 NOTE — BSMART NOTE
OCCUPATIONAL THERAPY PROGRESS NOTE Group Time:  [id number] Attendance: The patient attended full group. Much encouragement needed). Participation: The patient participated with moderate elaboration in the activity. Attention: The patient needed redirection to activity at least once. Interaction: The patient occasionally  interacts with others. Interacting more with select peer and more elaboration in responses. Mood seems brighter and more attentive to group.

## 2018-10-05 NOTE — BH NOTES
Patient rests quietly. Offers no complaints. Eats and tolerates evening meal. Gripper socks and 15 minute checks in place for safety. Gait appropriate. Takes medicines without incident. Interacts with staff and peers appropriately but, minimally. Does go to recreation group. Will continue to monitor and support.

## 2018-10-05 NOTE — BH NOTES
GROUP THERAPY PROGRESS NOTE Rick Holland is participating in [employer] Group time: 30 minutes Personal goal for participation: go home Goal orientation: community Group therapy participation: minimal 
 
Therapeutic interventions reviewed and discussed: goals and procedures Impression of participation: encouraged

## 2018-10-05 NOTE — PROGRESS NOTES
[address] Inpatient Progress Note Date of Service: 10/05/18 Hospital Day: 3 Subjective/Interval History 10/05/18 
 
  
Deo Lazcano is a 26 y.o. BLACK OR  male with a history of Shcizophrenia, who presented under TDO. ED note: \"Deo Lazcano is a 26 y. o. male with  Psychiatric disorder, and anxiety, mood disorder who presents with PPD on TDO due to mental health evaluation. Per CSB worker the pt was making outburst and being aggressive towards his aunt who is guardian. Pt has also having auditory hallucination and refusing to take medications. Per the pt he has been aggressive towards his aunt but while presenting denying HI/SI, self injury, and hallucinations. No ETOH abuse or illicit drug abuse. Denies fever, chills, N/V, abdominal pain, back pain, neck pain, urinary sx, or any other associated sx. No other complaints or concerns. Greg Blanc Theadore Guanaco \". Pt is not a very good historian.  Pt was at [facility] in June of 2018, stabilized on Abilify and PRN Clonazepam, received Abilify Sustenna injection.  [facility] Pt stated he and his aunt argued, because he \"did not like his medication, and refused to take it. .. \".  It is not clear what medication it was. Pt stated he wants to be on Adderall, as it makes him think clear, but this medication was never prescribed to him. 
[facility] [place] 
   
Patient interview: Deo Lazcano was interviewed by this writer today.   Pt had hearing on 10/03/2018 and was committed to tx. On 10/03/2018 pts CM reported pt continued to be on Abilify injection and had the last one on 09/28/2018. CM also reported pt gets always agitated on the day of injection.  CM also reported that pt continued to be RX oral Abilify he refused to take. Pt conformed that he did not want to take oral medication, but could not recall the name; CM indicated it was also Abilify.  
Pt agreed to augmentation with mood stabilized and will be initiated on Depakote 500 mg bid. Pt presented no complaints, as per staff, pt had no behavioral problems, but was observed responding to internal stimuli and at some point raised his voice speaking on the phone, demanding to know who his mother is, wanting DNA testing, or wards to that effect. Will check Depakote level 10/07/2018. Objective Visit Vitals  /74 (BP 1 Location: Right arm, BP Patient Position: Sitting)  Pulse 76  Temp 97 °F (36.1 °C)  Resp 18  Ht 5' 10\" (1.778 m)  Wt 61.2 kg (135 lb)  SpO2 98%  BMI 19.37 kg/m2 No results found for this or any previous visit (from the past 24 hour(s)). Active Orders Lab VALPROIC ACID Frequency: ONE TIME Number of Occurrences:  1 Occurrences Diet DIET REGULAR Frequency: DIET EFFECTIVE NOW Number of Occurrences:  Until Specified Nursing UP AD YAMILETH Frequency: CONTINUOUS Number of Occurrences:  Until Specified VITAL SIGNS PER UNIT ROUTINE Frequency: CONTINUOUS Number of Occurrences:  Until Specified Precaution SEIZURE PRECAUTIONS Frequency: CONTINUOUS Number of Occurrences:  Until Specified SUICIDE PRECAUTIONS Frequency: CONTINUOUS Number of Occurrences:  Until Specified SUICIDE PRECAUTIONS Frequency: CONTINUOUS Number of Occurrences:  Until Specified Medications  
 benztropine (COGENTIN) injection 1 mg Frequency: Q6H PRN Dose: 1 mg Route: IntraMUSCular  
 benztropine (COGENTIN) tablet 1 mg Frequency: Q6H PRN Dose: 1 mg Route: Oral  
 clonazePAM (KlonoPIN) tablet 0.5 mg  
  Frequency: BID Dose: 0.5 mg  
  Route: Oral  
 divalproex ER (DEPAKOTE ER) 24 hour tablet 500 mg Frequency: BID Dose: 500 mg  
  Route: Oral  
 haloperidol (HALDOL) tablet 5 mg Frequency: Q4H PRN Dose: 5 mg Route: Oral  
 haloperidol lactate (HALDOL) injection 5 mg Frequency: Q4H PRN Dose: 5 mg Route: IntraMUSCular ibuprofen (MOTRIN) tablet 400 mg Frequency: Q6H PRN Dose: 400 mg  
  Route: Oral  
 influenza vaccine 2018-19 (6 mos+)(PF) (FLUARIX QUAD/FLULAVAL QUAD) injection 0.5 mL Frequency: PRIOR TO DISCHARGE Dose: 0.5 mL Route: IntraMUSCular LORazepam (ATIVAN) injection 1 mg Frequency: Q4H PRN Dose: 1 mg Route: IntraMUSCular LORazepam (ATIVAN) tablet 1 mg Frequency: Q4H PRN Dose: 1 mg Route: Oral  
 traZODone (DESYREL) tablet 50 mg  
  Frequency: QHS PRN Dose: 50 mg  
  Route: Oral  
Ancillary Consult BEHAVIORAL HEALTH H&P CONSULT Frequency: ONE TIME Number of Occurrences:  1 Occurrences Mental Status Examination  
   
Orientation/Appearance/Hygiene Cognition 23 y.o. BLACK OR  male Hygiene: wnl No gross impairment of concentration/memory Behavior/Social Relatedness Appropriate Musculoskeletal Gait/Station: appropriate Tone (flaccid, cogwheeling, spastic): not assessed Psychomotor (hyperkinetic, hypokinetic): calm  
Involuntary movements (tics, dyskinesias, akathisa, stereotypies): none Speech                          Rate, rhythm, volume, fluency and articulation are appropriate Mood                          \"doing all right. Mordecai Reynolds Mordecai Reynolds \" Affect                                                   constricted Thought Process marginally Organized w/loose associations   
Thought Content  Denies self-injurious behavior/thoughts (SIB), suicidal ideation (SI), aggressive behavior or homicidal ideation (HI) Denied delusions Sensorium and Perceptual Disturbances Denied auditory/visual hallucinations Insight              improved Judgment improved    
   
Assessment/Plan  
   
Psychiatric Diagnoses:  
   
 Schizophrenia ([facility] [address] F20.9  
   
   
   
Medical Diagnoses:  n.a. 
   
Psychosocial and contextual factors: SPMI 
   
Level of impairment/disability: severe 
   
Deo Lazcano is a 26 y.o. who is currently on IPU/commited.  
   
 1.  Continue current pharmacotherapy 
- continue Depakote 500mg bid - Depakote level 10/07/2018 - Kevin Watt was given 09/28/18 as per CM 2.  Reviewed instructions, risks, benefits and side effects of medications 3.  Encouraged to participate in milieu and group therapy sessions 4.  Disposition/Discharge Date: 3-5 days 
MD DR. [facility] Psychiatry

## 2018-10-05 NOTE — BSMART NOTE
SW ENCOUNTER: The patient expressed readiness for discharge; denied current SI/HI, intent, AVH, paranoia, and concerns regarding his medications, health and safety. The SW encouraged the patient to update his grandmother on his progress and work towards repairing the strained relationship. The patient seemed to be amenable.

## 2018-10-05 NOTE — BSMART NOTE
SOCIAL WORK GROUP THERAPY PROGRESS NOTE Group Time:  10:15am   
 
Group Topic:  Coping Skills    C D Issues Group Participation:   
 
Pt moderately involved during group discussion but remained attentive. Looked at the \"Process of setting Goals\", the value of a \"daily\" /  \"weekly\" schedule as tool to build self esteem, sharpen decision making skills and help define one's reality. Discussion included the process of identifying strengths & weaknesses to support improving one's self esteem. Pt sees no need to write a \"daily\" schedule. He does admit needs be more consistent in tx.

## 2018-10-06 PROCEDURE — 65220000003 HC RM SEMIPRIVATE PSYCH

## 2018-10-06 PROCEDURE — 74011250637 HC RX REV CODE- 250/637: Performed by: PSYCHIATRY & NEUROLOGY

## 2018-10-06 RX ADMIN — CLONAZEPAM 0.5 MG: 0.5 TABLET ORAL at 20:15

## 2018-10-06 RX ADMIN — DIVALPROEX SODIUM 500 MG: 250 TABLET, FILM COATED, EXTENDED RELEASE ORAL at 20:15

## 2018-10-06 RX ADMIN — CLONAZEPAM 0.5 MG: 0.5 TABLET ORAL at 08:42

## 2018-10-06 RX ADMIN — DIVALPROEX SODIUM 500 MG: 250 TABLET, FILM COATED, EXTENDED RELEASE ORAL at 08:42

## 2018-10-06 NOTE — BH NOTES
Patient cooperative, appeared dull however he contracted for safety. Patient was pacing on the unit for a while this shift, focused at possible discharge by the psychiatrist this evening, he was also focused at seeing his psychiatrist however he was able to speak with Dr Lee Chavarria this evening. Patient ate 100% dinner/snacks, interacted with peers and staff, participated in groups and received his medications as prescribed. Patient continues to utilize non slip footwear as encouraged. Will continue to monitor

## 2018-10-06 NOTE — PROGRESS NOTES
[address] Physician Daily Progress Note Patient:  Miguel King Age:  26 y.o. :  1995 SEX:  male MRN:  [id number] CSN:  [id number] Admit Date:  10/2/2018 Attending:  Luisa Abebe MD 
 
Subjective:  Miguel King is a 26year old AA  male with a history of Shcizophrenia, who was admitted under TDO for worsening psychosis. He was  having auditory hallucination and refusing to take medications. He was  aggressive towards his aunt. He reports better mood today. He is currently on Depakote and Clonazepam. He will likely benefit from the addition of a antipsychotic agent. Current Medications:   
Current Facility-Administered Medications Medication Dose Route Frequency Provider Last Rate Last Dose  divalproex ER (DEPAKOTE ER) 24 hour tablet 500 mg  500 mg Oral BID Luisa Abebe MD   500 mg at 10/06/18 3943  benztropine (COGENTIN) tablet 1 mg  1 mg Oral Q6H PRN Peng Lyle MD      
 benztropine (COGENTIN) injection 1 mg  1 mg IntraMUSCular Q6H PRN Peng Lyle MD      
 haloperidol (HALDOL) tablet 5 mg  5 mg Oral Q4H PRN Peng Lyle MD      
 haloperidol lactate (HALDOL) injection 5 mg  5 mg IntraMUSCular Q4H PRN Peng Lyle MD      
 ibuprofen (MOTRIN) tablet 400 mg  400 mg Oral Q6H PRN Peng Lyle MD      
 LORazepam (ATIVAN) tablet 1 mg  1 mg Oral Q4H PRN Peng Lyle MD      
 LORazepam (ATIVAN) injection 1 mg  1 mg IntraMUSCular Q4H PRN Peng Lyle MD      
 traZODone (DESYREL) tablet 50 mg  50 mg Oral QHS PRN Peng Lyle MD      
 influenza vaccine 2018- (6 mos+)(PF) (FLUARIX QUAD/FLULAVAL QUAD) injection 0.5 mL  0.5 mL IntraMUSCular PRIOR TO DISCHARGE Luisa Abebe MD      
 clonazePAM (KlonoPIN) tablet 0.5 mg  0.5 mg Oral BID Luisa Abebe MD   0.5 mg at 10/06/18 4520 Compliant with medication:  Yes     
 Side effects from medications:  No  
 
Mental Status Exam 
 
Appearance/Hygiene 26 y.o. BLACK OR  male Hygiene: wnl  
Orientation Cognition AO to self/place/date/reason for evaluation No gross impalement of concentration/memory Behavior/Social Relatedness cooperative Musculoskeletal Gait/Station: appropriate Tone (flaccid, cogwheeling, spastic): not assessed Psychomotor (hyperkinetic, hypokinetic): calm Involuntary movements (tics, dyskinesias, akathisa, stereotypies): none Speech                          Rate, rhythm, volume, fluency and articulation are appropriate Mood                          Doing ok. Kindra Rotunda [place] Rotunda I need my Adderall bad Affect                                                   guarded Thought Process W/loose associations 
  Thought Content and Perceptual Disturbances Denies delusions, ideas of reference, overvalued ideas, ruminations, obsession, compulsions, and phobias 
  
Denies self-injurious behavior/thoughts (SIB), suicidal ideation (SI), aggressive behavior or homicidal ideation (HI) 
  Denies auditory and visual hallucinations  
     
Insight              marginal  
Judgment marginal  
 
 
  
Diagnoses/Impressions:       
Psychiatric:  
 Active Problems: 
  [facility] POA: Unknown Medical:  
 
Visit Vitals  /62 (BP 1 Location: Right arm, BP Patient Position: Sitting)  Pulse 78  Temp 97.1 °F (36.2 °C)  Resp 16  
 Ht 5' 10\" (1.778 m)  Wt 61.2 kg (135 lb)  SpO2 98%  BMI 19.37 kg/m2 No lab exists for component: 24H Recommendations/Plan:   
 
[]  Dangerous and will not contract for safety in the community []  Response to medications is not adequate 
 
[]  Appropriate disposition not finalized 
 
[]  Collateral history needed to determine safety [x]  Continue current medications and follow MSE for sxs improvement 
 
[]  Medication changes as follows:  
 
[x]  Continue to build rapport [x]  Supportive psychotherapy [x]  Insight oriented therapy 
 
[]  Group attendance/processing 
 
[x]  Relapse prevention 
 
 
[x]  Somatic Management [x]  Disposition planning               Jorge Posadas MD               10/6/2018          6:33 PM

## 2018-10-06 NOTE — PROGRESS NOTES
Patient slept all 8 hours. Used gripper socks for safety, visual checks by staff q 15' for safety and support as needed.

## 2018-10-06 NOTE — BH NOTES
MHT Note:  Patient received a visit from his Grandmother and cousin. They brought him some clothing and snacks. The visit went well at first and then patient became somewhat agitated when trying to discuss the situation about the grandmother moving and his own housing situations. He was trying to explain his feelings but became somewhat agitated and was unable to verbalize his thoughts. He did not become out of control. Visiting hour was over but before the family left they stated they would be interested in having a family session prior to patient's discharge. Staff will continue to monitor for safety and location .

## 2018-10-06 NOTE — BH NOTES
GROUP THERAPY PROGRESS NOTE Farhad Wells is participating in Recreational Therapy. Group time: 1 hour Goal orientation: relaxation Group therapy participation: active Impression of participation:   Mike Roblero enjoyed the recreation group. He spent time with a peer at the [employer] and talking about music. He also played basketball.

## 2018-10-06 NOTE — PROGRESS NOTES
Problem: Suicide/Homicide (Adult/Pediatric) Goal: *STG: Seeks staff when feelings of self harm or harm towards others arise Patient will talk with staff every shift while awake re: feelings of harm should they arise throughout hospital stay. Outcome: Not Progressing Towards Goal 
Patient will inform staff of feeling of self harm. Problem: Falls - Risk of 
Goal: *Absence of Falls Document Timothy Levels Fall Risk and appropriate interventions in the flowsheet. Patient will wear anti-slip footwear and keep floor clear of personal belongings every shift throughout hospital stay. Outcome: Progressing Towards Goal 
Fall Risk Interventions: 
  
Encourage patient to wear skid free socks. Medication Interventions: Teach patient to arise slowly Comments: Patient affect is flat. He sat quietly in day area initially this am.  He accepted his morning medications and meal.  Patient began interacting with peer later during the day. Patient appeared anxious at times, pacing back and forth. Patients behavior has been appropriate thus far. Patient is monitored every 15 minutes for safety.

## 2018-10-07 PROCEDURE — 65220000003 HC RM SEMIPRIVATE PSYCH

## 2018-10-07 PROCEDURE — 74011250637 HC RX REV CODE- 250/637: Performed by: PSYCHIATRY & NEUROLOGY

## 2018-10-07 RX ADMIN — DIVALPROEX SODIUM 500 MG: 250 TABLET, FILM COATED, EXTENDED RELEASE ORAL at 20:21

## 2018-10-07 RX ADMIN — CLONAZEPAM 0.5 MG: 0.5 TABLET ORAL at 20:21

## 2018-10-07 RX ADMIN — CLONAZEPAM 0.5 MG: 0.5 TABLET ORAL at 08:09

## 2018-10-07 RX ADMIN — DIVALPROEX SODIUM 500 MG: 250 TABLET, FILM COATED, EXTENDED RELEASE ORAL at 08:09

## 2018-10-07 NOTE — PROGRESS NOTES
Problem: Suicide/Homicide (Adult/Pediatric) Goal: *STG: Attends activities and groups Patient will attend at least 2 groups/activities every day throughout hospital stay. Outcome: Progressing Towards Goal 
Patient has been compliant with unit activities. Goal: *STG/LTG: Complies with medication therapy Patient will take prescribed medications as ordered every day throughout hospital stay. Outcome: Progressing Towards Goal 
Patient has been compliant with prescribed medications. Comments: Patient has been visible within milieu this shift. Patient has been social and interactive with peers and staff. Patient has showered and completed daily hygiene care. Patient has been observed pacing throughout milieu at times focused on his labs and discharging in the am. Patient discussed his housing upon discharge with ambivalence related to his grandmother moving out of her apartment. Patient denies SI/HI, denies A/VH, Will monitor for safety with support as needed throughout treatment regimen.

## 2018-10-07 NOTE — PROGRESS NOTES
[address] [facility] Physician Daily Progress Note Patient:  Rico Lassiter Age:  26 y.o. :  1995 SEX:  male MRN:  [id number] [facility]:  [id number] Admit Date:  10/2/2018 Attending:  Ross Edwards MD 
 
Subjective: Luis Antonio Ruelas a 26year old [address] [place] [address] St a history of Shcizophrenia, who was admitted under TDO for worsening psychosis. He was  having auditory hallucination and refusing to take medications. He was  aggressive towards his aunt. He reports better mood today. He denies SI, HI, AVH tyoday. He is currently on Depakote and Clonazepam. .  
 
Current Medications:   
Current Facility-Administered Medications Medication Dose Route Frequency Provider Last Rate Last Dose  divalproex ER (DEPAKOTE ER) 24 hour tablet 500 mg  500 mg Oral BID Ross Edwards MD   500 mg at 10/07/18 7893  benztropine (COGENTIN) tablet 1 mg  1 mg Oral Q6H PRN Ebenezer Barrow MD      
 benztropine (COGENTIN) injection 1 mg  1 mg IntraMUSCular Q6H PRN Ebenezer Barrow MD      
 haloperidol (HALDOL) tablet 5 mg  5 mg Oral Q4H PRN Ebenezer Barrow MD      
 haloperidol lactate (HALDOL) injection 5 mg  5 mg IntraMUSCular Q4H PRN Ebenezer Barrow MD      
 ibuprofen (MOTRIN) tablet 400 mg  400 mg Oral Q6H PRN Ebenezer Barrow MD      
 LORazepam (ATIVAN) tablet 1 mg  1 mg Oral Q4H PRN Ebenezer Barrow MD      
 LORazepam (ATIVAN) injection 1 mg  1 mg IntraMUSCular Q4H PRN Ebenezer Barrow MD      
 traZODone (DESYREL) tablet 50 mg  50 mg Oral QHS PRN Ebenezer Barrow MD      
 influenza vaccine 2018- (6 mos+)(PF) (FLUARIX QUAD/FLULAVAL QUAD) injection 0.5 mL  0.5 mL IntraMUSCular PRIOR TO DISCHARGE Ross Edwards MD      
 clonazePAM (KlonoPIN) tablet 0.5 mg  0.5 mg Oral BID Ross Edwards MD   0.5 mg at 10/07/18 7077 Compliant with medication:  Yes Side effects from medications:  No  
 
Mental Status Exam 
 
 Appearance/Hygiene 23 y.o. BLACK OR  male Hygiene: wnl  
Orientation Cognition AO to self/place/date/reason for evaluation No gross impalement of concentration/memory Behavior/Social Relatedness cooperative Musculoskeletal Gait/Station: appropriate Tone (flaccid, cogwheeling, spastic): not assessed Psychomotor (hyperkinetic, hypokinetic): calm Involuntary movements (tics, dyskinesias, akathisa, stereotypies): none Speech                          Rate, rhythm, volume, fluency and articulation are appropriate Mood                          Doing ok. Sharon Jian Sharon Jian I need my Adderall bad Affect                                                   guarded Thought Process W/loose associations 
    
Thought Content and Perceptual Disturbances Denies delusions, ideas of reference, overvalued ideas, ruminations, obsession, compulsions, and phobias 
   
Denies self-injurious behavior/thoughts (SIB), suicidal ideation (SI), aggressive behavior or homicidal ideation (HI) 
   
Denies auditory and visual hallucinations        
Insight              marginal  
Judgment marginal  
  
 
  
Diagnoses/Impressions:      
Psychiatric:  
 Active Problems: 
  Schizophrenia ([facility] [address] (1/12/2015) POA: Unknown Medical:  
 
Visit Vitals  /64 (BP 1 Location: Left arm, BP Patient Position: Sitting)  Pulse 96  Temp 97.7 °F (36.5 °C)  Resp 18  Ht 5' 10\" (1.778 m)  Wt 61.2 kg (135 lb)  SpO2 98%  BMI 19.37 kg/m2 No lab exists for component: 24H Recommendations/Plan:   
 
[]  Dangerous and will not contract for safety in the community []  Response to medications is not adequate 
 
[]  Appropriate disposition not finalized 
 
[]  Collateral history needed to determine safety [x]  Continue current medications and follow MSE for sxs improvement 
 
[]  Medication changes as follows:  
 
[x]  Continue to build rapport [x]  Supportive psychotherapy [x]  Insight oriented therapy [x]  Group attendance/processing 
 
[x]  Relapse prevention 
 
 
[x]  Somatic Management [x]  Disposition planning               Mansoor Veria Bosworth, MD               10/7/2018          10:44 AM

## 2018-10-07 NOTE — BH NOTES
GROUP THERAPY PROGRESS NOTE Yokocristofer Dayday is participating in [employer]. Group time: 30 minutes Personal goal for participation: verify insight and reality orientation Goal orientation: personal 
 
Group therapy participation: active

## 2018-10-08 LAB — VALPROATE SERPL-MCNC: 88 UG/ML (ref 50–100)

## 2018-10-08 PROCEDURE — 80164 ASSAY DIPROPYLACETIC ACD TOT: CPT

## 2018-10-08 PROCEDURE — 36415 COLL VENOUS BLD VENIPUNCTURE: CPT

## 2018-10-08 PROCEDURE — 74011250637 HC RX REV CODE- 250/637: Performed by: PSYCHIATRY & NEUROLOGY

## 2018-10-08 PROCEDURE — 65220000003 HC RM SEMIPRIVATE PSYCH

## 2018-10-08 RX ADMIN — DIVALPROEX SODIUM 500 MG: 250 TABLET, FILM COATED, EXTENDED RELEASE ORAL at 09:00

## 2018-10-08 RX ADMIN — DIVALPROEX SODIUM 500 MG: 250 TABLET, FILM COATED, EXTENDED RELEASE ORAL at 20:00

## 2018-10-08 RX ADMIN — CLONAZEPAM 0.5 MG: 0.5 TABLET ORAL at 09:00

## 2018-10-08 RX ADMIN — CLONAZEPAM 0.5 MG: 0.5 TABLET ORAL at 20:00

## 2018-10-08 NOTE — BSMART NOTE
OCCUPATIONAL THERAPY PROGRESS NOTE Group Time:  3774 Attendance: The patient attended 1/2 of group. Participation: The patient participated with minimal elaboration in the activity. Attention: The patient needed redirection to activity at least once. Interaction: The patient acknowledges others or responds to questions,  with no spontaneous interaction. Focused on wanting D/C at times. Claims he talked to his grandmother who said he could come back until the lease was up.

## 2018-10-08 NOTE — PROGRESS NOTES
Mr. Mely Casper was seen individually. His case was discussed with staff during the huddle meeting, his chart was reviewed and his case was also discussed with  therapist.  
The patient was admitted 6 days or so ago. A TDO admission, when presented before the local courts, he was committed for further inpatient tx. Compliance has been fairly good, with medications tolerance being excellent. Labs have included the following, CBC has mild anormalities, all of them NCS, 
BMP showed nl electrolytes, nl renal functioning and nl liver function tests. Depakote level is 88, so with in very good range. (Levels were obtained today). UDS was negative Staff informed me that as an OP, the patient is receiving Abilify Maintena, 400 mgs IM every 4 weeks, next injection will be given as an OP tomorrow, after the patient is discharged, if current stability remains. I will see the patient early in the morning, and will proceed with discharge if stable. No evidence at present of danger to self and or others, psychosis and or organicity. The pt is also considered to be psychiatrically competent. So, a discharge tomorrow is expected.

## 2018-10-08 NOTE — BH NOTES
Writer has observed patient's behavior throughout the shift. Patient has taken a shower and has conducted his personal hygiene care and has eaten all meals and snacks. Patient , also has participated in group session (Recreational Therapy). Patient has been interactive with peers and staff, and has been observed pacing throughout this shift several times. Patient has been focusing on being discharged. Patient has been free of falls and other harms and staff will continue to monitor patient's safety and behavior for the duration of the shift.

## 2018-10-08 NOTE — BH NOTES
GROUP THERAPY PROGRESS NOTE Jessica Sanders is not participating in unit activities despite education and encouragement towards treatment compliance.

## 2018-10-08 NOTE — PROGRESS NOTES
Problem: Suicide/Homicide (Adult/Pediatric) Goal: *STG: Seeks staff when feelings of self harm or harm towards others arise Patient will talk with staff every shift while awake re: feelings of harm should they arise throughout hospital stay. Outcome: Progressing Towards Goal 
denie suicidal thoughts. Goal: *STG/LTG: Complies with medication therapy Patient will take prescribed medications as ordered every day throughout hospital stay. Outcome: Progressing Towards Goal 
Compliant. Problem: Aggression and Hostility (Behavioral Health) Goal: *Identify alternative ways to cope with assaultive behavior AEB identifying at least 2 triggers causing his aggression and verbalize a coping skill for each trigger while in the hospital.  
Outcome: Progressing Towards Goal 
Verbalized 2 effective coping skills , walking away from the person you may be arguing with and staying on my medications. Comments: Patient allowed labs to be drawn. Medication compliant. Attending groups.

## 2018-10-08 NOTE — BSMART NOTE
SOCIAL WORK GROUP THERAPY PROGRESS NOTE Group Time:  10:15am 
 
Group Topic:  Coping Skills    C D Issues Group Participation:  . Pt moderately involved during group discussion but remained attentive. Looked at the \"Process of setting Goals\", the value of a \"daily\" /  \"weekly\" schedule as tool to build self esteem, sharpen decision making skills and help define one's reality. Pt still sees no need to create one even after encouraged by peers. Sofi Whittington

## 2018-10-08 NOTE — BH NOTES
Eats and tolerates evening meal. Offers no complaints. Unpleasant phone conversation with grandmother. Gripper socks and 15 minute checks in place for safety. Interacts with staff and peers appropriately. Gait appropriate. Takes medicines without incident. Will continue to monitor and support.

## 2018-10-08 NOTE — BH NOTES
GROUP THERAPY PROGRESS NOTE Rosibel Feliz is participating in [place]. Group time: 30 minutes Personal goal for participation: talk to my Dr 
 
Goal orientation: community Group therapy participation: minimal 
 
Therapeutic interventions reviewed and discussed: goals and procedures Impression of participation: encouraged

## 2018-10-08 NOTE — BSMART NOTE
SW ENCOUNTER: The patient expressed readiness for discharge; denied current paranoia, depression, anger/aggression, SI/HI, intent and AVH. The SW addressed ways that he can rebuild his relationship with his family members for continued support. The patient seemed amenable; exhibited good eye contact.

## 2018-10-08 NOTE — PROGRESS NOTES
Patient had refused am labs, explained to patient the importance of the Depakote level. He agreed to allow them to draw the lab. Reordered Depakote level and lab notified.

## 2018-10-08 NOTE — BSMART NOTE
ART THERAPY GROUP PROGRESS NOTE Group time:1415 The patient refused group. He was unable to focus on the group topic, was focused on discharge and spent the majority of group pacing the unit.

## 2018-10-09 VITALS
DIASTOLIC BLOOD PRESSURE: 78 MMHG | BODY MASS INDEX: 19.33 KG/M2 | RESPIRATION RATE: 17 BRPM | TEMPERATURE: 98.8 F | HEART RATE: 96 BPM | WEIGHT: 135 LBS | SYSTOLIC BLOOD PRESSURE: 128 MMHG | HEIGHT: 70 IN | OXYGEN SATURATION: 98 %

## 2018-10-09 PROCEDURE — 74011250637 HC RX REV CODE- 250/637: Performed by: PSYCHIATRY & NEUROLOGY

## 2018-10-09 RX ORDER — DIVALPROEX SODIUM 500 MG/1
500 TABLET, EXTENDED RELEASE ORAL 2 TIMES DAILY
Qty: 30 TAB | Refills: 1 | Status: SHIPPED | OUTPATIENT
Start: 2018-10-09 | End: 2019-02-26

## 2018-10-09 RX ORDER — ARIPIPRAZOLE 400 MG
400 KIT INTRAMUSCULAR ONCE
Status: DISCONTINUED | OUTPATIENT
Start: 2018-10-19 | End: 2018-10-09 | Stop reason: HOSPADM

## 2018-10-09 RX ADMIN — CLONAZEPAM 0.5 MG: 0.5 TABLET ORAL at 08:02

## 2018-10-09 RX ADMIN — DIVALPROEX SODIUM 500 MG: 250 TABLET, FILM COATED, EXTENDED RELEASE ORAL at 08:02

## 2018-10-09 NOTE — BH NOTES
GROUP THERAPY PROGRESS NOTE Peewee Beaulieu is participating in [place]. Group time: 30 minutes Personal goal for participation: rules/ regulations Goal orientation: community Group therapy participation: active and minimal 
 
Therapeutic interventions reviewed and discussed: He was educated on \"staying well discharge\" during group. Impression of participation: He was alert and cooperative during group he focused on getting discharged and his follow-up care during this group.

## 2018-10-09 NOTE — DISCHARGE SUMMARY
[facility]  MR#: [id number]  : 1995  ACCOUNT #: [de-identified]   ADMIT DATE: 10/02/2018  DISCHARGE DATE: 10/09/2018    REASON FOR ADMISSION:  The patient was admitted to this facility on or about 10/02/2018 under the care of Dr. Nati Rich. Attention invited to his dictated admission note which is self-explanatory. For the purpose of this discharge summary it is important to mention that the patient who has a history of schizophrenia, presented himself under a temporary detaining order with a history of requiring a mental health evaluation after suffering with an outburst and being aggressive towards family members. The chart stated that the patient had been aggressive towards his aunt and, however, he apparently resides with his grandmother to whom he said that the undersigned is the one that he had apparently became rather angry and had lost verbal control. At the time of his initial admission, the patient denied any homicidal or suicidal thoughts, plans to intentions. Denied the use of alcohol and also denied the use of drugs. The patient had been followed as an outpatient by Marely Ruiz, nurse practitioner, with [address] [place] [address] in addition to his receiving injections through the [facility] of Abilify Maintena 400 mg intramuscularly every 4 weeks. It appears that his next injection is on or about 10/19/2018. The patient denied having any problems tolerating his medications and was noted to be up on the examination, otherwise a healthy young man. Review of the labs throughout his hospital stay included multiple tests being performed including a CBC with differential that basically showed normal test results. The patient's blood chemistry showed normal electrolytes with a sodium of 141, potassium 3.9, chloride 105, in addition to a BUN of 13 and creatinine of 1.12.   The patient's estimated GFR for an -American about 60 mL per minute. Depakote levels with her Depakote extended release dose of 500 mg twice a day rather good at 88 mcg/mL. Urine drug screen done upon admission was reported to be negative. DISCHARGE DIAGNOSES:  AXIS I:  Schizophrenia, chronic, undifferentiated type, rule out schizoaffective disorder, bipolar type. AXIS II:  Noncontributory. AXIS III:  Noncontributory. HOSPITAL COURSE:  The patient was admitted to the special treatment unit program due to his history of control loss. As stated above under the auspices of a temporary detaining order; however, when presented before the vocal cords, he was also considered to be in need of further involuntary inpatient treatment. He remained an involuntary patient throughout his hospital stay. Treatment compliant. The patient was maintained on this and prescriptions for Depakote extended release 500 mg twice a day and clonazepam 0.5 mg twice a day, in addition to being strongly supportive on his prescription for Abilify Maintena 400 mg intramuscularly every 4 weeks. The patient showed no evidence of medication-related side effects throughout his hospital stay. CONDITION UPON DISCHARGE:  Upon examination today, the patient is found to be not psychotic. There is no evidence of organicity. He is not suicidal, he is not homicidal and currently he is found to be psychiatrically competent in that he knows what he is doing, he knows the difference between right and wrong and knows what the consequences from his actions are. PROGNOSIS:  At least fair with outpatient treatment compliance. DISCHARGE MEDICATIONS:  The patient was discharged on prescriptions for Depakote extended release 500 mg, #30 with 1 refill, to take 1 twice daily, clonazepam 0.5 mg tablets, #30 without refills, to take 1 twice a day.   He is to receive his next injection of Abilify Maintena 400 mg intramuscularly on or about 10/19/2018 at the [employer] [facility].    DISPOSITION:  Outpatient followup at [address] [place] [address] with Abbie Song, nurse practitioner and also with the [facility] as indicated , to continue with his Abilify Maintena injections. The patient will remain still at home with his grandmother, a place where he is planning on returning back to.       Maggie Castro MD.       FV/MARILEE  D: 10/09/2018 10:56     T: 10/09/2018 14:02  JOB #: [id number]

## 2018-10-09 NOTE — DISCHARGE INSTRUCTIONS
BEHAVIORAL HEALTH NURSING DISCHARGE NOTE      The following personal items collected during your admission are returned to you:   Dental Appliance: Dental Appliances: None  Vision: Visual Aid: None  Hearing Aid:    Jewelry: Jewelry: None  Clothing: Clothing: Belt, Footwear, Jacket/Coat, Pants, Shirt, Undergarments  Other Valuables: Other Valuables: None  Valuables sent to safe: Personal Items Sent to Safe: none      PATIENT INSTRUCTIONS:             The discharge information has been reviewed with the patient. The patient verbalized understanding.         Patient armband removed and shredded

## 2018-10-09 NOTE — PROGRESS NOTES
Patient received discharge instructions. Verbalize understanding of his medications and follow up appt. for his next appt. for injection. Patient waiting for his transportation.

## 2018-10-09 NOTE — PROGRESS NOTES
Pt was seen and case was discussed with staff. The general concensus is that the patient can be discharged to OP. Please see dictated DS note.

## 2018-10-09 NOTE — BSMART NOTE
SOCIAL WORK GROUP THERAPY PROGRESS NOTE Group Time:  10am 
 
Group Topic:  Coping Skills Group Participation: Pt was unavailable for group due to working with S W and nursing staff on his d/c.

## 2018-10-18 RX ORDER — ARIPIPRAZOLE 400 MG
400 KIT INTRAMUSCULAR ONCE
Status: COMPLETED | OUTPATIENT
Start: 2018-10-19 | End: 2018-10-19

## 2018-10-19 ENCOUNTER — HOSPITAL ENCOUNTER (OUTPATIENT)
Dept: INFUSION THERAPY | Age: 23
Discharge: HOME | End: 2018-10-19
Payer: MEDICAID

## 2018-10-19 VITALS
RESPIRATION RATE: 18 BRPM | HEART RATE: 88 BPM | DIASTOLIC BLOOD PRESSURE: 78 MMHG | SYSTOLIC BLOOD PRESSURE: 125 MMHG | OXYGEN SATURATION: 99 % | TEMPERATURE: 97.4 F

## 2018-10-19 PROCEDURE — 74011250636 HC RX REV CODE- 250/636

## 2018-10-19 PROCEDURE — 96372 THER/PROPH/DIAG INJ SC/IM: CPT

## 2018-10-19 RX ADMIN — ARIPIPRAZOLE 400 MG: KIT at 13:31

## 2018-10-19 NOTE — PROGRESS NOTES
[facility] Progress Note Date: 2018 Name: Jose Mar MRN: [id number] : 1995 Abilify Injection Mr. Fred Israel was assessed and education was provided. Mr. Robynn Aschoff vitals were reviewed and patient was observed for 5 minutes prior to treatment. Visit Vitals /78 (BP 1 Location: Right arm, BP Patient Position: Sitting) Pulse 88 Temp 97.4 °F (36.3 °C) Resp 18 SpO2 99% Ability 300 mg was administered IM in the left deltoid. No bleeding or redness noted. Band-aid applied to to the site. Mr. Fred Israel tolerated well, and had no complaints. Patient politely declined to stay for an observation period at this time. Patient armband removed and shredded. Mr. Fred Israel was discharged from [place] [address] in stable condition at 1335. He is to return on 2018 at 1300 for his next appointment. Jeane Hastings RN 2018

## 2018-11-09 RX ORDER — ARIPIPRAZOLE 400 MG
400 KIT INTRAMUSCULAR ONCE
Status: DISPENSED | OUTPATIENT
Start: 2018-11-16 | End: 2018-11-17

## 2018-11-16 ENCOUNTER — HOSPITAL ENCOUNTER (OUTPATIENT)
Dept: INFUSION THERAPY | Age: 23
Discharge: HOME | End: 2018-11-16

## 2018-11-18 ENCOUNTER — HOSPITAL ENCOUNTER (EMERGENCY)
Age: 23
Discharge: HOME | End: 2018-11-18
Attending: EMERGENCY MEDICINE
Payer: MEDICAID

## 2018-11-18 VITALS
DIASTOLIC BLOOD PRESSURE: 73 MMHG | RESPIRATION RATE: 16 BRPM | SYSTOLIC BLOOD PRESSURE: 127 MMHG | TEMPERATURE: 97.3 F | OXYGEN SATURATION: 98 % | HEART RATE: 73 BPM

## 2018-11-18 DIAGNOSIS — F20.3 UNDIFFERENTIATED SCHIZOPHRENIA (HCC): Primary | ICD-10-CM

## 2018-11-18 LAB
ALBUMIN SERPL-MCNC: 3.8 G/DL (ref 3.4–5)
ALBUMIN/GLOB SERPL: 1.2 {RATIO} (ref 0.8–1.7)
ALP SERPL-CCNC: 92 U/L (ref 45–117)
ALT SERPL-CCNC: 24 U/L (ref 16–61)
AMPHET UR QL SCN: NEGATIVE
ANION GAP SERPL CALC-SCNC: 4 MMOL/L (ref 3–18)
AST SERPL-CCNC: 13 U/L (ref 15–37)
BARBITURATES UR QL SCN: NEGATIVE
BASOPHILS # BLD: 0 K/UL (ref 0–0.1)
BASOPHILS NFR BLD: 0 % (ref 0–2)
BENZODIAZ UR QL: NEGATIVE
BILIRUB SERPL-MCNC: 0.2 MG/DL (ref 0.2–1)
BUN SERPL-MCNC: 9 MG/DL (ref 7–18)
BUN/CREAT SERPL: 9 (ref 12–20)
CALCIUM SERPL-MCNC: 8.8 MG/DL (ref 8.5–10.1)
CANNABINOIDS UR QL SCN: NEGATIVE
CHLORIDE SERPL-SCNC: 106 MMOL/L (ref 100–108)
CO2 SERPL-SCNC: 31 MMOL/L (ref 21–32)
COCAINE UR QL SCN: NEGATIVE
CREAT SERPL-MCNC: 1.04 MG/DL (ref 0.6–1.3)
DIFFERENTIAL METHOD BLD: ABNORMAL
EOSINOPHIL # BLD: 0.2 K/UL (ref 0–0.4)
EOSINOPHIL NFR BLD: 4 % (ref 0–5)
ERYTHROCYTE [DISTWIDTH] IN BLOOD BY AUTOMATED COUNT: 13 % (ref 11.6–14.5)
ETHANOL SERPL-MCNC: <3 MG/DL (ref 0–3)
GLOBULIN SER CALC-MCNC: 3.3 G/DL (ref 2–4)
GLUCOSE SERPL-MCNC: 85 MG/DL (ref 74–99)
HCT VFR BLD AUTO: 45.4 % (ref 36–48)
HDSCOM,HDSCOM: NORMAL
HGB BLD-MCNC: 15.6 G/DL (ref 13–16)
LYMPHOCYTES # BLD: 2.6 K/UL (ref 0.9–3.6)
LYMPHOCYTES NFR BLD: 47 % (ref 21–52)
MCH RBC QN AUTO: 29.9 PG (ref 24–34)
MCHC RBC AUTO-ENTMCNC: 34.4 G/DL (ref 31–37)
MCV RBC AUTO: 87.1 FL (ref 74–97)
METHADONE UR QL: NEGATIVE
MONOCYTES # BLD: 0.6 K/UL (ref 0.05–1.2)
MONOCYTES NFR BLD: 10 % (ref 3–10)
NEUTS SEG # BLD: 2.2 K/UL (ref 1.8–8)
NEUTS SEG NFR BLD: 39 % (ref 40–73)
OPIATES UR QL: NEGATIVE
PCP UR QL: NEGATIVE
PLATELET # BLD AUTO: 199 K/UL (ref 135–420)
PMV BLD AUTO: 10.9 FL (ref 9.2–11.8)
POTASSIUM SERPL-SCNC: 4 MMOL/L (ref 3.5–5.5)
PROT SERPL-MCNC: 7.1 G/DL (ref 6.4–8.2)
RBC # BLD AUTO: 5.21 M/UL (ref 4.7–5.5)
SODIUM SERPL-SCNC: 141 MMOL/L (ref 136–145)
WBC # BLD AUTO: 5.5 K/UL (ref 4.6–13.2)

## 2018-11-18 PROCEDURE — 80307 DRUG TEST PRSMV CHEM ANLYZR: CPT

## 2018-11-18 PROCEDURE — 80053 COMPREHEN METABOLIC PANEL: CPT

## 2018-11-18 PROCEDURE — 85025 COMPLETE CBC W/AUTO DIFF WBC: CPT

## 2018-11-18 PROCEDURE — 99283 EMERGENCY DEPT VISIT LOW MDM: CPT

## 2018-11-18 NOTE — ED TRIAGE NOTES
Pt presents to the ED in police custody under an ECO. Pt's charityan reported that pt is having hallucinations and aggressive behavior due to not taking his medications. Pt appears to be calm and cooperative with linear thought process. Pt states \" I just need to listen to music\".
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
## 2018-11-19 NOTE — ED NOTES
Bedside shift change report given to Evelyn Anaya RN (oncoming nurse) by Trever Gunter RN (offgoing nurse). Report included the following information SBAR, ED Summary, Intake/Output, MAR and Recent Results.

## 2018-11-19 NOTE — BSMART NOTE
Per Liang LUI clinician: Accepted on TDO to [facility] by Karolyn Hanks on CIE unit. Report number [id number].

## 2018-11-19 NOTE — ED NOTES
EMTALA form completed and signed by Marilyn Rivera RN, [employer], and [employer] OD[employer]. Hard copy sent with pt and transportation. Copy of EMTALA given to  to be scanned into pt EMR.

## 2018-12-07 RX ORDER — ARIPIPRAZOLE 400 MG
400 KIT INTRAMUSCULAR ONCE
Status: COMPLETED | OUTPATIENT
Start: 2018-12-14 | End: 2018-12-14

## 2018-12-14 ENCOUNTER — HOSPITAL ENCOUNTER (OUTPATIENT)
Dept: INFUSION THERAPY | Age: 23
Discharge: HOME | End: 2018-12-14
Payer: MEDICAID

## 2018-12-14 VITALS
OXYGEN SATURATION: 98 % | SYSTOLIC BLOOD PRESSURE: 129 MMHG | DIASTOLIC BLOOD PRESSURE: 79 MMHG | RESPIRATION RATE: 18 BRPM | TEMPERATURE: 98.4 F | HEART RATE: 90 BPM

## 2018-12-14 PROCEDURE — 74011250636 HC RX REV CODE- 250/636

## 2018-12-14 PROCEDURE — 96372 THER/PROPH/DIAG INJ SC/IM: CPT

## 2018-12-14 RX ADMIN — ARIPIPRAZOLE 400 MG: KIT at 15:09

## 2018-12-14 NOTE — PROGRESS NOTES
[facility] Progress Note    Date: 2018    Name: Royal Varma    MRN: [id number]         : 1995    Abilify Injection    Mr. Rita Santos was assessed and education was provided. Mr. Tomasa Meza vitals were reviewed and patient was observed for 5 minutes prior to treatment. Visit Vitals  /79 (BP 1 Location: Right arm, BP Patient Position: Sitting)   Pulse 90   Temp 98.4 °F (36.9 °C)   Resp 18   SpO2 98%       Ability 300 mg was administered IM in the right deltoid. No bleeding or redness noted. Band-aid applied to to the site. Mr. Rita Santos tolerated well, and had no complaints. Patient politely declined to stay for an observation period at this time. Patient armband removed and shredded. Mr. Rita Santos was discharged from [place] [address] in stable condition at 1515. He is to return on 2019 at 1300 for his next appointment.     Bertha Spears RN  2018

## 2019-01-04 RX ORDER — ARIPIPRAZOLE 400 MG
400 KIT INTRAMUSCULAR ONCE
Status: COMPLETED | OUTPATIENT
Start: 2019-01-11 | End: 2019-01-11

## 2019-01-11 ENCOUNTER — APPOINTMENT (OUTPATIENT)
Dept: INFUSION THERAPY | Age: 24
End: 2019-01-11
Payer: MEDICAID

## 2019-01-11 ENCOUNTER — HOSPITAL ENCOUNTER (OUTPATIENT)
Dept: INFUSION THERAPY | Age: 24
Discharge: HOME | End: 2019-01-11
Payer: MEDICAID

## 2019-01-11 VITALS
HEART RATE: 88 BPM | RESPIRATION RATE: 18 BRPM | TEMPERATURE: 98 F | DIASTOLIC BLOOD PRESSURE: 73 MMHG | SYSTOLIC BLOOD PRESSURE: 123 MMHG

## 2019-01-11 PROCEDURE — 96372 THER/PROPH/DIAG INJ SC/IM: CPT

## 2019-01-11 PROCEDURE — 74011250636 HC RX REV CODE- 250/636

## 2019-01-11 RX ORDER — ZOLPIDEM TARTRATE 5 MG/1
TABLET ORAL
COMMUNITY
End: 2019-02-26

## 2019-01-11 RX ADMIN — ARIPIPRAZOLE 400 MG: KIT at 13:10

## 2019-01-11 NOTE — PROGRESS NOTES
[facility] Progress Note Date: 2019 Name: Ness Porras MRN: [id number] : 1995 Abilify Injection Mr. Daniel Rice was assessed and education was provided. Mr. Carmen Dexter vitals were reviewed and patient was observed for 5 minutes prior to treatment. Visit Vitals /73 (BP 1 Location: Left arm, BP Patient Position: At rest;Sitting) Pulse 88 Temp 98 °F (36.7 °C) Resp 18 Ability 300 mg was administered IM in the left deltoid. No bleeding or redness noted. Band-aid applied to to the site. Mr. Daniel Rice tolerated well, and had no complaints. Patient politely declined to stay for an observation period at this time. Patient armband removed and shredded. Mr. Daniel Rice was discharged from [place] [address] in stable condition at 1315. He is to return on 2019 at 1300 for his next appointment. Pily Singh RN 
2019 
[id number]

## 2019-02-01 RX ORDER — ARIPIPRAZOLE 400 MG
400 KIT INTRAMUSCULAR ONCE
Status: COMPLETED | OUTPATIENT
Start: 2019-02-08 | End: 2019-02-08

## 2019-02-08 ENCOUNTER — HOSPITAL ENCOUNTER (OUTPATIENT)
Dept: INFUSION THERAPY | Age: 24
Discharge: HOME | End: 2019-02-08
Payer: MEDICAID

## 2019-02-08 VITALS
RESPIRATION RATE: 18 BRPM | OXYGEN SATURATION: 98 % | SYSTOLIC BLOOD PRESSURE: 128 MMHG | HEART RATE: 86 BPM | TEMPERATURE: 97.6 F | DIASTOLIC BLOOD PRESSURE: 86 MMHG

## 2019-02-08 PROCEDURE — 74011250636 HC RX REV CODE- 250/636

## 2019-02-08 PROCEDURE — 96372 THER/PROPH/DIAG INJ SC/IM: CPT

## 2019-02-08 RX ADMIN — ARIPIPRAZOLE 400 MG: KIT at 11:05

## 2019-02-08 NOTE — PROGRESS NOTES
[facility] Progress Note Date: 2019 Name: Gissel Lovelace MRN: [id number] : 1995 Abilify Injection Mr. Get Blas was assessed and education was provided. Mr. Luis Swann vitals were reviewed and patient was observed for 5 minutes prior to treatment. Visit Vitals /86 (BP 1 Location: Left arm, BP Patient Position: Sitting) Pulse 86 Temp 97.6 °F (36.4 °C) Resp 18 SpO2 98% Ability 300 mg was administered IM in the left deltoid. No bleeding or redness noted. Band-aid applied to to the site. Mr. Get Blas tolerated well, and had no complaints. Patient politely declined to stay for an observation period at this time. Patient armband removed and shredded. Mr. Get Blas was discharged from [place] [address] in stable condition at 1110. He is to return on 2019 at 1300 for his next appointment. Ankur Lyle RN 2019 
1110

## 2019-02-24 ENCOUNTER — HOSPITAL ENCOUNTER (INPATIENT)
Age: 24
LOS: 2 days | Discharge: HOME | DRG: 750 | End: 2019-02-26
Attending: EMERGENCY MEDICINE | Admitting: PSYCHIATRY & NEUROLOGY
Payer: MEDICAID

## 2019-02-24 DIAGNOSIS — F20.9 SCHIZOPHRENIA, UNSPECIFIED TYPE (HCC): Primary | ICD-10-CM

## 2019-02-24 LAB
ALBUMIN SERPL-MCNC: 4.4 G/DL (ref 3.4–5)
ALBUMIN/GLOB SERPL: 1.4 {RATIO} (ref 0.8–1.7)
ALP SERPL-CCNC: 89 U/L (ref 45–117)
ALT SERPL-CCNC: 18 U/L (ref 16–61)
AMPHET UR QL SCN: NEGATIVE
ANION GAP SERPL CALC-SCNC: 6 MMOL/L (ref 3–18)
APPEARANCE UR: CLEAR
AST SERPL-CCNC: 15 U/L (ref 15–37)
BARBITURATES UR QL SCN: NEGATIVE
BASOPHILS # BLD: 0 K/UL (ref 0–0.1)
BASOPHILS NFR BLD: 0 % (ref 0–2)
BENZODIAZ UR QL: NEGATIVE
BILIRUB SERPL-MCNC: 0.4 MG/DL (ref 0.2–1)
BILIRUB UR QL: NEGATIVE
BUN SERPL-MCNC: 18 MG/DL (ref 7–18)
BUN/CREAT SERPL: 13 (ref 12–20)
CALCIUM SERPL-MCNC: 8.9 MG/DL (ref 8.5–10.1)
CANNABINOIDS UR QL SCN: NEGATIVE
CHLORIDE SERPL-SCNC: 107 MMOL/L (ref 100–108)
CO2 SERPL-SCNC: 31 MMOL/L (ref 21–32)
COCAINE UR QL SCN: NEGATIVE
COLOR UR: YELLOW
CREAT SERPL-MCNC: 1.35 MG/DL (ref 0.6–1.3)
DIFFERENTIAL METHOD BLD: ABNORMAL
EOSINOPHIL # BLD: 0.2 K/UL (ref 0–0.4)
EOSINOPHIL NFR BLD: 3 % (ref 0–5)
ERYTHROCYTE [DISTWIDTH] IN BLOOD BY AUTOMATED COUNT: 12.6 % (ref 11.6–14.5)
ETHANOL SERPL-MCNC: <3 MG/DL (ref 0–3)
GLOBULIN SER CALC-MCNC: 3.2 G/DL (ref 2–4)
GLUCOSE SERPL-MCNC: 85 MG/DL (ref 74–99)
GLUCOSE UR STRIP.AUTO-MCNC: NEGATIVE MG/DL
HCT VFR BLD AUTO: 46.1 % (ref 36–48)
HDSCOM,HDSCOM: NORMAL
HGB BLD-MCNC: 16.4 G/DL (ref 13–16)
HGB UR QL STRIP: NEGATIVE
KETONES UR QL STRIP.AUTO: ABNORMAL MG/DL
LEUKOCYTE ESTERASE UR QL STRIP.AUTO: NEGATIVE
LYMPHOCYTES # BLD: 4.1 K/UL (ref 0.9–3.6)
LYMPHOCYTES NFR BLD: 51 % (ref 21–52)
MCH RBC QN AUTO: 31.4 PG (ref 24–34)
MCHC RBC AUTO-ENTMCNC: 35.6 G/DL (ref 31–37)
MCV RBC AUTO: 88.3 FL (ref 74–97)
METHADONE UR QL: NEGATIVE
MONOCYTES # BLD: 1.1 K/UL (ref 0.05–1.2)
MONOCYTES NFR BLD: 13 % (ref 3–10)
NEUTS SEG # BLD: 2.7 K/UL (ref 1.8–8)
NEUTS SEG NFR BLD: 33 % (ref 40–73)
NITRITE UR QL STRIP.AUTO: NEGATIVE
OPIATES UR QL: NEGATIVE
PCP UR QL: NEGATIVE
PH UR STRIP: 6 [PH] (ref 5–8)
PLATELET # BLD AUTO: 197 K/UL (ref 135–420)
PMV BLD AUTO: 10.3 FL (ref 9.2–11.8)
POTASSIUM SERPL-SCNC: 3.9 MMOL/L (ref 3.5–5.5)
PROT SERPL-MCNC: 7.6 G/DL (ref 6.4–8.2)
PROT UR STRIP-MCNC: NEGATIVE MG/DL
RBC # BLD AUTO: 5.22 M/UL (ref 4.7–5.5)
SODIUM SERPL-SCNC: 144 MMOL/L (ref 136–145)
SP GR UR REFRACTOMETRY: >1.03 (ref 1–1.03)
UROBILINOGEN UR QL STRIP.AUTO: 1 EU/DL (ref 0.2–1)
VALPROATE SERPL-MCNC: 36 UG/ML (ref 50–100)
WBC # BLD AUTO: 8.1 K/UL (ref 4.6–13.2)

## 2019-02-24 PROCEDURE — 80164 ASSAY DIPROPYLACETIC ACD TOT: CPT

## 2019-02-24 PROCEDURE — 99283 EMERGENCY DEPT VISIT LOW MDM: CPT

## 2019-02-24 PROCEDURE — 74011250637 HC RX REV CODE- 250/637: Performed by: PSYCHIATRY & NEUROLOGY

## 2019-02-24 PROCEDURE — 80053 COMPREHEN METABOLIC PANEL: CPT

## 2019-02-24 PROCEDURE — 65220000005 HC RM SEMIPRIVATE PSYCH 3 OR 4 BED

## 2019-02-24 PROCEDURE — 85025 COMPLETE CBC W/AUTO DIFF WBC: CPT

## 2019-02-24 PROCEDURE — 80307 DRUG TEST PRSMV CHEM ANLYZR: CPT

## 2019-02-24 PROCEDURE — 81003 URINALYSIS AUTO W/O SCOPE: CPT

## 2019-02-24 RX ORDER — TRAZODONE HYDROCHLORIDE 50 MG/1
50 TABLET ORAL
Status: DISCONTINUED | OUTPATIENT
Start: 2019-02-24 | End: 2019-02-26 | Stop reason: HOSPADM

## 2019-02-24 RX ORDER — HALOPERIDOL 5 MG/1
5 TABLET ORAL
Status: DISCONTINUED | OUTPATIENT
Start: 2019-02-24 | End: 2019-02-26 | Stop reason: HOSPADM

## 2019-02-24 RX ORDER — DIVALPROEX SODIUM 500 MG/1
500 TABLET, EXTENDED RELEASE ORAL 2 TIMES DAILY
Status: DISCONTINUED | OUTPATIENT
Start: 2019-02-24 | End: 2019-02-26 | Stop reason: HOSPADM

## 2019-02-24 RX ORDER — LORAZEPAM 1 MG/1
1 TABLET ORAL
Status: DISCONTINUED | OUTPATIENT
Start: 2019-02-24 | End: 2019-02-26 | Stop reason: HOSPADM

## 2019-02-24 RX ORDER — ARIPIPRAZOLE 5 MG/1
5 TABLET ORAL
Status: DISCONTINUED | OUTPATIENT
Start: 2019-02-25 | End: 2019-02-26 | Stop reason: HOSPADM

## 2019-02-24 RX ORDER — HALOPERIDOL 5 MG/ML
5 INJECTION INTRAMUSCULAR
Status: DISCONTINUED | OUTPATIENT
Start: 2019-02-24 | End: 2019-02-26 | Stop reason: HOSPADM

## 2019-02-24 RX ORDER — LORAZEPAM 1 MG/1
2 TABLET ORAL
Status: DISCONTINUED | OUTPATIENT
Start: 2019-02-24 | End: 2019-02-26 | Stop reason: HOSPADM

## 2019-02-24 RX ORDER — BENZTROPINE MESYLATE 1 MG/ML
1-2 INJECTION INTRAMUSCULAR; INTRAVENOUS
Status: DISCONTINUED | OUTPATIENT
Start: 2019-02-24 | End: 2019-02-26 | Stop reason: HOSPADM

## 2019-02-24 RX ORDER — LORAZEPAM 2 MG/ML
2 INJECTION INTRAMUSCULAR
Status: DISCONTINUED | OUTPATIENT
Start: 2019-02-24 | End: 2019-02-26 | Stop reason: HOSPADM

## 2019-02-24 RX ORDER — BENZTROPINE MESYLATE 1 MG/1
1-2 TABLET ORAL
Status: DISCONTINUED | OUTPATIENT
Start: 2019-02-24 | End: 2019-02-26 | Stop reason: HOSPADM

## 2019-02-24 RX ORDER — LORAZEPAM 2 MG/ML
1 INJECTION INTRAMUSCULAR
Status: DISCONTINUED | OUTPATIENT
Start: 2019-02-24 | End: 2019-02-26 | Stop reason: HOSPADM

## 2019-02-24 RX ORDER — ARIPIPRAZOLE 5 MG/1
5 TABLET ORAL DAILY
Status: DISCONTINUED | OUTPATIENT
Start: 2019-02-24 | End: 2019-02-24

## 2019-02-24 RX ADMIN — ARIPIPRAZOLE 5 MG: 5 TABLET ORAL at 08:14

## 2019-02-24 RX ADMIN — DIVALPROEX SODIUM 500 MG: 500 TABLET, FILM COATED, EXTENDED RELEASE ORAL at 20:23

## 2019-02-24 RX ADMIN — DIVALPROEX SODIUM 500 MG: 500 TABLET, FILM COATED, EXTENDED RELEASE ORAL at 08:14

## 2019-02-24 RX ADMIN — TRAZODONE HYDROCHLORIDE 50 MG: 50 TABLET ORAL at 20:22

## 2019-02-24 RX ADMIN — LORAZEPAM 2 MG: 1 TABLET ORAL at 05:11

## 2019-02-24 NOTE — BH NOTES
Pt was escorted to [address] from [facility] ED by security via wheelchair with his belongings. Pts belongings were searched for contraband and inventoried. Pt presented disheveled and paranoid and exhibited delayed speech and thought blocking. Pt denied si/hi and avh but was responding to internal stimuli. Pt denied current alcohol, tobacco and drug use. Pt did endorse decreased appetite and sleep disruption; pt reported sleeping approximately 2 hours nightly. Pt was cooperative during admission assessment. Treatment plan was reviewed with pt and pt was oriented to the unit.
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
## 2019-02-24 NOTE — BH NOTES
Pt arrived to unit at 0439; appears to be sleeping at this time. Will continue to monitor for safety.
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
## 2019-02-24 NOTE — BH NOTES
GROUP THERAPY PROGRESS NOTE True Paget is not participating in Depression (Therapy For Depression). 
  
Group time: 30 Minutes 
  
Personal goal for participation: Getting the proper treatment for depression (Patient Did Not Participate). 
  
Goal orientation: Social 
  
Group therapy participation: Patient did not participated. 
  
Therapeutic interventions reviewed and discussed: Therapeutic, the meaning of depression and how it causes serve symptoms that affect how a person feels, thinks, and handle daily activities, such as sleeping, eating and working. Different forms of depressions that may develop under different circumstances and the risks factors, the signs and symptoms, in addition, getting the proper treatment. 
  
Impression of participation: Staff encouraged patient to participate, but patient refused.
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
## 2019-02-24 NOTE — BH NOTES
MHT Note:  Patient has been in bed most of the shift except for meals. He has been cooperative with staff. He has been no management problem on the unit. Staff will encourage patient to participate in unit activities and continue to monitor for safety and location.

## 2019-02-24 NOTE — BH NOTES
GROUP THERAPY PROGRESS NOTE Yolanda Vogt is not participating in [place] despite staff encouragement.

## 2019-02-25 PROCEDURE — 65220000005 HC RM SEMIPRIVATE PSYCH 3 OR 4 BED

## 2019-02-25 PROCEDURE — 74011250637 HC RX REV CODE- 250/637: Performed by: PSYCHIATRY & NEUROLOGY

## 2019-02-25 RX ADMIN — ARIPIPRAZOLE 5 MG: 5 TABLET ORAL at 20:32

## 2019-02-25 RX ADMIN — DIVALPROEX SODIUM 500 MG: 500 TABLET, FILM COATED, EXTENDED RELEASE ORAL at 20:31

## 2019-02-25 RX ADMIN — DIVALPROEX SODIUM 500 MG: 500 TABLET, FILM COATED, EXTENDED RELEASE ORAL at 08:40

## 2019-02-25 RX ADMIN — TRAZODONE HYDROCHLORIDE 50 MG: 50 TABLET ORAL at 20:32

## 2019-02-25 NOTE — BH NOTES
MHT Note: Patient interacts with staff and peers kindly and cooperatively but minimally. Pt ate all meals on shift. Pt reports no new pain or symptoms and was asked to inform care team of any changes in condition, pt agreed. Pt stayed isolated in room except for art therapy and meals. Staff will continue to monitor pt for safety, behavior and location.

## 2019-02-25 NOTE — BH NOTES
MHT Note: Patient interacts with staff and peers kindly and cooperatively, but minimally. Pt affect flat. Pt had 1 visitor today. Pt ate all meals on shift. Pt reports no new pain or symptoms and was asked to inform care team of any changes in condition, pt agreed. Staff will continue to monitor pt for safety, behavior and location.

## 2019-02-25 NOTE — BH NOTES
The patient appeared to have slept for about 7+ hours with no disturbance and no disruptive behavior, and staff will continue to monitor the patient's safety. Patient at this time appears to be sleeping.

## 2019-02-25 NOTE — PROGRESS NOTES
Behavioral Health Progress Note Admit Date: 2/24/2019 Hospital day 1 Vitals :  
Patient Vitals for the past 8 hrs: 
 BP Temp Pulse Resp  
02/25/19 0747 120/75 97.2 °F (36.2 °C) 87 16 Labs:  No results found for this or any previous visit (from the past 24 hour(s)). Meds:  
  
Medication Administration Report Attending Provider: Shane Sharp MD   
Allergies: No Known Allergies Isolation: None Infection: None Code Status: Prior Advance Care Planning Activity   
Service: PSY Ht: 5' 9\" (1.753 m) Wt: 68.9 kg (152 lb) Admission Wt: 68.9 kg (152 lb) Admission Dx: Schizophrenia ([facility] [address] Principal Problem: Schizophrenia ([facility] [address] [F20.9] BMI: 22.45 kg/m 2 BSA: 1.83 m 2 Medication Administration Report  
for Kelly Queen as of 02/25/19 1308  
  1 Day 3 Days 7 Days 10 Days < Today >  
 Legend:  
        
 Time     
   
  Discontinued    Completed      Linked      
   
Medications 02/23/19 02/24/19 02/25/19  
benztropine (COGENTIN) injection 1-2 mg  
Dose: 1-2 mg 
Freq: EVERY 4 HOURS AS NEEDED Route: IM 
PRN Reason: Extrapyramidal Side Effects PRN Comment: Severe Start: 02/24/19 0445 Order ID: [id number]  
     
benztropine (COGENTIN) tablet 1-2 mg  
Dose: 1-2 mg 
Freq: EVERY 4 HOURS AS NEEDED Route: PO 
PRN Reason: Extrapyramidal Syndrome Start: 02/24/19 0445 Order ID: [id number]  
     
divalproex ER (DEPAKOTE ER) 24 hour tablet 500 mg Dose: 500 mg Freq: 2 TIMES DAILY Route: PO 
Start: 02/24/19 0800 Admin Instructions:  
Give with food. Do not crush. Hazardous medication: Use proper procedures for handling and disposal of hazardous medications. Order ID: [id number]  
  08 (500 mg) 20 (500 mg)    
 08 (500 mg) 21    
  
haloperidol (HALDOL) tablet 5 mg Dose: 5 mg Freq: EVERY 4 HOURS AS NEEDED Route: PO 
PRN Reasons: Psychosis,Agitation Start: 02/24/19 0445 Admin Instructions:  
May give with Ativan Order ID: [id number] haloperidol lactate (HALDOL) injection 5 mg Dose: 5 mg Freq: EVERY 4 HOURS AS NEEDED Route: IM 
PRN Reasons: Psychosis,Agitation PRN Comment: Severe Start: 02/24/19 0445 Admin Instructions:  
May give with Ativan Order ID: [id number] LORazepam (ATIVAN) injection 1 mg Dose: 1 mg Freq: EVERY 4 HOURS AS NEEDED Route: IM 
PRN Reasons: Agitation,Anxiety PRN Comment: moderate Start: 02/24/19 0445 Admin Instructions:  
Inject slowly (2mg/min) May give with Haldol Order ID: [id number] LORazepam (ATIVAN) injection 2 mg Dose: 2 mg Freq: EVERY 4 HOURS AS NEEDED Route: IM 
PRN Reasons: Agitation,Anxiety PRN Comment: Severe Start: 02/24/19 0445 Admin Instructions:  
Inject slowly (2mg/min) May give with Haldol Order ID: [id number] LORazepam (ATIVAN) tablet 1 mg Dose: 1 mg Freq: EVERY 4 HOURS AS NEEDED Route: PO 
PRN Reasons: Agitation,Anxiety PRN Comment: mild Start: 02/24/19 0445 Admin Instructions:  
May give with Haldol Order ID: [id number] LORazepam (ATIVAN) tablet 2 mg Dose: 2 mg Freq: EVERY 4 HOURS AS NEEDED Route: PO 
PRN Reasons: Agitation,Anxiety PRN Comment: moderate Start: 02/24/19 0445 Admin Instructions:  
May give with Haldol Order ID: [id number]  
  05 (2 mg)      
   
traZODone (DESYREL) tablet 50 mg  
Dose: 50 mg 
Freq: BEDTIME PRN Route: PO 
PRN Reason: Sleep Start: 02/24/19 0445 Order ID: [id number]  
  47 (50 mg)      
   
Future Medications Medications 02/23/19 02/24/19 02/25/19 ARIPiprazole (ABILIFY) tablet 5 mg Dose: 5 mg Freq: EVERY BEDTIME Route: PO Indications of Use: Schizophrenia Start: 02/25/19 2100 Order ID: [id number]  
   21      
  
Discontinued Medications Medications 02/23/19 02/24/19 02/25/19 ARIPiprazole (ABILIFY) tablet 5 mg Dose: 5 mg Freq: DAILY Route: PO 
Start: 02/24/19 0900 End: 02/24/19 1400 Order ID: [id number]  
  08 (5 mg)      
   
   
  
  
  
 
 
 Hospital Problems: Principal Problem: 
  Schizophrenia ([facility] [address] (1/12/2015) Subjective:  
Medication side effects: none 
none Mental Status Exam 
Sensorium: alert Orientation: oriented to time, place, person and situation Relations: cooperative and passive Eye Contact: appropriate Appearance: shows no evidence of impairment Thought Process: normal rate of thoughts and poor abstract reasoning/computation Thought Content: paucity of thought content Suicidal: denies Homicidal: none Mood: is euthymic Affect: constricted, odd Memory: is impaired and is recent Concentration: distractable Abstraction: concrete Insight: The patient shows little insight OR Fair Judgement: is psychologically impaired OR  Fair Assessment/Plan:  
improved He is more cooperative. Not aggressive. Denies depression, paranoia or aud halluc today. Not sleepy or groggy. He is uncertain if he will be allowed to return to live w/ aunt. Continue close observation, continue on Depakote ER, additional small dose Abilify 5 mg at night to help re-stabilize pt. He intends to return to see Reinaldo Olvera NP at [facility] on [place]. SW to contact family about housing on [place].

## 2019-02-25 NOTE — BH NOTES
GROUP THERAPY PROGRESS NOTE Nila Yin is participating in [place]. Group time: 30 minutes Personal goal for participation: Ernesto Banks to unit Goal orientation: community Group therapy participation: active Therapeutic interventions reviewed and discussed:Unit guidelines for behavior, visitation and schedule. Discussed pt concerns and daily goals. Impression of participation: Pt present

## 2019-02-25 NOTE — BSMART NOTE
OCCUPATIONAL THERAPY PROGRESS NOTE Group Time:  1430 Attendance: The patient attended full group. Rakesh Minor Participation: The patient participated with minimal elaboration in the activity. Attention: The patient was able to focus on the activity. Interaction: The patient acknowledges others or responds to questions,  with no spontaneous interaction. Interactions brief, generally on topic in responses without much detail/elaboration.
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
## 2019-02-25 NOTE — BH NOTES
GROUP THERAPY PROGRESS NOTE Dixon Casillas is participating in Relaxation Therapy. Group time: 30 minutes Personal goal for participation: Relax in hopes of letting go of past issues Goal orientation: relaxation Group therapy participation: active Therapeutic interventions reviewed and discussed: Discussed how relaxation and changing focus can allow pts to work towards letting go of past issues and trauma Impression of participation: Pt engaged

## 2019-02-25 NOTE — PROGRESS NOTES
Problem: Psychosis Goal: *STG: Decreased hallucinations AEB pt experiencing decreased or absent hallucinations for consecutive days prior to discharge Outcome: Progressing Towards Goal 
Denies Goal: *STG: Decreased delusional thinking AEB pt experiencing decreased or absent delusional thinking for consecutive days prior to discharge Outcome: Progressing Towards Goal 
Not expressing any delusional thoughts Goal: *STG: Remains safe in hospital 
AEB pt remaining safe in the hospital daily while in the hospital.  
Outcome: Progressing Towards Goal 
No attempts to harm self or others Goal: *STG: Participates in individual and group therapy AEB pt participating in individual and a minimum of 3 groups daily, while in the hospital.  
Outcome: Progressing Towards Goal 
Participating in groups Goal: *STG/LTG: Complies with medication therapy AEB pt taking all scheduled medications daily, while in the hospital.  
Outcome: Progressing Towards Goal 
Compliant Problem: Falls - Risk of 
Goal: *Absence of Falls 1. Document Aleena End Fall Risk and appropriate interventions in the flowsheet. 2. Pt will be free from falls daily Outcome: Progressing Towards Goal 
Fall Risk Interventions: 
  
 
  
 
Medication Interventions: Teach patient to arise slowly Comments: Pt is pleasant and cooperative. He is brief with responses. Pt denies any thoughts of harming self or others and any hallucinations. He is compliant with medication and participating in groups. Pt spoke with dietitian today due to weight loss at home. He is eating his meals here and asked what time lunch was going to get here. Pt states he is ready to go home.

## 2019-02-25 NOTE — H&P
[address] [place] [address] Dr CHAVARRIA HISTORY AND PHYSICAL Name:  Que Reynoso 
MR#:   [id number] :  1995 ACCOUNT #:  [de-identified] ADMIT DATE:  2019 IDENTIFYING DATA:  The patient is a 22-year-old single black male who says that he lives with his aunt. He is unemployed and covered by a [employer] plan. He is a resident of [place], [place]. BASIS FOR ADMISSION:  The patient is admitted after presenting to [facility] Room, being brought in by grandmother. He apparently had been into a heated disagreement with her, and she had felt that he was behaving bizarrely so she brought him to the emergency room. He was repeatedly stating, \"I want a drink. \"  He was recognized to have a prior history of attention deficit hyperactive disorder, anxiety disorder, depression, mood disorder, psychotic disorder. Family are saying that his mother had  this past November. Family history was significant for schizophrenia in mother, brother, and sister. The patient was known from presentation to this facility for diagnosis of schizophrenia for three admissions in , two admissions in , and three admissions in 2018. .  Most recent admission was on 10/02/2018, and he was described at that point as being followed as an outpatient by Faviola Frias, nurse practitioner at [facility] ([facility]). She was prescribing him Abilify Maintena 400 mg every 4 weeks and Depakote  mg b.i.d. For his episodes of agitation, he was being prescribed clonazepam 0.5 mg b.i.d. It appeared that he was being noncompliant with the Depakote and had return of psychotic symptoms with a subsequent admission. His urine drug screen had been negative. A Depakote level was not noted.  
 
On evaluation, the patient does acknowledge that he has not been taking his Depakote routinely over the past several days and since that time feels as if he is \"tripping. \"  He denied using drugs. He denied having auditory or visual hallucinations or paranoia but described himself as feeling strange and having difficulty concentrating or thinking. He apparently did get his scheduled dose of Abilify Maintena on time on February 8. His Depakote level was significantly low being 36 mcg/mL in the emergency room. Workup showed a normal CBC, concentrated urine with specific gravity 1.030 with the remainder normal.  Normal chemistry and electrolyte profile except elevation of creatinine 1.35 mg/dL though with a normal BUN and a normal eGFR. He had a negative urine drug screen. The emergency room physician had described a review of systems to be negative other than behavioral problems. Physical examination was totally normal other than being described as withdrawn on his psychiatric symptoms. PAST MEDICAL HISTORY:  Medical history of significance was denied by the patient. ALLERGIES:  HE DENIED FOOD OR DRUG ALLERGIES. SUBSTANCE ABUSE:  He denied drug or alcohol abuse. He said he smokes 5-10 cigarettes a day and did not want nicotine patch or gum. He said he had not smoked anything in the past 5 days. FAMILY HISTORY:  Was as noted above. Three different family members with schizophrenia. SOCIAL HISTORY:  He said he quit high school in grade 12. He denied a history of legal problems. He denied ever being . He denied ever having children. There was some question as to who he was living with since he said he lives apart and the old records said that he lived with grandmother. MENTAL STATUS EXAM:  Revealed him to be an alert though somewhat sleepy, black male. Eye contact was poor. Speech was minimal, soft, and somewhat slurred. Mood was irritable with a blunted affect. Thought processing was easily confused to disorganized.   I did have to ask questions several times to get answers as he seemed have some confusion in figuring out what I was asking. He appeared to respond to internal stimuli though he was denying auditory or visual hallucinations. He was denying homicidal or suicidal ideas. IQ was estimated in the borderline intellectual function range. He denied medication complaints. Insight and judgment were poor and apparently influenced by his psychosis. ASSESSMENT: 
AXIS I:  Schizophrenia. Nicotine use disorder, mild. AXIS II:  Borderline intellectual function. AXIS III:  None. TREATMENT PLAN:  This patient is admitted voluntarily after self presentation, being brought by family. He apparently is not taking medicines as prescribed in light of the fact that his Depakote level was low but does sound to be taking his Abilify Maintena as prescribed. I am going to temporarily add a small dose of Abilify 5 mg at bedtime to assist with his apparent psychosis. We will resume him back on his Depakote  mg b.i.d. We will continue with supportive psychotherapy; individual, group, and milieu therapies; art and recreation therapy; case management services; social work services. We will recheck a Depakote level in 3 or 4 days. We will need to be in contact with the family to get further information as to what is happening with him. ESTIMATED LENGTH OF STAY:  5 days. ANTICIPATED DISPOSITION:  Follow up back with Carlos Helms, nurse practitioner; and with own primary care provider. Gerardo Mcneal MD 
 
 
[id number] 
D:  02/24/2019 14:23 T:  02/24/2019 19:21 
JOB #:  [id number]

## 2019-02-26 VITALS
HEART RATE: 69 BPM | RESPIRATION RATE: 18 BRPM | DIASTOLIC BLOOD PRESSURE: 74 MMHG | BODY MASS INDEX: 22.51 KG/M2 | OXYGEN SATURATION: 100 % | TEMPERATURE: 97.1 F | SYSTOLIC BLOOD PRESSURE: 104 MMHG | HEIGHT: 69 IN | WEIGHT: 152 LBS

## 2019-02-26 PROCEDURE — 74011250637 HC RX REV CODE- 250/637: Performed by: PSYCHIATRY & NEUROLOGY

## 2019-02-26 RX ORDER — DIVALPROEX SODIUM 500 MG/1
500 TABLET, EXTENDED RELEASE ORAL 2 TIMES DAILY
Qty: 30 TAB | Refills: 1 | Status: SHIPPED | OUTPATIENT
Start: 2019-02-26

## 2019-02-26 RX ORDER — ARIPIPRAZOLE 400 MG
400 KIT INTRAMUSCULAR
Qty: 400 MG | Refills: 0 | Status: SHIPPED
Start: 2019-02-26

## 2019-02-26 RX ORDER — TRAZODONE HYDROCHLORIDE 50 MG/1
50 TABLET ORAL
Qty: 15 TAB | Refills: 1 | Status: SHIPPED | OUTPATIENT
Start: 2019-02-26

## 2019-02-26 RX ADMIN — DIVALPROEX SODIUM 500 MG: 500 TABLET, FILM COATED, EXTENDED RELEASE ORAL at 08:26

## 2019-02-26 NOTE — BH NOTES
Pt not social. Pt affect flat. Pt did not have any visitors. Nonskid socks provided and pt remains free from falls. Pt contracts for safety.

## 2019-02-26 NOTE — BSMART NOTE
SW Contact:  Reviewed Safety & d/c plan with pt. .. See FYI for appointments. Pt also reminded how continued compliance with outpt tx plan will give him relief, direction and support.

## 2019-02-26 NOTE — BH NOTES
GROUP THERAPY PROGRESS NOTE Elma Sherman is participating in [place]. Group time: 30 minutes Personal goal for participation: get discharged Goal orientation: community Group therapy participation: minimal 
 
Therapeutic interventions reviewed and discussed: goals and procedures Impression of participation: encouraged

## 2019-02-26 NOTE — PROGRESS NOTES
Reviewed discharge with patient. Verbalized understanding of his discharge medications and follow up for his infusion appt. and doctor's appt. Request  medicaid cab.

## 2019-02-26 NOTE — PROGRESS NOTES
Patient was not able to get medicaid cab. Patient request bus ticket. Patient discharge at this time.

## 2019-02-26 NOTE — DISCHARGE INSTRUCTIONS
BEHAVIORAL HEALTH NURSING DISCHARGE NOTE      The following personal items collected during your admission are returned to you:   Dental Appliance: Dental Appliances: None  Vision: Visual Aid: None  Hearing Aid:    Jewelry: Jewelry: None  Clothing: Clothing: Belt, Footwear, Pants, Shirt, Socks, Undergarments  Other Valuables: Other Valuables: Other (comment)(VA ID Card-Pt aware ID card is bent)  Valuables sent to safe: Personal Items Sent to Safe: (None)      PATIENT INSTRUCTIONS:         The discharge information has been reviewed with the patient. The patient verbalized understanding.         Patient armband removed and shredded
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
## 2019-02-27 NOTE — DISCHARGE SUMMARY
[facility]    Name:  Rosalina Soriano  MR#:   [id number]  :  1995  ACCOUNT #:  [de-identified]  ADMIT DATE:  2019  DISCHARGE DATE:  2019    IDENTIFYING DATA:  The patient is a 63-year-old single black male who lives with his aunt. He lives in [place], [place] and is covered by [employer]. The patient is admitted after self-presentation to the emergency room, being dropped off. He had apparently been involved in a verbal disagreement with aunt and she thought he was behaving bizarrely so brought him to the emergency room. He was known to be followed by Chalo Dempsey, nurse practitioner at [facility] ([facility]) and she had been prescribing for him Abilify Maintena 400 mg every 4 weeks and Depakote  mg b.i.d., but he apparently had been noncompliant with the Depakote for the past 4 days. Getting increasingly agitated. Depakote level had been low at 36 mcg/mL consistent with his not taking the medication routinely. HOSPITAL COURSE:  The patient was admitted to the [facility] special treatment unit where he was afforded individual group and milieu therapies. Attention is invited to the laboratory testing, which had been done prior to coming to the unit. While in the hospital, he was treated with the medications of Abilify 5 mg at bedtime for single dose, resumed on his Depakote  mg b.i.d., single dosing of lorazepam 2 mg at night for agitation on the night of admission and trazodone 50 mg at night for sleep. Mood improved very quickly back in the structure of the hospital.  He began denying homicidal or suicidal ideas, hallucinatory or delusional material.  He was not angry or aggressive. He agreed he needed to be on his Depakote. He wished to be discharged home with outpatient followup. CONDITION ON DISCHARGE:  Fair. PROGNOSIS:  Fair. ASSESSMENT:  AXIS I:  Schizophrenia. Nicotine use disorder, mild.   AXIS II: Borderline intellectual function. AXIS III:  None. DISPOSITION:  Discharged to self. Follow up at [address] [place] [address] with Luca Murguia, nurse practitioner. Follow up with own primary care provider as needed. MEDICATIONS:  Abilify Maintena 400 mg IM every 4 weeks,  next  dosing 03/08/2019, Depakote  mg tablet one b.i.d. #31 refill, trazodone 50 mg at night as needed for sleep, #15, one refill.         Lorraine Crowley MD      [id number]  D:  02/26/2019 11:59  T:  02/27/2019 0:52  JOB #:  [id number]

## 2019-03-01 RX ORDER — ARIPIPRAZOLE 400 MG
400 KIT INTRAMUSCULAR ONCE
Status: COMPLETED | OUTPATIENT
Start: 2019-03-08 | End: 2019-03-08

## 2019-03-08 ENCOUNTER — HOSPITAL ENCOUNTER (OUTPATIENT)
Dept: INFUSION THERAPY | Age: 24
Discharge: HOME | End: 2019-03-08
Payer: MEDICAID

## 2019-03-08 VITALS
SYSTOLIC BLOOD PRESSURE: 126 MMHG | OXYGEN SATURATION: 98 % | DIASTOLIC BLOOD PRESSURE: 70 MMHG | RESPIRATION RATE: 18 BRPM | TEMPERATURE: 98 F | HEART RATE: 72 BPM

## 2019-03-08 PROCEDURE — 96372 THER/PROPH/DIAG INJ SC/IM: CPT

## 2019-03-08 PROCEDURE — 74011250636 HC RX REV CODE- 250/636

## 2019-03-08 RX ADMIN — ARIPIPRAZOLE 400 MG: KIT at 11:14

## 2019-03-08 NOTE — PROGRESS NOTES
[facility] OPI Progress Note    Date: 2019    Name: Nicolasa Campos    MRN: [id number]         : 1995    Abilify Injection    Mr. Jeramy Dalton was assessed and education was provided. Mr. Scott Shelton vitals were reviewed and patient was observed for 5 minutes prior to treatment. Visit Vitals  /70 (BP 1 Location: Left arm, BP Patient Position: Sitting)   Pulse 72   Temp 98 °F (36.7 °C)   Resp 18   SpO2 98%       Ability 300 mg was administered IM in the left deltoid. No bleeding or redness noted. Band-aid applied to to the site. Mr. Jeramy Dalton tolerated well, and had no complaints. Patient politely declined to stay for an observation period at this time. Patient armband removed and shredded. Mr. Jeramy Dalton was discharged from [place] [address] in stable condition at 1120. He is to return on 2019 at 1300 for his next appointment.     Danielito Healy RN  2019

## 2019-04-05 ENCOUNTER — HOSPITAL ENCOUNTER (OUTPATIENT)
Dept: INFUSION THERAPY | Age: 24
End: 2019-04-05
Payer: MEDICAID

## 2019-04-09 RX ORDER — ARIPIPRAZOLE 400 MG
400 KIT INTRAMUSCULAR ONCE
Status: COMPLETED | OUTPATIENT
Start: 2019-04-10 | End: 2019-04-10

## 2019-04-10 ENCOUNTER — HOSPITAL ENCOUNTER (OUTPATIENT)
Dept: INFUSION THERAPY | Age: 24
Discharge: HOME | End: 2019-04-10
Payer: MEDICAID

## 2019-04-10 VITALS
OXYGEN SATURATION: 100 % | HEART RATE: 92 BPM | TEMPERATURE: 97.7 F | DIASTOLIC BLOOD PRESSURE: 72 MMHG | SYSTOLIC BLOOD PRESSURE: 110 MMHG | RESPIRATION RATE: 18 BRPM

## 2019-04-10 PROCEDURE — 74011250636 HC RX REV CODE- 250/636: Performed by: NURSE PRACTITIONER

## 2019-04-10 PROCEDURE — 96372 THER/PROPH/DIAG INJ SC/IM: CPT

## 2019-04-10 RX ADMIN — ARIPIPRAZOLE 400 MG: KIT at 15:18

## 2019-04-10 NOTE — PROGRESS NOTES
[facility] OPIC Progress Note Date: April 10, 2019 Name: Ketan Noland MRN: [id number] : 1995 Abilify Injection Arrived ambulatory to [facility] at 1515, accompanied by family member. No complaints or concerns voiced Mr. Travis Cervantes was assessed and education was provided. Mr. Karma Boeck vitals were reviewed and patient was observed for 5 minutes prior to treatment. Visit Vitals /72 (BP 1 Location: Left arm, BP Patient Position: Sitting) Pulse 92 Temp 97.7 °F (36.5 °C) Resp 18 SpO2 100% Ability 400 mg was administered IM in the left deltoid. No bleeding or redness noted. Band-aid applied to to the site. Mr. Travis Cervantes tolerated well, and had no complaints. Patient armband removed and shredded. Mr. Travis Cervantes was discharged from [place] [address] in stable condition at 1525. He is to return on 19 at 1300 for his next appointment. Emily Lomas RN April 10, 2019

## 2019-04-19 ENCOUNTER — HOSPITAL ENCOUNTER (EMERGENCY)
Age: 24
Discharge: PSYCHIATRIC OR SUBSTANCE USE FACILITY | End: 2019-04-20
Attending: EMERGENCY MEDICINE | Admitting: EMERGENCY MEDICINE
Payer: MEDICAID

## 2019-04-19 DIAGNOSIS — F20.9 SCHIZOPHRENIA, UNSPECIFIED TYPE (HCC): Primary | ICD-10-CM

## 2019-04-19 LAB
ALBUMIN SERPL-MCNC: 3.8 G/DL (ref 3.4–5)
ALBUMIN/GLOB SERPL: 1.1 {RATIO} (ref 0.8–1.7)
ALP SERPL-CCNC: 89 U/L (ref 45–117)
ALT SERPL-CCNC: 21 U/L (ref 16–61)
AMPHET UR QL SCN: NEGATIVE
ANION GAP SERPL CALC-SCNC: 3 MMOL/L (ref 3–18)
APAP SERPL-MCNC: <2 UG/ML (ref 10–30)
AST SERPL-CCNC: 15 U/L (ref 15–37)
BARBITURATES UR QL SCN: NEGATIVE
BASOPHILS # BLD: 0 K/UL (ref 0–0.1)
BASOPHILS NFR BLD: 1 % (ref 0–2)
BENZODIAZ UR QL: NEGATIVE
BILIRUB SERPL-MCNC: 0.2 MG/DL (ref 0.2–1)
BUN SERPL-MCNC: 12 MG/DL (ref 7–18)
BUN/CREAT SERPL: 11 (ref 12–20)
CALCIUM SERPL-MCNC: 8.9 MG/DL (ref 8.5–10.1)
CANNABINOIDS UR QL SCN: NEGATIVE
CHLORIDE SERPL-SCNC: 108 MMOL/L (ref 100–108)
CO2 SERPL-SCNC: 30 MMOL/L (ref 21–32)
COCAINE UR QL SCN: NEGATIVE
CREAT SERPL-MCNC: 1.05 MG/DL (ref 0.6–1.3)
DIFFERENTIAL METHOD BLD: ABNORMAL
EOSINOPHIL # BLD: 0.2 K/UL (ref 0–0.4)
EOSINOPHIL NFR BLD: 3 % (ref 0–5)
ERYTHROCYTE [DISTWIDTH] IN BLOOD BY AUTOMATED COUNT: 12.2 % (ref 11.6–14.5)
ETHANOL SERPL-MCNC: <3 MG/DL (ref 0–3)
GLOBULIN SER CALC-MCNC: 3.5 G/DL (ref 2–4)
GLUCOSE SERPL-MCNC: 100 MG/DL (ref 74–99)
HCT VFR BLD AUTO: 44.9 % (ref 36–48)
HDSCOM,HDSCOM: NORMAL
HGB BLD-MCNC: 15.8 G/DL (ref 13–16)
LYMPHOCYTES # BLD: 3.5 K/UL (ref 0.9–3.6)
LYMPHOCYTES NFR BLD: 45 % (ref 21–52)
MCH RBC QN AUTO: 30.7 PG (ref 24–34)
MCHC RBC AUTO-ENTMCNC: 35.2 G/DL (ref 31–37)
MCV RBC AUTO: 87.4 FL (ref 74–97)
METHADONE UR QL: NEGATIVE
MONOCYTES # BLD: 0.9 K/UL (ref 0.05–1.2)
MONOCYTES NFR BLD: 11 % (ref 3–10)
NEUTS SEG # BLD: 3.1 K/UL (ref 1.8–8)
NEUTS SEG NFR BLD: 40 % (ref 40–73)
OPIATES UR QL: NEGATIVE
PCP UR QL: NEGATIVE
PLATELET # BLD AUTO: 186 K/UL (ref 135–420)
PMV BLD AUTO: 10.4 FL (ref 9.2–11.8)
POTASSIUM SERPL-SCNC: 3.7 MMOL/L (ref 3.5–5.5)
PROT SERPL-MCNC: 7.3 G/DL (ref 6.4–8.2)
RBC # BLD AUTO: 5.14 M/UL (ref 4.7–5.5)
SALICYLATES SERPL-MCNC: <1.7 MG/DL (ref 2.8–20)
SODIUM SERPL-SCNC: 141 MMOL/L (ref 136–145)
WBC # BLD AUTO: 7.7 K/UL (ref 4.6–13.2)

## 2019-04-19 PROCEDURE — 85025 COMPLETE CBC W/AUTO DIFF WBC: CPT

## 2019-04-19 PROCEDURE — 99282 EMERGENCY DEPT VISIT SF MDM: CPT

## 2019-04-19 PROCEDURE — 80053 COMPREHEN METABOLIC PANEL: CPT

## 2019-04-19 PROCEDURE — 80307 DRUG TEST PRSMV CHEM ANLYZR: CPT

## 2019-04-20 VITALS
WEIGHT: 150 LBS | BODY MASS INDEX: 22.22 KG/M2 | RESPIRATION RATE: 16 BRPM | HEART RATE: 83 BPM | TEMPERATURE: 98.2 F | OXYGEN SATURATION: 98 % | DIASTOLIC BLOOD PRESSURE: 66 MMHG | HEIGHT: 69 IN | SYSTOLIC BLOOD PRESSURE: 105 MMHG

## 2019-04-20 NOTE — ED PROVIDER NOTES
Patient presents to the emergency department as an ECO. Patient for medical clearance for commitment commitment paperwork states that patient was erratic and violent and in need of commitment patient denies any complaints or any violence please are at bedside    The history is provided by the patient. Past Medical History:   Diagnosis Date    ADHD (attention deficit hyperactivity disorder)     Anxiety disorder     Depression     Mood disorder (HCC)     Psychiatric disorder     Psychotic disorder ([facility] [address]     Trauma     \"His mother  this past November\"       No past surgical history on file.       Family History:   Problem Relation Age of Onset    Schizophrenia Mother     Schizophrenia Brother     Schizophrenia Sister        Social History     Socioeconomic History    Marital status: SINGLE     Spouse name: Not on file    Number of children: Not on file    Years of education: Not on file    Highest education level: Not on file   Occupational History    Not on file   Social Needs    Financial resource strain: Not on file    Food insecurity:     Worry: Not on file     Inability: Not on file    Transportation needs:     Medical: Not on file     Non-medical: Not on file   Tobacco Use    Smoking status: Never Smoker    Smokeless tobacco: Never Used   Substance and Sexual Activity    Alcohol use: No    Drug use: No    Sexual activity: Never   Lifestyle    Physical activity:     Days per week: Not on file     Minutes per session: Not on file    Stress: Not on file   Relationships    Social connections:     Talks on phone: Not on file     Gets together: Not on file     Attends Baptism service: Not on file     Active member of club or organization: Not on file     Attends meetings of clubs or organizations: Not on file     Relationship status: Not on file    Intimate partner violence:     Fear of current or ex partner: Not on file     Emotionally abused: Not on file     Physically abused: Not on file     Forced sexual activity: Not on file   Other Topics Concern    Poor oral hygiene Not Asked    Bike safety Not Asked    Vehicle safety Not Asked   Social History Narrative    Not on file         ALLERGIES: Patient has no known allergies. Review of Systems   Constitutional: Negative for fever. Cardiovascular: Negative for chest pain. Gastrointestinal: Negative for abdominal pain. Skin: Negative for wound. Psychiatric/Behavioral: Negative for agitation and suicidal ideas. All other systems reviewed and are negative. Vitals:    04/19/19 2039 04/20/19 0331   BP: 125/81 105/66   Pulse: 86 83   Resp: 16 16   Temp: 97.6 °F (36.4 °C) 98.2 °F (36.8 °C)   SpO2: 100% 98%   Weight: 68 kg (150 lb)    Height: 5' 9\" (1.753 m)             Physical Exam   Constitutional: He is oriented to person, place, and time. He appears well-developed and well-nourished. HENT:   Head: Normocephalic and atraumatic. Eyes: Pupils are equal, round, and reactive to light. Conjunctivae and EOM are normal.   Neck: Normal range of motion. Neck supple. Cardiovascular: Normal rate and regular rhythm. Pulmonary/Chest: Effort normal and breath sounds normal.   Abdominal: Soft. Bowel sounds are normal.   Musculoskeletal: Normal range of motion. Neurological: He is alert and oriented to person, place, and time. He has normal reflexes. Skin: Skin is warm and dry. Psychiatric: He has a normal mood and affect. His speech is normal and behavior is normal. Judgment and thought content normal. He expresses no homicidal and no suicidal ideation. Nursing note and vitals reviewed.        MDM  Number of Diagnoses or Management Options  Schizophrenia, unspecified type [facility]):   Diagnosis management comments: Patient will be cleared and will be pending CSU placement    Per [facility] personnel pt to be transferred to [facility]       Amount and/or Complexity of Data Reviewed  Clinical lab tests: ordered and reviewed  Review and summarize past medical records: yes  Independent visualization of images, tracings, or specimens: yes    Risk of Complications, Morbidity, and/or Mortality  Presenting problems: moderate  Diagnostic procedures: moderate  Management options: moderate    Patient Progress  Patient progress: stable         Procedures          Vitals:  Patient Vitals for the past 12 hrs:   Temp Pulse Resp BP SpO2   04/20/19 0331 98.2 °F (36.8 °C) 83 16 105/66 98 %   04/19/19 2039 97.6 °F (36.4 °C) 86 16 125/81 100 %       Medications ordered:   Medications - No data to display      Lab findings:  Recent Results (from the past 12 hour(s))   DRUG SCREEN, URINE    Collection Time: 04/19/19  8:42 PM   Result Value Ref Range    BENZODIAZEPINES NEGATIVE  NEG      BARBITURATES NEGATIVE  NEG      THC (TH-CANNABINOL) NEGATIVE  NEG      OPIATES NEGATIVE  NEG      PCP(PHENCYCLIDINE) NEGATIVE  NEG      COCAINE NEGATIVE  NEG      AMPHETAMINES NEGATIVE  NEG      METHADONE NEGATIVE  NEG      HDSCOM (NOTE)    CBC WITH AUTOMATED DIFF    Collection Time: 04/19/19  8:55 PM   Result Value Ref Range    WBC 7.7 4.6 - 13.2 K/uL    RBC 5.14 4.70 - 5.50 M/uL    HGB 15.8 13.0 - 16.0 g/dL    HCT 44.9 36.0 - 48.0 %    MCV 87.4 74.0 - 97.0 FL    MCH 30.7 24.0 - 34.0 PG    MCHC 35.2 31.0 - 37.0 g/dL    RDW 12.2 11.6 - 14.5 %    PLATELET 954 725 - 345 K/uL    MPV 10.4 9.2 - 11.8 FL    NEUTROPHILS 40 40 - 73 %    LYMPHOCYTES 45 21 - 52 %    MONOCYTES 11 (H) 3 - 10 %    EOSINOPHILS 3 0 - 5 %    BASOPHILS 1 0 - 2 %    ABS. NEUTROPHILS 3.1 1.8 - 8.0 K/UL    ABS. LYMPHOCYTES 3.5 0.9 - 3.6 K/UL    ABS. MONOCYTES 0.9 0.05 - 1.2 K/UL    ABS. EOSINOPHILS 0.2 0.0 - 0.4 K/UL    ABS.  BASOPHILS 0.0 0.0 - 0.1 K/UL    DF AUTOMATED     METABOLIC PANEL, COMPREHENSIVE    Collection Time: 04/19/19  8:55 PM   Result Value Ref Range    Sodium 141 136 - 145 mmol/L    Potassium 3.7 3.5 - 5.5 mmol/L    Chloride 108 100 - 108 mmol/L    CO2 30 21 - 32 mmol/L    Anion gap 3 3.0 - 18 mmol/L    Glucose 100 (H) 74 - 99 mg/dL    BUN 12 7.0 - 18 MG/DL    Creatinine 1.05 0.6 - 1.3 MG/DL    BUN/Creatinine ratio 11 (L) 12 - 20      GFR est AA >60 >60 ml/min/1.73m2    GFR est non-AA >60 >60 ml/min/1.73m2    Calcium 8.9 8.5 - 10.1 MG/DL    Bilirubin, total 0.2 0.2 - 1.0 MG/DL    ALT (SGPT) 21 16 - 61 U/L    AST (SGOT) 15 15 - 37 U/L    Alk. phosphatase 89 45 - 117 U/L    Protein, total 7.3 6.4 - 8.2 g/dL    Albumin 3.8 3.4 - 5.0 g/dL    Globulin 3.5 2.0 - 4.0 g/dL    A-G Ratio 1.1 0.8 - 1.7     ACETAMINOPHEN    Collection Time: 04/19/19  8:55 PM   Result Value Ref Range    Acetaminophen level <2 (L) 10.0 - 30.0 ug/mL   ETHYL ALCOHOL    Collection Time: 04/19/19  8:55 PM   Result Value Ref Range    ALCOHOL(ETHYL),SERUM <3 0 - 3 MG/DL   SALICYLATE    Collection Time: 04/19/19  8:55 PM   Result Value Ref Range    Salicylate level <9.9 (L) 2.8 - 20.0 MG/DL              Disposition:    Diagnosis:   1. Schizophrenia, unspecified type ([facility]        Disposition: Transferred to [place] [facility]           Patient's Medications   Start Taking    No medications on file   Continue Taking    ARIPIPRAZOLE (ABILIFY MAINTENA) 400 MG INJECTION    400 mg by IntraMUSCular route every four (4) weeks. DIVALPROEX ER (DEPAKOTE ER) 500 MG ER TABLET    Take 1 Tab by mouth two (2) times a day. TRAZODONE (DESYREL) 50 MG TABLET    Take 1 Tab by mouth nightly as needed for Sleep. These Medications have changed    No medications on file   Stop Taking    No medications on file               Jaya Knight ENP-C,FNP-C      Dragon voice recognition was used to generate this report, which may have resulted in some phonetic based errors in grammar and contents.  Even though attempts were made to correct all the mistakes, some may have been missed, and remained in the body of the document

## 2019-04-20 NOTE — ED NOTES
Patient brought in by the police on a temporary senior living order taken out by the patient's family because the patient is having auditory hallucinations and aggressive behavior thereatening neighbors punching holes in walls and banging on walls. Family also report that the patient is non compliant with his medication.

## 2019-05-01 RX ORDER — ARIPIPRAZOLE 400 MG
400 KIT INTRAMUSCULAR ONCE
Status: DISCONTINUED | OUTPATIENT
Start: 2019-05-08 | End: 2019-05-02 | Stop reason: SDUPTHER

## 2019-05-02 RX ORDER — ARIPIPRAZOLE 400 MG
400 KIT INTRAMUSCULAR ONCE
Status: COMPLETED | OUTPATIENT
Start: 2019-05-09 | End: 2019-05-09

## 2019-05-03 ENCOUNTER — APPOINTMENT (OUTPATIENT)
Dept: INFUSION THERAPY | Age: 24
End: 2019-05-03
Payer: MEDICAID

## 2019-05-08 ENCOUNTER — HOSPITAL ENCOUNTER (OUTPATIENT)
Dept: INFUSION THERAPY | Age: 24
Discharge: HOME | End: 2019-05-08
Payer: MEDICAID

## 2019-05-09 ENCOUNTER — HOSPITAL ENCOUNTER (OUTPATIENT)
Dept: INFUSION THERAPY | Age: 24
Discharge: HOME | End: 2019-05-09
Payer: MEDICAID

## 2019-05-09 VITALS
SYSTOLIC BLOOD PRESSURE: 110 MMHG | RESPIRATION RATE: 18 BRPM | HEART RATE: 106 BPM | DIASTOLIC BLOOD PRESSURE: 67 MMHG | TEMPERATURE: 97.6 F | OXYGEN SATURATION: 100 %

## 2019-05-09 PROCEDURE — 74011250636 HC RX REV CODE- 250/636: Performed by: NURSE PRACTITIONER

## 2019-05-09 PROCEDURE — 96372 THER/PROPH/DIAG INJ SC/IM: CPT

## 2019-05-09 RX ADMIN — ARIPIPRAZOLE 400 MG: KIT at 10:07

## 2019-05-09 NOTE — PROGRESS NOTES
[facility] Progress Note Date: May 9, 2019 Name: Ingrid Macias MRN: [id number] : 1995 Abilify Injection Arrived ambulatory to [facility] at 1005, accompanied by family member. No complaints or concerns voiced. Mr. Constantine Ruelas was assessed and education was provided. Mr. Carl Andrew vitals were reviewed and patient was observed for 5 minutes prior to treatment. Visit Vitals /67 (BP 1 Location: Left arm, BP Patient Position: Sitting) Pulse (!) 106 Temp 97.6 °F (36.4 °C) Resp 18 SpO2 100% Ability 400 mg was administered IM in the left deltoid. No bleeding or redness noted. Band-aid applied to to the site. Mr. Constantine Ruelas tolerated well, and had no complaints. Patient armband removed and shredded. Mr. Constantine Ruelas was discharged from [place] [address] in stable condition at 1015. He is to return on 19 at 1300 for his next appointment. Althea Carroll RN May 9, 2019 
1015

## 2019-05-30 RX ORDER — ARIPIPRAZOLE 400 MG
400 KIT INTRAMUSCULAR ONCE
Status: COMPLETED | OUTPATIENT
Start: 2019-06-06 | End: 2019-06-06

## 2019-06-06 ENCOUNTER — HOSPITAL ENCOUNTER (OUTPATIENT)
Dept: INFUSION THERAPY | Age: 24
Discharge: HOME | End: 2019-06-06
Payer: MEDICAID

## 2019-06-06 VITALS
RESPIRATION RATE: 18 BRPM | DIASTOLIC BLOOD PRESSURE: 74 MMHG | SYSTOLIC BLOOD PRESSURE: 117 MMHG | TEMPERATURE: 97.5 F | HEART RATE: 100 BPM | OXYGEN SATURATION: 99 %

## 2019-06-06 PROCEDURE — 96372 THER/PROPH/DIAG INJ SC/IM: CPT

## 2019-06-06 PROCEDURE — 74011250636 HC RX REV CODE- 250/636: Performed by: NURSE PRACTITIONER

## 2019-06-06 RX ADMIN — ARIPIPRAZOLE 400 MG: KIT at 10:19

## 2019-06-12 ENCOUNTER — HOSPITAL ENCOUNTER (EMERGENCY)
Age: 24
Discharge: HOME | End: 2019-06-12
Attending: EMERGENCY MEDICINE
Payer: MEDICAID

## 2019-06-12 VITALS
OXYGEN SATURATION: 100 % | BODY MASS INDEX: 23.63 KG/M2 | SYSTOLIC BLOOD PRESSURE: 134 MMHG | HEART RATE: 94 BPM | RESPIRATION RATE: 14 BRPM | DIASTOLIC BLOOD PRESSURE: 84 MMHG | TEMPERATURE: 98.3 F | WEIGHT: 160 LBS

## 2019-06-12 DIAGNOSIS — R44.3 HALLUCINATION: Primary | ICD-10-CM

## 2019-06-12 LAB
ALBUMIN SERPL-MCNC: 3.8 G/DL (ref 3.4–5)
ALBUMIN/GLOB SERPL: 1 {RATIO} (ref 0.8–1.7)
ALP SERPL-CCNC: 86 U/L (ref 45–117)
ALT SERPL-CCNC: 21 U/L (ref 16–61)
AMPHET UR QL SCN: NEGATIVE
ANION GAP SERPL CALC-SCNC: 8 MMOL/L (ref 3–18)
APPEARANCE UR: CLEAR
AST SERPL-CCNC: 16 U/L (ref 15–37)
BARBITURATES UR QL SCN: NEGATIVE
BASOPHILS # BLD: 0 K/UL (ref 0–0.06)
BASOPHILS NFR BLD: 0 % (ref 0–3)
BENZODIAZ UR QL: NEGATIVE
BILIRUB SERPL-MCNC: 0.3 MG/DL (ref 0.2–1)
BILIRUB UR QL: NEGATIVE
BUN SERPL-MCNC: 13 MG/DL (ref 7–18)
BUN/CREAT SERPL: 10 (ref 12–20)
CALCIUM SERPL-MCNC: 8.8 MG/DL (ref 8.5–10.1)
CANNABINOIDS UR QL SCN: NEGATIVE
CHLORIDE SERPL-SCNC: 107 MMOL/L (ref 100–108)
CO2 SERPL-SCNC: 28 MMOL/L (ref 21–32)
COCAINE UR QL SCN: NEGATIVE
COLOR UR: YELLOW
CREAT SERPL-MCNC: 1.29 MG/DL (ref 0.6–1.3)
DIFFERENTIAL METHOD BLD: ABNORMAL
EOSINOPHIL # BLD: 0.3 K/UL (ref 0–0.4)
EOSINOPHIL NFR BLD: 3 % (ref 0–5)
ERYTHROCYTE [DISTWIDTH] IN BLOOD BY AUTOMATED COUNT: 12.1 % (ref 11.6–14.5)
ETHANOL SERPL-MCNC: <3 MG/DL (ref 0–3)
GLOBULIN SER CALC-MCNC: 3.7 G/DL (ref 2–4)
GLUCOSE SERPL-MCNC: 91 MG/DL (ref 74–99)
GLUCOSE UR STRIP.AUTO-MCNC: NEGATIVE MG/DL
HCT VFR BLD AUTO: 41.9 % (ref 36–48)
HDSCOM,HDSCOM: NORMAL
HGB BLD-MCNC: 14.4 G/DL (ref 13–16)
HGB UR QL STRIP: NEGATIVE
KETONES UR QL STRIP.AUTO: NEGATIVE MG/DL
LEUKOCYTE ESTERASE UR QL STRIP.AUTO: NEGATIVE
LYMPHOCYTES # BLD: 4.2 K/UL (ref 0.8–3.5)
LYMPHOCYTES NFR BLD: 43 % (ref 20–51)
MCH RBC QN AUTO: 30 PG (ref 24–34)
MCHC RBC AUTO-ENTMCNC: 34.4 G/DL (ref 31–37)
MCV RBC AUTO: 87.3 FL (ref 74–97)
METHADONE UR QL: NEGATIVE
MONOCYTES # BLD: 1.2 K/UL (ref 0–1)
MONOCYTES NFR BLD: 13 % (ref 2–9)
NEUTS SEG # BLD: 3.9 K/UL (ref 1.8–8)
NEUTS SEG NFR BLD: 41 % (ref 42–75)
NITRITE UR QL STRIP.AUTO: NEGATIVE
OPIATES UR QL: NEGATIVE
PCP UR QL: NEGATIVE
PH UR STRIP: 6.5 [PH] (ref 5–8)
PLATELET # BLD AUTO: 176 K/UL (ref 135–420)
PLATELET COMMENTS,PCOM: ABNORMAL
PMV BLD AUTO: 10.1 FL (ref 9.2–11.8)
POTASSIUM SERPL-SCNC: 3.6 MMOL/L (ref 3.5–5.5)
PROT SERPL-MCNC: 7.5 G/DL (ref 6.4–8.2)
PROT UR STRIP-MCNC: NEGATIVE MG/DL
RBC # BLD AUTO: 4.8 M/UL (ref 4.7–5.5)
RBC MORPH BLD: ABNORMAL
SODIUM SERPL-SCNC: 143 MMOL/L (ref 136–145)
SP GR UR REFRACTOMETRY: 1.03 (ref 1–1.03)
UROBILINOGEN UR QL STRIP.AUTO: 2 EU/DL (ref 0.2–1)
WBC # BLD AUTO: 9.6 K/UL (ref 4.6–13.2)

## 2019-06-12 PROCEDURE — 80307 DRUG TEST PRSMV CHEM ANLYZR: CPT

## 2019-06-12 PROCEDURE — 85025 COMPLETE CBC W/AUTO DIFF WBC: CPT

## 2019-06-12 PROCEDURE — 99281 EMR DPT VST MAYX REQ PHY/QHP: CPT

## 2019-06-12 PROCEDURE — 80053 COMPREHEN METABOLIC PANEL: CPT

## 2019-06-12 PROCEDURE — 81003 URINALYSIS AUTO W/O SCOPE: CPT

## 2019-06-12 NOTE — BSMART NOTE
Comprehensive Assessment Form Part 1      Section l - Integrated Summary    Summary:  27year old male who presented voluntarily to the ER via Police reporting auditory hallucinations. Interviewed in room 5 @ the request of Dr. Magnolia Valdovinos. Patient resting on ER bed. Dressed in paper scrubs. Alert and oriented. Adequate hygiene and grooming. Cooperative with interview. Patient reports he is hearing voices but when asked to give detail of what he was hearing he was unable to do so. Reported the voices are \" just talking\". Denied any command hallucinations to harm self or others. Denied any suicidal or homicidal ideations. Admits to missed some doses of his oral medications. Spoke to [employer], whom he calls Grandma [phone]. Reports Deo has been staying with his Father for the past two weeks. Suggested to call his  Mr. Sunitha Goodrich    Spoke to Mr. Sunitha Goodrich [phone]. Reports Deo is to stay with his Father as his David Edward is unable to deal with Deo. Mr. Therese Leung reports Arpan Morales is resistant in doing his ADL's but his Father is better able to make him to these. Arpan Morales continues not to take prescribe oral medications as prescribed but has gotten his Abilify Maintena Injection. Spoke to Deo's Father Beatriz  [phone]. Arpan Morales can return to his residence. [address], [address]    Section ll - Disposition      The Medical Doctor to Psychiatrist conference was completed. The Medical Doctor is in agreement with Psychiatrist disposition because of (reason) denied being suicidal and homicidal. Reports auditory hallucinations but does not appear distressed by them, denied command type hallucinations. .  The plan is discussed with Dr. Magnolia Valdovinos, plan to discharge home from the ER. Referred to outpatient [facility] as scheduled. Fabian Sorto for medication management.     Bushra Johnson RN

## 2019-06-12 NOTE — ED NOTES
I have reviewed discharge instructions with the {PATIENT PARENT GUARDIAN:[id number]}. The {PATIENT PARENT GUARDIAN:[id number]} verbalized understanding.

## 2019-06-12 NOTE — ED PROVIDER NOTES
EMERGENCY DEPARTMENT HISTORY AND PHYSICAL EXAM    4:28 AM      Date: 2019  Patient Name: Lilliam Lee    History of Presenting Illness     No chief complaint on file. History Provided By: Patient    Additional History (Context): Lilliam Lee is a 27 y.o. male with history of psychiatric disorder who presents with complaint of auditory hallucinations, narrating things happening around him. He states that the voices are \"like a neighbor\". He denies any suicidal ideation, homicidal ideation. Denies any substance abuse tonight or other acute symptoms. PCP: Aleksandr Sherwood NP    Current Outpatient Medications   Medication Sig Dispense Refill    ARIPiprazole (ABILIFY MAINTENA) 400 mg injection 400 mg by IntraMUSCular route every four (4) weeks. 400 mg 0    divalproex ER (DEPAKOTE ER) 500 mg ER tablet Take 1 Tab by mouth two (2) times a day. 30 Tab 1    traZODone (DESYREL) 50 mg tablet Take 1 Tab by mouth nightly as needed for Sleep. 15 Tab 1       Past History     Past Medical History:  Past Medical History:   Diagnosis Date    ADHD (attention deficit hyperactivity disorder)     Anxiety disorder     Depression     Mood disorder ([facility]     Psychiatric disorder     Psychotic disorder ([facility] [address]     Trauma     \"His mother  this past November\"       Past Surgical History:  No past surgical history on file. Family History:  Family History   Problem Relation Age of Onset    Schizophrenia Mother    [facility] Schizophrenia Brother     Schizophrenia Sister        Social History:  Social History     Tobacco Use    Smoking status: Never Smoker    Smokeless tobacco: Never Used   Substance Use Topics    Alcohol use: No    Drug use: No       Allergies:  No Known Allergies      Review of Systems       Review of Systems   Constitutional: Negative for activity change and appetite change. HENT: Negative for congestion. Eyes: Negative for visual disturbance.    Respiratory: Negative for cough and shortness of breath. Cardiovascular: Negative for chest pain. Gastrointestinal: Negative for abdominal pain, diarrhea, nausea and vomiting. Genitourinary: Negative for dysuria. Musculoskeletal: Negative for arthralgias and myalgias. Skin: Negative for rash. Neurological: Negative for weakness and numbness. Psychiatric/Behavioral: Positive for hallucinations. Physical Exam     Visit Vitals  BP (!) 145/92 (BP 1 Location: Left arm)   Pulse (!) 122   Temp 98.3 °F (36.8 °C)   Resp 16   Wt 72.6 kg (160 lb)   SpO2 98%   BMI 23.63 kg/m²         Physical Exam   Constitutional: He is oriented to person, place, and time. He appears well-developed and well-nourished. HENT:   Head: Normocephalic and atraumatic. Mouth/Throat: Oropharynx is clear and moist.   Eyes: Conjunctivae are normal.   Neck: Normal range of motion. Cardiovascular: Intact distal pulses. Tachycardia present. Pulmonary/Chest: Effort normal.   Abdominal: He exhibits no distension. Musculoskeletal: Normal range of motion. Neurological: He is alert and oriented to person, place, and time. Skin: Skin is warm and dry. Psychiatric: He has a normal mood and affect.  His behavior is normal.         Diagnostic Study Results     Labs -  Recent Results (from the past 12 hour(s))   URINALYSIS W/ RFLX MICROSCOPIC    Collection Time: 06/12/19  3:05 AM   Result Value Ref Range    Color YELLOW      Appearance CLEAR      Specific gravity 1.026 1.005 - 1.030      pH (UA) 6.5 5.0 - 8.0      Protein NEGATIVE  NEG mg/dL    Glucose NEGATIVE  NEG mg/dL    Ketone NEGATIVE  NEG mg/dL    Bilirubin NEGATIVE  NEG      Blood NEGATIVE  NEG      Urobilinogen 2.0 (H) 0.2 - 1.0 EU/dL    Nitrites NEGATIVE  NEG      Leukocyte Esterase NEGATIVE  NEG     DRUG SCREEN, URINE    Collection Time: 06/12/19  3:25 AM   Result Value Ref Range    BENZODIAZEPINES NEGATIVE  NEG      BARBITURATES NEGATIVE  NEG      THC (TH-CANNABINOL) NEGATIVE  NEG      OPIATES NEGATIVE NEG      PCP(PHENCYCLIDINE) NEGATIVE  NEG      COCAINE NEGATIVE  NEG      AMPHETAMINES NEGATIVE  NEG      METHADONE NEGATIVE  NEG      HDSCOM (NOTE)    CBC WITH AUTOMATED DIFF    Collection Time: 06/12/19  3:30 AM   Result Value Ref Range    WBC 9.6 4.6 - 13.2 K/uL    RBC 4.80 4.70 - 5.50 M/uL    HGB 14.4 13.0 - 16.0 g/dL    HCT 41.9 36.0 - 48.0 %    MCV 87.3 74.0 - 97.0 FL    MCH 30.0 24.0 - 34.0 PG    MCHC 34.4 31.0 - 37.0 g/dL    RDW 12.1 11.6 - 14.5 %    PLATELET 070 883 - 235 K/uL    MPV 10.1 9.2 - 11.8 FL    NEUTROPHILS 41 (L) 42 - 75 %    LYMPHOCYTES 43 20 - 51 %    MONOCYTES 13 (H) 2 - 9 %    EOSINOPHILS 3 0 - 5 %    BASOPHILS 0 0 - 3 %    ABS. NEUTROPHILS 3.9 1.8 - 8.0 K/UL    ABS. LYMPHOCYTES 4.2 (H) 0.8 - 3.5 K/UL    ABS. MONOCYTES 1.2 (H) 0 - 1.0 K/UL    ABS. EOSINOPHILS 0.3 0.0 - 0.4 K/UL    ABS. BASOPHILS 0.0 0.0 - 0.06 K/UL    DF MANUAL      PLATELET COMMENTS ADEQUATE PLATELETS      RBC COMMENTS NORMOCYTIC, NORMOCHROMIC     METABOLIC PANEL, COMPREHENSIVE    Collection Time: 06/12/19  3:30 AM   Result Value Ref Range    Sodium 143 136 - 145 mmol/L    Potassium 3.6 3.5 - 5.5 mmol/L    Chloride 107 100 - 108 mmol/L    CO2 28 21 - 32 mmol/L    Anion gap 8 3.0 - 18 mmol/L    Glucose 91 74 - 99 mg/dL    BUN 13 7.0 - 18 MG/DL    Creatinine 1.29 0.6 - 1.3 MG/DL    BUN/Creatinine ratio 10 (L) 12 - 20      GFR est AA >60 >60 ml/min/1.73m2    GFR est non-AA >60 >60 ml/min/1.73m2    Calcium 8.8 8.5 - 10.1 MG/DL    Bilirubin, total 0.3 0.2 - 1.0 MG/DL    ALT (SGPT) 21 16 - 61 U/L    AST (SGOT) 16 15 - 37 U/L    Alk. phosphatase 86 45 - 117 U/L    Protein, total 7.5 6.4 - 8.2 g/dL    Albumin 3.8 3.4 - 5.0 g/dL    Globulin 3.7 2.0 - 4.0 g/dL    A-G Ratio 1.0 0.8 - 1.7     ETHYL ALCOHOL    Collection Time: 06/12/19  3:30 AM   Result Value Ref Range    ALCOHOL(ETHYL),SERUM <3 0 - 3 MG/DL       Radiologic Studies -   No orders to display         Medical Decision Making   I am the first provider for this patient.     I reviewed the vital signs, available nursing notes, past medical history, past surgical history, family history and social history. Vital Signs-Reviewed the patient's vital signs. Records Reviewed: Nursing Notes (Time of Review: 4:28 AM)      Provider Notes (Medical Decision Making): Lilliam Lee is a 27 y.o. male with history of psychiatric disorder who presents with complaint of auditory hallucinations, narrating things happening around him. He states that the voices are \"like a neighbor\". He denies any suicidal ideation, homicidal ideation. Denies any substance abuse tonight or other acute symptoms. Patient appears well, but is noted to be tachycardic with a sinus tachycardia on monitor. Differential Diagnosis: Patient presents for auditory hallucinations, likely due to underlying psychiatric diagnosis. Will evaluate for cause of his tachycardia, possibly substance or medication related. Patient denies any physical symptoms to suggest acute cardiopulmonary etiology. Testing: CBC, CMP, UDS, ETOH    Treatments: Pending evaluation    Re-evaluations:  Patient's labs are unremarkable, heart rate is improving. Patient will be evaluated by crisis. Diagnosis     Clinical Impression:   1. Hallucination        Disposition: 0700 AM : Pt care transferred to Dr. Osmani Spencer  ,ED provider. History of patient complaint(s), available diagnostic reports and current treatment plan has been discussed thoroughly. Bedside rounding on patient occured : yes . Intended disposition of patient : TBD  Pending diagnostics reports and/or labs (please list): crisis evaluation      Follow-up Information    None          Patient's Medications   Start Taking    No medications on file   Continue Taking    ARIPIPRAZOLE (ABILIFY MAINTENA) 400 MG INJECTION    400 mg by IntraMUSCular route every four (4) weeks. DIVALPROEX ER (DEPAKOTE ER) 500 MG ER TABLET    Take 1 Tab by mouth two (2) times a day.     TRAZODONE (DESYREL) 50 MG TABLET    Take 1 Tab by mouth nightly as needed for Sleep. These Medications have changed    No medications on file   Stop Taking    No medications on file     _______________________________    Attestations:  Maame Persaud MD acting as a scribe for and in the presence of Kamryn Lopes MD      June 12, 2019 at 6:35 AM       Provider Attestation:      I personally performed the services described in the documentation, reviewed the documentation, as recorded by the scribe in my presence, and it accurately and completely records my words and actions.  June 12, 2019 at 6:35 AM - Kamryn Lopes MD    _______________________________

## 2019-06-12 NOTE — ED NOTES
Turned over to me by Dr. Osei Gonzalez emergency physician for follow-up and disposition. Patient is a 82-year-old suffering from hallucinations. Crisis needs to evaluate. Consult:  Discussed care with him. Crisis services. Standard discussion; including history of patients chief complaint, available diagnostic results, and treatment course. He will discuss with counselors to see if patient is okay to be discharged home. 8:51 AM      Isidro Mcgrath from crisis services states spoke to want father and counselors. Patient is okay and cleared to go home.

## 2019-06-25 ENCOUNTER — HOSPITAL ENCOUNTER (EMERGENCY)
Age: 24
Discharge: HOME | End: 2019-06-26
Attending: EMERGENCY MEDICINE
Payer: MEDICAID

## 2019-06-25 VITALS
RESPIRATION RATE: 16 BRPM | HEART RATE: 92 BPM | DIASTOLIC BLOOD PRESSURE: 74 MMHG | OXYGEN SATURATION: 99 % | HEIGHT: 70 IN | WEIGHT: 150 LBS | TEMPERATURE: 97.7 F | BODY MASS INDEX: 21.47 KG/M2 | SYSTOLIC BLOOD PRESSURE: 117 MMHG

## 2019-06-25 DIAGNOSIS — Z00.8 ENCOUNTER FOR PSYCHOLOGICAL EVALUATION: Primary | ICD-10-CM

## 2019-06-25 DIAGNOSIS — Z86.59 HISTORY OF SCHIZOPHRENIA: ICD-10-CM

## 2019-06-25 LAB
ALBUMIN SERPL-MCNC: 3.8 G/DL (ref 3.4–5)
ALBUMIN/GLOB SERPL: 1 {RATIO} (ref 0.8–1.7)
ALP SERPL-CCNC: 95 U/L (ref 45–117)
ALT SERPL-CCNC: 20 U/L (ref 16–61)
AMPHET UR QL SCN: NEGATIVE
ANION GAP SERPL CALC-SCNC: 5 MMOL/L (ref 3–18)
APPEARANCE UR: NORMAL
AST SERPL-CCNC: 13 U/L (ref 15–37)
BARBITURATES UR QL SCN: NEGATIVE
BASOPHILS # BLD: 0 K/UL (ref 0–0.1)
BASOPHILS NFR BLD: 1 % (ref 0–2)
BENZODIAZ UR QL: NEGATIVE
BILIRUB SERPL-MCNC: 0.2 MG/DL (ref 0.2–1)
BILIRUB UR QL: NEGATIVE
BUN SERPL-MCNC: 11 MG/DL (ref 7–18)
BUN/CREAT SERPL: 10 (ref 12–20)
CALCIUM SERPL-MCNC: 8.7 MG/DL (ref 8.5–10.1)
CANNABINOIDS UR QL SCN: NEGATIVE
CHLORIDE SERPL-SCNC: 107 MMOL/L (ref 100–108)
CO2 SERPL-SCNC: 28 MMOL/L (ref 21–32)
COCAINE UR QL SCN: NEGATIVE
COLOR UR: YELLOW
CREAT SERPL-MCNC: 1.12 MG/DL (ref 0.6–1.3)
DIFFERENTIAL METHOD BLD: NORMAL
EOSINOPHIL # BLD: 0.1 K/UL (ref 0–0.4)
EOSINOPHIL NFR BLD: 2 % (ref 0–5)
ERYTHROCYTE [DISTWIDTH] IN BLOOD BY AUTOMATED COUNT: 12.1 % (ref 11.6–14.5)
ETHANOL SERPL-MCNC: <3 MG/DL (ref 0–3)
GLOBULIN SER CALC-MCNC: 3.7 G/DL (ref 2–4)
GLUCOSE SERPL-MCNC: 91 MG/DL (ref 74–99)
GLUCOSE UR STRIP.AUTO-MCNC: NEGATIVE MG/DL
HCT VFR BLD AUTO: 42.7 % (ref 36–48)
HDSCOM,HDSCOM: NORMAL
HGB BLD-MCNC: 15.2 G/DL (ref 13–16)
HGB UR QL STRIP: NEGATIVE
KETONES UR QL STRIP.AUTO: NEGATIVE MG/DL
LEUKOCYTE ESTERASE UR QL STRIP.AUTO: NEGATIVE
LYMPHOCYTES # BLD: 2.9 K/UL (ref 0.9–3.6)
LYMPHOCYTES NFR BLD: 43 % (ref 21–52)
MCH RBC QN AUTO: 30.6 PG (ref 24–34)
MCHC RBC AUTO-ENTMCNC: 35.6 G/DL (ref 31–37)
MCV RBC AUTO: 86.1 FL (ref 74–97)
METHADONE UR QL: NEGATIVE
MONOCYTES # BLD: 0.6 K/UL (ref 0.05–1.2)
MONOCYTES NFR BLD: 9 % (ref 3–10)
NEUTS SEG # BLD: 3.1 K/UL (ref 1.8–8)
NEUTS SEG NFR BLD: 45 % (ref 40–73)
NITRITE UR QL STRIP.AUTO: NEGATIVE
OPIATES UR QL: NEGATIVE
PCP UR QL: NEGATIVE
PH UR STRIP: 7.5 [PH] (ref 5–8)
PLATELET # BLD AUTO: 234 K/UL (ref 135–420)
PMV BLD AUTO: 10 FL (ref 9.2–11.8)
POTASSIUM SERPL-SCNC: 3.8 MMOL/L (ref 3.5–5.5)
PROT SERPL-MCNC: 7.5 G/DL (ref 6.4–8.2)
PROT UR STRIP-MCNC: NEGATIVE MG/DL
RBC # BLD AUTO: 4.96 M/UL (ref 4.7–5.5)
SODIUM SERPL-SCNC: 140 MMOL/L (ref 136–145)
SP GR UR REFRACTOMETRY: 1.02 (ref 1–1.03)
UROBILINOGEN UR QL STRIP.AUTO: 1 EU/DL (ref 0.2–1)
VALPROATE SERPL-MCNC: 87 UG/ML (ref 50–100)
WBC # BLD AUTO: 6.8 K/UL (ref 4.6–13.2)

## 2019-06-25 PROCEDURE — 81003 URINALYSIS AUTO W/O SCOPE: CPT

## 2019-06-25 PROCEDURE — 85025 COMPLETE CBC W/AUTO DIFF WBC: CPT

## 2019-06-25 PROCEDURE — 80164 ASSAY DIPROPYLACETIC ACD TOT: CPT

## 2019-06-25 PROCEDURE — 99283 EMERGENCY DEPT VISIT LOW MDM: CPT

## 2019-06-25 PROCEDURE — 80053 COMPREHEN METABOLIC PANEL: CPT

## 2019-06-25 PROCEDURE — 80307 DRUG TEST PRSMV CHEM ANLYZR: CPT

## 2019-06-26 NOTE — ED PROVIDER NOTES
EMERGENCY DEPARTMENT HISTORY AND PHYSICAL EXAM    Date: 2019  Patient Name: Jerrica Ferreira    History of Presenting Illness     No chief complaint on file. History Provided By: Patient    Chief Complaint: mental health evaluation      Additional History (Context): Jerrica Ferreira is a 27 y.o. male with ADHD, anxiety, depression, mood disorder who presents for mental health evaluation. Pt states \"I am not hear for an evaluation, I need a scan for this chip in my head. \"   Pt presents with paranoid thoughts. Denies SI/HI. PCP: Abdi Reynoso NP    Current Outpatient Medications   Medication Sig Dispense Refill    ARIPiprazole (ABILIFY MAINTENA) 400 mg injection 400 mg by IntraMUSCular route every four (4) weeks. 400 mg 0    divalproex ER (DEPAKOTE ER) 500 mg ER tablet Take 1 Tab by mouth two (2) times a day. 30 Tab 1    traZODone (DESYREL) 50 mg tablet Take 1 Tab by mouth nightly as needed for Sleep. 15 Tab 1       Past History     Past Medical History:  Past Medical History:   Diagnosis Date    ADHD (attention deficit hyperactivity disorder)     Anxiety disorder     Depression     [facility])     Psychiatric disorder     [facility]otic disorder ([facility] [address]     Trauma     \"His mother  this past November\"       Past Surgical History:  No past surgical history on file. Family History:  Family History   Problem Relation Age of Onset    Schizophrenia Mother     Schizophrenia Brother     Schizophrenia Sister        Social History:  Social History     Tobacco Use    Smoking status: Never Smoker    Smokeless tobacco: Never Used   Substance Use Topics    Alcohol use: No    Drug use: No       Allergies:  No Known Allergies      Review of Systems   Review of Systems   Psychiatric/Behavioral: Positive for hallucinations.      All Other Systems Negative  Physical Exam     Vitals:    19 2203   BP: 117/74   Pulse: 92   Resp: 16   Temp: 97.7 °F (36.5 °C)   SpO2: 99%   Weight: 68 kg (150 lb)   Height: 5' 10\" (1.778 m)     Physical Exam   Constitutional: He appears well-developed and well-nourished. No distress. HENT:   Head: Normocephalic and atraumatic. Right Ear: External ear normal.   Left Ear: External ear normal.   Nose: Nose normal.   Eyes: Conjunctivae are normal.   Neck: Normal range of motion. Neck supple. Cardiovascular: Normal rate. Pulmonary/Chest: Effort normal.   Musculoskeletal: Normal range of motion. Neurological: He is alert. Skin: Skin is warm and dry. He is not diaphoretic. Psychiatric: He has a normal mood and affect. His speech is normal. Judgment normal. He is actively hallucinating. Thought content is paranoid. Cognition and memory are normal.            Diagnostic Study Results     Labs -     Recent Results (from the past 12 hour(s))   CBC WITH AUTOMATED DIFF    Collection Time: 06/25/19 10:15 PM   Result Value Ref Range    WBC 6.8 4.6 - 13.2 K/uL    RBC 4.96 4.70 - 5.50 M/uL    HGB 15.2 13.0 - 16.0 g/dL    HCT 42.7 36.0 - 48.0 %    MCV 86.1 74.0 - 97.0 FL    MCH 30.6 24.0 - 34.0 PG    MCHC 35.6 31.0 - 37.0 g/dL    RDW 12.1 11.6 - 14.5 %    PLATELET 639 250 - 583 K/uL    MPV 10.0 9.2 - 11.8 FL    NEUTROPHILS 45 40 - 73 %    LYMPHOCYTES 43 21 - 52 %    MONOCYTES 9 3 - 10 %    EOSINOPHILS 2 0 - 5 %    BASOPHILS 1 0 - 2 %    ABS. NEUTROPHILS 3.1 1.8 - 8.0 K/UL    ABS. LYMPHOCYTES 2.9 0.9 - 3.6 K/UL    ABS. MONOCYTES 0.6 0.05 - 1.2 K/UL    ABS. EOSINOPHILS 0.1 0.0 - 0.4 K/UL    ABS.  BASOPHILS 0.0 0.0 - 0.1 K/UL    DF AUTOMATED     METABOLIC PANEL, COMPREHENSIVE    Collection Time: 06/25/19 10:15 PM   Result Value Ref Range    Sodium 140 136 - 145 mmol/L    Potassium 3.8 3.5 - 5.5 mmol/L    Chloride 107 100 - 108 mmol/L    CO2 28 21 - 32 mmol/L    Anion gap 5 3.0 - 18 mmol/L    Glucose 91 74 - 99 mg/dL    BUN 11 7.0 - 18 MG/DL    Creatinine 1.12 0.6 - 1.3 MG/DL    BUN/Creatinine ratio 10 (L) 12 - 20      GFR est AA >60 >60 ml/min/1.73m2    GFR est non-AA >60 >60 ml/min/1.73m2    Calcium 8.7 8.5 - 10.1 MG/DL    Bilirubin, total 0.2 0.2 - 1.0 MG/DL    ALT (SGPT) 20 16 - 61 U/L    AST (SGOT) 13 (L) 15 - 37 U/L    Alk. phosphatase 95 45 - 117 U/L    Protein, total 7.5 6.4 - 8.2 g/dL    Albumin 3.8 3.4 - 5.0 g/dL    Globulin 3.7 2.0 - 4.0 g/dL    A-G Ratio 1.0 0.8 - 1.7     ETHYL ALCOHOL    Collection Time: 06/25/19 10:15 PM   Result Value Ref Range    ALCOHOL(ETHYL),SERUM <3 0 - 3 MG/DL   VALPROIC ACID    Collection Time: 06/25/19 10:15 PM   Result Value Ref Range    Valproic acid 87 50 - 100 ug/ml   URINALYSIS W/ RFLX MICROSCOPIC    Collection Time: 06/25/19 10:45 PM   Result Value Ref Range    Color YELLOW      Appearance TURBID      Specific gravity 1.023 1.005 - 1.030      pH (UA) 7.5 5.0 - 8.0      Protein NEGATIVE  NEG mg/dL    Glucose NEGATIVE  NEG mg/dL    Ketone NEGATIVE  NEG mg/dL    Bilirubin NEGATIVE  NEG      Blood NEGATIVE  NEG      Urobilinogen 1.0 0.2 - 1.0 EU/dL    Nitrites NEGATIVE  NEG      Leukocyte Esterase NEGATIVE  NEG     DRUG SCREEN, URINE    Collection Time: 06/25/19 10:45 PM   Result Value Ref Range    BENZODIAZEPINES NEGATIVE  NEG      BARBITURATES NEGATIVE  NEG      THC (TH-CANNABINOL) NEGATIVE  NEG      OPIATES NEGATIVE  NEG      PCP(PHENCYCLIDINE) NEGATIVE  NEG      COCAINE NEGATIVE  NEG      AMPHETAMINES NEGATIVE  NEG      METHADONE NEGATIVE  NEG      HDSCOM (NOTE)        Radiologic Studies -   No orders to display     CT Results  (Last 48 hours)    None        CXR Results  (Last 48 hours)    None            Medical Decision Making   I am the first provider for this patient. I reviewed the vital signs, available nursing notes, past medical history, past surgical history, family history and social history. Vital Signs-Reviewed the patient's vital signs.       Pulse Oximetry Analysis - 99% on RA    Records Reviewed: Nursing Notes and Old Medical Records    Procedures:  Procedures    Provider Notes (Medical Decision Making): Ddx: paranoia, schizophrenia, vs other. Pt here with paranoid behavior, stating he needs a scan for the chip in his head. Did allow for blood work and UA. Will consult crisis. GEORGES Stoeks 11:21 PM    12 AM Discussed w/ Raquel, crisis. Will eval pt. GEORGES Stokes    1:44 AM Pt seen by Raquel. Per his conversation with Raquel, pt does not recall telling ED staff about a chip in his head. Pt advised Raquel that he lives with his aunt, takes all of his medications and the name of his psychiatrist. He denies HI/SI and hallucinations and he is ready to go home. GEORGES Stokes      MED RECONCILIATION:  No current facility-administered medications for this encounter. Current Outpatient Medications   Medication Sig    ARIPiprazole (ABILIFY MAINTENA) 400 mg injection 400 mg by IntraMUSCular route every four (4) weeks.  divalproex ER (DEPAKOTE ER) 500 mg ER tablet Take 1 Tab by mouth two (2) times a day.  traZODone (DESYREL) 50 mg tablet Take 1 Tab by mouth nightly as needed for Sleep. Disposition:  home    DISCHARGE NOTE:   Pt has been reexamined. Patient has no new complaints, changes, or physical findings. Care plan outlined and precautions discussed. Results were reviewed with the patient. All medications were reviewed with the patient; will d/c home. All of pt's questions and concerns were addressed. Patient was instructed and agrees to follow up with psych, as well as to return to the ED upon further deterioration. Patient is ready to go home. Follow-up Information    None         Current Discharge Medication List            Diagnosis     Clinical Impression:   1.  Encounter for psychological evaluation

## 2019-06-26 NOTE — ED TRIAGE NOTES
Pt states he needs to be scanned for the chip in his head. Pt states the chip is like a telephone in his head. Pt denies SI/HI.

## 2019-06-26 NOTE — BSMART NOTE
Comprehensive Assessment Form Part 1 Section I - Disposition The Medical Doctor to Psychiatrist conference {WAS/NOT:13593} completed. The Medical Doctor is in agreement with Psychiatrist disposition because of (reason) ***. The plan is ***. The on-call Psychiatrist consulted was Dr. Saige Ervin The admitting Psychiatrist will be Dr. Saige Ervin The admitting Diagnosis is ***. The Payor source is ***. The name of the representative was ***. This was {[facility] APPROVED/NOT APPROVED:72435}. Section II - Integrated Summary Summary:  *** The patient {IS/NOT:19568} deemed competent to provide informed consent. The information is given by the {Information source:19689}. The Chief Complaint is ***. The Precipitant Factors are ***. Previous Hospitalizations: *** The patient {HAS BEEN IN RESTRAINTS:19228} Current Psychiatrist and/or  is ***. Lethality Assessment: 
 
The potential for suicide is noted by the following: {SUICIDE POTENTIAL CHOICES:19125}. The potential for homicide is {NOTED:73754}. The patient {HAS/NOT:30607} been a perpetrator of sexual or physical abuse. There {ARE/ARE NOT PENDING LAADDYK:86559} The patient {IS/IS NOT:28314} felt to be at risk for self harm or harm to others. The attending nurse was advised {[facility] HARM TO SELF OR OTHERS:08824}. Section III - Psychosocial 
The patient's overall mood and attitude is ***. Feelings of helplessness and hopelessness are {OBSERVED/NOT OBSERVED:87806}. Generalized anxiety is {OBSERVED/NOT OBSERVED:85117}. Panic is {OBSERVED/NOT OBSERVED:07922}. Phobias are {OBSERVED/NOT OBSERVED:88183}. Obsessive compulsive tendencies are {OBSERVED/NOT OBSERVED:78517}. Section IV - Mental Status Exam 
The patient's appearance {APPEARANCE:75573}. The patient's behavior {BEHAVIOR:97229}. The patient is {ORIENTATION:42722}. The patient's speech {SPEECH:14395}. The patient's mood  {MOOD BH:64533}.   The range of affect {RANGE OF NEGINW:92923}. The patient's thought content  demonstrates {THOUGHT CONTENT:21442}. The thought process {THOUGHT PROCESS:83031}. The patient's perception {PERCEPTION:75001}. The patient's memory {MEMORY BH:52512}. The patient's appetite {APPETITIE:91591}. The patient's sleep {SLEEP BH:17787}. {DKHXMSO:16581}. The patient's judgement {JUDGEMENT:}. Section V - Substance Abuse The patient {IS/NOT:} using substances. The patient is using {SUBSTANCE TYPE:57929}. The patient has experienced the following withdrawal symptoms,  {SUBSTANCE ABUSE SYMPTOMS:89482}. Section VI - Living Arrangements The patient {MARITIAL STATUS:66585}. The patient lives {CAREGIVER:48470}. The patient has {CHILDREN:86792}. The patient {DOES/DOES NOT/WILD CARD (D):76794} plan to return home upon discharge. The patient {DOES/DOES NOT/WILD CARD (D):29478} have legal issues pending. The patient's source of income comes from [facility] SOURCE:}. [employer] and cultural practices {CULTURAL/Scientology PRACTICES:20349} The patient's greatest support comes from *** and this person {WILL/WILL NOT:74768} be involved with the treatment. The patient {HAS/HAS NOT:[id number]} been in an event described as horrible or outside the realm of ordinary life experience either currently or in the past. 
The patient {HAS/NOT:22802} been a victim of sexual/physical abuse. Section VII - Other Areas of Clinical Concern The highest grade achieved is *** with the overall quality of school experience being described as ***. The patient is currently  {EMPLOYED:98233} and speaks {LANGUAGE:53499} as a primary language. The patient has {IMPAIRED COMMUNICATION:56089} affecting communication. The patient's preference for learning can be described as: {Learning assessment:34211}. The patient's hearing is { HEARIN}. The patient's vision is { VISION:72659} . Raquel Patricia

## 2019-06-28 RX ORDER — ARIPIPRAZOLE 400 MG
400 KIT INTRAMUSCULAR ONCE
Status: COMPLETED | OUTPATIENT
Start: 2019-07-05 | End: 2019-07-05

## 2019-07-04 ENCOUNTER — APPOINTMENT (OUTPATIENT)
Dept: INFUSION THERAPY | Age: 24
End: 2019-07-04
Payer: MEDICAID

## 2019-07-05 ENCOUNTER — HOSPITAL ENCOUNTER (OUTPATIENT)
Dept: INFUSION THERAPY | Age: 24
Discharge: HOME | End: 2019-07-05
Payer: MEDICAID

## 2019-07-05 VITALS
DIASTOLIC BLOOD PRESSURE: 73 MMHG | SYSTOLIC BLOOD PRESSURE: 118 MMHG | HEART RATE: 86 BPM | OXYGEN SATURATION: 99 % | TEMPERATURE: 97.8 F | RESPIRATION RATE: 18 BRPM

## 2019-07-05 PROCEDURE — 96372 THER/PROPH/DIAG INJ SC/IM: CPT

## 2019-07-05 PROCEDURE — 74011250636 HC RX REV CODE- 250/636: Performed by: NURSE PRACTITIONER

## 2019-07-05 RX ADMIN — ARIPIPRAZOLE 400 MG: KIT at 10:11

## 2019-07-05 NOTE — PROGRESS NOTES
[facility] OPI Progress Note    Date: 2019    Name: Erika Tompkins    MRN: [id number]         : 1995      Mr. Surekha Rooney arrived to [facility] at [phone]. Mr. Surekha Rooney was assessed and education was provided. Mr. Ruvalcaba Landon vitals were reviewed. Visit Vitals  /73 (BP 1 Location: Right arm, BP Patient Position: Sitting)   Pulse 86   Temp 97.8 °F (36.6 °C)   Resp 18   SpO2 99%       Abilify 400mg was administered as ordered IM in patient's left deltoid muscle. Mr. Surekha Rooney tolerated well without complaints. Mr. Surekha Rooney was discharged from [place] [address] in stable condition at 1015. He is to return on 19 at 1000 for his next appointment.     Flora Holloway RN  2019

## 2019-07-26 RX ORDER — ARIPIPRAZOLE 400 MG
400 KIT INTRAMUSCULAR ONCE
Status: DISPENSED | OUTPATIENT
Start: 2019-08-02 | End: 2019-08-02

## 2019-08-01 ENCOUNTER — HOSPITAL ENCOUNTER (OUTPATIENT)
Dept: INFUSION THERAPY | Age: 24
Discharge: HOME | End: 2019-08-01
Payer: MEDICAID

## 2019-08-01 VITALS
SYSTOLIC BLOOD PRESSURE: 120 MMHG | HEART RATE: 80 BPM | OXYGEN SATURATION: 99 % | RESPIRATION RATE: 18 BRPM | TEMPERATURE: 97.6 F | DIASTOLIC BLOOD PRESSURE: 80 MMHG

## 2019-08-01 PROCEDURE — 96372 THER/PROPH/DIAG INJ SC/IM: CPT

## 2019-08-01 PROCEDURE — 74011250636 HC RX REV CODE- 250/636: Performed by: NURSE PRACTITIONER

## 2019-08-01 RX ORDER — ARIPIPRAZOLE 400 MG
400 KIT INTRAMUSCULAR ONCE
Status: COMPLETED | OUTPATIENT
Start: 2019-08-01 | End: 2019-08-01

## 2019-08-01 RX ADMIN — ARIPIPRAZOLE 400 MG: KIT at 15:14

## 2019-08-01 NOTE — PROGRESS NOTES
[facility] Progress Note    Date: 2019    Name: Maxime Agosto    MRN: [id number]         : 1995      Mr. Rebeca Gallagher was assessed and education was provided. Mr. Ahmet Lynch vitals were reviewed and patient was observed for 5 minutes prior to treatment. Visit Vitals  /80 (BP 1 Location: Right arm, BP Patient Position: Sitting)   Pulse 80   Temp 97.6 °F (36.4 °C)   Resp 18   SpO2 99%           Ariprazole 400 mg   was administered IM  in  Left deltoid. .       Mr. Rebeca Gallagher tolerated well, and had no complaints. Patient armband removed and shredded. Mr. Rebeca Gallagher was discharged from [place] [address] in stable condition at 1515. He is to return on 2019 at 1500 for his next appointment.     Chelita Woodall RN  2019  3:40 PM

## 2019-08-02 ENCOUNTER — HOSPITAL ENCOUNTER (OUTPATIENT)
Dept: INFUSION THERAPY | Age: 24
Discharge: HOME | End: 2019-08-02
Payer: MEDICAID

## 2019-08-23 RX ORDER — ARIPIPRAZOLE 400 MG
400 KIT INTRAMUSCULAR ONCE
Status: DISPENSED | OUTPATIENT
Start: 2019-08-30 | End: 2019-08-30

## 2019-08-30 ENCOUNTER — HOSPITAL ENCOUNTER (OUTPATIENT)
Dept: INFUSION THERAPY | Age: 24
Discharge: HOME | End: 2019-08-30
Payer: MEDICAID

## 2019-09-03 ENCOUNTER — HOSPITAL ENCOUNTER (OUTPATIENT)
Dept: INFUSION THERAPY | Age: 24
Discharge: HOME | End: 2019-09-03
Payer: MEDICAID

## 2019-09-03 VITALS
HEART RATE: 70 BPM | SYSTOLIC BLOOD PRESSURE: 127 MMHG | TEMPERATURE: 97.8 F | DIASTOLIC BLOOD PRESSURE: 81 MMHG | OXYGEN SATURATION: 99 % | RESPIRATION RATE: 18 BRPM

## 2019-09-03 PROCEDURE — 74011250636 HC RX REV CODE- 250/636: Performed by: NURSE PRACTITIONER

## 2019-09-03 PROCEDURE — 96372 THER/PROPH/DIAG INJ SC/IM: CPT

## 2019-09-03 RX ORDER — ARIPIPRAZOLE 400 MG
400 KIT INTRAMUSCULAR ONCE
Status: COMPLETED | OUTPATIENT
Start: 2019-09-03 | End: 2019-09-03

## 2019-09-03 RX ADMIN — ARIPIPRAZOLE 400 MG: KIT at 12:04

## 2019-09-03 NOTE — PROGRESS NOTES
[facility] Progress Note    Date: September 3, 2019    Name: Mohan Trivedi    MRN: [id number]         : 1995    Abilify Injection    Mr. Janice Pedraza was assessed and education was provided. Mr. Darla Goodpasture vitals were reviewed and patient was observed for 5 minutes prior to treatment. Visit Vitals  /81 (BP 1 Location: Left arm, BP Patient Position: At rest;Sitting)   Pulse 70   Temp 97.8 °F (36.6 °C)   Resp 18   SpO2 99%       Ability 300 mg was administered IM in the left deltoid. No bleeding or redness noted. Band-aid applied to to the site. Mr. Janice Pedraza tolerated well, and had no complaints. Patient politely declined to stay for an observation period at this time. Patient armband removed and shredded. Mr. Janice Pedraza was discharged from [place] [address] in stable condition at 1210. He is to return on 10/1/2019 at 1300 for his next appointment.     Nini Wang RN  September 3, 2019  1216

## 2019-09-27 ENCOUNTER — APPOINTMENT (OUTPATIENT)
Dept: INFUSION THERAPY | Age: 24
End: 2019-09-27
Payer: MEDICAID

## 2019-10-01 ENCOUNTER — HOSPITAL ENCOUNTER (OUTPATIENT)
Dept: INFUSION THERAPY | Age: 24
Discharge: HOME | End: 2019-10-01
Payer: MEDICAID

## 2019-10-01 VITALS
TEMPERATURE: 98.9 F | OXYGEN SATURATION: 97 % | HEART RATE: 94 BPM | DIASTOLIC BLOOD PRESSURE: 69 MMHG | SYSTOLIC BLOOD PRESSURE: 109 MMHG | RESPIRATION RATE: 18 BRPM

## 2019-10-01 PROCEDURE — 74011250636 HC RX REV CODE- 250/636: Performed by: NURSE PRACTITIONER

## 2019-10-01 PROCEDURE — 96372 THER/PROPH/DIAG INJ SC/IM: CPT

## 2019-10-01 RX ORDER — ARIPIPRAZOLE 400 MG
400 KIT INTRAMUSCULAR ONCE
Status: COMPLETED | OUTPATIENT
Start: 2019-10-01 | End: 2019-10-01

## 2019-10-01 RX ADMIN — ARIPIPRAZOLE 400 MG: KIT at 11:17

## 2019-10-01 NOTE — PROGRESS NOTES
[facility] OPI Progress Note    Date: 2019    Name: Jesus Bruce    MRN: [id number]         : 1995      Mr. Belen Mata arrived to [facility] at 1110. Mr. Belen Mata was assessed and education was provided. Mr. Marcie Nelson vitals were reviewed. Visit Vitals  /69 (BP 1 Location: Left arm, BP Patient Position: Sitting)   Pulse 94   Temp 98.9 °F (37.2 °C)   Resp 18   SpO2 97%       Abilify 400mg was administered as ordered IM in patient's left deltoid muscle. Mr. Belen Mata tolerated well without complaints. Mr. Belen Mata was discharged from [place] [address] in stable condition at 1120. He is to return on 10/30/19 at 0900 for his next appointment.     Marito Mims RN  2019

## 2019-10-06 ENCOUNTER — HOSPITAL ENCOUNTER (EMERGENCY)
Age: 24
Discharge: HOME | End: 2019-10-07
Attending: EMERGENCY MEDICINE
Payer: MEDICAID

## 2019-10-06 VITALS
TEMPERATURE: 98.2 F | OXYGEN SATURATION: 99 % | HEIGHT: 70 IN | DIASTOLIC BLOOD PRESSURE: 82 MMHG | SYSTOLIC BLOOD PRESSURE: 140 MMHG | HEART RATE: 104 BPM | BODY MASS INDEX: 23.91 KG/M2 | RESPIRATION RATE: 18 BRPM | WEIGHT: 167 LBS

## 2019-10-06 DIAGNOSIS — R44.0 AUDITORY HALLUCINATIONS: Primary | ICD-10-CM

## 2019-10-06 LAB
ALBUMIN SERPL-MCNC: 3.9 G/DL (ref 3.4–5)
ALBUMIN/GLOB SERPL: 1 {RATIO} (ref 0.8–1.7)
ALP SERPL-CCNC: 95 U/L (ref 45–117)
ALT SERPL-CCNC: 16 U/L (ref 16–61)
AMPHET UR QL SCN: NEGATIVE
ANION GAP SERPL CALC-SCNC: 7 MMOL/L (ref 3–18)
AST SERPL-CCNC: 16 U/L (ref 10–38)
BARBITURATES UR QL SCN: NEGATIVE
BASOPHILS # BLD: 0 K/UL (ref 0–0.1)
BASOPHILS NFR BLD: 1 % (ref 0–2)
BENZODIAZ UR QL: NEGATIVE
BILIRUB SERPL-MCNC: 0.2 MG/DL (ref 0.2–1)
BUN SERPL-MCNC: 11 MG/DL (ref 7–18)
BUN/CREAT SERPL: 9 (ref 12–20)
CALCIUM SERPL-MCNC: 8.4 MG/DL (ref 8.5–10.1)
CANNABINOIDS UR QL SCN: POSITIVE
CHLORIDE SERPL-SCNC: 106 MMOL/L (ref 100–111)
CO2 SERPL-SCNC: 28 MMOL/L (ref 21–32)
COCAINE UR QL SCN: NEGATIVE
CREAT SERPL-MCNC: 1.23 MG/DL (ref 0.6–1.3)
DIFFERENTIAL METHOD BLD: ABNORMAL
EOSINOPHIL # BLD: 0.2 K/UL (ref 0–0.4)
EOSINOPHIL NFR BLD: 3 % (ref 0–5)
ERYTHROCYTE [DISTWIDTH] IN BLOOD BY AUTOMATED COUNT: 12.1 % (ref 11.6–14.5)
ETHANOL SERPL-MCNC: <3 MG/DL (ref 0–3)
GLOBULIN SER CALC-MCNC: 3.8 G/DL (ref 2–4)
GLUCOSE SERPL-MCNC: 85 MG/DL (ref 74–99)
HCT VFR BLD AUTO: 44.3 % (ref 36–48)
HDSCOM,HDSCOM: ABNORMAL
HGB BLD-MCNC: 15.8 G/DL (ref 13–16)
LYMPHOCYTES # BLD: 2.6 K/UL (ref 0.9–3.6)
LYMPHOCYTES NFR BLD: 39 % (ref 21–52)
MCH RBC QN AUTO: 30.5 PG (ref 24–34)
MCHC RBC AUTO-ENTMCNC: 35.7 G/DL (ref 31–37)
MCV RBC AUTO: 85.5 FL (ref 74–97)
METHADONE UR QL: NEGATIVE
MONOCYTES # BLD: 0.8 K/UL (ref 0.05–1.2)
MONOCYTES NFR BLD: 12 % (ref 3–10)
NEUTS SEG # BLD: 3 K/UL (ref 1.8–8)
NEUTS SEG NFR BLD: 45 % (ref 40–73)
OPIATES UR QL: NEGATIVE
PCP UR QL: NEGATIVE
PLATELET # BLD AUTO: 187 K/UL (ref 135–420)
PMV BLD AUTO: 10.5 FL (ref 9.2–11.8)
POTASSIUM SERPL-SCNC: 3.7 MMOL/L (ref 3.5–5.5)
PROT SERPL-MCNC: 7.7 G/DL (ref 6.4–8.2)
RBC # BLD AUTO: 5.18 M/UL (ref 4.7–5.5)
SODIUM SERPL-SCNC: 141 MMOL/L (ref 136–145)
WBC # BLD AUTO: 6.5 K/UL (ref 4.6–13.2)

## 2019-10-06 PROCEDURE — 85025 COMPLETE CBC W/AUTO DIFF WBC: CPT

## 2019-10-06 PROCEDURE — 99283 EMERGENCY DEPT VISIT LOW MDM: CPT

## 2019-10-06 PROCEDURE — 80053 COMPREHEN METABOLIC PANEL: CPT

## 2019-10-06 PROCEDURE — 80307 DRUG TEST PRSMV CHEM ANLYZR: CPT

## 2019-10-06 NOTE — ED NOTES
Patients mother states that the patient was hearing voices and was hitting the walls and staring at the walls like someone was there and went up to a neighbor and was possibly being threatening to them. Patient denies SI and HI but patients mother states that he made comments about jumping off of a bridge.

## 2019-10-06 NOTE — ED TRIAGE NOTES
\"I'm hearing voices and not taking my medicine daily\"  Voices sounded like patients neighbors.   Sounds like a radio

## 2019-10-06 NOTE — ED PROVIDER NOTES
EMERGENCY DEPARTMENT HISTORY AND PHYSICAL EXAM    7:27 PM      Date: 10/6/2019  Patient Name: Juliette Ibanez    History of Presenting Illness     Chief Complaint   Patient presents with   [address]       History Provided By: Patient    Chief Complaint: auditory hallucinations, depressed mood, threatening neighbors  Duration:  N/A  Timing:  Waxing and Waning  Location:   Quality: N/A  Severity: N/A  Modifying Factors: none  Associated Symptoms: denies any other associated signs or symptoms      Additional History (Context): Juliette Ibanez is a 27 y.o. male with a pertinent history of schizophrenia, anxiety, ADHD who presents with grandmother to the emergency department for evaluation of auditory hallucinations that sound like a radio. Patient states this is been going on for months or longer. He states that he felt like \"life ain't all that great\" today, but denies any suicidal ideation or homicide he admits to tobacco smoking and occasional marijuana use. Denies EtOH or other illicit drugs. Admits to occasional noncompliance with medications. Grandma states that he approached the neighbor's house in an aggressive manner today and she is concerned about him. PCP:  Kar Macias NP        Current Outpatient Medications   Medication Sig Dispense Refill    ARIPiprazole (ABILIFY MAINTENA) 400 mg injection 400 mg by IntraMUSCular route every four (4) weeks. 400 mg 0    divalproex ER (DEPAKOTE ER) 500 mg ER tablet Take 1 Tab by mouth two (2) times a day. 30 Tab 1    traZODone (DESYREL) 50 mg tablet Take 1 Tab by mouth nightly as needed for Sleep.  15 Tab 1       Past History     Past Medical History:  Past Medical History:   Diagnosis Date    ADHD (attention deficit hyperactivity disorder)     Anxiety disorder     Depression     Mood disorder ([facility])     Psychiatric disorder     [facility]     Schizophrenia ([facility]     Trauma     \"His mother  this past November\" Past Surgical History:  History reviewed. No pertinent surgical history. Family History:  Family History   Problem Relation Age of Onset   24 [facility] Schizophrenia Mother     Schizophrenia Brother     Schizophrenia Sister        Social History:  Social History     Tobacco Use    Smoking status: Current Every Day Smoker    Smokeless tobacco: Never Used   Substance Use Topics    Alcohol use: Yes    Drug use: Yes     Types: Marijuana       Allergies:  No Known Allergies      Review of Systems       Review of Systems   Constitutional: Negative for chills and fever. HENT: Negative for congestion, rhinorrhea and sore throat. Respiratory: Negative for cough and shortness of breath. Cardiovascular: Negative for chest pain. Gastrointestinal: Negative for abdominal pain, blood in stool, constipation, diarrhea, nausea and vomiting. Genitourinary: Negative for dysuria, frequency and hematuria. Musculoskeletal: Negative for back pain and myalgias. Skin: Negative for rash and wound. Neurological: Negative for dizziness and headaches. Psychiatric/Behavioral: Positive for dysphoric mood and hallucinations. Negative for self-injury and suicidal ideas. All other systems reviewed and are negative. Physical Exam     Visit Vitals  /82 (BP 1 Location: Left arm, BP Patient Position: At rest)   Pulse (!) 104   Temp 98.2 °F (36.8 °C)   Resp 18   Ht 5' 10\" (1.778 m)   Wt 75.8 kg (167 lb)   SpO2 99%   BMI 23.96 kg/m²       Physical Exam   Constitutional: He is oriented to person, place, and time. He appears well-developed and well-nourished. No distress. HENT:   Head: Normocephalic and atraumatic. Eyes: Conjunctivae are normal.   Neck: Normal range of motion. Neck supple. Cardiovascular: Regular rhythm and normal heart sounds. Pulmonary/Chest: Effort normal. No respiratory distress. Musculoskeletal: Normal range of motion. He exhibits no edema or deformity.    Neurological: He is alert and oriented to person, place, and time. Skin: Skin is warm and dry. He is not diaphoretic. Psychiatric:   guarded   Nursing note and vitals reviewed. Diagnostic Study Results     Labs -  Recent Results (from the past 12 hour(s))   DRUG SCREEN, URINE    Collection Time: 10/06/19  7:28 PM   Result Value Ref Range    BENZODIAZEPINES NEGATIVE  NEG      BARBITURATES NEGATIVE  NEG      THC (TH-CANNABINOL) POSITIVE (A) NEG      OPIATES NEGATIVE  NEG      PCP(PHENCYCLIDINE) NEGATIVE  NEG      COCAINE NEGATIVE  NEG      AMPHETAMINES NEGATIVE  NEG      METHADONE NEGATIVE  NEG      HDSCOM (NOTE)    METABOLIC PANEL, COMPREHENSIVE    Collection Time: 10/06/19  7:48 PM   Result Value Ref Range    Sodium 141 136 - 145 mmol/L    Potassium 3.7 3.5 - 5.5 mmol/L    Chloride 106 100 - 111 mmol/L    CO2 28 21 - 32 mmol/L    Anion gap 7 3.0 - 18 mmol/L    Glucose 85 74 - 99 mg/dL    BUN 11 7.0 - 18 MG/DL    Creatinine 1.23 0.6 - 1.3 MG/DL    BUN/Creatinine ratio 9 (L) 12 - 20      GFR est AA >60 >60 ml/min/1.73m2    GFR est non-AA >60 >60 ml/min/1.73m2    Calcium 8.4 (L) 8.5 - 10.1 MG/DL    Bilirubin, total 0.2 0.2 - 1.0 MG/DL    ALT (SGPT) 16 16 - 61 U/L    AST (SGOT) 16 10 - 38 U/L    Alk. phosphatase 95 45 - 117 U/L    Protein, total 7.7 6.4 - 8.2 g/dL    Albumin 3.9 3.4 - 5.0 g/dL    Globulin 3.8 2.0 - 4.0 g/dL    A-G Ratio 1.0 0.8 - 1.7     CBC WITH AUTOMATED DIFF    Collection Time: 10/06/19  7:48 PM   Result Value Ref Range    WBC 6.5 4.6 - 13.2 K/uL    RBC 5.18 4.70 - 5.50 M/uL    HGB 15.8 13.0 - 16.0 g/dL    HCT 44.3 36.0 - 48.0 %    MCV 85.5 74.0 - 97.0 FL    MCH 30.5 24.0 - 34.0 PG    MCHC 35.7 31.0 - 37.0 g/dL    RDW 12.1 11.6 - 14.5 %    PLATELET 834 433 - 605 K/uL    MPV 10.5 9.2 - 11.8 FL    NEUTROPHILS 45 40 - 73 %    LYMPHOCYTES 39 21 - 52 %    MONOCYTES 12 (H) 3 - 10 %    EOSINOPHILS 3 0 - 5 %    BASOPHILS 1 0 - 2 %    ABS. NEUTROPHILS 3.0 1.8 - 8.0 K/UL    ABS. LYMPHOCYTES 2.6 0.9 - 3.6 K/UL    ABS.  MONOCYTES 0.8 0.05 - 1.2 K/UL    ABS. EOSINOPHILS 0.2 0.0 - 0.4 K/UL    ABS. BASOPHILS 0.0 0.0 - 0.1 K/UL    DF AUTOMATED     ETHYL ALCOHOL    Collection Time: 10/06/19  7:48 PM   Result Value Ref Range    ALCOHOL(ETHYL),SERUM <3 0 - 3 MG/DL       Radiologic Studies -   No results found. Medical Decision Making   I am the first provider for this patient. I reviewed the vital signs, available nursing notes, past medical history, past surgical history, family history and social history. Vital Signs-Reviewed the patient's vital signs. Pulse Oximetry Analysis -  99% on room air (Interpretation)    Records Reviewed: Nursing Notes and Old Medical Records (Time of Review: 7:27 PM)    ED Course: Progress Notes, Reevaluation, and Consults:  11:52 PM: Discussed case with [place] and crisis. She will come down and evaluate patient. She is aware that he is medically cleared. Colton Banuelos PA-C    12:17 AM:  Gloria with [facility] has evaluated patient and determined that he does not require admission. Pt is not suicidal or homicidal.  He is no longer experiencing any hallucinations. He has outpatient follow-up. Colton Banuelos PA-C      Provider Notes (Medical Decision Making):   Differential Diagnosis: substance abuse, alcohol intoxication, substance withdrawal, acute psychotic episode, anxiety, panic disorder, jimmie, undifferentiated schizophrenia, depression, SI, HI, medication non-compliance, other mood disorder        Diagnosis     Clinical Impression:   1.  Auditory hallucinations        Disposition: DC Home    Follow-up Information     Follow up With Specialties Details Why Contact Info    Morgan Leslie NP Nurse Practitioner Call in 2 days  [address] [employer] [address]      [facility] EMERGENCY DEPT Emergency Medicine Go to As needed, If symptoms worsen [address] [facility] [address]  [phone]           Patient's Medications   Start Taking    No medications on file   Continue Taking ARIPIPRAZOLE (ABILIFY MAINTENA) 400 MG INJECTION    400 mg by IntraMUSCular route every four (4) weeks. DIVALPROEX ER (DEPAKOTE ER) 500 MG ER TABLET    Take 1 Tab by mouth two (2) times a day. TRAZODONE (DESYREL) 50 MG TABLET    Take 1 Tab by mouth nightly as needed for Sleep. These Medications have changed    No medications on file   Stop Taking    No medications on file     _______________________________    This note was dictated utilizing voice recognition software which may lead to typographical errors. I apologize in advance if the situation occurs. If questions arise please do not hesitate to contact me or call our department.   Fouzia Abrams PA-C

## 2019-10-07 NOTE — ED NOTES
Patient given personal belongings out of locker. Discharge instructions discussed with patient by provider.

## 2019-10-07 NOTE — BSMART NOTE
26 yo old pt interviewed in ED room 21 at the request of Daniela. Pt standing in doorway upon my arrival. Pt reports coming to the ED because he was hearing voices today. \"they sound like people having conversations. \" Pt denies s/i h/i v/h. States he is no longer hearing voices \"they come and go\" Reports living with his family who support him. Denies alcohol use. Reports marijuana use. States he is seen at [facility] and has an appointment on 10-25-19. Reports missing his medications today but normally taking them as prescribed. Denies legal issues. Reports having somewhere safe to return to upon any subsequent discharge. denies any medical issues. Reports being admitted to [facility] \"earlier this year\" and being compliant with follow up treatment. Pt states he is wanting to go home and will follow up with his outpatient provider at [facility] on his scheduled appointment. Pt states he feels safe to return home as he does not want to harm himself or anyone else and is currently not hearing any voices. \"I am just tired now and am ready to get to bed and sleep. \" No other issues voiced or noted at this time. Daniela notified of assessment findings.

## 2019-10-23 RX ORDER — ARIPIPRAZOLE 400 MG
400 KIT INTRAMUSCULAR ONCE
Status: COMPLETED | OUTPATIENT
Start: 2019-10-30 | End: 2019-10-30

## 2019-10-29 ENCOUNTER — APPOINTMENT (OUTPATIENT)
Dept: INFUSION THERAPY | Age: 24
End: 2019-10-29
Payer: MEDICAID

## 2019-10-30 ENCOUNTER — HOSPITAL ENCOUNTER (OUTPATIENT)
Dept: INFUSION THERAPY | Age: 24
Discharge: HOME | End: 2019-10-30
Payer: MEDICAID

## 2019-10-30 VITALS
HEART RATE: 86 BPM | RESPIRATION RATE: 18 BRPM | OXYGEN SATURATION: 99 % | SYSTOLIC BLOOD PRESSURE: 122 MMHG | DIASTOLIC BLOOD PRESSURE: 76 MMHG

## 2019-10-30 PROCEDURE — 96372 THER/PROPH/DIAG INJ SC/IM: CPT

## 2019-10-30 PROCEDURE — 74011250636 HC RX REV CODE- 250/636: Performed by: NURSE PRACTITIONER

## 2019-10-30 RX ADMIN — ARIPIPRAZOLE 400 MG: KIT at 09:10

## 2019-10-30 NOTE — PROGRESS NOTES
[facility] OPIC Progress Note    Date: 2019    Name: Kareem Singh    MRN: [id number]         : 1995      Mr. Ketty Sanders arrived to [facility] at 8379. Mr. Ketty Sanders was assessed and education was provided. Mr. Ryan Casarez vitals were reviewed. Visit Vitals  /76 (BP 1 Location: Left arm, BP Patient Position: Sitting)   Pulse 86   Resp 18   SpO2 99%       Abilify 400mg was administered as ordered IM in patient's left deltoid muscle. Mr. Ketty Sanders tolerated well without complaints. Mr. Ketty Sanders was discharged from [place] [address] in stable condition at [address] [place] [address]. He is to return on 19 at 0930 for his next appointment.     Radha Salazar RN  2019

## 2019-11-19 RX ORDER — ARIPIPRAZOLE 400 MG
400 KIT INTRAMUSCULAR ONCE
Status: COMPLETED | OUTPATIENT
Start: 2019-11-26 | End: 2019-11-26

## 2019-11-26 ENCOUNTER — HOSPITAL ENCOUNTER (OUTPATIENT)
Dept: INFUSION THERAPY | Age: 24
Discharge: HOME | End: 2019-11-26
Payer: MEDICAID

## 2019-11-26 VITALS
OXYGEN SATURATION: 100 % | RESPIRATION RATE: 18 BRPM | SYSTOLIC BLOOD PRESSURE: 124 MMHG | HEART RATE: 76 BPM | DIASTOLIC BLOOD PRESSURE: 85 MMHG | TEMPERATURE: 97.8 F

## 2019-11-26 PROCEDURE — 74011250636 HC RX REV CODE- 250/636: Performed by: NURSE PRACTITIONER

## 2019-11-26 PROCEDURE — 96372 THER/PROPH/DIAG INJ SC/IM: CPT

## 2019-11-26 RX ADMIN — ARIPIPRAZOLE 400 MG: KIT at 10:11

## 2019-11-26 NOTE — PROGRESS NOTES
[facility] OPIC Progress Note    Date: 2019    Name: Danie Kimball    MRN: [id number]         : 1995    Abilify Injection 3 of 4    Arrived ambulatory to [facility] at 1, accompanied by family member. No complaints or concerns voiced      Mr. Clare Perez was assessed and education was provided. Mr. Jasmeet Perez vitals were reviewed and patient was observed for 5 minutes prior to treatment. Visit Vitals  /85 (BP 1 Location: Left arm, BP Patient Position: Sitting)   Pulse 76   Temp 97.8 °F (36.6 °C)   Resp 18   SpO2 100%       Ability 400 mg was administered IM in the left deltoid. No bleeding or redness noted. Band-aid applied  to the site. Mr. Clare Perez tolerated well, and had no complaints. Patient armband removed and shredded. Mr. Clare Perez was discharged from [place] [address] in stable condition at 1015. He is to return on 19 at 0930 for his next appointment.     Rakesh Haley RN  2019

## 2019-11-27 ENCOUNTER — APPOINTMENT (OUTPATIENT)
Dept: INFUSION THERAPY | Age: 24
End: 2019-11-27
Payer: MEDICAID

## 2019-12-20 RX ORDER — ARIPIPRAZOLE 400 MG
400 KIT INTRAMUSCULAR ONCE
Status: COMPLETED | OUTPATIENT
Start: 2019-12-26 | End: 2019-12-26

## 2019-12-24 ENCOUNTER — APPOINTMENT (OUTPATIENT)
Dept: INFUSION THERAPY | Age: 24
End: 2019-12-24
Payer: MEDICAID

## 2019-12-26 ENCOUNTER — HOSPITAL ENCOUNTER (OUTPATIENT)
Dept: INFUSION THERAPY | Age: 24
Discharge: HOME | End: 2019-12-26
Payer: MEDICAID

## 2019-12-26 VITALS
SYSTOLIC BLOOD PRESSURE: 112 MMHG | DIASTOLIC BLOOD PRESSURE: 68 MMHG | RESPIRATION RATE: 18 BRPM | TEMPERATURE: 98.3 F | OXYGEN SATURATION: 96 % | HEART RATE: 80 BPM

## 2019-12-26 PROCEDURE — 74011250636 HC RX REV CODE- 250/636: Performed by: NURSE PRACTITIONER

## 2019-12-26 PROCEDURE — 96372 THER/PROPH/DIAG INJ SC/IM: CPT

## 2019-12-26 RX ADMIN — ARIPIPRAZOLE 400 MG: KIT at 09:15

## 2019-12-26 NOTE — PROGRESS NOTES
[facility] OPI Progress Note    Date: 2019    Name: Nicolasa Campos    MRN: [id number]         : 1995      Mr. Jeramy Dalton arrived to [facility] at [phone]. Mr. Jeramy Dalton was assessed and education was provided. Mr. Scott Shelton vitals were reviewed. Visit Vitals  /68 (BP 1 Location: Left arm, BP Patient Position: Sitting)   Pulse 80   Temp 98.3 °F (36.8 °C)   Resp 18   SpO2 96%       Abilify 400mg was administered as ordered IM in patient's left deltoid muscle. Mr. Jeramy Dalton tolerated well without complaints. Mr. Jeramy Dalton was discharged from [place] [address] in stable condition at [phone]. He is to return on 20 at 0930 for his next appointment.     Danielito Healy RN  2019

## 2019-12-30 ENCOUNTER — HOSPITAL ENCOUNTER (EMERGENCY)
Age: 24
Discharge: HOME | End: 2019-12-30
Attending: EMERGENCY MEDICINE
Payer: MEDICAID

## 2019-12-30 VITALS
HEART RATE: 89 BPM | BODY MASS INDEX: 21.52 KG/M2 | RESPIRATION RATE: 16 BRPM | DIASTOLIC BLOOD PRESSURE: 86 MMHG | TEMPERATURE: 97.4 F | OXYGEN SATURATION: 97 % | WEIGHT: 150 LBS | SYSTOLIC BLOOD PRESSURE: 131 MMHG

## 2019-12-30 DIAGNOSIS — F20.9 SCHIZOPHRENIA, UNSPECIFIED TYPE (HCC): Primary | ICD-10-CM

## 2019-12-30 LAB
ALBUMIN SERPL-MCNC: 3.5 G/DL (ref 3.4–5)
ALBUMIN/GLOB SERPL: 1 {RATIO} (ref 0.8–1.7)
ALP SERPL-CCNC: 92 U/L (ref 45–117)
ALT SERPL-CCNC: 15 U/L (ref 16–61)
AMPHET UR QL SCN: NEGATIVE
ANION GAP SERPL CALC-SCNC: 5 MMOL/L (ref 3–18)
AST SERPL-CCNC: 10 U/L (ref 10–38)
BARBITURATES UR QL SCN: NEGATIVE
BASOPHILS # BLD: 0 K/UL (ref 0–0.1)
BASOPHILS NFR BLD: 0 % (ref 0–2)
BENZODIAZ UR QL: NEGATIVE
BILIRUB SERPL-MCNC: 0.1 MG/DL (ref 0.2–1)
BUN SERPL-MCNC: 12 MG/DL (ref 7–18)
BUN/CREAT SERPL: 11 (ref 12–20)
CALCIUM SERPL-MCNC: 8.5 MG/DL (ref 8.5–10.1)
CANNABINOIDS UR QL SCN: NEGATIVE
CHLORIDE SERPL-SCNC: 107 MMOL/L (ref 100–111)
CO2 SERPL-SCNC: 29 MMOL/L (ref 21–32)
COCAINE UR QL SCN: NEGATIVE
CREAT SERPL-MCNC: 1.08 MG/DL (ref 0.6–1.3)
DIFFERENTIAL METHOD BLD: ABNORMAL
EOSINOPHIL # BLD: 0.2 K/UL (ref 0–0.4)
EOSINOPHIL NFR BLD: 3 % (ref 0–5)
ERYTHROCYTE [DISTWIDTH] IN BLOOD BY AUTOMATED COUNT: 12.8 % (ref 11.6–14.5)
ETHANOL SERPL-MCNC: <3 MG/DL (ref 0–3)
GLOBULIN SER CALC-MCNC: 3.6 G/DL (ref 2–4)
GLUCOSE SERPL-MCNC: 88 MG/DL (ref 74–99)
HCT VFR BLD AUTO: 41.8 % (ref 36–48)
HDSCOM,HDSCOM: NORMAL
HGB BLD-MCNC: 14.6 G/DL (ref 13–16)
LYMPHOCYTES # BLD: 4.4 K/UL (ref 0.9–3.6)
LYMPHOCYTES NFR BLD: 61 % (ref 21–52)
MCH RBC QN AUTO: 30.4 PG (ref 24–34)
MCHC RBC AUTO-ENTMCNC: 34.9 G/DL (ref 31–37)
MCV RBC AUTO: 87.1 FL (ref 74–97)
METHADONE UR QL: NEGATIVE
MONOCYTES # BLD: 0.8 K/UL (ref 0.05–1.2)
MONOCYTES NFR BLD: 11 % (ref 3–10)
NEUTS SEG # BLD: 1.8 K/UL (ref 1.8–8)
NEUTS SEG NFR BLD: 25 % (ref 40–73)
OPIATES UR QL: NEGATIVE
PCP UR QL: NEGATIVE
PLATELET # BLD AUTO: 146 K/UL (ref 135–420)
PMV BLD AUTO: 11.2 FL (ref 9.2–11.8)
POTASSIUM SERPL-SCNC: 4 MMOL/L (ref 3.5–5.5)
PROT SERPL-MCNC: 7.1 G/DL (ref 6.4–8.2)
RBC # BLD AUTO: 4.8 M/UL (ref 4.7–5.5)
SODIUM SERPL-SCNC: 141 MMOL/L (ref 136–145)
WBC # BLD AUTO: 7.2 K/UL (ref 4.6–13.2)

## 2019-12-30 PROCEDURE — 80053 COMPREHEN METABOLIC PANEL: CPT

## 2019-12-30 PROCEDURE — 99283 EMERGENCY DEPT VISIT LOW MDM: CPT

## 2019-12-30 PROCEDURE — 85025 COMPLETE CBC W/AUTO DIFF WBC: CPT

## 2019-12-30 PROCEDURE — 80307 DRUG TEST PRSMV CHEM ANLYZR: CPT

## 2019-12-30 NOTE — ED TRIAGE NOTES
Brought to ed voluntarily by ppd for \"hearing voices that woke me up out my sleep\"  Denies SI/HI. States the voices \"sound like young people talking to me.   Its hard to explain\"

## 2019-12-30 NOTE — BSMART NOTE
28 yo male pt interviewed in ED room 17 at the request of Dr Mott. Pt resting quietly on stretcher upon my arrival. Reports coming to the ED after being awoken to voices in his head and wanting a medication adjustment. Denies s/i h/i and states \"the voices are not near as bad now\" reports seeing his psychiatrist last month who opted not to adjust his medication. \"I need something to happen thought because I don't know what to do and sometimes the voices get overwhelming. \" reports taking medications as prescribed. Denies drug or alcohol use at this time. Denies legal issues. States he has somewhere safe to go upon any subsequent discharge. Does not want inpatient psychiatric care. Informed pt that he needed to contact his Dr and request an appointment for medication evaluation and that the ED Dr's do not adjust medications because they cannot follow up with him after he has been discharged from the ED. Pt verbalized understanding and states he will call his dr today and try to get an appointment. I asked if he would like information for other dr's and he declined. No other issues voiced or noted. Dr Mott notified.

## 2019-12-30 NOTE — ED PROVIDER NOTES
EMERGENCY DEPARTMENT HISTORY AND PHYSICAL EXAM    4:47 AM  Date: 2019  Patient Name: Lilliam Lee    History of Presenting Illness     Chief Complaint   Patient presents with   [address]        History Provided By: Patient    HPI: Lilliam Lee is a 27 y.o. male with History of schizophrenia, depression, here for hallucinations. Patient had called police earlier tonight as voices had woken him out of sleep. He states that he occasionally has thoughts of hurting himself or others but currently the voices are not telling him to do so. He would like to have his psychiatric medicines adjusted. Denies any pain, fevers, other symptoms. Location: Generalized  Severity: Moderate  Timing/course: Constant  Onset/Duration: Chronic     PCP: Aleksandr Sherwood NP    Past History     Past Medical History:  Past Medical History:   Diagnosis Date    ADHD (attention deficit hyperactivity disorder)     Anxiety disorder     Depression     [facility] disorder ([facility] [address]     Psychiatric disorder     Psychotic disorder ([facility] [address] [facility]     Schizophrenia ([facility] [address] [facility]     Trauma     \"His mother  this past November\"       Past Surgical History:  History reviewed. No pertinent surgical history. Family History:  Family History   Problem Relation Age of Onset   [place] Schizophrenia Mother     Schizophrenia Brother     Schizophrenia Sister        Social History:  Social History     Tobacco Use    Smoking status: Current Every Day Smoker    Smokeless tobacco: Never Used   Substance Use Topics    Alcohol use: Yes    Drug use: Yes     Types: Marijuana       Allergies:  No Known Allergies    Review of Systems     Constitutional: No fevers. Eyes: No visual symptoms. Neurological: No headaches, sensory or motor symptoms. Psychiatric: Auditory hallucinations    All other systems negative.     Physical Exam     Patient Vitals for the past 12 hrs:   Temp Pulse Resp BP SpO2   19 0435 97.4 °F (36.3 °C) 89 16 131/86 97 % Physical Exam   Constitutional: Appears well-developed. Is not diaphoretic. Eyes: Conjunctiva clear, EOMI  Head: Normocephalic and atraumatic. Neck: Normal range of motion. No stridor or tracheal deviation. Cardiovascular: Intact distal pulses. Pulmonary/Chest: Effort normal and no respiratory distress. Abdominal: Non distended. Musculoskeletal: Normal range of motion. Exhibits no tenderness. Neurological: Conversant, alert. Skin: Skin is warm and dry. Psychiatric: Has a normal mood and affect. Behavior is normal.   Nursing note and vitals reviewed. Diagnostic Study Results     Labs -  Recent Results (from the past 12 hour(s))   DRUG SCREEN, URINE    Collection Time: 12/30/19  4:55 AM   Result Value Ref Range    BENZODIAZEPINES NEGATIVE  NEG      BARBITURATES NEGATIVE  NEG      THC (TH-CANNABINOL) NEGATIVE  NEG      OPIATES NEGATIVE  NEG      PCP(PHENCYCLIDINE) NEGATIVE  NEG      COCAINE NEGATIVE  NEG      AMPHETAMINES NEGATIVE  NEG      METHADONE NEGATIVE  NEG      HDSCOM (NOTE)    ETHYL ALCOHOL    Collection Time: 12/30/19  4:55 AM   Result Value Ref Range    ALCOHOL(ETHYL),SERUM <3 0 - 3 MG/DL   CBC WITH AUTOMATED DIFF    Collection Time: 12/30/19  4:55 AM   Result Value Ref Range    WBC 7.2 4.6 - 13.2 K/uL    RBC 4.80 4.70 - 5.50 M/uL    HGB 14.6 13.0 - 16.0 g/dL    HCT 41.8 36.0 - 48.0 %    MCV 87.1 74.0 - 97.0 FL    MCH 30.4 24.0 - 34.0 PG    MCHC 34.9 31.0 - 37.0 g/dL    RDW 12.8 11.6 - 14.5 %    PLATELET 376 418 - 475 K/uL    MPV 11.2 9.2 - 11.8 FL    NEUTROPHILS 25 (L) 40 - 73 %    LYMPHOCYTES 61 (H) 21 - 52 %    MONOCYTES 11 (H) 3 - 10 %    EOSINOPHILS 3 0 - 5 %    BASOPHILS 0 0 - 2 %    ABS. NEUTROPHILS 1.8 1.8 - 8.0 K/UL    ABS. LYMPHOCYTES 4.4 (H) 0.9 - 3.6 K/UL    ABS. MONOCYTES 0.8 0.05 - 1.2 K/UL    ABS. EOSINOPHILS 0.2 0.0 - 0.4 K/UL    ABS.  BASOPHILS 0.0 0.0 - 0.1 K/UL    DF AUTOMATED     METABOLIC PANEL, COMPREHENSIVE    Collection Time: 12/30/19  4:55 AM   Result Value Ref Range    Sodium 141 136 - 145 mmol/L    Potassium 4.0 3.5 - 5.5 mmol/L    Chloride 107 100 - 111 mmol/L    CO2 29 21 - 32 mmol/L    Anion gap 5 3.0 - 18 mmol/L    Glucose 88 74 - 99 mg/dL    BUN 12 7.0 - 18 MG/DL    Creatinine 1.08 0.6 - 1.3 MG/DL    BUN/Creatinine ratio 11 (L) 12 - 20      GFR est AA >60 >60 ml/min/1.73m2    GFR est non-AA >60 >60 ml/min/1.73m2    Calcium 8.5 8.5 - 10.1 MG/DL    Bilirubin, total 0.1 (L) 0.2 - 1.0 MG/DL    ALT (SGPT) 15 (L) 16 - 61 U/L    AST (SGOT) 10 10 - 38 U/L    Alk. phosphatase 92 45 - 117 U/L    Protein, total 7.1 6.4 - 8.2 g/dL    Albumin 3.5 3.4 - 5.0 g/dL    Globulin 3.6 2.0 - 4.0 g/dL    A-G Ratio 1.0 0.8 - 1.7         Radiologic Studies -   No results found. Medical Decision Making     ED Course: Progress Notes, Reevaluation, and Consults:    4:47 AM Initial assessment performed. The patients presenting problems have been discussed, and they/their family are in agreement with the care plan formulated and outlined with them. I have encouraged them to ask questions as they arise throughout their visit. 5:28 AM -patient is medically cleared. Spoke with Basim Ramires of [facility], she will evaluate patient. 6:04 AM patient seen by crisis. Patient currently without any active hallucinations. Currently does not meet inpatient criteria, and contracts to safety. He is agreeable to follow-up with his primary psychiatrist to ensure that he can be followed after they adjust his medicines. He will call today to get this set up. ;        Deo Lazcano's  results have been reviewed with him. He has been counseled regarding his diagnosis. He verbally conveys understanding and agreement of the signs, symptoms, diagnosis, treatment and prognosis and additionally agrees to follow up as recommended with Dr. Darby Stallings, MARGIE in 24 - 48 hours. He also agrees with the care-plan and conveys that all of his questions have been answered.   I have also put together some discharge instructions for him that include: 1) educational information regarding their diagnosis, 2) how to care for their diagnosis at home, as well a 3) list of reasons why they would want to return to the ED prior to their follow-up appointment, should their condition change. Vital Signs-Reviewed the patient's vital signs. Reviewed pt's pulse ox reading. Records Reviewed: Nursing Notes and Old Medical Records (Time of Review: 4:47 AM)  -I am the first provider for this patient.  -I reviewed the vital signs, available nursing notes, past medical history, past surgical history, family history and social history. Current Outpatient Medications   Medication Sig Dispense Refill    ARIPiprazole (ABILIFY MAINTENA) 400 mg injection 400 mg by IntraMUSCular route every four (4) weeks. 400 mg 0    divalproex ER (DEPAKOTE ER) 500 mg ER tablet Take 1 Tab by mouth two (2) times a day. 30 Tab 1    traZODone (DESYREL) 50 mg tablet Take 1 Tab by mouth nightly as needed for Sleep. 15 Tab 1        Clinical Impression     Clinical Impression:   1. Schizophrenia, unspecified type ([facility] [address]        Disposition: DC home        This note was dictated utilizing voice recognition software which may lead to typographical errors. I apologize in advance if the situation occurs. If questions arise please do not hesitate to contact me or call our department.

## 2019-12-30 NOTE — DISCHARGE INSTRUCTIONS
Patient Education        Schizophrenia: Care Instructions  Your Care Instructions  Schizophrenia is a disease that makes it hard to think clearly, manage emotions, and interact with other people. It can cause:  · Delusions. These are beliefs that are not real.  · Hallucinations. These are things that you may see or hear that are not really there. · Paranoia. This is the belief that others are lying, cheating, using you, or trying to harm you. The disease may change your ability to enjoy life, express emotions, or function. At times, you may hear voices, behave strangely, have trouble speaking or understanding speech, or keep to yourself. The goal of treatment is to lower your stress and help your brain function normally. You may need lifelong treatment with medicines and counseling to keep your schizophrenia under control. When schizophrenia is not treated, the risks are higher for suicide, a hospital stay, and other problems. Early treatment called coordinated specialty care [facility]) may help a person who is having his or her first episode of psychotic thoughts. Ask your doctor about [address]. Follow-up care is a key part of your treatment and safety. Be sure to make and go to all appointments, and call your doctor if you are having problems. It's also a good idea to know your test results and keep a list of the medicines you take. How can you care for yourself at home? · Be safe with medicines. Take your medicines exactly as prescribed. Call your doctor if you think you are having a problem with your medicine. When you feel good, you may think that you do not need your medicines. But it is important to keep taking them as scheduled. · Go to your counseling sessions. Call and talk with your counselor if you can't attend or if you don't think the sessions are helping. Do not just stop going. · Eat a healthy diet. Talk with a dietitian about what type of diet may be best for you.   · Do not use alcohol or illegal drugs.  · Keep the numbers for these national suicide hotlines: 7-344-179-TALK (0-254.888.2044) and 1-668-QQPSAEU (1-137.500.5728). If you or someone you know talks about suicide or feeling hopeless, get help right away. What should you do if someone in your family has schizophrenia? · Learn about the disease and how it may get worse over time. · Remind your family member that you love him or her. · Make a plan with all family members about how to take care of your loved one when his or her symptoms are bad. · Talk about your fears and concerns and those of other family members. Seek counseling if needed. · Do not focus attention only on the person who is in treatment. · Remind yourself that it will take time for changes to occur. · Do not blame yourself for the disease. · Know your legal rights and the legal rights of your family member. · Take care of yourself. Stay involved with your own interests, such as your career, hobbies, and friends. Use exercise, positive self-talk, relaxation, and deep breathing to help lower your stress. · Give yourself time to grieve. You may need to deal with emotions such as anger, fear, and frustration. After you work through your feelings, you will be better able to care for yourself and your family. · If you are having a hard time with your feelings and your interactions with your family member, talk with a counselor. When should you call for help? Call 911 anytime you think you may need emergency care.  For example, call if:    · You are thinking about suicide or are threatening suicide.     · You feel like hurting yourself or someone else.    Call your doctor now or seek immediate medical care if:    · You hear voices.     · You think someone is trying to harm you.     · You cannot concentrate or are easily confused.    Watch closely for changes in your health, and be sure to contact your doctor if:    · You are having trouble taking care of yourself.     · You cannot attend your counseling sessions. Where can you learn more? Go to http://rajinder-brooke.info/. Enter A620 in the search box to learn more about \"Schizophrenia: Care Instructions. \"  Current as of: May 28, 2019  Content Version: 12.2  © 1915-9378 [employer]. Care instructions adapted under license by [employer] (which disclaims liability or warranty for this information). If you have questions about a medical condition or this instruction, always ask your healthcare professional. [address] any warranty or liability for your use of this information.

## 2020-01-27 RX ORDER — ARIPIPRAZOLE 400 MG
400 KIT INTRAMUSCULAR ONCE
Status: COMPLETED | OUTPATIENT
Start: 2020-01-28 | End: 2020-01-28

## 2020-01-28 ENCOUNTER — HOSPITAL ENCOUNTER (OUTPATIENT)
Dept: INFUSION THERAPY | Age: 25
Discharge: HOME | End: 2020-01-28
Payer: MEDICAID

## 2020-01-28 VITALS
RESPIRATION RATE: 18 BRPM | OXYGEN SATURATION: 100 % | HEART RATE: 82 BPM | TEMPERATURE: 97.7 F | SYSTOLIC BLOOD PRESSURE: 106 MMHG | DIASTOLIC BLOOD PRESSURE: 71 MMHG

## 2020-01-28 PROCEDURE — 74011250636 HC RX REV CODE- 250/636: Performed by: PSYCHIATRY & NEUROLOGY

## 2020-01-28 PROCEDURE — 96372 THER/PROPH/DIAG INJ SC/IM: CPT

## 2020-01-28 RX ADMIN — ARIPIPRAZOLE 400 MG: KIT at 10:04

## 2020-01-28 NOTE — PROGRESS NOTES
[facility] Progress Note    Date: 2020    Name: Kristy Steven    MRN: [id number]         : 1995    Abilify Injection. Arrived ambulatory to [facility] at [phone], accompanied by family member. No complaints or concerns voiced      Mr. Roman Castillo was assessed and education was provided. Mr. Olaf Alexandra vitals were reviewed and patient was observed for 5 minutes prior to treatment. Visit Vitals  /71 (BP 1 Location: Left arm, BP Patient Position: Sitting)   Pulse 82   Temp 97.7 °F (36.5 °C)   Resp 18   SpO2 100%       Ability 400 mg was administered IM in the left deltoid. No bleeding or redness noted. Band-aid applied  to the site. Mr. Roman Castillo tolerated well, and had no complaints. Patient armband removed and shredded. Mr. Roman Castillo was discharged from [place] [address] in stable condition at 1005. He is to return on 2020 at 1000 for his next appointment.     Oni Montanez RN  2020

## 2020-02-20 ENCOUNTER — APPOINTMENT (OUTPATIENT)
Dept: INFUSION THERAPY | Age: 25
End: 2020-02-20
Payer: MEDICAID

## 2020-02-25 ENCOUNTER — HOSPITAL ENCOUNTER (OUTPATIENT)
Dept: INFUSION THERAPY | Age: 25
Discharge: HOME | End: 2020-02-25
Payer: MEDICAID

## 2020-02-25 ENCOUNTER — HOSPITAL ENCOUNTER (EMERGENCY)
Age: 25
Discharge: HOME | End: 2020-02-25
Attending: EMERGENCY MEDICINE
Payer: MEDICAID

## 2020-02-25 VITALS
BODY MASS INDEX: 21.52 KG/M2 | OXYGEN SATURATION: 98 % | TEMPERATURE: 98.2 F | DIASTOLIC BLOOD PRESSURE: 75 MMHG | SYSTOLIC BLOOD PRESSURE: 122 MMHG | RESPIRATION RATE: 16 BRPM | WEIGHT: 150 LBS | HEART RATE: 85 BPM

## 2020-02-25 VITALS
TEMPERATURE: 98.4 F | DIASTOLIC BLOOD PRESSURE: 75 MMHG | SYSTOLIC BLOOD PRESSURE: 133 MMHG | HEART RATE: 102 BPM | RESPIRATION RATE: 16 BRPM | OXYGEN SATURATION: 99 %

## 2020-02-25 DIAGNOSIS — F20.9 SCHIZOPHRENIA, UNSPECIFIED TYPE (HCC): ICD-10-CM

## 2020-02-25 DIAGNOSIS — F20.9 SCHIZOPHRENIA, UNSPECIFIED TYPE (HCC): Primary | ICD-10-CM

## 2020-02-25 DIAGNOSIS — R45.851 SUICIDAL IDEATION: Primary | ICD-10-CM

## 2020-02-25 LAB
AMPHET UR QL SCN: NEGATIVE
ANION GAP SERPL CALC-SCNC: 5 MMOL/L (ref 3–18)
BARBITURATES UR QL SCN: NEGATIVE
BENZODIAZ UR QL: NEGATIVE
BUN SERPL-MCNC: 10 MG/DL (ref 7–18)
BUN/CREAT SERPL: 10 (ref 12–20)
CALCIUM SERPL-MCNC: 8.7 MG/DL (ref 8.5–10.1)
CANNABINOIDS UR QL SCN: NEGATIVE
CHLORIDE SERPL-SCNC: 105 MMOL/L (ref 100–111)
CO2 SERPL-SCNC: 30 MMOL/L (ref 21–32)
COCAINE UR QL SCN: NEGATIVE
CREAT SERPL-MCNC: 1.04 MG/DL (ref 0.6–1.3)
ERYTHROCYTE [DISTWIDTH] IN BLOOD BY AUTOMATED COUNT: 12.3 % (ref 11.6–14.5)
ETHANOL SERPL-MCNC: <3 MG/DL (ref 0–3)
GLUCOSE SERPL-MCNC: 77 MG/DL (ref 74–99)
HCT VFR BLD AUTO: 45.4 % (ref 36–48)
HDSCOM,HDSCOM: NORMAL
HGB BLD-MCNC: 15.9 G/DL (ref 13–16)
MCH RBC QN AUTO: 30.6 PG (ref 24–34)
MCHC RBC AUTO-ENTMCNC: 35 G/DL (ref 31–37)
MCV RBC AUTO: 87.5 FL (ref 74–97)
METHADONE UR QL: NEGATIVE
OPIATES UR QL: NEGATIVE
PCP UR QL: NEGATIVE
PLATELET # BLD AUTO: 173 K/UL (ref 135–420)
PMV BLD AUTO: 10.5 FL (ref 9.2–11.8)
POTASSIUM SERPL-SCNC: 3.9 MMOL/L (ref 3.5–5.5)
RBC # BLD AUTO: 5.19 M/UL (ref 4.7–5.5)
SODIUM SERPL-SCNC: 140 MMOL/L (ref 136–145)
WBC # BLD AUTO: 8.9 K/UL (ref 4.6–13.2)

## 2020-02-25 PROCEDURE — 96372 THER/PROPH/DIAG INJ SC/IM: CPT

## 2020-02-25 PROCEDURE — 80048 BASIC METABOLIC PNL TOTAL CA: CPT

## 2020-02-25 PROCEDURE — 74011250636 HC RX REV CODE- 250/636: Performed by: PSYCHIATRY & NEUROLOGY

## 2020-02-25 PROCEDURE — 85027 COMPLETE CBC AUTOMATED: CPT

## 2020-02-25 PROCEDURE — 99283 EMERGENCY DEPT VISIT LOW MDM: CPT

## 2020-02-25 PROCEDURE — 80307 DRUG TEST PRSMV CHEM ANLYZR: CPT

## 2020-02-25 RX ORDER — ARIPIPRAZOLE 400 MG
400 KIT INTRAMUSCULAR ONCE
Status: CANCELLED | OUTPATIENT
Start: 2020-03-24 | End: 2020-03-24

## 2020-02-25 RX ORDER — ARIPIPRAZOLE 400 MG
400 KIT INTRAMUSCULAR ONCE
Status: COMPLETED | OUTPATIENT
Start: 2020-02-25 | End: 2020-02-25

## 2020-02-25 RX ADMIN — ARIPIPRAZOLE 400 MG: KIT at 10:11

## 2020-02-25 NOTE — ED NOTES
Patient turned over to me Dr. Tiera Thomas 62-year-old male presents with history of schizophrenia ADD anxiety hallucinations. He has been seen by crisis he is no longer suicidal.  He has a plan to get a ride to [facility] to get medications. He typically has worsening symptoms present on the month when his medications are wearing off. Will discharge for outpatient follow-up. General; AOX3. Pulmonary; CTA-B. Cardiac: RRR no MRG; Abd S/NT/ND.  Plan:      Nena Wilson MD

## 2020-02-25 NOTE — ED NOTES
Received report from [facility], assumed care of pt at this time. Per report pt + SI with plan to overdose. Pt is having auditory hallucinations with a history of ADHD, anxiety and Schizophrenia. Pt was placed in paper scrubs per policy. Pt noted with no sitter and pt awaits for placement. Micki HIGH called nursing supp for sitter and was told no sitters available. 0745:  1st contact with this patient:  Patient identified. Patient noted to be taking with crisis at this time    0805:  Pt noted to be fully dressed and walking the hallways asking for Ms. Haddad. Pt asking if she is going to release him or admit him. This writer educated pt that this writer did not know at this time but will contact Ms. Haddad to find out the plan of care. 3307:   This writer called and spoke to Miguelito Mosher who will have Boo call this writer regarding dispo    0820:  pts aunt is at bedside and will be taking this pt home

## 2020-02-25 NOTE — ED PROVIDER NOTES
EMERGENCY DEPARTMENT HISTORY AND PHYSICAL EXAM    Date: 2020  Patient Name: Ricardo Pimentel    History of Presenting Illness     Chief Complaint   Patient presents with    Suicidal         History Provided By: Patient    Chief Complaint: Suicidal ideation, hallucinations  Duration: Today  Timing: Acute  Location: \"In my head\"  Quality: Auditory  Severity: Moderate  Modifying Factors: Getting into arguments with family  Associated Symptoms: none       Additional History (Context): Ricardo Pimentel is a 27 y.o. male with a history of ADHD, anxiety, schizophrenia, mood disorder who presents today for history as listed above. Patient states that he has auditory hallucinations and is suicidal.  Patient denies any plan. Patient denies HI. Patient reports he abuses tobacco but denies drug or alcohol abuse. Patient reports he has been admitted to psych in the past.      PCP: Marsh Siemens, NP    Current Outpatient Medications   Medication Sig Dispense Refill    ARIPiprazole (ABILIFY MAINTENA) 400 mg injection 400 mg by IntraMUSCular route every four (4) weeks. 400 mg 0    divalproex ER (DEPAKOTE ER) 500 mg ER tablet Take 1 Tab by mouth two (2) times a day. 30 Tab 1    traZODone (DESYREL) 50 mg tablet Take 1 Tab by mouth nightly as needed for Sleep. 15 Tab 1       Past History     Past Medical History:  Past Medical History:   Diagnosis Date    ADHD (attention deficit hyperactivity disorder)     Anxiety disorder     Depression     Mood disorder (HCC)     Psychiatric disorder     [facility]otic disorder ([facility]     Schizophrenia ([facility]     Trauma     \"His mother  this past November\"       Past Surgical History:  History reviewed. No pertinent surgical history.     Family History:  Family History   Problem Relation Age of Onset    Schizophrenia Mother    [facility] Schizophrenia Brother     Schizophrenia Sister        Social History:  Social History     Tobacco Use    Smoking status: Current Every Day Smoker    Smokeless tobacco: Never Used   Substance Use Topics    Alcohol use: Yes    Drug use: Yes     Types: Marijuana       Allergies:  No Known Allergies      Review of Systems   Review of Systems   Constitutional: Negative for chills and fever. HENT: Negative for congestion, rhinorrhea and sore throat. Respiratory: Negative for cough and shortness of breath. Cardiovascular: Negative for chest pain. Gastrointestinal: Negative for abdominal pain, blood in stool, constipation, diarrhea, nausea and vomiting. Genitourinary: Negative for dysuria, frequency and hematuria. Musculoskeletal: Negative for back pain and myalgias. Skin: Negative for rash and wound. Neurological: Negative for dizziness and headaches. Psychiatric/Behavioral: Positive for hallucinations and suicidal ideas. All other systems reviewed and are negative. All Other Systems Negative  Physical Exam     Vitals:    02/25/20 0305   BP: 119/76   Pulse: 92   Resp: 16   Temp: 98.2 °F (36.8 °C)   SpO2: 98%   Weight: 68 kg (150 lb)     Physical Exam  Vitals signs and nursing note reviewed. Constitutional:       General: He is not in acute distress. Appearance: He is well-developed. He is not diaphoretic. HENT:      Head: Normocephalic and atraumatic. Eyes:      Conjunctiva/sclera: Conjunctivae normal.   Neck:      Musculoskeletal: Normal range of motion and neck supple. Cardiovascular:      Rate and Rhythm: Normal rate and regular rhythm. Heart sounds: Normal heart sounds. Pulmonary:      Effort: Pulmonary effort is normal. No respiratory distress. Breath sounds: Normal breath sounds. Chest:      Chest wall: No tenderness. Abdominal:      General: Bowel sounds are normal. There is no distension. Palpations: Abdomen is soft. Tenderness: There is no abdominal tenderness. There is no guarding or rebound. Musculoskeletal: Normal range of motion. General: No deformity.    Skin:     General: Skin is warm and dry. Neurological:      Mental Status: He is alert and oriented to person, place, and time. Psychiatric:         Attention and Perception: He is attentive. He perceives auditory hallucinations. He does not perceive visual hallucinations. Mood and Affect: Mood normal.         Speech: Speech normal.         Behavior: Behavior normal.         Thought Content: Thought content includes suicidal ideation. Thought content does not include homicidal ideation. Thought content does not include homicidal or suicidal plan. Diagnostic Study Results     Labs -     Recent Results (from the past 12 hour(s))   DRUG SCREEN, URINE    Collection Time: 02/25/20  3:10 AM   Result Value Ref Range    BENZODIAZEPINES NEGATIVE  NEG      BARBITURATES NEGATIVE  NEG      THC (TH-CANNABINOL) NEGATIVE  NEG      OPIATES NEGATIVE  NEG      PCP(PHENCYCLIDINE) NEGATIVE  NEG      COCAINE NEGATIVE  NEG      AMPHETAMINES NEGATIVE  NEG      METHADONE NEGATIVE  NEG      HDSCOM (NOTE)    CBC W/O DIFF    Collection Time: 02/25/20  3:13 AM   Result Value Ref Range    WBC 8.9 4.6 - 13.2 K/uL    RBC 5.19 4.70 - 5.50 M/uL    HGB 15.9 13.0 - 16.0 g/dL    HCT 45.4 36.0 - 48.0 %    MCV 87.5 74.0 - 97.0 FL    MCH 30.6 24.0 - 34.0 PG    MCHC 35.0 31.0 - 37.0 g/dL    RDW 12.3 11.6 - 14.5 %    PLATELET 707 089 - 266 K/uL    MPV 10.5 9.2 - 11.8 FL       Radiologic Studies -   No orders to display     CT Results  (Last 48 hours)    None        CXR Results  (Last 48 hours)    None            Medical Decision Making   I am the first provider for this patient. I reviewed the vital signs, available nursing notes, past medical history, past surgical history, family history and social history. Vital Signs-Reviewed the patient's vital signs.       Records Reviewed: Nursing Notes and Old Medical Records     Procedures: None   Procedures    Provider Notes (Medical Decision Making):     Differential Diagnosis: substance abuse, alcohol intoxication, substance withdrawal, acute psychotic episode, anxiety, panic disorder, jimmie, undifferentiated schizophrenia, depression, SI, HI, medication non-compliance, other mood disorder      Plan: Will medically clear and consult crisis     3:43 AM : Pt care transferred to Dr. Yumiko Hill  ,ED provider. History of patient complaint(s), available diagnostic reports and current treatment plan has been discussed thoroughly. Bedside rounding on patient occured : no . Intended disposition of patient : TBD  Pending diagnostics reports and/or labs (please list): Medical clearance       MED RECONCILIATION:  No current facility-administered medications for this encounter. Current Outpatient Medications   Medication Sig    ARIPiprazole (ABILIFY MAINTENA) 400 mg injection 400 mg by IntraMUSCular route every four (4) weeks.  divalproex ER (DEPAKOTE ER) 500 mg ER tablet Take 1 Tab by mouth two (2) times a day.  traZODone (DESYREL) 50 mg tablet Take 1 Tab by mouth nightly as needed for Sleep. Disposition:  TBD      Diagnosis     Clinical Impression:   1. Suicidal ideation          \"Please note that this dictation was completed with [employer], the [employer] voice recognition software. Quite often unanticipated grammatical, syntax, homophones, and other interpretive errors are inadvertently transcribed by the computer software. Please disregard these errors. Please excuse any errors that have escaped final proofreading. \"

## 2020-02-25 NOTE — PROGRESS NOTES
[facility] Progress Note    Date: 2020    Name: Shaheen Dhillon    MRN: [id number]         : 1995      Mr. Kevyn Galaviz arrived to [facility] at 1000 am ambulatory with his . Patient was d/c from the Hospital this am, prior to arrival.     Mr. Kevyn Galaviz was assessed and education was provided. Mr. Basil Gonzalez vitals were reviewed. Visit Vitals  /75 (BP 1 Location: Left arm, BP Patient Position: At rest)   Pulse (!) 102   Temp 98.4 °F (36.9 °C)   Resp 16   SpO2 99%       Abilify 400 mg  was administered as ordered IM  in patient's Left deltoid. Band-aid applied to patient site. Mr. Kevyn Galaviz tolerated well without complaints. Mr. Kevyn Galaviz was discharged from [place] [address] in stable condition at 1015. He is to return on 3/24/20 at 1000 for his next appointment.     Wilfrido Knowles RN  2020

## 2020-02-25 NOTE — BSMART NOTE
Integrated Summary Crisis evaluation completed for patient who is a 25y/o male who presented to Ed with c/o hearing voices telling him to harm himself and racing thoughts. Patient presented alert/oriented to person, place, time and situation. Patient was calm and cooperative during assessment. Patient stated that he only starts hearing voices at the end of every month. Patient also informed this Writer that he gets Abilify injections each month; one scheduled today at 56 (c/o ). Patient was asked what his triggers were; at that time patient stated that when he gets lonely he often is depressed. Patient states that he often uses music as a coping skill, which is effective often times. Patient at this current assessment denied any auditory/visual hallucination, nor thoughts to harm himself or others. Patient also expressed at this current assessment that he does not want to be in the Hospital; sometimes all I need is to talk to somebody like you, it helps me a lot, makes me feel better. Patient has outpatient services with Dr. Venkata Fallon. Patient has been encouraged to keep all follow-up appointments. Patient is without any safety concerns and is without any overt s/s of Psychosis. Patient reports he is compliant with his current medications (Trazadone, Abilify IM (next injection due 2/25/20 per patient), Abilify PO, and Depakote). Substance Abuse The patient denies any current drug use, BAL= <3.    
  
Disposition Discussed with Dr. Florecita Dodd, patient does not meet criteria for inpatient Dane Melendez RN

## 2020-02-25 NOTE — DISCHARGE INSTRUCTIONS
Patient Education        Suicidal Thoughts and Behavior: Care Instructions  Your Care Instructions  You have been seen by a doctor because you've had thoughts about killing yourself. Maybe you have tried to do it. This is much different from having fleeting thoughts of death, which many people have from time to time. Your doctor and support team will work hard to help keep you safe. Your team may include a , a , and a counselor. Most people who think about suicide don't want to die. They think suicide will end their problems and pain. People who consider suicide often feel hopeless, helpless, and worthless. These thoughts can make a person feel that there is no other choice. But you do have a choice. Help is always available. The doctor and staff members are taking you and your pain very seriously. It is important to remember that there are people who are willing and able to talk with you about your suicidal thoughts. Treatment and close follow-up care can help you feel better about life. Thoughts of hopelessness and suicide may come from being depressed. Depression is a medical condition. When you have depression, there may be problems with activity levels in certain parts of your brain. Chemicals in your brain called neurotransmitters may be out of balance. But depression can be treated. Treatment for depression includes counseling, medicines, and lifestyle changes. With treatment, you can feel better. Your doctor doesn't want you to hurt yourself. He or she may ask you to sign a \"no harm\" agreement or contract. This contract is your promise that you will not hurt yourself between now and your next visit. Be completely honest with your doctor if you feel that you can't sign it. You will get help. Follow-up care is a key part of your treatment and safety. Be sure to make and go to all appointments, and call your doctor if you are having problems.  It's also a good idea to know your test results and keep a list of the medicines you take. How can you care for yourself at home? · Talk to someone. Reach out to family members, friends, your doctor, or a counselor. Be open and honest with them about your thoughts and feelings. · Be safe with medicines. Take your medicines exactly as prescribed. Call your doctor if you think you are having a problem with your medicine. · Avoid illegal drugs and alcohol. · Attend all counseling sessions recommended by your doctor. · Have someone take away sharp or dangerous objects, guns, and drugs from your home. · Keep the numbers for these national suicide hotlines: 2-781-396-TALK (9-830.330.8113) and 0-661-LGVJQYL (2-176.690.7870). When should you call for help? Call 911 anytime you think you may need emergency care. For example, call if:    · You feel you cannot stop from hurting yourself or someone else.   [facility] your doctor now or seek immediate medical care if:    · You have one or more warning signs of suicide. For example, call if:  ? You feel like giving away your possessions. ? You use illegal drugs or drink alcohol heavily. ? You talk or write about death. This may include writing suicide notes and talking about guns, knives, or pills. ? You start to spend a lot of time alone or spend more time alone than usual.     · You hear voices.     · You start acting in an aggressive way that's not normal for you.    Watch closely for changes in your health, and be sure to contact your doctor if you have any problems. Where can you learn more? Go to http://rajinder-brooke.info/. Enter P571 in the search box to learn more about \"Suicidal Thoughts and Behavior: Care Instructions. \"  Current as of: May 28, 2019  Content Version: 12.2  © 9010-3595 [employer]. Care instructions adapted under license by [employer] (which disclaims liability or warranty for this information).  If you have questions about a medical condition or this instruction, always ask your healthcare professional. [place] [address] any warranty or liability for your use of this information.

## 2020-03-24 ENCOUNTER — HOSPITAL ENCOUNTER (OUTPATIENT)
Dept: INFUSION THERAPY | Age: 25
Discharge: HOME | End: 2020-03-24
Payer: MEDICAID

## 2020-03-24 VITALS
TEMPERATURE: 97.1 F | HEART RATE: 83 BPM | SYSTOLIC BLOOD PRESSURE: 116 MMHG | DIASTOLIC BLOOD PRESSURE: 74 MMHG | OXYGEN SATURATION: 99 % | RESPIRATION RATE: 16 BRPM

## 2020-03-24 DIAGNOSIS — F20.9 SCHIZOPHRENIA, UNSPECIFIED TYPE (HCC): Primary | ICD-10-CM

## 2020-03-24 DIAGNOSIS — F20.9 SCHIZOPHRENIA, UNSPECIFIED TYPE (HCC): ICD-10-CM

## 2020-03-24 PROCEDURE — 96372 THER/PROPH/DIAG INJ SC/IM: CPT

## 2020-03-24 PROCEDURE — 74011250636 HC RX REV CODE- 250/636: Performed by: PSYCHIATRY & NEUROLOGY

## 2020-03-24 RX ORDER — ARIPIPRAZOLE 400 MG
400 KIT INTRAMUSCULAR ONCE
Status: CANCELLED | OUTPATIENT
Start: 2020-04-21

## 2020-03-24 RX ORDER — ARIPIPRAZOLE 400 MG
400 KIT INTRAMUSCULAR ONCE
Status: COMPLETED | OUTPATIENT
Start: 2020-03-24 | End: 2020-03-24

## 2020-03-24 RX ADMIN — ARIPIPRAZOLE 400 MG: KIT at 10:27

## 2020-03-24 NOTE — PROGRESS NOTES
[facility] Progress Note    Date: 2020    Name: Jeevan Mcmlulen    MRN: [id number]         : 1995      Mr. Jazzy Saleh arrived to [facility] at 1020 am ambulatory with his . Mr. Jazzy Saleh was assessed and education was provided. Mr. Susie Epps vitals were reviewed. Visit Vitals  /74 (BP 1 Location: Left arm, BP Patient Position: At rest)   Pulse 83   Temp 97.1 °F (36.2 °C)   Resp 16   SpO2 99%       Abilify 400 mg  was administered as ordered IM  in patient's Left deltoid. Band-aid applied to patient site. Mr. Jazzy Saleh tolerated well without complaints. Mr. Jazzy Saleh was discharged from [place] [address] in stable condition at 1030. He is to return on 20 at 1000 for his next appointment.     Floy Soulier, RN  2020

## 2020-04-21 ENCOUNTER — HOSPITAL ENCOUNTER (OUTPATIENT)
Dept: INFUSION THERAPY | Age: 25
Discharge: HOME | End: 2020-04-21
Payer: MEDICAID

## 2020-04-21 VITALS
RESPIRATION RATE: 16 BRPM | OXYGEN SATURATION: 97 % | SYSTOLIC BLOOD PRESSURE: 116 MMHG | HEART RATE: 81 BPM | TEMPERATURE: 96.8 F | DIASTOLIC BLOOD PRESSURE: 65 MMHG

## 2020-04-21 DIAGNOSIS — F20.9 SCHIZOPHRENIA, UNSPECIFIED TYPE (HCC): Primary | ICD-10-CM

## 2020-04-21 DIAGNOSIS — F20.9 SCHIZOPHRENIA, UNSPECIFIED TYPE (HCC): ICD-10-CM

## 2020-04-21 PROCEDURE — 74011250636 HC RX REV CODE- 250/636

## 2020-04-21 PROCEDURE — 96372 THER/PROPH/DIAG INJ SC/IM: CPT

## 2020-04-21 RX ORDER — ARIPIPRAZOLE 400 MG
400 KIT INTRAMUSCULAR ONCE
Status: COMPLETED | OUTPATIENT
Start: 2020-04-21 | End: 2020-04-21

## 2020-04-21 RX ORDER — ARIPIPRAZOLE 400 MG
400 KIT INTRAMUSCULAR ONCE
Status: CANCELLED | OUTPATIENT
Start: 2020-05-19

## 2020-04-21 RX ORDER — ARIPIPRAZOLE 400 MG
400 KIT INTRAMUSCULAR ONCE
Status: DISCONTINUED | OUTPATIENT
Start: 2020-04-21 | End: 2020-04-21

## 2020-04-21 RX ADMIN — ARIPIPRAZOLE 400 MG: KIT at 11:05

## 2020-04-21 NOTE — PROGRESS NOTES
[facility] Progress Note    Date: 2020    Name: Ingrid Macias    MRN: [id number]         : 1995      Mr. Constantine Ruelas arrived to [facility] at 1100 am ambulatory with his . Mr. Constantine Ruelas was assessed and education was provided. Mr. Carl Andrew vitals were reviewed. Visit Vitals  /65 (BP 1 Location: Left arm, BP Patient Position: Sitting)   Pulse 81   Temp 96.8 °F (36 °C)   Resp 16   SpO2 97%       Abilify 400 mg  was administered as ordered IM  in patient's Left deltoid. Band-aid applied to patient site. Mr. Constantine Ruelas tolerated well without complaints. Mr. Constantine Ruelas was discharged from [place] [address] in stable condition at 1110. He is to return on 20 at 1000 for his next appointment.     Meg Search  2020

## 2020-05-16 ENCOUNTER — HOSPITAL ENCOUNTER (EMERGENCY)
Age: 25
Discharge: HOME | End: 2020-05-16
Attending: EMERGENCY MEDICINE
Payer: MEDICAID

## 2020-05-16 VITALS
OXYGEN SATURATION: 97 % | DIASTOLIC BLOOD PRESSURE: 80 MMHG | SYSTOLIC BLOOD PRESSURE: 119 MMHG | TEMPERATURE: 97.9 F | HEART RATE: 78 BPM

## 2020-05-16 DIAGNOSIS — F20.0 PARANOID SCHIZOPHRENIA (HCC): Primary | ICD-10-CM

## 2020-05-16 LAB
AMPHET UR QL SCN: NEGATIVE
ANION GAP SERPL CALC-SCNC: 3 MMOL/L (ref 3–18)
BARBITURATES UR QL SCN: NEGATIVE
BASOPHILS # BLD: 0 K/UL (ref 0–0.1)
BASOPHILS NFR BLD: 0 % (ref 0–2)
BENZODIAZ UR QL: NEGATIVE
BUN SERPL-MCNC: 15 MG/DL (ref 7–18)
BUN/CREAT SERPL: 15 (ref 12–20)
CALCIUM SERPL-MCNC: 8.7 MG/DL (ref 8.5–10.1)
CANNABINOIDS UR QL SCN: NEGATIVE
CHLORIDE SERPL-SCNC: 107 MMOL/L (ref 100–111)
CO2 SERPL-SCNC: 28 MMOL/L (ref 21–32)
COCAINE UR QL SCN: NEGATIVE
CREAT SERPL-MCNC: 1.02 MG/DL (ref 0.6–1.3)
DIFFERENTIAL METHOD BLD: ABNORMAL
EOSINOPHIL # BLD: 0.2 K/UL (ref 0–0.4)
EOSINOPHIL NFR BLD: 2 % (ref 0–5)
ERYTHROCYTE [DISTWIDTH] IN BLOOD BY AUTOMATED COUNT: 12.4 % (ref 11.6–14.5)
ETHANOL SERPL-MCNC: <3 MG/DL (ref 0–3)
GLUCOSE SERPL-MCNC: 85 MG/DL (ref 74–99)
HCT VFR BLD AUTO: 42.3 % (ref 36–48)
HDSCOM,HDSCOM: NORMAL
HGB BLD-MCNC: 15 G/DL (ref 13–16)
LYMPHOCYTES # BLD: 4.6 K/UL (ref 0.9–3.6)
LYMPHOCYTES NFR BLD: 53 % (ref 21–52)
MCH RBC QN AUTO: 30.5 PG (ref 24–34)
MCHC RBC AUTO-ENTMCNC: 35.5 G/DL (ref 31–37)
MCV RBC AUTO: 86.2 FL (ref 74–97)
METHADONE UR QL: NEGATIVE
MONOCYTES # BLD: 0.7 K/UL (ref 0.05–1.2)
MONOCYTES NFR BLD: 8 % (ref 3–10)
NEUTS SEG # BLD: 3.2 K/UL (ref 1.8–8)
NEUTS SEG NFR BLD: 37 % (ref 40–73)
OPIATES UR QL: NEGATIVE
PCP UR QL: NEGATIVE
PLATELET # BLD AUTO: 237 K/UL (ref 135–420)
PMV BLD AUTO: 10.8 FL (ref 9.2–11.8)
POTASSIUM SERPL-SCNC: 3.8 MMOL/L (ref 3.5–5.5)
RBC # BLD AUTO: 4.91 M/UL (ref 4.7–5.5)
SODIUM SERPL-SCNC: 138 MMOL/L (ref 136–145)
WBC # BLD AUTO: 8.7 K/UL (ref 4.6–13.2)

## 2020-05-16 PROCEDURE — 80048 BASIC METABOLIC PNL TOTAL CA: CPT

## 2020-05-16 PROCEDURE — 99283 EMERGENCY DEPT VISIT LOW MDM: CPT

## 2020-05-16 PROCEDURE — 85025 COMPLETE CBC W/AUTO DIFF WBC: CPT

## 2020-05-16 PROCEDURE — 80307 DRUG TEST PRSMV CHEM ANLYZR: CPT

## 2020-05-16 NOTE — ED TRIAGE NOTES
Pt states that he is hearing voices in his head. He takes his Abilify and Depakote regularly and feels it is not enough. \"It's like a girl and a boy talking in my head.  Like they messin' with me\" Denies SI/HI

## 2020-05-16 NOTE — ED PROVIDER NOTES
EMERGENCY DEPARTMENT HISTORY AND PHYSICAL EXAM    2:46 AM      Date: 2020  Patient Name: Nieves Dick    History of Presenting Illness     Chief Complaint   Patient presents with   [address]         History Provided By: Patient  Location/Duration/Severity/Modifying factors   25YOM with a PMH of schizophrenia (on Depakote and Abilify), ADHD, anxiety presents to the ER for evaluation of hearing voices in his head. Reports that he has heard a boy and girl speaking in his head for the past year. Recently, it has gotten louder and more disruptive. He is concerned that he has a chip/radio in his brain and that his neighbors placed it. Reports that they are out to get him. He also endorses recent suicidal thoughts with a plan of overdosing on his medications. Reports that he has been taking his medications as scheduled. Denies recreational drug use. Denies headache, visual disturbances, chest pain, difficulty breathing, NVD. PCP: Jyoti Duenas NP    Current Outpatient Medications   Medication Sig Dispense Refill    ARIPiprazole (ABILIFY MAINTENA) 400 mg injection 400 mg by IntraMUSCular route every four (4) weeks. 400 mg 0    divalproex ER (DEPAKOTE ER) 500 mg ER tablet Take 1 Tab by mouth two (2) times a day. 30 Tab 1    traZODone (DESYREL) 50 mg tablet Take 1 Tab by mouth nightly as needed for Sleep. 15 Tab 1       Past History     Past Medical History:  Past Medical History:   Diagnosis Date    ADHD (attention deficit hyperactivity disorder)     Anxiety disorder     Depression     Mood disorder (HCC)     Psychiatric disorder     [facility] [address]     Schizophrenia ([facility] [address] [facility]     Trauma     \"His mother  this past November\"       Past Surgical History:  History reviewed. No pertinent surgical history.     Family History:  Family History   Problem Relation Age of Onset    Schizophrenia Mother     Schizophrenia Brother     Schizophrenia Sister        Social History:  Social History     Tobacco Use    Smoking status: Current Every Day Smoker    Smokeless tobacco: Never Used   Substance Use Topics    Alcohol use: Yes    Drug use: Yes     Types: Marijuana       Allergies:  No Known Allergies      Review of Systems       Review of Systems   Constitutional: Negative for chills and fever. HENT: Negative for congestion and sore throat. Eyes: Negative for visual disturbance. Respiratory: Negative for cough and shortness of breath. Cardiovascular: Negative for chest pain. Gastrointestinal: Negative for diarrhea, nausea and vomiting. Endocrine: Negative for polyuria. Genitourinary: Negative for difficulty urinating. Musculoskeletal: Negative for myalgias. Neurological: Negative for headaches. Hematological: Does not bruise/bleed easily. Physical Exam     Visit Vitals  /84 (BP 1 Location: Left arm)   Pulse 89   Temp 98.1 °F (36.7 °C)   SpO2 98%       Physical Exam  Constitutional:       Appearance: Normal appearance. He is not toxic-appearing. HENT:      Head: Normocephalic. Mouth/Throat:      Mouth: Mucous membranes are moist.   Eyes:      Extraocular Movements: Extraocular movements intact. Pupils: Pupils are equal, round, and reactive to light. Neck:      Musculoskeletal: Neck supple. Cardiovascular:      Rate and Rhythm: Normal rate and regular rhythm. Pulmonary:      Effort: No respiratory distress. Breath sounds: Normal breath sounds. No wheezing or rales. Abdominal:      General: Abdomen is flat. There is no distension. Palpations: Abdomen is soft. Tenderness: There is no abdominal tenderness. There is no guarding. Musculoskeletal: Normal range of motion. Skin:     General: Skin is warm and dry. Capillary Refill: Capillary refill takes less than 2 seconds. Neurological:      General: No focal deficit present. Mental Status: He is alert. Cranial Nerves: No cranial nerve deficit. Coordination: Coordination normal.      Gait: Gait normal.   Psychiatric:         Attention and Perception: Attention normal.         Mood and Affect: Mood is anxious. Speech: Speech normal.         Behavior: Behavior is agitated. Behavior is cooperative. Thought Content: Thought content is paranoid and delusional.         Judgment: Judgment normal.           Diagnostic Study Results     Labs -  No results found for this or any previous visit (from the past 12 hour(s)). Radiologic Studies -   No orders to display         Medical Decision Making   I am the first provider for this patient. I reviewed the vital signs, available nursing notes, past medical history, past surgical history, family history and social history. Vital Signs-Reviewed the patient's vital signs. Records Reviewed: Nursing Notes and Old Medical Records (Time of Review: 2:46 AM)    ED Course: Progress Notes, Reevaluation, and Consults:         Provider Notes (Medical Decision Making):   MDM  Number of Diagnoses or Management Options  Paranoid schizophrenia [facility]):   Diagnosis management comments: 89HOS with a PMH of schizophrenia, anxiety, ADHD presents to the ER for evaluation of auditory hallucinations, paranoia, suicidal thoughts. Vital signs WNL; afebrile. Physical exam reveals a benign neurologic and cardiopulmonary exam. Patient is agitated, but cooperative. Actively delusional. No clinical concern for acute medical or toxicologic etiology of symptoms at this time. Labs reveal normal blood counts, electrolytes. Undetectable EtOH and negative UDS. Crisis evaluated the patient. He contracts for safety and is not currently a threat to himself. His Psychiatrist recently upped his Abilify dose, but the patient has not picked it up yet. Patient will likely benefit from increase in his anti-psychotics. Stable for discharge. Instructed to follow-up with his therapist for reevaluation. Gave strict return precautions.  He agrees with the plan. Diagnosis     Clinical Impression:   1. Paranoid schizophrenia ([facility] [address]        Disposition: Home    Follow-up Information    None          Patient's Medications   Start Taking    No medications on file   Continue Taking    ARIPIPRAZOLE (ABILIFY MAINTENA) 400 MG INJECTION    400 mg by IntraMUSCular route every four (4) weeks. DIVALPROEX ER (DEPAKOTE ER) 500 MG ER TABLET    Take 1 Tab by mouth two (2) times a day. TRAZODONE (DESYREL) 50 MG TABLET    Take 1 Tab by mouth nightly as needed for Sleep.    These Medications have changed    No medications on file   Stop Taking    No medications on file       Vega Duran MD  Emergency Medicine PGY-2  [facility] Emergency Department  5/16/2020, 5:39AM

## 2020-05-16 NOTE — BSMART NOTE
24 yo male pt interviewed in ed room 17 at the request of Dr Pastora Hauser. Pt pacing in room upon my arrival. Reports coming to the ed related to hearing voices for over a year. States he is not currently hearing them and that they tend to \"come and go\". Denies s/i h/i v/h as well. States he had an appointment with his outpt provider Deejay Stanley this past week and she increased his dosage of Abilify but that he has not went and picked up his prescription however. Encouraged to do so. Pt reports living with his father at this time. States that he feels safe there. Denies drug or alcohol use. Reports med compliance with home meds other than the increased abilify. \"I have been taking the old dose of abilify though\". Denies legal issues. At this time feels safe to return to fathers. Again denies s/i h/i a/vh at this time. No other issues noted or voiced. Pt plans to  his prescription and f/u with outpt provider next week. Dr Pastora Hauser notified.

## 2020-05-19 DIAGNOSIS — F20.9 SCHIZOPHRENIA, UNSPECIFIED TYPE (HCC): ICD-10-CM

## 2020-05-20 ENCOUNTER — HOSPITAL ENCOUNTER (OUTPATIENT)
Dept: INFUSION THERAPY | Age: 25
Discharge: HOME | End: 2020-05-20
Payer: MEDICAID

## 2020-05-20 VITALS
SYSTOLIC BLOOD PRESSURE: 131 MMHG | RESPIRATION RATE: 16 BRPM | DIASTOLIC BLOOD PRESSURE: 76 MMHG | TEMPERATURE: 97 F | OXYGEN SATURATION: 98 % | HEART RATE: 84 BPM

## 2020-05-20 DIAGNOSIS — F20.9 SCHIZOPHRENIA, UNSPECIFIED TYPE (HCC): Primary | ICD-10-CM

## 2020-05-20 PROCEDURE — 96372 THER/PROPH/DIAG INJ SC/IM: CPT

## 2020-05-20 PROCEDURE — 74011250636 HC RX REV CODE- 250/636: Performed by: PSYCHIATRY & NEUROLOGY

## 2020-05-20 RX ORDER — ARIPIPRAZOLE 400 MG
400 KIT INTRAMUSCULAR ONCE
Status: COMPLETED | OUTPATIENT
Start: 2020-05-20 | End: 2020-05-20

## 2020-05-20 RX ORDER — ARIPIPRAZOLE 400 MG
400 KIT INTRAMUSCULAR ONCE
Status: CANCELLED
Start: 2020-06-10

## 2020-05-20 RX ADMIN — ARIPIPRAZOLE 400 MG: KIT at 10:20

## 2020-05-20 NOTE — PROGRESS NOTES
[facility] Progress Note    Date: May 20, 2020    Name: Mertha Moritz    MRN: [id number]         : 1995      Mr. Benedict Logan arrived to [facility] at 21  am ambulatory. Mr. Benedict Logan was assessed and education was provided. Mr. Teresita Hill vitals were reviewed. Visit Vitals  /76   Pulse 84   Temp 97 °F (36.1 °C)   Resp 16   SpO2 98%       Abilify 400 mg  was administered as ordered IM  in patient's Left deltoid. Band-aid applied to patient site. Mr. Benedict Logan tolerated well without complaints. Mr. Benedict Logan was discharged from [place] [address] in stable condition at 1025. He is to return on 20 at 1000 for his next appointment.     Driss Majano RN  May 20, 2020

## 2020-06-09 RX ORDER — ARIPIPRAZOLE 400 MG
400 KIT INTRAMUSCULAR ONCE
Status: CANCELLED | OUTPATIENT
Start: 2020-06-16 | End: 2020-06-16

## 2020-06-16 ENCOUNTER — HOSPITAL ENCOUNTER (OUTPATIENT)
Dept: INFUSION THERAPY | Age: 25
End: 2020-06-16
Payer: MEDICAID

## 2020-06-16 DIAGNOSIS — F20.9 SCHIZOPHRENIA, UNSPECIFIED TYPE (HCC): ICD-10-CM

## 2020-06-17 ENCOUNTER — HOSPITAL ENCOUNTER (OUTPATIENT)
Dept: INFUSION THERAPY | Age: 25
Discharge: HOME | End: 2020-06-17
Payer: MEDICAID

## 2020-06-17 VITALS
OXYGEN SATURATION: 99 % | TEMPERATURE: 97.6 F | RESPIRATION RATE: 16 BRPM | SYSTOLIC BLOOD PRESSURE: 105 MMHG | DIASTOLIC BLOOD PRESSURE: 70 MMHG | HEART RATE: 89 BPM

## 2020-06-17 DIAGNOSIS — F20.9 SCHIZOPHRENIA, UNSPECIFIED TYPE (HCC): Primary | ICD-10-CM

## 2020-06-17 PROCEDURE — 96372 THER/PROPH/DIAG INJ SC/IM: CPT

## 2020-06-17 PROCEDURE — 74011250636 HC RX REV CODE- 250/636: Performed by: NURSE PRACTITIONER

## 2020-06-17 RX ORDER — HYDROCORTISONE SODIUM SUCCINATE 100 MG/2ML
INJECTION, POWDER, FOR SOLUTION INTRAMUSCULAR; INTRAVENOUS
Status: DISCONTINUED
Start: 2020-06-17 | End: 2020-06-18 | Stop reason: HOSPADM

## 2020-06-17 RX ORDER — ARIPIPRAZOLE 400 MG
400 KIT INTRAMUSCULAR ONCE
Status: CANCELLED | OUTPATIENT
Start: 2020-07-14 | End: 2020-07-14

## 2020-06-17 RX ORDER — ARIPIPRAZOLE 400 MG
400 KIT INTRAMUSCULAR ONCE
Status: COMPLETED | OUTPATIENT
Start: 2020-06-17 | End: 2020-06-17

## 2020-06-17 RX ADMIN — ARIPIPRAZOLE 400 MG: KIT at 13:20

## 2020-06-17 NOTE — PROGRESS NOTES
[facility] Progress Note    Date: 2020    Name: Krystal Geller    MRN: [id number]         : 1995      Mr. Jeremy Vázquez arrived in the [facility] today at 1300, in stable condition, here for Q 28 Day (Q 4 Week), IM Abilify Injection. He was assessed and education was provided. Mr. Dante Herrmann vitals were reviewed. Visit Vitals  /70 (BP 1 Location: Left arm, BP Patient Position: Sitting)   Pulse 89   Temp 97.6 °F (36.4 °C)   Resp 16   SpO2 99%           ABILIFY MAINTENA (Aripiprazole) 400 mg, was given IM, in his right deltoid at 1320, per order, and without incident. Mr. Jeremy Vázquez tolerated well, and had no complaints. Mr. Jeremy Vázquez was discharged from [place] [address] in stable condition at 1325. Hang Luciano He is to return in 4 weeks, on Tuesday, 20, at 1000, for his next appointment, for his next Abilify Injection.      Gabriella Garces RN  2020  1:16 PM

## 2020-07-08 ENCOUNTER — APPOINTMENT (OUTPATIENT)
Dept: CT IMAGING | Age: 25
End: 2020-07-08
Attending: EMERGENCY MEDICINE
Payer: MEDICAID

## 2020-07-08 ENCOUNTER — HOSPITAL ENCOUNTER (EMERGENCY)
Age: 25
Discharge: HOME | End: 2020-07-08
Attending: EMERGENCY MEDICINE
Payer: MEDICAID

## 2020-07-08 VITALS
DIASTOLIC BLOOD PRESSURE: 77 MMHG | SYSTOLIC BLOOD PRESSURE: 121 MMHG | RESPIRATION RATE: 16 BRPM | HEIGHT: 70 IN | TEMPERATURE: 97.7 F | WEIGHT: 160 LBS | OXYGEN SATURATION: 100 % | HEART RATE: 98 BPM | BODY MASS INDEX: 22.9 KG/M2

## 2020-07-08 DIAGNOSIS — S06.0X0A CONCUSSION WITHOUT LOSS OF CONSCIOUSNESS, INITIAL ENCOUNTER: Primary | ICD-10-CM

## 2020-07-08 DIAGNOSIS — F20.9 SCHIZOPHRENIA, UNSPECIFIED TYPE (HCC): ICD-10-CM

## 2020-07-08 LAB
ALBUMIN SERPL-MCNC: 4.1 G/DL (ref 3.4–5)
ALBUMIN/GLOB SERPL: 1.2 {RATIO} (ref 0.8–1.7)
ALP SERPL-CCNC: 83 U/L (ref 45–117)
ALT SERPL-CCNC: 17 U/L (ref 16–61)
AMPHET UR QL SCN: NEGATIVE
ANION GAP SERPL CALC-SCNC: 4 MMOL/L (ref 3–18)
AST SERPL-CCNC: 10 U/L (ref 10–38)
BARBITURATES UR QL SCN: NEGATIVE
BASOPHILS # BLD: 0 K/UL (ref 0–0.1)
BASOPHILS NFR BLD: 0 % (ref 0–2)
BENZODIAZ UR QL: NEGATIVE
BILIRUB SERPL-MCNC: 0.3 MG/DL (ref 0.2–1)
BUN SERPL-MCNC: 11 MG/DL (ref 7–18)
BUN/CREAT SERPL: 9 (ref 12–20)
CALCIUM SERPL-MCNC: 9 MG/DL (ref 8.5–10.1)
CANNABINOIDS UR QL SCN: NEGATIVE
CHLORIDE SERPL-SCNC: 109 MMOL/L (ref 100–111)
CO2 SERPL-SCNC: 29 MMOL/L (ref 21–32)
COCAINE UR QL SCN: NEGATIVE
CREAT SERPL-MCNC: 1.25 MG/DL (ref 0.6–1.3)
DIFFERENTIAL METHOD BLD: ABNORMAL
EOSINOPHIL # BLD: 0.2 K/UL (ref 0–0.4)
EOSINOPHIL NFR BLD: 3 % (ref 0–5)
ERYTHROCYTE [DISTWIDTH] IN BLOOD BY AUTOMATED COUNT: 12 % (ref 11.6–14.5)
ETHANOL SERPL-MCNC: <3 MG/DL (ref 0–3)
GLOBULIN SER CALC-MCNC: 3.3 G/DL (ref 2–4)
GLUCOSE SERPL-MCNC: 87 MG/DL (ref 74–99)
HCT VFR BLD AUTO: 43.5 % (ref 36–48)
HDSCOM,HDSCOM: NORMAL
HGB BLD-MCNC: 15.5 G/DL (ref 13–16)
LYMPHOCYTES # BLD: 4.3 K/UL (ref 0.9–3.6)
LYMPHOCYTES NFR BLD: 51 % (ref 21–52)
MCH RBC QN AUTO: 30.7 PG (ref 24–34)
MCHC RBC AUTO-ENTMCNC: 35.6 G/DL (ref 31–37)
MCV RBC AUTO: 86.1 FL (ref 74–97)
METHADONE UR QL: NEGATIVE
MONOCYTES # BLD: 0.7 K/UL (ref 0.05–1.2)
MONOCYTES NFR BLD: 8 % (ref 3–10)
NEUTS SEG # BLD: 3.1 K/UL (ref 1.8–8)
NEUTS SEG NFR BLD: 38 % (ref 40–73)
OPIATES UR QL: NEGATIVE
PCP UR QL: NEGATIVE
PLATELET # BLD AUTO: 249 K/UL (ref 135–420)
PMV BLD AUTO: 10.2 FL (ref 9.2–11.8)
POTASSIUM SERPL-SCNC: 3.9 MMOL/L (ref 3.5–5.5)
PROT SERPL-MCNC: 7.4 G/DL (ref 6.4–8.2)
RBC # BLD AUTO: 5.05 M/UL (ref 4.7–5.5)
SODIUM SERPL-SCNC: 142 MMOL/L (ref 136–145)
WBC # BLD AUTO: 8.3 K/UL (ref 4.6–13.2)

## 2020-07-08 PROCEDURE — 85025 COMPLETE CBC W/AUTO DIFF WBC: CPT

## 2020-07-08 PROCEDURE — 80307 DRUG TEST PRSMV CHEM ANLYZR: CPT

## 2020-07-08 PROCEDURE — 99284 EMERGENCY DEPT VISIT MOD MDM: CPT

## 2020-07-08 PROCEDURE — 80053 COMPREHEN METABOLIC PANEL: CPT

## 2020-07-08 PROCEDURE — 70450 CT HEAD/BRAIN W/O DYE: CPT

## 2020-07-08 NOTE — ED PROVIDER NOTES
EMERGENCY DEPARTMENT HISTORY AND PHYSICAL EXAM    3:57 AM  Date: 2020  Patient Name: Molly Martinez    History of Presenting Illness     Chief Complaint   Patient presents with   [address] Problem        History Provided By: Patient    HPI: Molly Martinez is a 23 y.o. male with ADHD and schizophrenia. Patient is presenting with complaints of hallucinations. Reports 2 days ago he had a mechanical fall from his bed and hit his head. Since then has been complaining of headache and lightheadedness that are mild. Denies LOC. No other neurological symptoms. He reports that he started hallucinating again today and he has been hallucinating in a very long time. Denies SI, HI. Denies drugs or alcohol. Location:  Severity:  Timing/course: Onset/Duration:     PCP: Anup Red NP    Past History     Past Medical History:  Past Medical History:   Diagnosis Date    ADHD (attention deficit hyperactivity disorder)     Anxiety disorder     Depression     [facility] disorder ([facility] [address]     Psychiatric disorder     Psychotic disorder ([facility] [address]     Schizophrenia ([facility] [address]     Trauma     \"His mother  this past November\"       Past Surgical History:  No past surgical history on file. Family History:  Family History   Problem Relation Age of Onset   Lavon Schizophrenia Mother     Schizophrenia Brother     Schizophrenia Sister        Social History:  Social History     Tobacco Use    Smoking status: Current Every Day Smoker    Smokeless tobacco: Never Used   Substance Use Topics    Alcohol use: Yes    Drug use: Yes     Types: Marijuana       Allergies:  No Known Allergies    Review of Systems   Review of Systems   Neurological: Positive for light-headedness and headaches. Psychiatric/Behavioral: Positive for hallucinations. All other systems reviewed and are negative.        Physical Exam     Patient Vitals for the past 12 hrs:   Temp Pulse Resp BP SpO2   20 0240 97.7 °F (36.5 °C) 98 16 121/77 100 % Physical Exam  Vitals signs and nursing note reviewed. Constitutional:       Appearance: Normal appearance. HENT:      Head: Normocephalic and atraumatic. Eyes:      Extraocular Movements: Extraocular movements intact. Neck:      Musculoskeletal: Normal range of motion and neck supple. Cardiovascular:      Rate and Rhythm: Normal rate. Pulmonary:      Effort: Pulmonary effort is normal. No respiratory distress. Musculoskeletal: Normal range of motion. General: No deformity. Neurological:      General: No focal deficit present. Mental Status: He is alert and oriented to person, place, and time. Psychiatric:         Mood and Affect: Mood normal.         Behavior: Behavior normal.         Thought Content: Thought content does not include homicidal or suicidal ideation. Diagnostic Study Results     Labs -  Recent Results (from the past 12 hour(s))   CBC WITH AUTOMATED DIFF    Collection Time: 07/08/20  3:00 AM   Result Value Ref Range    WBC 8.3 4.6 - 13.2 K/uL    RBC 5.05 4.70 - 5.50 M/uL    HGB 15.5 13.0 - 16.0 g/dL    HCT 43.5 36.0 - 48.0 %    MCV 86.1 74.0 - 97.0 FL    MCH 30.7 24.0 - 34.0 PG    MCHC 35.6 31.0 - 37.0 g/dL    RDW 12.0 11.6 - 14.5 %    PLATELET 254 973 - 437 K/uL    MPV 10.2 9.2 - 11.8 FL    NEUTROPHILS 38 (L) 40 - 73 %    LYMPHOCYTES 51 21 - 52 %    MONOCYTES 8 3 - 10 %    EOSINOPHILS 3 0 - 5 %    BASOPHILS 0 0 - 2 %    ABS. NEUTROPHILS 3.1 1.8 - 8.0 K/UL    ABS. LYMPHOCYTES 4.3 (H) 0.9 - 3.6 K/UL    ABS. MONOCYTES 0.7 0.05 - 1.2 K/UL    ABS. EOSINOPHILS 0.2 0.0 - 0.4 K/UL    ABS.  BASOPHILS 0.0 0.0 - 0.1 K/UL    DF AUTOMATED     METABOLIC PANEL, COMPREHENSIVE    Collection Time: 07/08/20  3:00 AM   Result Value Ref Range    Sodium 142 136 - 145 mmol/L    Potassium 3.9 3.5 - 5.5 mmol/L    Chloride 109 100 - 111 mmol/L    CO2 29 21 - 32 mmol/L    Anion gap 4 3.0 - 18 mmol/L    Glucose 87 74 - 99 mg/dL    BUN 11 7.0 - 18 MG/DL    Creatinine 1.25 0.6 - 1.3 MG/DL    BUN/Creatinine ratio 9 (L) 12 - 20      GFR est AA >60 >60 ml/min/1.73m2    GFR est non-AA >60 >60 ml/min/1.73m2    Calcium 9.0 8.5 - 10.1 MG/DL    Bilirubin, total 0.3 0.2 - 1.0 MG/DL    ALT (SGPT) 17 16 - 61 U/L    AST (SGOT) 10 10 - 38 U/L    Alk. phosphatase 83 45 - 117 U/L    Protein, total 7.4 6.4 - 8.2 g/dL    Albumin 4.1 3.4 - 5.0 g/dL    Globulin 3.3 2.0 - 4.0 g/dL    A-G Ratio 1.2 0.8 - 1.7     ETHYL ALCOHOL    Collection Time: 07/08/20  3:00 AM   Result Value Ref Range    ALCOHOL(ETHYL),SERUM <3 0 - 3 MG/DL   DRUG SCREEN, URINE    Collection Time: 07/08/20  3:00 AM   Result Value Ref Range    BENZODIAZEPINES Negative NEG      BARBITURATES Negative NEG      THC (TH-CANNABINOL) Negative NEG      OPIATES Negative NEG      PCP(PHENCYCLIDINE) Negative NEG      COCAINE Negative NEG      AMPHETAMINES Negative NEG      METHADONE Negative NEG      HDSCOM (NOTE)        Radiologic Studies -   No results found. Medical Decision Making     ED Course: Progress Notes, Reevaluation, and Consults:    3:57 AM Initial assessment performed. The patients presenting problems have been discussed, and they/their family are in agreement with the care plan formulated and outlined with them. I have encouraged them to ask questions as they arise throughout their visit. Provider Notes (Medical Decision Making): 46-year-old male history of schizophrenia presenting with hallucinations. Patient reports head trauma recently with postconcussive symptoms. Hallucinations are likely due to his chronic mental illness over the patient is concerned about the trauma so we will get a head CT. Psych screening labs as well. Patient was offered psychiatric counseling but he feels like he does not need to speak to crisis team and he just wanted to check his head. Procedures:     Critical Care Time:     Vital Signs-Reviewed the patient's vital signs. Reviewed pt's pulse ox reading. EKG: Interpreted by the EP.    Time Interpreted:    Rate:    Rhythm:    Interpretation:   Comparison:     Records Reviewed: Nursing Notes (Time of Review: 3:57 AM)  -I am the first provider for this patient.  -I reviewed the vital signs, available nursing notes, past medical history, past surgical history, family history and social history. Current Outpatient Medications   Medication Sig Dispense Refill    ARIPiprazole (ABILIFY MAINTENA) 400 mg injection 400 mg by IntraMUSCular route every four (4) weeks. 400 mg 0    divalproex ER (DEPAKOTE ER) 500 mg ER tablet Take 1 Tab by mouth two (2) times a day. 30 Tab 1    traZODone (DESYREL) 50 mg tablet Take 1 Tab by mouth nightly as needed for Sleep. 15 Tab 1        Clinical Impression     Clinical Impression: No diagnosis found. Disposition: DC      This note was dictated utilizing voice recognition software which may lead to typographical errors. I apologize in advance if the situation occurs. If questions arise please do not hesitate to contact me or call our department.     Jessika Wong MD  3:57 AM

## 2020-07-08 NOTE — ED TRIAGE NOTES
Pt came by EMS from home. Pt says he fell and hit his head Sunday evening. Pt says after hitting his head and has started hearing voices. He says the voices tell him negative thoughts. Pt denies any SI or HI thoughts. Pt does have a history or schizophrenia and says he is compliant with his medications.

## 2020-07-08 NOTE — DISCHARGE INSTRUCTIONS
Patient Education        Learning About a Closed Head Injury  What is a closed head injury? A closed head injury happens when your head gets hit hard. The strong force of the blow causes your brain to shake in your skull. This movement can cause the brain to bruise, swell, or tear. Sometimes nerves or blood vessels also get damaged. This can cause bleeding in or around the brain. A concussion is a type of closed head injury. What are the symptoms? If you have a mild concussion, you may have a mild headache or feel \"not quite right. \" These symptoms are common. They usually go away over a few days to 4 weeks. But sometimes after a concussion, you feel like you can't function as well as before the injury. And you have new symptoms. This is called postconcussive syndrome. You may:  · Find it harder to solve problems, think, concentrate, or remember. · Have headaches. · Have changes in your sleep patterns, such as not being able to sleep or sleeping all the time. · Have changes in your personality. · Not be interested in your usual activities. · Feel angry or anxious without a clear reason. · Lose your sense of taste or smell. · Be dizzy, lightheaded, or unsteady. It may be hard to stand or walk. How is a closed head injury treated? Any person who may have a concussion needs to see a doctor. Some people have to stay in the hospital to be watched. Others can go home safely. If you go home, follow your doctor's instructions. He or she will tell you if you need someone to watch you closely for the next 24 hours or longer. Rest is the best treatment. Get plenty of sleep at night. And try to rest during the day. · Avoid activities that are physically or mentally demanding. These include housework, exercise, and schoolwork. And don't play video games, send text messages, or use the computer. You may need to change your school or work schedule to be able to avoid these activities.   · Ask your doctor when it's okay to drive, ride a bike, or operate machinery. · Take an over-the-counter pain medicine, such as acetaminophen (Tylenol), ibuprofen (Advil, Motrin), or naproxen (Aleve). Be safe with medicines. Read and follow all instructions on the label. · Check with your doctor before you use any other medicines for pain. · Do not drink alcohol or use illegal drugs. They can slow recovery. They can also increase your risk of getting a second head injury. Follow-up care is a key part of your treatment and safety. Be sure to make and go to all appointments, and call your doctor if you are having problems. It's also a good idea to know your test results and keep a list of the medicines you take. Where can you learn more? Go to http://rajinder-brooke.info/  Enter E235 in the search box to learn more about \"Learning About a Closed Head Injury. \"  Current as of: November 20, 2019               Content Version: 12.5  © 4201-1259 [employer]. Care instructions adapted under license by [employer] (which disclaims liability or warranty for this information). If you have questions about a medical condition or this instruction, always ask your healthcare professional. [address] any warranty or liability for your use of this information.

## 2020-07-14 ENCOUNTER — HOSPITAL ENCOUNTER (OUTPATIENT)
Dept: INFUSION THERAPY | Age: 25
Discharge: HOME | End: 2020-07-14
Payer: MEDICAID

## 2020-07-14 VITALS
DIASTOLIC BLOOD PRESSURE: 71 MMHG | OXYGEN SATURATION: 100 % | TEMPERATURE: 98.2 F | SYSTOLIC BLOOD PRESSURE: 121 MMHG | RESPIRATION RATE: 16 BRPM | HEART RATE: 94 BPM

## 2020-07-14 DIAGNOSIS — F20.9 SCHIZOPHRENIA, UNSPECIFIED TYPE (HCC): Primary | ICD-10-CM

## 2020-07-14 PROCEDURE — 74011250636 HC RX REV CODE- 250/636: Performed by: NURSE PRACTITIONER

## 2020-07-14 PROCEDURE — 96372 THER/PROPH/DIAG INJ SC/IM: CPT

## 2020-07-14 RX ORDER — ARIPIPRAZOLE 400 MG
400 KIT INTRAMUSCULAR ONCE
Status: CANCELLED | OUTPATIENT
Start: 2020-08-11 | End: 2020-08-11

## 2020-07-14 RX ORDER — ARIPIPRAZOLE 400 MG
400 KIT INTRAMUSCULAR ONCE
Status: COMPLETED | OUTPATIENT
Start: 2020-07-14 | End: 2020-07-14

## 2020-07-14 RX ADMIN — ARIPIPRAZOLE 400 MG: KIT at 10:19

## 2020-07-14 NOTE — PROGRESS NOTES
[facility] Progress Note    Date: 2020    Name: Maxime Agosto    MRN: [id number]         : 1995      Mr. Rebeca Gallagher arrived to [facility] at 1100 am ambulatory with his . Mr. Rebeca Gallagher was assessed and education was provided. Mr. Ahmet Lynch vitals were reviewed. Visit Vitals  /71 (BP 1 Location: Left arm, BP Patient Position: Sitting)   Pulse 94   Temp 98.2 °F (36.8 °C)   Resp 16   SpO2 100%       Abilify 400 mg  was administered as ordered IM  in patient's Left deltoid. Band-aid applied to patient site. Mr. Rebeca Gallagher tolerated well without complaints. Mr. Rebeca Gallagher was discharged from [place] [address] in stable condition at 1030. He is to return on 20 at 1000 for his next appointment.     Ludy Sevilla RN  2020

## 2020-08-11 ENCOUNTER — HOSPITAL ENCOUNTER (OUTPATIENT)
Dept: INFUSION THERAPY | Age: 25
Discharge: HOME | End: 2020-08-11
Payer: MEDICAID

## 2020-08-11 VITALS
OXYGEN SATURATION: 96 % | DIASTOLIC BLOOD PRESSURE: 70 MMHG | RESPIRATION RATE: 16 BRPM | SYSTOLIC BLOOD PRESSURE: 105 MMHG | TEMPERATURE: 97.3 F | HEART RATE: 77 BPM

## 2020-08-11 DIAGNOSIS — F20.9 SCHIZOPHRENIA, UNSPECIFIED TYPE (HCC): Primary | ICD-10-CM

## 2020-08-11 PROCEDURE — 74011250636 HC RX REV CODE- 250/636: Performed by: NURSE PRACTITIONER

## 2020-08-11 PROCEDURE — 96372 THER/PROPH/DIAG INJ SC/IM: CPT

## 2020-08-11 RX ORDER — ARIPIPRAZOLE 400 MG
400 KIT INTRAMUSCULAR ONCE
Status: COMPLETED | OUTPATIENT
Start: 2020-08-11 | End: 2020-08-11

## 2020-08-11 RX ORDER — ARIPIPRAZOLE 400 MG
400 KIT INTRAMUSCULAR ONCE
Status: CANCELLED | OUTPATIENT
Start: 2020-09-08 | End: 2020-09-08

## 2020-08-11 RX ADMIN — ARIPIPRAZOLE 400 MG: KIT at 10:17

## 2020-08-11 NOTE — PROGRESS NOTES
[facility] Progress Note    Date: 2020    Name: Maxime Agosto    MRN: [id number]         : 1995      Mr. Rebeca Gallagher arrived to [facility] at 21  am ambulatory with his . Mr. Rebeca Gallagher was assessed and education was provided. Mr. Ahmet Lynch vitals were reviewed. Visit Vitals  /70 (BP 1 Location: Left arm, BP Patient Position: Sitting)   Pulse 77   Temp 97.3 °F (36.3 °C)   Resp 16   SpO2 96%       Abilify 400 mg  was administered as ordered IM  in patient's Left deltoid. Band-aid applied to patient site. Mr. Rebeca Gallagher tolerated well without complaints. Mr. Rebeca Gallagher was discharged from [place] [address] in stable condition at 1030. He is to return on 20 at 0900 for his next appointment.     Lilly eFlton RN  2020

## 2020-08-19 ENCOUNTER — HOSPITAL ENCOUNTER (EMERGENCY)
Age: 25
Discharge: LEFT BEFORE DISCHARGE | End: 2020-08-19
Attending: EMERGENCY MEDICINE

## 2020-08-19 VITALS
RESPIRATION RATE: 16 BRPM | WEIGHT: 160 LBS | OXYGEN SATURATION: 97 % | HEART RATE: 89 BPM | DIASTOLIC BLOOD PRESSURE: 81 MMHG | BODY MASS INDEX: 22.96 KG/M2 | SYSTOLIC BLOOD PRESSURE: 127 MMHG | TEMPERATURE: 97.5 F

## 2020-08-19 NOTE — ED NOTES
Left ambulatory in stable condition prior to completion of treatment. Pt denied suicidal thoughts and left by himself. Pt was calm and did not appear at all aggressive.

## 2020-08-19 NOTE — ED TRIAGE NOTES
Pt arrived from home for suicidal thoughts and hearing voices. Pt states he would like his medication adjusted. Pt has a history of schizophrenia. Pt hears his neighbors voices through the Internet. Pt states he has no exact plan of self harm but feels depressed.

## 2020-08-25 ENCOUNTER — HOSPITAL ENCOUNTER (EMERGENCY)
Age: 25
Discharge: LEFT BEFORE DISCHARGE | End: 2020-08-25
Payer: MEDICAID

## 2020-08-25 PROCEDURE — 75810000275 HC EMERGENCY DEPT VISIT NO LEVEL OF CARE

## 2020-09-08 ENCOUNTER — HOSPITAL ENCOUNTER (OUTPATIENT)
Dept: INFUSION THERAPY | Age: 25
Discharge: HOME | End: 2020-09-08
Payer: MEDICAID

## 2020-09-08 VITALS
OXYGEN SATURATION: 100 % | HEART RATE: 82 BPM | TEMPERATURE: 98.1 F | RESPIRATION RATE: 16 BRPM | DIASTOLIC BLOOD PRESSURE: 77 MMHG | SYSTOLIC BLOOD PRESSURE: 118 MMHG

## 2020-09-08 DIAGNOSIS — F20.9 SCHIZOPHRENIA, UNSPECIFIED TYPE (HCC): Primary | ICD-10-CM

## 2020-09-08 PROCEDURE — 96372 THER/PROPH/DIAG INJ SC/IM: CPT

## 2020-09-08 RX ORDER — ARIPIPRAZOLE 400 MG
400 KIT INTRAMUSCULAR ONCE
Status: CANCELLED | OUTPATIENT
Start: 2020-10-06 | End: 2020-10-06

## 2020-09-08 RX ORDER — ARIPIPRAZOLE 400 MG
400 KIT INTRAMUSCULAR ONCE
Status: DISPENSED | OUTPATIENT
Start: 2020-09-08 | End: 2020-09-08

## 2020-09-08 NOTE — PROGRESS NOTES
[facility] Progress Note    Date: 2020    Name: Mertha Moritz    MRN: [id number]         : 1995      Mr. Benedict Logan arrived to [facility] at [address] am ambulatory with his . Mr. Benedict Logan was assessed and education was provided. Mr. Teresita Hill vitals were reviewed. Visit Vitals  /77 (BP 1 Location: Right arm, BP Patient Position: Sitting)   Pulse 82   Temp 98.1 °F (36.7 °C)   Resp 16   SpO2 100%       Abilify 400 mg  was administered as ordered IM  in patient's Left deltoid. Band-aid applied to patient site. Mr. Benedict Logan tolerated well without complaints. Patient's armband removed and shredded. Mr. Benedict Logan was discharged from [place] [address] in stable condition at 7696. He is to return on 10/06/20 at 0900 for his next Abilify appointment.       Connie Ch  2020

## 2020-10-06 ENCOUNTER — HOSPITAL ENCOUNTER (OUTPATIENT)
Dept: INFUSION THERAPY | Age: 25
End: 2020-10-06
Payer: MEDICAID

## 2020-10-08 ENCOUNTER — HOSPITAL ENCOUNTER (OUTPATIENT)
Dept: INFUSION THERAPY | Age: 25
Discharge: HOME | End: 2020-10-08
Payer: MEDICAID

## 2020-10-08 VITALS
SYSTOLIC BLOOD PRESSURE: 119 MMHG | DIASTOLIC BLOOD PRESSURE: 71 MMHG | RESPIRATION RATE: 16 BRPM | OXYGEN SATURATION: 98 % | TEMPERATURE: 98.4 F | HEART RATE: 95 BPM

## 2020-10-08 DIAGNOSIS — F20.9 SCHIZOPHRENIA, UNSPECIFIED TYPE (HCC): Primary | ICD-10-CM

## 2020-10-08 PROCEDURE — 96372 THER/PROPH/DIAG INJ SC/IM: CPT

## 2020-10-08 PROCEDURE — 74011250636 HC RX REV CODE- 250/636: Performed by: PSYCHIATRY & NEUROLOGY

## 2020-10-08 RX ORDER — ARIPIPRAZOLE 400 MG
400 KIT INTRAMUSCULAR ONCE
Status: CANCELLED | OUTPATIENT
Start: 2020-11-03 | End: 2020-11-03

## 2020-10-08 RX ORDER — ARIPIPRAZOLE 400 MG
400 KIT INTRAMUSCULAR ONCE
Status: COMPLETED | OUTPATIENT
Start: 2020-10-08 | End: 2020-10-08

## 2020-10-08 RX ADMIN — ARIPIPRAZOLE 400 MG: KIT at 10:46

## 2020-10-08 NOTE — PROGRESS NOTES
[facility] Progress Note    Date: 2020    Name: Jennifer Workman    MRN: [id number]         : 1995      Mr. Taylor La arrived to [facility] at [phone] am ambulatory with his . Mr. Taylor La was assessed and education was provided. Mr. Rene Ortega vitals were reviewed. Visit Vitals  /71 (BP 1 Location: Left arm, BP Patient Position: Sitting)   Pulse 95   Temp 98.4 °F (36.9 °C)   Resp 16   SpO2 98%       Abilify 400 mg  was administered as ordered IM  in patient's Left deltoid. Band-aid applied to patient site. Mr. Taylor La tolerated well without complaints. Mr. Taylor La was discharged from [place] [address] in stable condition at 1050. He is to return on 20 at 0900 for his next appointment.     Tamie Mcgregor RN  2020

## 2020-11-03 ENCOUNTER — HOSPITAL ENCOUNTER (OUTPATIENT)
Dept: INFUSION THERAPY | Age: 25
Discharge: HOME | End: 2020-11-03
Payer: MEDICAID

## 2020-11-03 VITALS
SYSTOLIC BLOOD PRESSURE: 119 MMHG | TEMPERATURE: 97.5 F | HEART RATE: 90 BPM | RESPIRATION RATE: 16 BRPM | DIASTOLIC BLOOD PRESSURE: 80 MMHG | OXYGEN SATURATION: 100 %

## 2020-11-03 DIAGNOSIS — F20.9 SCHIZOPHRENIA, UNSPECIFIED TYPE (HCC): Primary | ICD-10-CM

## 2020-11-03 PROCEDURE — 74011250636 HC RX REV CODE- 250/636: Performed by: PSYCHIATRY & NEUROLOGY

## 2020-11-03 PROCEDURE — 96372 THER/PROPH/DIAG INJ SC/IM: CPT

## 2020-11-03 RX ORDER — ARIPIPRAZOLE 400 MG
400 KIT INTRAMUSCULAR ONCE
Status: COMPLETED | OUTPATIENT
Start: 2020-11-03 | End: 2020-11-03

## 2020-11-03 RX ORDER — ARIPIPRAZOLE 400 MG
400 KIT INTRAMUSCULAR ONCE
Status: CANCELLED | OUTPATIENT
Start: 2020-11-03

## 2020-11-03 RX ADMIN — ARIPIPRAZOLE 400 MG: KIT at 09:20

## 2020-11-03 NOTE — PROGRESS NOTES
[facility] Progress Note    Date: November 3, 2020    Name: Meek Mckeon    MRN: [id number]         : 1995    Abilify Injection. Arrived ambulatory to [facility] at 0915, accompanied by family member. No complaints or concerns voiced      Mr. Jael Ng was assessed and education was provided. Mr. Sola Jean vitals were reviewed and patient was observed for 5 minutes prior to treatment. Visit Vitals  /80 (BP 1 Location: Left arm, BP Patient Position: Sitting)   Pulse 90   Temp 97.5 °F (36.4 °C)   Resp 16   SpO2 100%       Ability 400 mg was administered IM in the left deltoid. No bleeding or redness noted. Band-aid applied  to the site. Mr. Jael Ng tolerated well, and had no complaints. Patient armband removed and shredded. Mr. Jael Ng was discharged from [place] [address] in stable condition at 2852. He is to return on 2020 at 0900 for his next appointment.     Lisa Osborn RN  November 3, 2020

## 2020-12-01 ENCOUNTER — HOSPITAL ENCOUNTER (OUTPATIENT)
Dept: INFUSION THERAPY | Age: 25
Discharge: HOME | End: 2020-12-01
Payer: MEDICAID

## 2020-12-01 VITALS
RESPIRATION RATE: 16 BRPM | OXYGEN SATURATION: 100 % | SYSTOLIC BLOOD PRESSURE: 104 MMHG | HEART RATE: 89 BPM | DIASTOLIC BLOOD PRESSURE: 59 MMHG | TEMPERATURE: 98 F

## 2020-12-01 DIAGNOSIS — F20.9 SCHIZOPHRENIA, UNSPECIFIED TYPE (HCC): Primary | ICD-10-CM

## 2020-12-01 PROCEDURE — 74011250636 HC RX REV CODE- 250/636: Performed by: PSYCHIATRY & NEUROLOGY

## 2020-12-01 PROCEDURE — 96372 THER/PROPH/DIAG INJ SC/IM: CPT

## 2020-12-01 RX ORDER — ARIPIPRAZOLE 400 MG
400 KIT INTRAMUSCULAR ONCE
Status: CANCELLED | OUTPATIENT
Start: 2020-12-29 | End: 2020-12-29

## 2020-12-01 RX ORDER — ARIPIPRAZOLE 400 MG
400 KIT INTRAMUSCULAR ONCE
Status: COMPLETED | OUTPATIENT
Start: 2020-12-01 | End: 2020-12-01

## 2020-12-01 RX ADMIN — ARIPIPRAZOLE 400 MG: KIT at 09:21

## 2020-12-01 NOTE — PROGRESS NOTES
[facility] Progress Note    Date: 2020    Name: Meek Mckeon    MRN: [id number]         : 1995    Abilify Injection. Arrived ambulatory to [facility] at 0915. No complaints or concerns voiced      Mr. Jael Ng was assessed and education was provided. Mr. Sola Jean vitals were reviewed and patient was observed for 5 minutes prior to treatment. Visit Vitals  BP (!) 104/59 (BP 1 Location: Left arm, BP Patient Position: Sitting)   Pulse 89   Temp 98 °F (36.7 °C)   Resp 16   SpO2 100%       Ability 400 mg was administered IM in the left deltoid. No bleeding or redness noted. Band-aid applied  to the site. Mr. Jael Ng tolerated well, and had no complaints. Patient armband removed and shredded. Mr. Jael Ng was discharged from [place] [address] in stable condition at 8991. He is to return on 2020 at 0900 for his next appointment.     Arpan Chavarria RN  2020

## 2020-12-23 ENCOUNTER — HOSPITAL ENCOUNTER (EMERGENCY)
Age: 25
Discharge: HOME | End: 2020-12-23
Attending: EMERGENCY MEDICINE
Payer: MEDICAID

## 2020-12-23 VITALS
OXYGEN SATURATION: 98 % | DIASTOLIC BLOOD PRESSURE: 75 MMHG | TEMPERATURE: 98.1 F | RESPIRATION RATE: 24 BRPM | SYSTOLIC BLOOD PRESSURE: 120 MMHG | BODY MASS INDEX: 21.52 KG/M2 | HEART RATE: 97 BPM | WEIGHT: 150 LBS

## 2020-12-23 DIAGNOSIS — F20.9 SCHIZOPHRENIA, UNSPECIFIED TYPE (HCC): Primary | ICD-10-CM

## 2020-12-23 PROCEDURE — 99282 EMERGENCY DEPT VISIT SF MDM: CPT

## 2020-12-23 NOTE — ED PROVIDER NOTES
EMERGENCY DEPARTMENT HISTORY AND PHYSICAL EXAM      Date: 2020  Patient Name: Cristine Youngblood    History of Presenting Illness     Chief Complaint   Patient presents with    Mental Health Problem       History (Context): Cristine Youngblood is a 23 y.o. gentleman with active psychiatric conditions as noted in the past medical history who presents with 2 years of auditory hallucinations that are chronic without exacerbating/relieving features or other associated symptoms. The patient is undergoing psychiatric care. The patient has not had recent changes to medications. The patient has been compliant with psychiatric medications. On review of systems, the patient denies fever, chills, headache, anhedonia, current drug use, homicidal ideation, suicidal ideation. PCP: Krista Hernandez NP    Current Outpatient Medications   Medication Sig Dispense Refill    ARIPiprazole (ABILIFY MAINTENA) 400 mg injection 400 mg by IntraMUSCular route every four (4) weeks. 400 mg 0    divalproex ER (DEPAKOTE ER) 500 mg ER tablet Take 1 Tab by mouth two (2) times a day. 30 Tab 1    traZODone (DESYREL) 50 mg tablet Take 1 Tab by mouth nightly as needed for Sleep. 15 Tab 1       Past History     Past Medical History:  Past Medical History:   Diagnosis Date    ADHD (attention deficit hyperactivity disorder)     Anxiety disorder     Depression     [facility]     Psychiatric disorder     Psychotic disorder ([facility]     Schizophrenia ([facility]     Suicidal ideation     Trauma     \"His mother  this past November\"       Past Surgical History:  History reviewed. No pertinent surgical history.     Family History:  Family History   Problem Relation Age of Onset   Bee Good Schizophrenia Mother     Schizophrenia Brother     Schizophrenia Sister        Social History:  Social History     Tobacco Use    Smoking status: Current Every Day Smoker    Smokeless tobacco: Never Used   Substance Use Topics    Alcohol use: Not Currently    Drug use: Not Currently     Types: Marijuana       Allergies:  No Known Allergies    PMH, PSH, family history, social history, allergies reviewed with the patient with significant items noted above. Review of Systems   As per HPI, otherwise reviewed and negative. Physical Exam     Vitals:    12/23/20 0347   BP: 120/75   Pulse: 97   Resp: 24   Temp: 98.1 °F (36.7 °C)   SpO2: 98%   Weight: 68 kg (150 lb)       Gen: Well-appearing in no acute distress  HEENT: Normocephalic, sclera anicteric  Cardiovascular: Normal rate, regular rhythm, no murmurs, rubs, gallops. Pulses intact and equal distally. Pulmonary: No respiratory distress. No stridor. Clear lungs. ABD: Soft, nontender, nondistended. Neuro: Alert. Normal speech. Normal mentation. Psych: Dress: Normal. Behavior: Normal. Thought Process: Normal and linear. Thought content: Related to symptoms. Mood: Fine. Affect congruent with mood. : No CVA tenderness  EXT: Moves all extremities well. No cyanosis or clubbing. Skin: Warm and well-perfused. Diagnostic Study Results     Labs -   No results found for this or any previous visit (from the past 12 hour(s)). Radiologic Studies -   No orders to display     CT Results  (Last 48 hours)    None        CXR Results  (Last 48 hours)    None            Medical Decision Making   I am the first provider for this patient. I reviewed the vital signs, available nursing notes, past medical history, past surgical history, family history and social history. Vital Signs-Reviewed the patient's vital signs. Records Reviewed: Personally, on initial evaluation    MDM:   Patient presents with chronic hallucinations. Patient is not on a robust antipsychotic regimen. Patient has good follow-up with his psychiatrist, and will follow up very soon. Patient is not decompensated or disorganized at this time.   Patient will be discharged home      Follow-up Information     Follow up With Specialties Details Why [facility] EMERGENCY DEPT Emergency Medicine  As needed, If symptoms worsen [address] [facility]  [phone]    Your psychiatrist  Call today            Discharge Medication List as of 12/23/2020  4:38 AM            Diagnosis     Clinical Impression:   1. Schizophrenia, unspecified type ([facility]        Signed,  Denise Bethea MD  Emergency Physician  [facility]    As a voice dictation software was utilized to dictate this note, minor word transpositions can occur. I apologize for confusing wording and typographic errors. Please feel free to contact me for clarification.

## 2020-12-23 NOTE — ED TRIAGE NOTES
Auditory hallucinations x 2 years \"every day\". Per pt his mental health provider is aware and keeps telling him to keep taking his medications but is here to get a different medication.   Denies suicidal/homicidal ideation

## 2020-12-29 ENCOUNTER — HOSPITAL ENCOUNTER (OUTPATIENT)
Dept: INFUSION THERAPY | Age: 25
Discharge: HOME | End: 2020-12-29
Payer: MEDICAID

## 2020-12-29 ENCOUNTER — HOSPITAL ENCOUNTER (OUTPATIENT)
Dept: INFUSION THERAPY | Age: 25
End: 2020-12-29
Payer: MEDICAID

## 2020-12-29 VITALS
OXYGEN SATURATION: 100 % | RESPIRATION RATE: 16 BRPM | SYSTOLIC BLOOD PRESSURE: 122 MMHG | HEART RATE: 99 BPM | TEMPERATURE: 98.1 F | DIASTOLIC BLOOD PRESSURE: 78 MMHG

## 2020-12-29 DIAGNOSIS — F20.9 SCHIZOPHRENIA, UNSPECIFIED TYPE (HCC): Primary | ICD-10-CM

## 2020-12-29 PROCEDURE — 74011250636 HC RX REV CODE- 250/636: Performed by: PSYCHIATRY & NEUROLOGY

## 2020-12-29 PROCEDURE — 96372 THER/PROPH/DIAG INJ SC/IM: CPT

## 2020-12-29 RX ORDER — ARIPIPRAZOLE 400 MG
400 KIT INTRAMUSCULAR ONCE
Status: COMPLETED | OUTPATIENT
Start: 2020-12-29 | End: 2020-12-29

## 2020-12-29 RX ORDER — ARIPIPRAZOLE 400 MG
400 KIT INTRAMUSCULAR ONCE
Status: CANCELLED | OUTPATIENT
Start: 2021-01-26 | End: 2021-01-26

## 2020-12-29 RX ADMIN — ARIPIPRAZOLE 400 MG: KIT at 14:24

## 2020-12-29 NOTE — PROGRESS NOTES
[facility] Progress Note    Date: 2020    Name: Alicia De Souza    MRN: [id number]         : 1995    Abilify Injection. Ar[facility] amb[facility] to [facility] at 1410. No complaints or concerns voiced      Mr. Deric Melton was assessed and education was provided. Mr. Bryan Jaquez vitals were reviewed and patient was observed for 5 minutes prior to treatment. Visit Vitals  /78 (BP 1 Location: Left arm, BP Patient Position: At rest;Sitting)   Pulse 99   Temp 98.1 °F (36.7 °C)   Resp 16   SpO2 100%       Ability 400 mg was administered IM in the left deltoid. No bleeding or redness noted. Band-aid applied  to the site. Mr. Deric Melton tolerated well, and had no complaints. Patient armband removed and shredded. Mr. Deric Melton was discharged from [place] [address] in stable condition at 1430 He is to return on 2021 at 0900 for his next appointment.     Mirtha Nunes RN  2020

## 2021-01-26 ENCOUNTER — APPOINTMENT (OUTPATIENT)
Dept: INFUSION THERAPY | Age: 26
End: 2021-01-26

## 2021-01-26 ENCOUNTER — HOSPITAL ENCOUNTER (OUTPATIENT)
Dept: INFUSION THERAPY | Age: 26
Discharge: HOME | End: 2021-01-26
Payer: MEDICAID

## 2021-01-26 VITALS
RESPIRATION RATE: 16 BRPM | SYSTOLIC BLOOD PRESSURE: 120 MMHG | DIASTOLIC BLOOD PRESSURE: 77 MMHG | OXYGEN SATURATION: 99 % | TEMPERATURE: 98.2 F | HEART RATE: 88 BPM

## 2021-01-26 DIAGNOSIS — F20.9 SCHIZOPHRENIA, UNSPECIFIED TYPE (HCC): Primary | ICD-10-CM

## 2021-01-26 PROCEDURE — 74011250636 HC RX REV CODE- 250/636: Performed by: PSYCHIATRY & NEUROLOGY

## 2021-01-26 PROCEDURE — 96372 THER/PROPH/DIAG INJ SC/IM: CPT

## 2021-01-26 RX ORDER — ARIPIPRAZOLE 400 MG
400 KIT INTRAMUSCULAR ONCE
Status: CANCELLED | OUTPATIENT
Start: 2021-02-23 | End: 2021-02-23

## 2021-01-26 RX ORDER — ARIPIPRAZOLE 400 MG
400 KIT INTRAMUSCULAR ONCE
Status: COMPLETED | OUTPATIENT
Start: 2021-01-26 | End: 2021-01-26

## 2021-01-26 RX ADMIN — ARIPIPRAZOLE 400 MG: KIT at 09:12

## 2021-01-26 NOTE — PROGRESS NOTES
[facility] Progress Note    Date: 2021    Name: Laila Perez    MRN: [id number]         : 1995    Abilify Injection. No complaints or concerns voiced      Mr. Rah Cooper was assessed and education was provided. Mr. Flower Bracket vitals were reviewed and patient was observed for 5 minutes prior to treatment. Visit Vitals  /77 (BP 1 Location: Left arm, BP Patient Position: Sitting)   Pulse 88   Temp 98.2 °F (36.8 °C)   Resp 16   SpO2 99%       Ability 400 mg was administered IM in the left deltoid. No bleeding or redness noted. Band-aid applied  to the site. Mr. Rah Cooper tolerated well, and had no complaints. Patient armband removed and shredded. Mr. Rah Cooper was discharged from [place] [address] in stable condition at 0920 He is to return on 2021 at 0900 for his next appointment.     Xena Silverio RN  2021

## 2021-02-23 ENCOUNTER — HOSPITAL ENCOUNTER (OUTPATIENT)
Dept: INFUSION THERAPY | Age: 26
Discharge: HOME | End: 2021-02-23
Payer: MEDICAID

## 2021-02-23 VITALS
TEMPERATURE: 98.1 F | SYSTOLIC BLOOD PRESSURE: 116 MMHG | RESPIRATION RATE: 16 BRPM | DIASTOLIC BLOOD PRESSURE: 76 MMHG | OXYGEN SATURATION: 98 % | HEART RATE: 86 BPM

## 2021-02-23 DIAGNOSIS — F20.9 SCHIZOPHRENIA, UNSPECIFIED TYPE (HCC): Primary | ICD-10-CM

## 2021-02-23 PROCEDURE — 74011250636 HC RX REV CODE- 250/636: Performed by: PSYCHIATRY & NEUROLOGY

## 2021-02-23 PROCEDURE — 96372 THER/PROPH/DIAG INJ SC/IM: CPT

## 2021-02-23 RX ORDER — ARIPIPRAZOLE 400 MG
400 KIT INTRAMUSCULAR ONCE
Status: CANCELLED | OUTPATIENT
Start: 2021-03-23 | End: 2021-03-23

## 2021-02-23 RX ORDER — ARIPIPRAZOLE 400 MG
400 KIT INTRAMUSCULAR ONCE
Status: COMPLETED | OUTPATIENT
Start: 2021-02-23 | End: 2021-02-23

## 2021-02-23 RX ADMIN — ARIPIPRAZOLE 400 MG: KIT at 11:24

## 2021-02-23 NOTE — PROGRESS NOTES
[facility] Progress Note    Date: 2021    Name: Portia Buchanan    MRN: [id number]         : 1995    Abilify Injection. No complaints or concerns voiced      Mr. Yazmin Gonzalez was assessed and education was provided. Mr. Salazar Da Silva vitals were reviewed and patient was observed for 5 minutes prior to treatment. Visit Vitals  /76 (BP 1 Location: Left upper arm, BP Patient Position: At rest;Sitting)   Pulse 86   Temp 98.1 °F (36.7 °C)   Resp 16   SpO2 98%       Ability 400 mg was administered IM in the left deltoid. No bleeding or redness noted. Band-aid applied  to the site. Mr. Yazmin Gonzalez tolerated well, and had no complaints. Patient armband removed and shredded. Mr. Yazmin Gonzalez was discharged from [place] [address] in stable condition at 1130 He is to return on 3/23/2021 at 0900 for his next appointment.     Ninfa Walters RN  2021

## 2021-03-02 ENCOUNTER — HOSPITAL ENCOUNTER (EMERGENCY)
Age: 26
Discharge: LEFT BEFORE DISCHARGE | End: 2021-03-02
Attending: STUDENT IN AN ORGANIZED HEALTH CARE EDUCATION/TRAINING PROGRAM
Payer: MEDICAID

## 2021-03-02 VITALS
SYSTOLIC BLOOD PRESSURE: 123 MMHG | HEART RATE: 81 BPM | TEMPERATURE: 98.1 F | RESPIRATION RATE: 14 BRPM | OXYGEN SATURATION: 100 % | DIASTOLIC BLOOD PRESSURE: 80 MMHG

## 2021-03-02 LAB
ALBUMIN SERPL-MCNC: 3.6 G/DL (ref 3.4–5)
ALBUMIN/GLOB SERPL: 1 {RATIO} (ref 0.8–1.7)
ALP SERPL-CCNC: 77 U/L (ref 45–117)
ALT SERPL-CCNC: 20 U/L (ref 16–61)
AMPHET UR QL SCN: NEGATIVE
ANION GAP SERPL CALC-SCNC: 6 MMOL/L (ref 3–18)
APAP SERPL-MCNC: <2 UG/ML (ref 10–30)
AST SERPL-CCNC: 12 U/L (ref 10–38)
BARBITURATES UR QL SCN: NEGATIVE
BASOPHILS # BLD: 0 K/UL (ref 0–0.1)
BASOPHILS NFR BLD: 0 % (ref 0–2)
BENZODIAZ UR QL: NEGATIVE
BILIRUB SERPL-MCNC: 0.3 MG/DL (ref 0.2–1)
BUN SERPL-MCNC: 15 MG/DL (ref 7–18)
BUN/CREAT SERPL: 18 (ref 12–20)
CALCIUM SERPL-MCNC: 9.1 MG/DL (ref 8.5–10.1)
CANNABINOIDS UR QL SCN: NEGATIVE
CHLORIDE SERPL-SCNC: 108 MMOL/L (ref 100–111)
CK MB CFR SERPL CALC: 0.5 % (ref 0–4)
CK MB SERPL-MCNC: 1.8 NG/ML (ref 5–25)
CK SERPL-CCNC: 347 U/L (ref 39–308)
CO2 SERPL-SCNC: 25 MMOL/L (ref 21–32)
COCAINE UR QL SCN: NEGATIVE
CREAT SERPL-MCNC: 0.84 MG/DL (ref 0.6–1.3)
DIFFERENTIAL METHOD BLD: ABNORMAL
EOSINOPHIL # BLD: 0.2 K/UL (ref 0–0.4)
EOSINOPHIL NFR BLD: 2 % (ref 0–5)
ERYTHROCYTE [DISTWIDTH] IN BLOOD BY AUTOMATED COUNT: 12.7 % (ref 11.6–14.5)
ETHANOL SERPL-MCNC: <3 MG/DL (ref 0–3)
GLOBULIN SER CALC-MCNC: 3.7 G/DL (ref 2–4)
GLUCOSE SERPL-MCNC: 83 MG/DL (ref 74–99)
HCT VFR BLD AUTO: 43 % (ref 36–48)
HDSCOM,HDSCOM: NORMAL
HGB BLD-MCNC: 15.1 G/DL (ref 13–16)
LYMPHOCYTES # BLD: 4.4 K/UL (ref 0.9–3.6)
LYMPHOCYTES NFR BLD: 55 % (ref 21–52)
MCH RBC QN AUTO: 30 PG (ref 24–34)
MCHC RBC AUTO-ENTMCNC: 35.1 G/DL (ref 31–37)
MCV RBC AUTO: 85.5 FL (ref 74–97)
METHADONE UR QL: NEGATIVE
MONOCYTES # BLD: 0.9 K/UL (ref 0.05–1.2)
MONOCYTES NFR BLD: 11 % (ref 3–10)
NEUTS SEG # BLD: 2.6 K/UL (ref 1.8–8)
NEUTS SEG NFR BLD: 32 % (ref 40–73)
OPIATES UR QL: NEGATIVE
PCP UR QL: NEGATIVE
PLATELET # BLD AUTO: 225 K/UL (ref 135–420)
PMV BLD AUTO: 10.9 FL (ref 9.2–11.8)
POTASSIUM SERPL-SCNC: 3.9 MMOL/L (ref 3.5–5.5)
PROT SERPL-MCNC: 7.3 G/DL (ref 6.4–8.2)
RBC # BLD AUTO: 5.03 M/UL (ref 4.7–5.5)
SALICYLATES SERPL-MCNC: 2 MG/DL (ref 2.8–20)
SODIUM SERPL-SCNC: 139 MMOL/L (ref 136–145)
TROPONIN I SERPL-MCNC: <0.02 NG/ML (ref 0–0.04)
WBC # BLD AUTO: 8 K/UL (ref 4.6–13.2)

## 2021-03-02 PROCEDURE — 80307 DRUG TEST PRSMV CHEM ANLYZR: CPT

## 2021-03-02 PROCEDURE — 75810000275 HC EMERGENCY DEPT VISIT NO LEVEL OF CARE

## 2021-03-02 PROCEDURE — 80179 DRUG ASSAY SALICYLATE: CPT

## 2021-03-02 PROCEDURE — 80143 DRUG ASSAY ACETAMINOPHEN: CPT

## 2021-03-02 PROCEDURE — 82077 ASSAY SPEC XCP UR&BREATH IA: CPT

## 2021-03-02 PROCEDURE — 85025 COMPLETE CBC W/AUTO DIFF WBC: CPT

## 2021-03-02 PROCEDURE — 82553 CREATINE MB FRACTION: CPT

## 2021-03-02 PROCEDURE — 80053 COMPREHEN METABOLIC PANEL: CPT

## 2021-03-02 NOTE — ED TRIAGE NOTES
Pt states he wants to change his medications because he does not feel they are working, pt states he has homicidal thoughts and auditory hallucinations, denies SI.

## 2021-03-23 ENCOUNTER — HOSPITAL ENCOUNTER (OUTPATIENT)
Dept: INFUSION THERAPY | Age: 26
Discharge: HOME | End: 2021-03-23
Payer: MEDICAID

## 2021-03-23 VITALS
DIASTOLIC BLOOD PRESSURE: 79 MMHG | OXYGEN SATURATION: 100 % | HEART RATE: 101 BPM | TEMPERATURE: 98.9 F | RESPIRATION RATE: 16 BRPM | SYSTOLIC BLOOD PRESSURE: 123 MMHG

## 2021-03-23 DIAGNOSIS — F20.9 SCHIZOPHRENIA, UNSPECIFIED TYPE (HCC): Primary | ICD-10-CM

## 2021-03-23 PROCEDURE — 96372 THER/PROPH/DIAG INJ SC/IM: CPT

## 2021-03-23 PROCEDURE — 74011250636 HC RX REV CODE- 250/636: Performed by: PSYCHIATRY & NEUROLOGY

## 2021-03-23 RX ORDER — ARIPIPRAZOLE 400 MG
400 KIT INTRAMUSCULAR ONCE
Status: CANCELLED | OUTPATIENT
Start: 2021-04-20 | End: 2021-04-20

## 2021-03-23 RX ORDER — ARIPIPRAZOLE 400 MG
400 KIT INTRAMUSCULAR ONCE
Status: COMPLETED | OUTPATIENT
Start: 2021-03-23 | End: 2021-03-23

## 2021-03-23 RX ADMIN — ARIPIPRAZOLE 400 MG: KIT at 11:35

## 2021-03-23 NOTE — PROGRESS NOTES
[facility] Progress Note    Date: 2021    Name: Laila Perez    MRN: [id number]         : 1995      Mr. Rah Cooper arrived in the [facility] today at [phone], in stable condition, here for Q 28 Day (Q 4 Week), IM Abilify Injection. He was assessed and education was provided. Mr. Flower Bracket vitals were reviewed. Visit Vitals  /79 (BP 1 Location: Left upper arm, BP Patient Position: Sitting)   Pulse (!) 101   Temp 98.9 °F (37.2 °C)   Resp 16   SpO2 100%           ABILIFY MAINTENA (Aripiprazole) 400 mg, was given IM, in his left deltoid at 1135, per order, and without incident.   (would only allow me to administer the injection in his left deltoid)           Mr. Rah Cooper tolerated well, and had no complaints. Mr. Rah Cooper was discharged from [place] [address] in stable condition at 1140. Collette Hung He is to return in 4 weeks, on Tuesday, 21, at 1130, for his next appointment, for his next Abilify Injection.      Renetta Muhammad RN  2021  11:30 AM

## 2021-04-20 ENCOUNTER — HOSPITAL ENCOUNTER (OUTPATIENT)
Dept: INFUSION THERAPY | Age: 26
Discharge: HOME | End: 2021-04-20
Payer: MEDICAID

## 2021-04-20 VITALS
OXYGEN SATURATION: 98 % | TEMPERATURE: 98 F | HEART RATE: 80 BPM | DIASTOLIC BLOOD PRESSURE: 78 MMHG | SYSTOLIC BLOOD PRESSURE: 118 MMHG | RESPIRATION RATE: 18 BRPM

## 2021-04-20 DIAGNOSIS — F20.9 SCHIZOPHRENIA, UNSPECIFIED TYPE (HCC): Primary | ICD-10-CM

## 2021-04-20 PROCEDURE — 74011250636 HC RX REV CODE- 250/636: Performed by: PSYCHIATRY & NEUROLOGY

## 2021-04-20 PROCEDURE — 96372 THER/PROPH/DIAG INJ SC/IM: CPT

## 2021-04-20 RX ORDER — ARIPIPRAZOLE 400 MG
400 KIT INTRAMUSCULAR ONCE
Status: COMPLETED | OUTPATIENT
Start: 2021-04-20 | End: 2021-04-20

## 2021-04-20 RX ORDER — ARIPIPRAZOLE 400 MG
400 KIT INTRAMUSCULAR ONCE
Status: CANCELLED | OUTPATIENT
Start: 2021-05-18 | End: 2021-05-18

## 2021-04-20 RX ADMIN — ARIPIPRAZOLE 400 MG: KIT at 11:30

## 2021-04-20 NOTE — PROGRESS NOTES
[facility] Progress Note    Date: 2021    Name: Maria De Jesus Sims    MRN: [id number]         : 1995    Abilify Injection. Mr. Christal Stone was assessed and education was provided. Mr. Keri Olvera vitals were reviewed and patient was observed for 5 minutes prior to treatment. Visit Vitals  /78   Pulse 80   Temp 98 °F (36.7 °C)   Resp 18   SpO2 98%       Ability 400 mg was administered IM in the left deltoid. No bleeding or redness noted. Band-aid applied  to the site. Mr. Chrisatl Stone tolerated well, and had no complaints. Patient armband removed and shredded. Mr. Christal Stone was discharged from [place] [address] in stable condition at 1145. He is to return on 2021 at 36 for his next appointment.     Hung Diaz RN  2021

## 2021-05-08 ENCOUNTER — HOSPITAL ENCOUNTER (EMERGENCY)
Age: 26
Discharge: HOME | End: 2021-05-08
Attending: EMERGENCY MEDICINE
Payer: MEDICAID

## 2021-05-08 VITALS
WEIGHT: 151 LBS | SYSTOLIC BLOOD PRESSURE: 120 MMHG | DIASTOLIC BLOOD PRESSURE: 77 MMHG | TEMPERATURE: 98.5 F | HEART RATE: 89 BPM | RESPIRATION RATE: 12 BRPM | HEIGHT: 70 IN | BODY MASS INDEX: 21.62 KG/M2 | OXYGEN SATURATION: 100 %

## 2021-05-08 DIAGNOSIS — S01.81XA LACERATION OF FOREHEAD, INITIAL ENCOUNTER: Primary | ICD-10-CM

## 2021-05-08 PROCEDURE — 90715 TDAP VACCINE 7 YRS/> IM: CPT | Performed by: EMERGENCY MEDICINE

## 2021-05-08 PROCEDURE — 74011250636 HC RX REV CODE- 250/636: Performed by: EMERGENCY MEDICINE

## 2021-05-08 PROCEDURE — 90471 IMMUNIZATION ADMIN: CPT

## 2021-05-08 PROCEDURE — 75810000293 HC SIMP/SUPERF WND  RPR

## 2021-05-08 PROCEDURE — 99283 EMERGENCY DEPT VISIT LOW MDM: CPT

## 2021-05-08 PROCEDURE — 74011000250 HC RX REV CODE- 250: Performed by: EMERGENCY MEDICINE

## 2021-05-08 RX ORDER — BACITRACIN ZINC 500 UNIT/G
OINTMENT (GRAM) TOPICAL
Status: COMPLETED | OUTPATIENT
Start: 2021-05-08 | End: 2021-05-08

## 2021-05-08 RX ADMIN — Medication: at 08:36

## 2021-05-08 RX ADMIN — TETANUS TOXOID, REDUCED DIPHTHERIA TOXOID AND ACELLULAR PERTUSSIS VACCINE, ADSORBED 0.5 ML: 5; 2.5; 8; 8; 2.5 SUSPENSION INTRAMUSCULAR at 09:07

## 2021-05-08 NOTE — ED PROVIDER NOTES
EMERGENCY DEPARTMENT HISTORY AND PHYSICAL EXAM    Date: 2021  Patient Name: Sigifredo Stokes    History of Presenting Illness     Chief Complaint   Patient presents with    Laceration         History Provided By: Patient    Additional History (Context): Sigifredo Stokes is a 27 y.o. male with ADHD, schizophrenia who presents with forehead laceration. 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 He left forward but then hit his head on the metal gate of the grocery cart parking space. Needs a tetanus. Denies LOC vomiting. He is now reporting incident to his job. PCP: Yash Calle NP    Current Facility-Administered Medications   Medication Dose Route Frequency Provider Last Rate Last Admin    diph,Jose Luis(AC),Tet Vac-PF (BOOSTRIX) suspension 0.5 mL  0.5 mL IntraMUSCular PRIOR TO DISCHARGE Sunshine Maria PA        bacitracin zinc (BACITRACIN) 500 unit/gram ointment   Topical NOW Sunshine Maria PA         Current Outpatient Medications   Medication Sig Dispense Refill    ARIPiprazole (ABILIFY MAINTENA) 400 mg injection 400 mg by IntraMUSCular route every four (4) weeks. 400 mg 0    divalproex ER (DEPAKOTE ER) 500 mg ER tablet Take 1 Tab by mouth two (2) times a day. 30 Tab 1    traZODone (DESYREL) 50 mg tablet Take 1 Tab by mouth nightly as needed for Sleep. 15 Tab 1       Past History     Past Medical History:  Past Medical History:   Diagnosis Date    ADHD (attention deficit hyperactivity disorder)     Anxiety disorder     Depression     Mood disorder (HCC)     Psychiatric disorder     [facility]     Schizophrenia ([facility]     Suicidal ideation     Trauma     \"His mother  this past November\"       Past Surgical History:  History reviewed. No pertinent surgical history.     Family History:  Family History   Problem Relation Age of Onset    Schizophrenia Mother     Schizophrenia Brother     Schizophrenia Sister        Social History:  Social History     Tobacco Use    Smoking status: Current Every Day Smoker    Smokeless tobacco: Never Used   Substance Use Topics    Alcohol use: Not Currently    Drug use: Not Currently     Types: Marijuana       Allergies:  No Known Allergies      Review of Systems   Review of Systems   Gastrointestinal: Negative for nausea and vomiting. Skin: Positive for wound. Neurological: Negative for syncope and headaches. All Other Systems Negative  Physical Exam     Vitals:    05/08/21 0811   BP: 120/77   Pulse: 89   Resp: 12   Temp: 98.5 °F (36.9 °C)   SpO2: 100%   Weight: 68.5 kg (151 lb)   Height: 5' 10\" (1.778 m)     Physical Exam  Vitals signs and nursing note reviewed. Constitutional:       General: He is not in acute distress. Appearance: He is well-developed. He is not ill-appearing, toxic-appearing or diaphoretic. HENT:      Head: Normocephalic. Ears:      Comments: Vertical 2 cm laceration to forehead not actively bleeding linear, edges well approximated. Additional tiny abrasion, 2 mm in length, vertical to philtrum. Neck:      Musculoskeletal: Normal range of motion and neck supple. Thyroid: No thyromegaly. Vascular: No carotid bruit. Trachea: No tracheal deviation. Cardiovascular:      Rate and Rhythm: Normal rate and regular rhythm. Heart sounds: Normal heart sounds. No murmur. No friction rub. No gallop. Pulmonary:      Effort: Pulmonary effort is normal. No respiratory distress. Breath sounds: Normal breath sounds. No stridor. No wheezing or rales. Chest:      Chest wall: No tenderness. Abdominal:      General: There is no distension. Palpations: Abdomen is soft. There is no mass. Tenderness: There is no abdominal tenderness. There is no guarding or rebound. Musculoskeletal: Normal range of motion. Skin:     General: Skin is warm and dry. Coloration: Skin is not pale. Neurological:      Mental Status: He is alert. Psychiatric:         Speech: Speech normal.         Behavior: Behavior normal.         Thought Content: Thought content normal.         Judgment: Judgment normal.                Diagnostic Study Results     Labs -   No results found for this or any previous visit (from the past 12 hour(s)). Radiologic Studies -   No orders to display     CT Results  (Last 48 hours)    None        CXR Results  (Last 48 hours)    None            Medical Decision Making   I am the first provider for this patient. I reviewed the vital signs, available nursing notes, past medical history, past surgical history, family history and social history. Vital Signs-Reviewed the patient's vital signs. Procedures:  Wound Repair    Date/Time: 5/8/2021 8:38 AM  Performed by: Cindy Kimball provider: Ronald Fletcher  Preparation: skin prepped with Betadine  Pre-procedure re-eval: Immediately prior to the procedure, the patient was reevaluated and found suitable for the planned procedure and any planned medications. Time out: Immediately prior to the procedure a time out was called to verify the correct patient, procedure, equipment, staff and marking as appropriate. .  Location details: face    Anesthesia:  Anesthetic total: 3 mL  Foreign bodies: no foreign bodies  Irrigation solution: saline  Irrigation method: syringe  Debridement: none  Skin closure: 5-0 nylon  Number of sutures: 6  Technique: simple and interrupted  Approximation: loose  Dressing: antibiotic ointment  Patient tolerance: patient tolerated the procedure well with no immediate complications  My total time at bedside, performing this procedure was 1-15 minutes. Provider Notes (Medical Decision Making): Laceration repaired tetanus updated.   Instructed on wound care and to return in 1 week for suture removal.    MED RECONCILIATION:  Current Facility-Administered Medications   Medication Dose Route Frequency    diph,Pertuss(AC),Tet Vac-PF (BOOSTRIX) suspension 0.5 mL 0.5 mL IntraMUSCular PRIOR TO DISCHARGE    bacitracin zinc (BACITRACIN) 500 unit/gram ointment   Topical NOW     Current Outpatient Medications   Medication Sig    ARIPiprazole (ABILIFY MAINTENA) 400 mg injection 400 mg by IntraMUSCular route every four (4) weeks.  divalproex ER (DEPAKOTE ER) 500 mg ER tablet Take 1 Tab by mouth two (2) times a day.  traZODone (DESYREL) 50 mg tablet Take 1 Tab by mouth nightly as needed for Sleep. Disposition:  home    DISCHARGE NOTE:   8:38 AM    Pt has been reexamined. Patient has no new complaints, changes, or physical findings. Care plan outlined and precautions discussed. Results of exam were reviewed with the patient. All medications were reviewed with the patient. All of pt's questions and concerns were addressed. Patient was instructed and agrees to follow up with PCP, as well as to return to the ED upon further deterioration. Patient is ready to go home. Follow-up Information     Follow up With Specialties Details Why Contact Info    Pedrito Sigala NP Nurse Practitioner Schedule an appointment as soon as possible for a visit in 1 week For suture removal [address] [facility] EMERGENCY DEPT Emergency Medicine  If symptoms worsen return immediately [address] [facility]  [phone]          Current Discharge Medication List        Attending Physician Addendum: This patient was evaluated separately by the midlevel provider, Bethanie Oppenheim, PA-C. I did not personally evaluate the patient but immediately for consultation if needed. After review of patient's chart, vital signs, results of objective studies and H&P note, I agree with management above. My impression is Forehead Laceration. Sho Javier DO  [facility]  Emergency Physician        Diagnosis     Clinical Impression:   1.  Laceration of forehead, initial encounter

## 2021-05-08 NOTE — ED TRIAGE NOTES
The patient presents for evaluation of a laceration to his forehead. States, \"I was on my way to work. A dog chased me and I ran head on the end part of the gate. \"  Last Td unknown.

## 2021-05-16 ENCOUNTER — HOSPITAL ENCOUNTER (EMERGENCY)
Age: 26
Discharge: HOME | End: 2021-05-16
Attending: EMERGENCY MEDICINE
Payer: MEDICAID

## 2021-05-16 VITALS
HEART RATE: 91 BPM | RESPIRATION RATE: 16 BRPM | TEMPERATURE: 98.2 F | DIASTOLIC BLOOD PRESSURE: 75 MMHG | SYSTOLIC BLOOD PRESSURE: 129 MMHG | OXYGEN SATURATION: 100 % | BODY MASS INDEX: 21.14 KG/M2 | HEIGHT: 71 IN | WEIGHT: 151 LBS

## 2021-05-16 DIAGNOSIS — Z48.02 VISIT FOR SUTURE REMOVAL: Primary | ICD-10-CM

## 2021-05-16 PROCEDURE — 75810000275 HC EMERGENCY DEPT VISIT NO LEVEL OF CARE

## 2021-05-16 NOTE — ED PROVIDER NOTES
EMERGENCY DEPARTMENT HISTORY AND PHYSICAL EXAM    Date: 2021  Patient Name: Norma Louise    History of Presenting Illness     Chief Complaint:   Chief Complaint   Patient presents with    Suture Removal     History Provided By: Patient    Additional History (Context): Norma Louise is a 27 y.o. male presents to ED for suture removal from left eyebrow/forehead, laceration was repaired in this facility on 2021. Reports good wound healing, denies any pain, redness, pus. PCP: John Deluca NP    Current Outpatient Medications   Medication Sig Dispense Refill    ARIPiprazole (ABILIFY MAINTENA) 400 mg injection 400 mg by IntraMUSCular route every four (4) weeks. 400 mg 0    divalproex ER (DEPAKOTE ER) 500 mg ER tablet Take 1 Tab by mouth two (2) times a day. 30 Tab 1    traZODone (DESYREL) 50 mg tablet Take 1 Tab by mouth nightly as needed for Sleep. 15 Tab 1       Past History     Past Medical History:  Past Medical History:   Diagnosis Date    ADHD (attention deficit hyperactivity disorder)     Anxiety disorder     Depression     [facility] disorder ([facility]     Psychiatric disorder     Psychotic disorder ([facility] [place] [address]     Schizophrenia ([facility] [address]     Suicidal ideation     Trauma     \"His mother  this past November\"       Past Surgical History:  History reviewed. No pertinent surgical history. Family History:  Family History   Problem Relation Age of Onset    Schizophrenia Mother    Baldo Quigley Schizophrenia Brother     Schizophrenia Sister        Social History:  Social History     Tobacco Use    Smoking status: Current Every Day Smoker    Smokeless tobacco: Never Used   Substance Use Topics    Alcohol use: Not Currently    Drug use: Not Currently     Types: Marijuana       Allergies:  No Known Allergies      Review of Systems   Review of Systems   Constitutional: Negative. Eyes: Negative for pain. Skin: Positive for wound (Healed forehead laceration). Hematological: Negative for adenopathy. All other systems reviewed and are negative. All Other Systems Negative  Physical Exam     Vitals:    05/16/21 1651   BP: 129/75   Pulse: 91   Resp: 16   Temp: 98.2 °F (36.8 °C)   SpO2: 100%   Weight: 68.5 kg (151 lb)   Height: 5' 11\" (1.803 m)     Physical Exam  Constitutional:       Appearance: Normal appearance. HENT:      Head: Normocephalic. Laceration (As depicted, healing) present. Nose: Nose normal.      Mouth/Throat:      Mouth: Mucous membranes are moist.   Eyes:      Extraocular Movements: Extraocular movements intact. Conjunctiva/sclera: Conjunctivae normal.      Pupils: Pupils are equal, round, and reactive to light. Neck:      Musculoskeletal: Normal range of motion. No neck rigidity. Lymphadenopathy:      Cervical: No cervical adenopathy. Skin:     Comments: Forehead laceration   Neurological:      Mental Status: He is alert. Cranial Nerves: No cranial nerve deficit. Gait: Gait normal.            Diagnostic Study Results     Labs -   No results found for this or any previous visit (from the past 12 hour(s)). Radiologic Studies -   No orders to display     CT Results  (Last 48 hours)    None            Medical Decision Making   I am the first provider for this patient. I reviewed the vital signs, available nursing notes, past medical history, past surgical history, family history and social history. Vital Signs-Reviewed the patient's vital signs.     Records Reviewed: Nursing Notes and Old Medical Records    Procedures:  Suture/Staple Removal    Date/Time: 5/16/2021 5:00 PM  Performed by: GEORGES Justin  Authorized by: GEORGES Justin     Consent:     Consent obtained:  Verbal    Consent given by:  Patient    Risks discussed:  Bleeding, pain and wound separation    Alternatives discussed:  No treatment  Location:     Location:  Head/neck  Procedure details:     Wound appearance:  No signs of infection, good wound healing and clean    Number of sutures removed:  6  Post-procedure details:     Post-removal:  Antibiotic ointment applied and Band-Aid applied    Patient tolerance of procedure: Tolerated well, no immediate complications        Provider Notes (Medical Decision Making):     Patient presents with uncomplicated laceration that requires suture removal, wound is healing well, no signs of infection or dehiscence. Sutures removed without any difficulty. Patient is stable for discharge. MED RECONCILIATION:  No current facility-administered medications for this encounter. Current Outpatient Medications   Medication Sig    ARIPiprazole (ABILIFY MAINTENA) 400 mg injection 400 mg by IntraMUSCular route every four (4) weeks.  divalproex ER (DEPAKOTE ER) 500 mg ER tablet Take 1 Tab by mouth two (2) times a day.  traZODone (DESYREL) 50 mg tablet Take 1 Tab by mouth nightly as needed for Sleep. Disposition:  home    DISCHARGE NOTE:     Pt has been reexamined. Resolved. Patient has no new complaints, changes, or physical findings. Care plan outlined and precautions discussed. All medications were reviewed with the patient; will d/c home. All of pt's questions and concerns were addressed. Patient was instructed and agrees to follow up with PCP, as well as to return to the ED upon further deterioration. Patient is ready to go home. Follow-up Information     Follow up With Specialties Details Why Contact Info    Aaron Blue NP Nurse Practitioner  As needed [address] [address] [address]      [facility] EMERGENCY DEPT Emergency Medicine  As needed [address] [facility] [address]  [phone]          Discharge Medication List as of 5/16/2021  5:04 PM              Diagnosis     Clinical Impression:   1. Visit for suture removal          Dictation disclaimer:  Please note that this dictation was completed with [employer] the [employer] voice recognition software.   Quite often unanticipated grammatical, syntax, homophones, and other interpretive errors are inadvertently transcribed by the computer software. Please disregard these errors. Please excuse any errors that have escaped final proofreading.

## 2021-05-18 ENCOUNTER — HOSPITAL ENCOUNTER (OUTPATIENT)
Dept: INFUSION THERAPY | Age: 26
Discharge: HOME | End: 2021-05-18
Payer: MEDICAID

## 2021-05-18 VITALS
DIASTOLIC BLOOD PRESSURE: 78 MMHG | SYSTOLIC BLOOD PRESSURE: 115 MMHG | HEART RATE: 67 BPM | TEMPERATURE: 97.7 F | RESPIRATION RATE: 18 BRPM | OXYGEN SATURATION: 100 %

## 2021-05-18 DIAGNOSIS — F20.9 SCHIZOPHRENIA, UNSPECIFIED TYPE (HCC): Primary | ICD-10-CM

## 2021-05-18 PROCEDURE — 74011250636 HC RX REV CODE- 250/636: Performed by: PSYCHIATRY & NEUROLOGY

## 2021-05-18 PROCEDURE — 96372 THER/PROPH/DIAG INJ SC/IM: CPT

## 2021-05-18 RX ORDER — ARIPIPRAZOLE 400 MG
400 KIT INTRAMUSCULAR ONCE
Status: COMPLETED | OUTPATIENT
Start: 2021-05-18 | End: 2021-05-18

## 2021-05-18 RX ORDER — ARIPIPRAZOLE 400 MG
400 KIT INTRAMUSCULAR ONCE
Status: CANCELLED | OUTPATIENT
Start: 2021-06-15 | End: 2021-06-15

## 2021-05-18 RX ADMIN — ARIPIPRAZOLE 400 MG: KIT at 13:27

## 2021-05-18 NOTE — PROGRESS NOTES
[facility] Progress Note    Date: May 18, 2021    Name: Theresa Lala    MRN: [id number]         : 1995    Abilify Injection. Mr. Bismark Caal was assessed and education was provided. Mr. Jefe Doe vitals were reviewed and patient was observed for 5 minutes prior to treatment. Visit Vitals  /78 (BP 1 Location: Left upper arm, BP Patient Position: Sitting)   Pulse 67   Temp 97.7 °F (36.5 °C)   Resp 18   SpO2 100%       Ability 400 mg was administered IM in the left deltoid. No bleeding or redness noted. Band-aid applied  to the site. Mr. Bismark Caal tolerated well, and had no complaints. Patient armband removed and shredded. Mr. Bismark Caal was discharged from [place] [address] in stable condition at 1335. He is to return on 6/15/2021 at 1130 for his next appointment.     Soco Solano RN  May 18, 2021

## 2021-06-15 ENCOUNTER — HOSPITAL ENCOUNTER (OUTPATIENT)
Dept: INFUSION THERAPY | Age: 26
Discharge: HOME | End: 2021-06-15
Payer: MEDICAID

## 2021-06-15 VITALS
RESPIRATION RATE: 18 BRPM | DIASTOLIC BLOOD PRESSURE: 64 MMHG | SYSTOLIC BLOOD PRESSURE: 107 MMHG | TEMPERATURE: 97.8 F | OXYGEN SATURATION: 97 % | HEART RATE: 78 BPM

## 2021-06-15 DIAGNOSIS — F20.9 SCHIZOPHRENIA, UNSPECIFIED TYPE (HCC): Primary | ICD-10-CM

## 2021-06-15 PROCEDURE — 96372 THER/PROPH/DIAG INJ SC/IM: CPT

## 2021-06-15 PROCEDURE — 74011250636 HC RX REV CODE- 250/636: Performed by: PSYCHIATRY & NEUROLOGY

## 2021-06-15 RX ORDER — ARIPIPRAZOLE 400 MG
400 KIT INTRAMUSCULAR ONCE
Status: COMPLETED | OUTPATIENT
Start: 2021-06-15 | End: 2021-06-15

## 2021-06-15 RX ORDER — ARIPIPRAZOLE 400 MG
400 KIT INTRAMUSCULAR ONCE
Status: CANCELLED | OUTPATIENT
Start: 2021-07-13 | End: 2021-07-13

## 2021-06-15 RX ADMIN — ARIPIPRAZOLE 400 MG: KIT at 10:43

## 2021-06-15 NOTE — PROGRESS NOTES
[facility] Progress Note    Date: Ashley 15, 2021    Name: Laverne Castro    MRN: [id number]         : 1995    Abilify Injection [id number]       Mr. Faith Rosado was assessed and education was provided. Mr. Mateo Cheng vitals were reviewed and patient was observed for 5 minutes prior to treatment. Visit Vitals  /64 (BP 1 Location: Left upper arm, BP Patient Position: Sitting)   Pulse 78   Temp 97.8 °F (36.6 °C)   Resp 18   SpO2 97%       Ability 400 mg was administered IM in the left deltoid. No bleeding or redness noted. Band-aid applied  to the site. Mr. Faith Rosado tolerated well, and had no complaints. Patient armband removed and shredded. Mr. Faith Rosado was discharged from [address] [address] in stable condition at 1055. He is to return on 2021 at 1100 for his next appointment.     Yovany Barlow RN  Ashley 15, 2021

## 2021-07-13 ENCOUNTER — HOSPITAL ENCOUNTER (OUTPATIENT)
Dept: INFUSION THERAPY | Age: 26
Discharge: HOME | End: 2021-07-13
Payer: MEDICAID

## 2021-07-13 VITALS
OXYGEN SATURATION: 99 % | DIASTOLIC BLOOD PRESSURE: 75 MMHG | RESPIRATION RATE: 18 BRPM | HEART RATE: 103 BPM | TEMPERATURE: 97.9 F | SYSTOLIC BLOOD PRESSURE: 133 MMHG

## 2021-07-13 DIAGNOSIS — F20.9 SCHIZOPHRENIA, UNSPECIFIED TYPE (HCC): Primary | ICD-10-CM

## 2021-07-13 PROCEDURE — 96372 THER/PROPH/DIAG INJ SC/IM: CPT

## 2021-07-13 PROCEDURE — 74011250636 HC RX REV CODE- 250/636: Performed by: PSYCHIATRY & NEUROLOGY

## 2021-07-13 RX ORDER — ARIPIPRAZOLE 400 MG
400 KIT INTRAMUSCULAR ONCE
Status: CANCELLED | OUTPATIENT
Start: 2021-08-11 | End: 2021-08-11

## 2021-07-13 RX ORDER — ARIPIPRAZOLE 400 MG
400 KIT INTRAMUSCULAR ONCE
Status: COMPLETED | OUTPATIENT
Start: 2021-07-13 | End: 2021-07-13

## 2021-07-13 RX ADMIN — ARIPIPRAZOLE 400 MG: KIT at 11:20

## 2021-07-21 ENCOUNTER — HOSPITAL ENCOUNTER (INPATIENT)
Age: 26
LOS: 1 days | Discharge: LEFT BEFORE DISCHARGE | DRG: 817 | End: 2021-07-22
Attending: EMERGENCY MEDICINE | Admitting: INTERNAL MEDICINE
Payer: MEDICAID

## 2021-07-21 DIAGNOSIS — T42.6X2A INTENTIONAL POISONING BY VALPROATE SODIUM (HCC): Primary | ICD-10-CM

## 2021-07-21 LAB
ALBUMIN SERPL-MCNC: 3.6 G/DL (ref 3.4–5)
ALBUMIN/GLOB SERPL: 0.9 {RATIO} (ref 0.8–1.7)
ALP SERPL-CCNC: 95 U/L (ref 45–117)
ALT SERPL-CCNC: 23 U/L (ref 16–61)
AMMONIA PLAS-SCNC: 39 UMOL/L (ref 11–32)
ANION GAP SERPL CALC-SCNC: 10 MMOL/L (ref 3–18)
APAP SERPL-MCNC: <2 UG/ML (ref 10–30)
AST SERPL-CCNC: 9 U/L (ref 10–38)
BASOPHILS # BLD: 0 K/UL (ref 0–0.1)
BASOPHILS NFR BLD: 0 % (ref 0–2)
BILIRUB SERPL-MCNC: 0.3 MG/DL (ref 0.2–1)
BUN SERPL-MCNC: 12 MG/DL (ref 7–18)
BUN/CREAT SERPL: 10 (ref 12–20)
CALCIUM SERPL-MCNC: 8.7 MG/DL (ref 8.5–10.1)
CHLORIDE SERPL-SCNC: 106 MMOL/L (ref 100–111)
CO2 SERPL-SCNC: 29 MMOL/L (ref 21–32)
CREAT SERPL-MCNC: 1.25 MG/DL (ref 0.6–1.3)
DIFFERENTIAL METHOD BLD: NORMAL
EOSINOPHIL # BLD: 0.1 K/UL (ref 0–0.4)
EOSINOPHIL NFR BLD: 1 % (ref 0–5)
ERYTHROCYTE [DISTWIDTH] IN BLOOD BY AUTOMATED COUNT: 12.6 % (ref 11.6–14.5)
ETHANOL SERPL-MCNC: <3 MG/DL (ref 0–3)
GLOBULIN SER CALC-MCNC: 4.1 G/DL (ref 2–4)
GLUCOSE SERPL-MCNC: 95 MG/DL (ref 74–99)
HCT VFR BLD AUTO: 45.5 % (ref 36–48)
HGB BLD-MCNC: 15 G/DL (ref 13–16)
LYMPHOCYTES # BLD: 3.5 K/UL (ref 0.9–3.6)
LYMPHOCYTES NFR BLD: 47 % (ref 21–52)
Lab: 370.2
MCH RBC QN AUTO: 29.4 PG (ref 24–34)
MCHC RBC AUTO-ENTMCNC: 33 G/DL (ref 31–37)
MCV RBC AUTO: 89 FL (ref 74–97)
MONOCYTES # BLD: 0.3 K/UL (ref 0.05–1.2)
MONOCYTES NFR BLD: 5 % (ref 3–10)
NEUTS SEG # BLD: 3.5 K/UL (ref 1.8–8)
NEUTS SEG NFR BLD: 47 % (ref 40–73)
PLATELET # BLD AUTO: 292 K/UL (ref 135–420)
PMV BLD AUTO: 9.6 FL (ref 9.2–11.8)
POTASSIUM SERPL-SCNC: 3.7 MMOL/L (ref 3.5–5.5)
PROT SERPL-MCNC: 7.7 G/DL (ref 6.4–8.2)
RBC # BLD AUTO: 5.11 M/UL (ref 4.35–5.65)
REFERENCE LAB,REFLB: NORMAL
SALICYLATES SERPL-MCNC: <1.7 MG/DL (ref 2.8–20)
SODIUM SERPL-SCNC: 145 MMOL/L (ref 136–145)
TEST DESCRIPTION:,ATST: NORMAL
VALPROATE SERPL-MCNC: 209 UG/ML (ref 50–100)
VALPROATE SERPL-MCNC: >300 UG/ML (ref 50–100)
VALPROATE SERPL-MCNC: >300 UG/ML (ref 50–100)
WBC # BLD AUTO: 7.4 K/UL (ref 4.6–13.2)

## 2021-07-21 PROCEDURE — 99285 EMERGENCY DEPT VISIT HI MDM: CPT

## 2021-07-21 PROCEDURE — 80179 DRUG ASSAY SALICYLATE: CPT

## 2021-07-21 PROCEDURE — 80164 ASSAY DIPROPYLACETIC ACD TOT: CPT

## 2021-07-21 PROCEDURE — 99223 1ST HOSP IP/OBS HIGH 75: CPT | Performed by: INTERNAL MEDICINE

## 2021-07-21 PROCEDURE — 80053 COMPREHEN METABOLIC PANEL: CPT

## 2021-07-21 PROCEDURE — 36415 COLL VENOUS BLD VENIPUNCTURE: CPT

## 2021-07-21 PROCEDURE — APPSS60 APP SPLIT SHARED TIME 46-60 MINUTES: Performed by: NURSE PRACTITIONER

## 2021-07-21 PROCEDURE — 85025 COMPLETE CBC W/AUTO DIFF WBC: CPT

## 2021-07-21 PROCEDURE — 80143 DRUG ASSAY ACETAMINOPHEN: CPT

## 2021-07-21 PROCEDURE — 74011250636 HC RX REV CODE- 250/636: Performed by: EMERGENCY MEDICINE

## 2021-07-21 PROCEDURE — 93005 ELECTROCARDIOGRAM TRACING: CPT

## 2021-07-21 PROCEDURE — 96361 HYDRATE IV INFUSION ADD-ON: CPT

## 2021-07-21 PROCEDURE — 96374 THER/PROPH/DIAG INJ IV PUSH: CPT

## 2021-07-21 PROCEDURE — 82140 ASSAY OF AMMONIA: CPT

## 2021-07-21 PROCEDURE — 82077 ASSAY SPEC XCP UR&BREATH IA: CPT

## 2021-07-21 PROCEDURE — 65660000004 HC RM CVT STEPDOWN

## 2021-07-21 RX ORDER — ACETAMINOPHEN 650 MG/1
650 SUPPOSITORY RECTAL
Status: DISCONTINUED | OUTPATIENT
Start: 2021-07-21 | End: 2021-07-22 | Stop reason: HOSPADM

## 2021-07-21 RX ORDER — ONDANSETRON 2 MG/ML
4 INJECTION INTRAMUSCULAR; INTRAVENOUS
Status: DISCONTINUED | OUTPATIENT
Start: 2021-07-21 | End: 2021-07-22 | Stop reason: HOSPADM

## 2021-07-21 RX ORDER — SODIUM CHLORIDE 0.9 % (FLUSH) 0.9 %
5-40 SYRINGE (ML) INJECTION AS NEEDED
Status: DISCONTINUED | OUTPATIENT
Start: 2021-07-21 | End: 2021-07-22 | Stop reason: HOSPADM

## 2021-07-21 RX ORDER — SODIUM CHLORIDE 0.9 % (FLUSH) 0.9 %
5-40 SYRINGE (ML) INJECTION EVERY 8 HOURS
Status: DISCONTINUED | OUTPATIENT
Start: 2021-07-21 | End: 2021-07-22 | Stop reason: HOSPADM

## 2021-07-21 RX ORDER — ONDANSETRON 4 MG/1
4 TABLET, ORALLY DISINTEGRATING ORAL
Status: DISCONTINUED | OUTPATIENT
Start: 2021-07-21 | End: 2021-07-22 | Stop reason: HOSPADM

## 2021-07-21 RX ORDER — ONDANSETRON 2 MG/ML
4 INJECTION INTRAMUSCULAR; INTRAVENOUS
Status: COMPLETED | OUTPATIENT
Start: 2021-07-21 | End: 2021-07-21

## 2021-07-21 RX ORDER — ACETAMINOPHEN 325 MG/1
650 TABLET ORAL
Status: DISCONTINUED | OUTPATIENT
Start: 2021-07-21 | End: 2021-07-22 | Stop reason: HOSPADM

## 2021-07-21 RX ORDER — POLYETHYLENE GLYCOL 3350 17 G/17G
17 POWDER, FOR SOLUTION ORAL DAILY PRN
Status: DISCONTINUED | OUTPATIENT
Start: 2021-07-21 | End: 2021-07-22 | Stop reason: HOSPADM

## 2021-07-21 RX ORDER — SODIUM CHLORIDE 9 MG/ML
100 INJECTION, SOLUTION INTRAVENOUS CONTINUOUS
Status: DISPENSED | OUTPATIENT
Start: 2021-07-21 | End: 2021-07-21

## 2021-07-21 RX ORDER — ENOXAPARIN SODIUM 100 MG/ML
40 INJECTION SUBCUTANEOUS DAILY
Status: DISCONTINUED | OUTPATIENT
Start: 2021-07-22 | End: 2021-07-22 | Stop reason: HOSPADM

## 2021-07-21 RX ADMIN — SODIUM CHLORIDE 100 ML/HR: 900 INJECTION, SOLUTION INTRAVENOUS at 16:27

## 2021-07-21 RX ADMIN — Medication 10 ML: at 17:39

## 2021-07-21 RX ADMIN — ONDANSETRON 4 MG: 2 INJECTION INTRAMUSCULAR; INTRAVENOUS at 07:09

## 2021-07-21 RX ADMIN — SODIUM CHLORIDE 1000 ML: 900 INJECTION, SOLUTION INTRAVENOUS at 07:10

## 2021-07-21 NOTE — ROUTINE PROCESS
TRANSFER - OUT REPORT:    Verbal report given to Yair Maria (name) on [place]  being transferred to CVT step down rm 2306(unit) for routine progression of care       Report consisted of patients Situation, Background, Assessment and   Recommendations(SBAR). Information from the following report(s) SBAR, K[place], [facility] and Recent Results was reviewed with the receiving nurse. Lines:   Peripheral IV 07/21/21 Left Antecubital (Active)   Site Assessment Clean, dry, & intact 07/21/21 0709   Phlebitis Assessment 0 07/21/21 0709   Infiltration Assessment 0 07/21/21 0709   Dressing Status Clean, dry, & intact 07/21/21 0709   Dressing Type Transparent 07/21/21 0709   Hub Color/Line Status Pink;Patent; Flushed 07/21/21 0709   Action Taken Blood drawn 07/21/21 0709       Peripheral IV 96/53/63 Left Basilic (Active)   Site Assessment Clean, dry, & intact 07/21/21 0744   Phlebitis Assessment 0 07/21/21 0744   Infiltration Assessment 0 07/21/21 0744   Dressing Status Clean, dry, & intact 07/21/21 0744   Dressing Type Transparent 07/21/21 0744   Hub Color/Line Status Green;Patent; Flushed 07/21/21 0744   Action Taken Blood drawn 07/21/21 0744        Opportunity for questions and clarification was provided.       Patient transported with:   Monitor  Registered Nurse

## 2021-07-21 NOTE — H&P
The patient was seen and examined independently. Please read my note for additional detail. The plan was discussed with APC.      HPI:  Patient is more awake currently. He admits to taking 30 tablets of Depakote last night as he was very depressed. Denies any chest pain or abdominal pain currently. Had some headaches earlier today he walked in the room earlier today. He just ate without any problem. No nausea or vomiting. He admits of smoking and drinking alcohol socially. His past medical history includes ADHD, depression and schizophrenia. Past Medical History:   Diagnosis Date    ADHD (attention deficit hyperactivity disorder)     Anxiety disorder     Depression     Mood disorder ([facility])     Psychiatric disorder     Psychotic disorder ([facility] [address]     Schizophrenia ([facility]     Suicidal ideation     Trauma     \"His mother  this past November\"     Social History     Socioeconomic History    Marital status: SINGLE     Spouse name: Not on file    Number of children: Not on file    Years of education: Not on file    Highest education level: Not on file   Occupational History    Not on file   Tobacco Use    Smoking status: Current Every Day Smoker    Smokeless tobacco: Never Used   Substance and Sexual Activity    Alcohol use: Not Currently    Drug use: Not Currently     Types: Marijuana    Sexual activity: Never   Other Topics Concern    Poor oral hygiene Not Asked    Bike safety Not Asked    Vehicle safety Not Asked   Social History Narrative    Not on file     Social Determinants of Health     Financial Resource Strain:     Difficulty of Paying Living Expenses:    Food Insecurity:     Worried About Running Out of Food in the Last Year:     Ran Out of Food in the Last Year:    Transportation Needs:     Lack of Transportation (Medical):      Lack of Transportation (Non-Medical):    Physical Activity:     Days of Exercise per Week:     Minutes of Exercise per Session:    Stress:     Feeling of Stress :    Social Connections:     Frequency of Communication with Friends and Family:     Frequency of Social Gatherings with Friends and Family:     Attends Mu-ism Services:     Active Member of Clubs or Organizations:     Attends Club or Organization Meetings:     Marital Status:    Intimate Partner Violence:     Fear of Current or Ex-Partner:     Emotionally Abused:     Physically Abused:     Sexually Abused:      Prior to Admission Medications   Prescriptions Last Dose Informant Patient Reported? Taking? ARIPiprazole (ABILIFY MAINTENA) 400 mg injection Unknown at Unknown time  No No   Si mg by IntraMUSCular route every four (4) weeks. divalproex ER (DEPAKOTE ER) 500 mg ER tablet 2021 at Unknown time  No Yes   Sig: Take 1 Tab by mouth two (2) times a day. traZODone (DESYREL) 50 mg tablet Unknown at Unknown time  No No   Sig: Take 1 Tab by mouth nightly as needed for Sleep. Facility-Administered Medications: None     No Known Allergies      Physical examination:     /73   Pulse 92   Temp 98.5 °F (36.9 °C)   Resp 18   Wt 68 kg (150 lb)   SpO2 99%   BMI 20.92 kg/m²      General appearance - alert, well appearing, and in no distress  Eyes - sclera anicteric, no pallor  Nose - no obvious nasal discharge. Neck - supple, no JVD, trachea is midline  Chest -clear air entry noted in bases, no wheezes  Heart - S1 and S2 normal  Abdomen - soft, nontender, nondistended, Bowel sounds present  Neurological - alert, oriented, normal speech, no focal findings noted  Musculoskeletal - no joint tenderness or swelling of knees bilaterally  Extremities - no pedal edema noted     ASSESSMENT:     1. Intentional overdose with Depakote  2. Suicidal attempt  3. Depression  4. Schizophrenia  5. Elevated Depakote level  6.   Nausea and vomiting, currently resolved     PLAN:     Patient will be admitted to stepdown unit for further monitoring as recommended by poison control to check valproic acid level every 6 hours until it is downtrending. We will continue IV fluid. Zofran as needed. Suicidal precautions with a sitter. Dr. Mcclain from psychiatry department will see this patient tomorrow morning and patient is willing to have entered with him and be transferred to inpatient psych unit if required. If this is to be full code.     Total time to take care of this patient was 65 minutes and more than 50% of time was spent counseling and coordinating care.      Disclaimer: Sections of this note are dictated using utilizing voice recognition software, which may have resulted in some phonetic based errors in grammar and contents. Even though attempts were made to correct all the mistakes, some may have been missed, and remained in the body of the document. If questions arise, please contact our department.

## 2021-07-21 NOTE — ED TRIAGE NOTES
Per medic pt took 10-12 of his depakote.    Pt somulent on arrival.   States he was trying to hurt himself

## 2021-07-21 NOTE — ED NOTES
Pt prepared for transfer to inpatient unit.  Admitting provider at bedside speaking with pt at this time, plan to transport pt to Inpatient unit after

## 2021-07-21 NOTE — ED PROVIDER NOTES
EMERGENCY DEPARTMENT HISTORY AND PHYSICAL EXAM    7:23 AM  Date: 2021  Patient Name: Keron Keane    History of Presenting Illness       History Provided By:     HPI: Keron Keane is a 27 y.o. male with past medical history of ADHD, depression, suicidal ideation presents with after taking 13 tablets of Depakote 500 mg extended release at 11 PM last night. Patient denies any coingestion. Patient complains of nausea and had some episodes of vomiting. Patient mildly drowsy but able to answer questions. Patient took Depakote with intention to commit suicide. PCP: Tim Malone NP    Past History     Past Medical History:  Past Medical History:   Diagnosis Date    ADHD (attention deficit hyperactivity disorder)     Anxiety disorder     Depression     Mood disorder ([facility] [address]     Psychiatric disorder     Psychotic disorder ([facility] [address]     Schizophrenia ([facility] [address]     Suicidal ideation     Trauma     \"His mother  this past November\"       Past Surgical History:  No past surgical history on file. Family History:  Family History   Problem Relation Age of Onset    Schizophrenia Mother     Schizophrenia Brother     Schizophrenia Sister        Social History:  Social History     Tobacco Use    Smoking status: Current Every Day Smoker    Smokeless tobacco: Never Used   Substance Use Topics    Alcohol use: Not Currently    Drug use: Not Currently     Types: Marijuana       Allergies:  No Known Allergies    Review of Systems   Review of Systems   Constitutional: Negative for activity change, appetite change and chills. HENT: Negative for congestion, ear discharge, ear pain and sore throat. Eyes: Negative for photophobia and pain. Respiratory: Negative for cough and choking. Cardiovascular: Negative for palpitations and leg swelling. Gastrointestinal: Negative for anal bleeding and rectal pain. Endocrine: Negative for polydipsia and polyuria.    Genitourinary: Negative for genital sores and urgency. Musculoskeletal: Negative for arthralgias and myalgias. Neurological: Negative for dizziness, seizures and speech difficulty. Psychiatric/Behavioral: Negative for hallucinations, self-injury and suicidal ideas. Physical Exam     Patient Vitals for the past 12 hrs:   Temp Pulse Resp BP SpO2   07/21/21 0716  (!) 104 20 (!) 161/89 98 %   07/21/21 0700 98.7 °F (37.1 °C) (!) 104 24 (!) 166/95 100 %       Physical Exam  Vitals and nursing note reviewed. Constitutional:       Appearance: He is well-developed. HENT:      Head: Normocephalic and atraumatic. Eyes:      General:         Right eye: No discharge. Left eye: No discharge. Cardiovascular:      Rate and Rhythm: Normal rate and regular rhythm. Heart sounds: Normal heart sounds. No murmur heard. Pulmonary:      Effort: Pulmonary effort is normal. No respiratory distress. Breath sounds: Normal breath sounds. No stridor. No wheezing or rales. Chest:      Chest wall: No tenderness. Abdominal:      General: Bowel sounds are normal. There is no distension. Palpations: Abdomen is soft. Tenderness: There is no abdominal tenderness. There is no guarding or rebound. Musculoskeletal:         General: Normal range of motion. Cervical back: Normal range of motion and neck supple. Skin:     General: Skin is warm and dry. Neurological:      Mental Status: He is alert and oriented to person, place, and time. Diagnostic Study Results     Labs -  No results found for this or any previous visit (from the past 12 hour(s)). Radiologic Studies -   No results found. Medical Decision Making     ED Course: Progress Notes, Reevaluation, and Consults:    7:23 AM Initial assessment performed. The patients presenting problems have been discussed, and they/their family are in agreement with the care plan formulated and outlined with them.   I have encouraged them to ask questions as they arise throughout their visit. Provider Notes (Medical Decision Making):   Patient presents with nausea and vomiting after taking 13 pills of 500 mg Depakote extended release in a suicide attempt  Discussed with poison control who recommended Depakote levels and ammonia  Patient Depakote level is more than 300  Poison control recommended to send sample to [facility] for exact level determination  Depakote level 370.2  We will send a repeat Depakote level 6 hours after the first sample that is now pending   no elevated salicylate, acetaminophen level  Ammonia 39  No electrolyte abnormality  Plan to obtain labs, imaging  Old medical records reviewed:  EKG as interpreted by me: 102, sinus tachycardia, no ST changes normal intervals  Discussed with Dr. Edith Martins control toxicology recommended valproic acid level every 6 hours till downtrending  Patient received multiple reassessments  On my last reassessment at 5 PM patient is awake, alert, conversant asking for food. Vitals within normal limits  Patient will be admitted to stepdown unit  Patient admitted to Dr. Monica Moon  Patient will require psychiatry evaluation after medical admission and clearance    Critical Care:  CRITICAL CARE:  5:12 PM  I have spent 50 minutes of critical care time involved in lab review, consultations with specialist, family decision-making, and documentation. This time does not include separately billable procedures. During this entire length of time I was immediately available to the patient. Critical Care: The reason for providing this level of medical care for this critically ill patient was due a critical illness that impaired one or more vital organ systems such that there was a high probability of imminent or life threatening deterioration in the patients condition.  This care involved high complexity decision making to assess, manipulate, and support vital system functions, to treat this degreee vital organ system failure and to prevent further life threatening deterioration of the patients condition. Vital Signs-Reviewed the patient's vital signs. Reviewed pt's pulse ox reading. Records Reviewed: old medical records  -I am the first provider for this patient.  -I reviewed the vital signs, available nursing notes, past medical history, past surgical history, family history and social history. Current Facility-Administered Medications   Medication Dose Route Frequency Provider Last Rate Last Admin    sodium chloride 0.9 % bolus infusion 1,000 mL  1,000 mL IntraVENous Diallo Hurst MD 1,000 mL/hr at 07/21/21 0710 1,000 mL at 07/21/21 0710     Current Outpatient Medications   Medication Sig Dispense Refill    ARIPiprazole (ABILIFY MAINTENA) 400 mg injection 400 mg by IntraMUSCular route every four (4) weeks. 400 mg 0    divalproex ER (DEPAKOTE ER) 500 mg ER tablet Take 1 Tab by mouth two (2) times a day. 30 Tab 1    traZODone (DESYREL) 50 mg tablet Take 1 Tab by mouth nightly as needed for Sleep. 15 Tab 1        Clinical Impression     Clinical Impression: No diagnosis found. Disposition: This note was dictated utilizing voice recognition software which may lead to typographical errors. I apologize in advance if the situation occurs. If questions arise please do not hesitate to contact me or call our department.     Cooper Heath MD  7:23 AM

## 2021-07-21 NOTE — ED NOTES
Per Dr Devante Mathews repeat Depakote level at [facility]

## 2021-07-21 NOTE — Clinical Note
Status[de-identified] INPATIENT [101]   Type of Bed: Stepdown [17]   Cardiac Monitoring Required?: Yes   Inpatient Hospitalization Certified Necessary for the Following Reasons: 3.  Patient receiving treatment that can only be provided in an inpatient setting (further clarification in H&P documentation)   Admitting Diagnosis: Intentional poisoning by valproate sodium [facility]) [[id number]]   Admitting Physician: Gerhard Lowe   Attending Physician: Gerhard Lowe   Estimated Length of Stay: 2 Midnights   Discharge Plan[de-identified] Home with Office Follow-up

## 2021-07-21 NOTE — ED NOTES
Pt awake standing in the hallway stating he is cold and hungry.   Pt refused to be placed back on cardiac monitor

## 2021-07-21 NOTE — H&P
History & Physical    Patient: Yanni Epps MRN: [id number]  CSN: [id number]    YOB: 1995  Age: 27 y.o. Sex: male      DOA: 2021    Chief Complaint:   Chief Complaint   Patient presents with    Mental Health Problem          HPI:     Yanni Epps is a 27 y.o.  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 He took 13 tablets of Depakote 500 mg ER at 2300 last night. Per ED notes, pt mildly drowsy, able to answer questions and vomiting in ED. Pt tachycardic, hypertensive with SBP 160s now improved, stable CBC and CMP, ammonia 39,  Acetaminophen level <2, salicylate level <8.3, valproic acid level >300, alcohol level <3, EKG showed sinus tachycardia. Poison control has been contacted. He will be admitted to hospitalist service for medical management with psych to consult. He is a/o x 4, states he felt overwhelmed due to increased stress. He resides in a boarding house where he is renting a room. Pt states he takes Abilify, Depakote and one more medication but he cannot recall name. He is not followed by psychiatry. ED visit in 2020 with chronic hallucinations, at that time he reported being followed by psych. Pt reports prior suicidal ideations but did not admit to any actual attempts. Past Medical History:   Diagnosis Date    ADHD (attention deficit hyperactivity disorder)     Anxiety disorder     Depression     [facility]     Psychiatric disorder     Psychotic disorder ([facility] [address]     Schizophrenia ([facility] [address]     Suicidal ideation     Trauma     \"His mother  this past November\"       No past surgical history on file.     Family History   Problem Relation Age of Onset    Schizophrenia Mother     Schizophrenia Brother     Schizophrenia Sister        Social History     Socioeconomic History    Marital status: SINGLE     Spouse name: Not on file    Number of children: Not on file    Years of education: Not on file    Highest education level: Not on file   Tobacco Use    Smoking status: Current Every Day Smoker    Smokeless tobacco: Never Used   Substance and Sexual Activity    Alcohol use: Not Currently    Drug use: Not Currently     Types: Marijuana    Sexual activity: Never   Other Topics Concern     Social Determinants of Health     Financial Resource Strain:     Difficulty of Paying Living Expenses:    Food Insecurity:     Worried About Running Out of Food in the Last Year:     [address] in the Last Year:    Transportation Needs:     Lack of Transportation (Medical):  Lack of Transportation (Non-Medical):    Physical Activity:     Days of Exercise per Week:     Minutes of Exercise per Session:    Stress:     Feeling of Stress :    Social Connections:     Frequency of Communication with Friends and Family:     Frequency of Social Gatherings with Friends and Family:     Attends [employer] Services:     Active Member of Clubs or Organizations:     Attends Club or Organization Meetings:     Marital Status:        Prior to Admission medications    Medication Sig Start Date End Date Taking? Authorizing Provider   ARIPiprazole (ABILIFY MAINTENA) 400 mg injection 400 mg by IntraMUSCular route every four (4) weeks. 2/26/19   Ryann Sommer MD   divalproex ER (DEPAKOTE ER) 500 mg ER tablet Take 1 Tab by mouth two (2) times a day. 2/26/19   Ryann Sommer MD   traZODone (DESYREL) 50 mg tablet Take 1 Tab by mouth nightly as needed for Sleep. 2/26/19   Ryann Sommer MD       No Known Allergies      Review of Systems- positive in bold  GENERAL: No fever, chills, malaise, weight changes  HEENT: No change in vision, no earache, tinnitus, sore throat or sinus congestion. NECK: No pain or stiffness. PULMONARY: No shortness of breath, cough or wheeze. Cardiovascular: no pnd or orthopnea, no CP  GASTROINTESTINAL: +nausea.   No abdominal pain, vomiting or diarrhea, melena or bright red blood per rectum. GENITOURINARY: No urinary frequency, urgency, hesitancy or dysuria. MUSCULOSKELETAL: No joint or muscle pain, no back pain, no recent trauma. DERMATOLOGIC: No rash, no itching, no lesions. ENDOCRINE: No polyuria, polydipsia, no heat or cold intolerance. No recent change in weight. HEMATOLOGICAL: No anemia or easy bruising or bleeding. NEUROLOGIC: No headache, seizures, numbness, tingling or weakness. PSYCH: +suicidal ideation    Physical Exam:     Physical Exam:  Visit Vitals  /66 (BP 1 Location: Right arm, BP Patient Position: Supine)   Pulse 86   Temp 98.7 °F (37.1 °C)   Resp 20   Wt 68 kg (150 lb)   SpO2 98%   BMI 20.92 kg/m²      O2 Device: None (Room air)    Temp (24hrs), Av.7 °F (37.1 °C), Min:98.7 °F (37.1 °C), Max:98.7 °F (37.1 °C)    No intake/output data recorded. No intake/output data recorded. General:  Alert, cooperative, no distress, appears stated age. Head: Normocephalic, without obvious abnormality, atraumatic. Eyes:  Conjunctivae/corneas clear. PERRL, EOMs intact. Nose: Nares normal. No drainage or sinus tenderness. Neck: Supple, symmetrical, trachea midline, no adenopathy, thyroid: no enlargement, no carotid bruit and no JVD. Lungs:   Clear to auscultation bilaterally. Heart:  Regular rate and rhythm, S1, S2 normal.     Abdomen: Soft, non-tender. Bowel sounds normal.    Extremities: Extremities normal, atraumatic, no cyanosis or edema. Pulses: 2+ and symmetric all extremities. Skin:  No rashes or lesions   Neurologic: AAOx3, No focal motor or sensory deficit. Labs Reviewed: All lab results for the last 24 hours reviewed.   Recent Results (from the past 24 hour(s))   CBC WITH AUTOMATED DIFF    Collection Time: 21  6:58 AM   Result Value Ref Range    WBC 7.4 4.6 - 13.2 K/uL    RBC 5.11 4.35 - 5.65 M/uL    HGB 15.0 13.0 - 16.0 g/dL    HCT 45.5 36.0 - 48.0 %    MCV 89.0 74.0 - 97.0 FL    MCH 29.4 24.0 - 34.0 PG    MCHC 33.0 31.0 - 37.0 g/dL    RDW 12.6 11.6 - 14.5 %    PLATELET 800 658 - 898 K/uL    MPV 9.6 9.2 - 11.8 FL    NEUTROPHILS 47 40 - 73 %    LYMPHOCYTES 47 21 - 52 %    MONOCYTES 5 3 - 10 %    EOSINOPHILS 1 0 - 5 %    BASOPHILS 0 0 - 2 %    ABS. NEUTROPHILS 3.5 1.8 - 8.0 K/UL    ABS. LYMPHOCYTES 3.5 0.9 - 3.6 K/UL    ABS. MONOCYTES 0.3 0.05 - 1.2 K/UL    ABS. EOSINOPHILS 0.1 0.0 - 0.4 K/UL    ABS. BASOPHILS 0.0 0.0 - 0.1 K/UL    DF AUTOMATED     METABOLIC PANEL, COMPREHENSIVE    Collection Time: 07/21/21  6:58 AM   Result Value Ref Range    Sodium 145 136 - 145 mmol/L    Potassium 3.7 3.5 - 5.5 mmol/L    Chloride 106 100 - 111 mmol/L    CO2 29 21 - 32 mmol/L    Anion gap 10 3.0 - 18 mmol/L    Glucose 95 74 - 99 mg/dL    BUN 12 7.0 - 18 MG/DL    Creatinine 1.25 0.6 - 1.3 MG/DL    BUN/Creatinine ratio 10 (L) 12 - 20      GFR est AA >60 >60 ml/min/1.73m2    GFR est non-AA >60 >60 ml/min/1.73m2    Calcium 8.7 8.5 - 10.1 MG/DL    Bilirubin, total 0.3 0.2 - 1.0 MG/DL    ALT (SGPT) 23 16 - 61 U/L    AST (SGOT) 9 (L) 10 - 38 U/L    Alk.  phosphatase 95 45 - 117 U/L    Protein, total 7.7 6.4 - 8.2 g/dL    Albumin 3.6 3.4 - 5.0 g/dL    Globulin 4.1 (H) 2.0 - 4.0 g/dL    A-G Ratio 0.9 0.8 - 1.7     ACETAMINOPHEN    Collection Time: 07/21/21  6:58 AM   Result Value Ref Range    Acetaminophen level <2 (L) 10.0 - 30.0 ug/mL   ETHYL ALCOHOL    Collection Time: 07/21/21  6:58 AM   Result Value Ref Range    ALCOHOL(ETHYL),SERUM <3 0 - 3 MG/DL   VALPROIC ACID    Collection Time: 07/21/21  6:58 AM   Result Value Ref Range    Valproic acid >300 (HH) 50 - 077 ug/ml   SALICYLATE    Collection Time: 07/21/21  6:58 AM   Result Value Ref Range    Salicylate level <1.4 (L) 2.8 - 20.0 MG/DL   EKG, 12 LEAD, INITIAL    Collection Time: 07/21/21  7:01 AM   Result Value Ref Range    Ventricular Rate 102 BPM    Atrial Rate 102 BPM    P-R Interval 144 ms    QRS Duration 82 ms    Q-T Interval 354 ms    QTC Calculation (Bezet) 461 ms    Calculated P Axis 71 degrees    Calculated R Axis 70 degrees    Calculated T Axis 67 degrees    Diagnosis       Sinus tachycardia  Possible Left atrial enlargement  ST elevation, probably due to early repolarization  Borderline ECG  When compared with ECG of 22-JUN-2018 17:33,  No significant change was found     AMMONIA    Collection Time: 07/21/21  7:15 AM   Result Value Ref Range    Ammonia 39 (H) 11 - 32 UMOL/L   VALPROIC ACID    Collection Time: 07/21/21  1:46 PM   Result Value Ref Range    Valproic acid >300 (HH) 50 - 100 ug/ml            Procedures/imaging: see electronic medical records for all procedures/Xrays and details which were not copied into this note but were reviewed prior to creation of Plan      Assessment/Plan        Assessment  1. Intentional overdose by Valproate sodium  2. Nausea/vomiting  3. Depression  4. ADHD  5. Schizophrenia  6. Suicidal ideation          Plan  1. Admit to stepdown, tele monitoring  2. Poison control following ([phone]). Recommend checking valproic acid levels q6h until there are TWO down-trending results. Also requests sample to be sent to their lab as they need an exact number. Follow recommendations. I spoke with lab who stated if next valproic acid level is >300 they will send to [facility]. 3. Psych consulted, spoke with Dr. Perez Kidney- will see in am.    4. Sitter  5. Continue IVFs  6. Monitor vital signs, weight, I/Os per unit protocol  7. Fall, aspiration precautions  8. Consult CM to assist w/ dc planning          Diet: Regular  DVT/GI Prophylaxis: Lovenox  Code Status: FULL      Discussed with patient at bedside about hospital admission and plan care. He understands and agrees. All questions answered. Disclaimer: Sections of this note are dictated using utilizing voice recognition software. Minor typographical errors may be present.  If questions arise, please do not hesitate to contact me or call our department.         Madelin Corcoran, NP-C  [facility]  pager [phone]

## 2021-07-21 NOTE — PROGRESS NOTES
TRANSFER - IN REPORT:    Verbal report received from LENNOX Conn(name) on Samuel Inches  being received from ED(unit) for routine progression of care      Report consisted of patients Situation, Background, Assessment and   Recommendations(SBAR). Information from the following report(s) SBAR, Recent Results and Cardiac Rhythm NSR was reviewed with the receiving nurse. Opportunity for questions and clarification was provided. Assessment completed upon patients arrival to unit and care assumed. Bedside, Verbal, and Written shift change report given to Cheng Wright RN (oncoming nurse) by Bro Garrido RN (offgoing nurse). Report included the following information SBAR, Kardex, Intake/Output, MAR, Recent Results, and Cardiac Rhythm NSR.

## 2021-07-22 VITALS
WEIGHT: 150 LBS | DIASTOLIC BLOOD PRESSURE: 73 MMHG | HEART RATE: 69 BPM | SYSTOLIC BLOOD PRESSURE: 126 MMHG | TEMPERATURE: 98.1 F | RESPIRATION RATE: 18 BRPM | OXYGEN SATURATION: 100 % | BODY MASS INDEX: 20.92 KG/M2

## 2021-07-22 LAB
ALBUMIN SERPL-MCNC: 2.8 G/DL (ref 3.4–5)
ALBUMIN/GLOB SERPL: 0.9 {RATIO} (ref 0.8–1.7)
ALP SERPL-CCNC: 77 U/L (ref 45–117)
ALT SERPL-CCNC: 17 U/L (ref 16–61)
ANION GAP SERPL CALC-SCNC: 7 MMOL/L (ref 3–18)
AST SERPL-CCNC: 10 U/L (ref 10–38)
ATRIAL RATE: 102 BPM
BILIRUB SERPL-MCNC: 0.4 MG/DL (ref 0.2–1)
BUN SERPL-MCNC: 11 MG/DL (ref 7–18)
BUN/CREAT SERPL: 13 (ref 12–20)
CALCIUM SERPL-MCNC: 7.7 MG/DL (ref 8.5–10.1)
CALCULATED P AXIS, ECG09: 71 DEGREES
CALCULATED R AXIS, ECG10: 70 DEGREES
CALCULATED T AXIS, ECG11: 67 DEGREES
CHLORIDE SERPL-SCNC: 108 MMOL/L (ref 100–111)
CO2 SERPL-SCNC: 25 MMOL/L (ref 21–32)
COVID-19 RAPID TEST, COVR: NOT DETECTED
CREAT SERPL-MCNC: 0.88 MG/DL (ref 0.6–1.3)
DIAGNOSIS, 93000: NORMAL
ERYTHROCYTE [DISTWIDTH] IN BLOOD BY AUTOMATED COUNT: 13 % (ref 11.6–14.5)
GLOBULIN SER CALC-MCNC: 3.1 G/DL (ref 2–4)
GLUCOSE BLD STRIP.AUTO-MCNC: 139 MG/DL (ref 70–110)
GLUCOSE SERPL-MCNC: 114 MG/DL (ref 74–99)
HCT VFR BLD AUTO: 41 % (ref 36–48)
HGB BLD-MCNC: 13.6 G/DL (ref 13–16)
MAGNESIUM SERPL-MCNC: 2.3 MG/DL (ref 1.6–2.6)
MCH RBC QN AUTO: 29.3 PG (ref 24–34)
MCHC RBC AUTO-ENTMCNC: 33.2 G/DL (ref 31–37)
MCV RBC AUTO: 88.4 FL (ref 74–97)
P-R INTERVAL, ECG05: 144 MS
PLATELET # BLD AUTO: 276 K/UL (ref 135–420)
PMV BLD AUTO: 9.6 FL (ref 9.2–11.8)
POTASSIUM SERPL-SCNC: 3.2 MMOL/L (ref 3.5–5.5)
PROT SERPL-MCNC: 5.9 G/DL (ref 6.4–8.2)
Q-T INTERVAL, ECG07: 354 MS
QRS DURATION, ECG06: 82 MS
QTC CALCULATION (BEZET), ECG08: 461 MS
RBC # BLD AUTO: 4.64 M/UL (ref 4.35–5.65)
SODIUM SERPL-SCNC: 140 MMOL/L (ref 136–145)
SOURCE, COVRS: NORMAL
VALPROATE SERPL-MCNC: 83 UG/ML (ref 50–100)
VENTRICULAR RATE, ECG03: 102 BPM
WBC # BLD AUTO: 6.6 K/UL (ref 4.6–13.2)

## 2021-07-22 PROCEDURE — 99232 SBSQ HOSP IP/OBS MODERATE 35: CPT | Performed by: INTERNAL MEDICINE

## 2021-07-22 PROCEDURE — 83735 ASSAY OF MAGNESIUM: CPT

## 2021-07-22 PROCEDURE — 99221 1ST HOSP IP/OBS SF/LOW 40: CPT | Performed by: PSYCHIATRY & NEUROLOGY

## 2021-07-22 PROCEDURE — 80164 ASSAY DIPROPYLACETIC ACD TOT: CPT

## 2021-07-22 PROCEDURE — 87635 SARS-COV-2 COVID-19 AMP PRB: CPT

## 2021-07-22 PROCEDURE — 85027 COMPLETE CBC AUTOMATED: CPT

## 2021-07-22 PROCEDURE — 74011250637 HC RX REV CODE- 250/637: Performed by: PHYSICIAN ASSISTANT

## 2021-07-22 PROCEDURE — 80053 COMPREHEN METABOLIC PANEL: CPT

## 2021-07-22 PROCEDURE — 82962 GLUCOSE BLOOD TEST: CPT

## 2021-07-22 PROCEDURE — 36415 COLL VENOUS BLD VENIPUNCTURE: CPT

## 2021-07-22 PROCEDURE — 74011250636 HC RX REV CODE- 250/636: Performed by: NURSE PRACTITIONER

## 2021-07-22 PROCEDURE — 2709999900 HC NON-CHARGEABLE SUPPLY

## 2021-07-22 RX ORDER — POTASSIUM CHLORIDE 20 MEQ/1
40 TABLET, EXTENDED RELEASE ORAL
Status: COMPLETED | OUTPATIENT
Start: 2021-07-22 | End: 2021-07-22

## 2021-07-22 RX ADMIN — Medication 10 ML: at 13:09

## 2021-07-22 RX ADMIN — POTASSIUM CHLORIDE 40 MEQ: 1500 TABLET, EXTENDED RELEASE ORAL at 14:04

## 2021-07-22 RX ADMIN — ENOXAPARIN SODIUM 40 MG: 40 INJECTION SUBCUTANEOUS at 08:30

## 2021-07-22 NOTE — PROGRESS NOTES
[facility]  Progress Note    Patient: Kamini Rose Age: 27 y. o. : 1995 MR#: [id number] SSN: [id number]  Date: 2021            Subjective:   Sitter outside the door   Patient is pacing around the room, he has no complaints, no headaches, no chest pain, nor SOB nor abd pain/n/v. he denies SI nor HI at this time. He states he wanted to kill himself in the past, but this is the first time he tried. He states he has been in inpt psy before, does not want to go to inpt psy again. Feels depressed, does not elaborate much more. Social hx-   Currently resides in a boarding house   Works with a [employer]  Trying to get GED     Valproic acid >300 then >300 then 209 and most recently 83     Assessment/Plan:     Mr. Kamini Rose is a 27 y.o.  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 He took 13?? tablets of Depakote 500 mg ER at 2300 last night. valproic acid level >300. Poison control was consulted. Recommend checking valproic acid levels q6h until there are TWO down-trending results    CONSULTS- PSY     1. Intentional overdose, Suicide attempt with  depakote   2. Mild hypokalemia   3. Nausea and vomiting, resolved. 4. Depression disorder    5. Tobacco abuse   6. Alcohol abuse   7. ADHD  8. Schizophrenia     - Psy consulted- rec transfer to behavioral unit but patient declines, in process for  involuntary admission. Maintain sitter and Suicide precautions  until cleared by Psy. Hold depakote, abilify, trazadone.   - I called Poison control  [phone] today- case has been closed, no need to trend valproic acid level anymore. Cleared for dispo planning.   - replete K , addon Mg level   - UDS still pending. dispo - the patient is medically cleared for discharge to behavioral health unit, It will be involuntary admission.  If patient leaves the hospital- it is against medical advice. Diet: Regular  DVT/GI Prophylaxis: Lovenox  Code Status: FULL     Additional Notes:      Case discussed with:  [x]Patient  []Family  []Nursing  []Case Management  DVT Prophylaxis:  [x]Lovenox  []Hep SQ  []SCDs  []Coumadin   []On Heparin gtt    Objective:   VS:   Visit Vitals  /73   Pulse 69   Temp 98.1 °F (36.7 °C)   Resp 18   Wt 68 kg (150 lb)   SpO2 100%   BMI 20.92 kg/m²      Tmax/24hrs: Temp (24hrs), Av.2 °F (36.8 °C), Min:97.9 °F (36.6 °C), Max:98.5 °F (36.9 °C)      Intake/Output Summary (Last 24 hours) at 2021 1303  Last data filed at 2021 1210  Gross per 24 hour   Intake 2595 ml   Output    Net 2595 ml       General:  Young AA man pacing around room,  Alert, NAD  Cardiovascular:  RRR  Pulmonary:  LSC throughout; respiratory effort WNL, room air   GI:  +BS in all four quadrants, soft, non-tender  Extremities:  No edema; 2+ dorsalis pedis pulses bilaterally  Neuro: awake, alert, ox3, moving arms legs, follows commands.          Labs:    Recent Results (from the past 24 hour(s))   VALPROIC ACID    Collection Time: 21  1:46 PM   Result Value Ref Range    Valproic acid >300 (HH) 50 - 100 ug/ml   VALPROIC ACID    Collection Time: 21  8:20 PM   Result Value Ref Range    Valproic acid 209 (HH) 50 - 100 ug/ml   GLUCOSE, POC    Collection Time: 21  3:14 AM   Result Value Ref Range    Glucose (POC) 139 (H) 70 - 572 mg/dL   METABOLIC PANEL, COMPREHENSIVE    Collection Time: 21  5:15 AM   Result Value Ref Range    Sodium 140 136 - 145 mmol/L    Potassium 3.2 (L) 3.5 - 5.5 mmol/L    Chloride 108 100 - 111 mmol/L    CO2 25 21 - 32 mmol/L    Anion gap 7 3.0 - 18 mmol/L    Glucose 114 (H) 74 - 99 mg/dL    BUN 11 7.0 - 18 MG/DL    Creatinine 0.88 0.6 - 1.3 MG/DL    BUN/Creatinine ratio 13 12 - 20      GFR est AA >60 >60 ml/min/1.73m2    GFR est non-AA >60 >60 ml/min/1.73m2    Calcium 7.7 (L) 8.5 - 10.1 MG/DL    Bilirubin, total 0.4 0.2 - 1.0 MG/DL    ALT (SGPT) 17 16 - 61 U/L    AST (SGOT) 10 10 - 38 U/L    Alk.  phosphatase 77 45 - 117 U/L    Protein, total 5.9 (L) 6.4 - 8.2 g/dL    Albumin 2.8 (L) 3.4 - 5.0 g/dL    Globulin 3.1 2.0 - 4.0 g/dL    A-G Ratio 0.9 0.8 - 1.7     CBC W/O DIFF    Collection Time: 07/22/21  5:15 AM   Result Value Ref Range    WBC 6.6 4.6 - 13.2 K/uL    RBC 4.64 4.35 - 5.65 M/uL    HGB 13.6 13.0 - 16.0 g/dL    HCT 41.0 36.0 - 48.0 %    MCV 88.4 74.0 - 97.0 FL    MCH 29.3 24.0 - 34.0 PG    MCHC 33.2 31.0 - 37.0 g/dL    RDW 13.0 11.6 - 14.5 %    PLATELET 493 589 - 926 K/uL    MPV 9.6 9.2 - 11.8 FL   VALPROIC ACID    Collection Time: 07/22/21  5:15 AM   Result Value Ref Range    Valproic acid 83 50 - 100 ug/ml       Signed By: GEORGES Pang     July 22, 2021

## 2021-07-22 NOTE — PROGRESS NOTES
Progress Note    Patient: Louise Blas MRN: [id number]  [id number]: [id number]    YOB: 1995  Age: 27 y.o. Sex: male      Admit Date: 7/21/2021    LOS: 1 day     Subjective:     Seen this AM. Pacing in room, asking to use cellphone. Sitter at bedside. No complaints of headache. No nausea or vomiting. No chest pain. Denies suicidial/homicidal ideation. States he is willing to see psychiatry. Objective:     Vitals:    07/21/21 1740 07/22/21 0059 07/22/21 0410 07/22/21 0713   BP: 119/73 119/65 121/67 107/71   Pulse: 92 73 81 (!) 58   Resp: 18 18 20 18   Temp: 98.5 °F (36.9 °C) 98.1 °F (36.7 °C) 98.2 °F (36.8 °C) 97.9 °F (36.6 °C)   SpO2: 99% 97% 100% 100%   Weight:            Intake and Output:  Current Shift: 07/22 0701 - 07/22 1900  In: 400 [P.O.:400]  Out: -   Last three shifts: 07/20 1901 - 07/22 0700  In: 1995 [P.O.:240; I.V.:1755]  Out: -     Physical Exam:   GENERAL: alert, cooperative, no distress, appears stated age  LUNG: clear to auscultation bilaterally  HEART: regular rate and rhythm, S1, S2 normal, no murmur, click, rub or gallop  ABDOMEN: soft, non-tender.  Bowel sounds normal. No masses,  no organomegaly  Psychiatric:   Mood: within normal limits  Affect: appropriate  Thoughts: logical  Sensorium: No A/V hallucinations  Safety: Suicidal precaution, Sitter at bedside        Lab/Data Review:  BMP:   Lab Results   Component Value Date/Time     07/22/2021 05:15 AM    K 3.2 (L) 07/22/2021 05:15 AM     07/22/2021 05:15 AM    CO2 25 07/22/2021 05:15 AM    AGAP 7 07/22/2021 05:15 AM     (H) 07/22/2021 05:15 AM    BUN 11 07/22/2021 05:15 AM    CREA 0.88 07/22/2021 05:15 AM    GFRAA >60 07/22/2021 05:15 AM    GFRNA >60 07/22/2021 05:15 AM     CBC:   Lab Results   Component Value Date/Time    WBC 6.6 07/22/2021 05:15 AM    HGB 13.6 07/22/2021 05:15 AM    HCT 41.0 07/22/2021 05:15 AM     07/22/2021 05:15 AM     Recent Glucose Results:   Lab Results   Component Value Date/Time  (H) 2021 05:15 AM            Assessment:     Active Problems:    Intentional poisoning by valproate sodium ([facility] [address] (2021)        Plan:     Intentional Drug overdose, Valproic Acid level >300 on admission -  Resolved  Suicidal attempt, Depression  - Will consult psychiatry. Continue sitter. Suicide precautions  Schizophrenia - On Aripiprazole 400mg l9anilg  HypoKalemia - Give potassium 40mEq PO once.    Tobacco use - Counseled on cessation  Nausea & Vomiting - Resolved  ADHD    Diet: Regular  Code: Full  GI/DVT Prop: Lovenox  Disp: TBD      Signed ByMansfield Hillock Bronwyn Apgar     2021

## 2021-07-22 NOTE — CONSULTS
[address]    Name:  Elaine Gordon  MR#:   [id number]  :  1995  ACCOUNT #:  [de-identified]  DATE OF SERVICE:  2021    PSYCHIATRIC CONSULTATION    IDENTIFYING INFORMATION:  The patient is a 26-year-old male, single, admitted to [facility] on the above-mentioned date. BASIS FOR ADMISSION AND REASON FOR PSYCHIATRIC CONSULTATION:  Attention is invited to the patient's history and physical exam, this document being self-explanatory. For the purpose of this psychiatric consultation, it is to be mentioned that the patient came to the emergency room, being brought apparently by the emergency services after taking 13 tablets of Depakote 500 mg extended release tablets around 11 p.m. the night before. At the time of the evaluation, the patient complained of nausea and had apparently some episodes of vomiting, being also found mildly drowsy but able to answer questions. The statement is indicative that the patient \"took the Depakote with intention to commit suicide. \"    With a Depakote level of 372, he obviously required a medical admission. This resulted, after the patient's Depakote levels began to trend downwards and currently being found to be within therapeutic range, for the patient to be evaluated to determine what type of psychiatric treatment is currently required. He is being followed by Irma Johnston, nurse practitioner, with a [facility] practice, with the patient being seen apparently every couple of months. Ms. Zeke Torres saw him last, he said, around 4 weeks or so ago. He is being provided prescriptions for Abilify Maintena 400 mg intramuscularly which he is also taking every 4 weeks. So, with that history, Dr. Ajit Granados very kindly requested me to evaluate the patient and so the reason for which I had the opportunity to meet with the patient, to discuss his case with nursing staff and also to discuss the case with Dr. Ajit Granados.   The undersigned was also able to review the chart. PSYCHIATRIC HISTORY:  The patient has a history of multiple admissions to [facility]. The last admission was under the care of my associate Dr. Lulu Harris, admission 02/24/2019, discharged 02/26/2019 after 2 days of inpatient hospital stay with a final diagnosis of schizophrenia and referred back to outpatient treatment. During that admission, the patient was admitted after he had become increasingly agitated and perhaps aggressive after having a verbal disagreement with a relative (his aunt), and so she thought that he was behaving bizarrely and brought him to the ER. The patient was at that time also being followed by Ms. Jael Daly, nurse practitioner, who used to work at [employer]; however, since then, she moved as an independent practitioner and so the patient continued to be followed by her. Even then in 2019, the patient was already being prescribed with Abilify Maintena 400 mg every 4 weeks and Depakote  mg twice a day. One of the reasons for which the patient was found to be agitated was that he had not been taking his Depakote, he said, at that time for 4 days or so. After beginning treatment with the medication, the patient began to show very positive improvement and was able to calm down appropriately. After a couple of days of inpatient care, he was referred back to outpatient treatment. It appears that since then he has not required to be hospitalized again or at least not here at [facility]. MEDICAL HISTORY:  Remarkable for a history of attention deficit disorder, anxiety, depression, psychotic disorder described as schizophrenia and a negative surgical history. ALLERGIES:  THE PATIENT'S HISTORY OF ALLERGIES IS NEGATIVE. REVIEW OF SYSTEMS:  Psychiatric:  Remarkable for a chronic history of schizophrenia and the above-mentioned suicidal attempt.   He also has been described as having a borderline intellectual functional capacity. The rest of the systems review is negative. A physical exam in the emergency room showed blood pressure of 161/89 with a heart rate of 104. He was described as drowsy, however, responding to questions appropriately. He was also described as being oriented to person, place and time. The rest of the physical exam was basically negative. Since the patient's admission, discussion with Poison Control followed. They recommended Depakote levels and ammonia levels to be sent to [facility] for a level determination. Depakote level was found to be 370.2 with a repeat Depakote level after 6 hours being suggested. Ammonia levels were 39. An electrocardiogram interpreted by the attending ER physician showed sinus tachy with no ST changes and normal intervals. Case was discussed with Dr. Ottoniel Valentin with Poison Control toxicology who recommended a valproic acid level every 6 hours, still downtrending. At the time of the last reassessment around 5 p.m., the patient was awake, alert, conversing, asking for food. He was then admitted to Dr. Phuong Turner to one of the step-down units. DIAGNOSTIC DATA:  Other labs performed since the patient's admission to the hospital included a CBC with differential that showed completely normal test results. The initial blood chemistry showed a sodium 145, potassium 3.7, chloride of 106, BUN 12, creatinine 1.25, estimated GFR above 60 mL/min and normal liver function tests. Acetaminophen and salicylate levels were below range. Alcohol levels below 3. A repeat of the patient's Depakote levels around 1346 with a serum of 300, by 2020 on 07/21/2021 down to 209, and by 5 o'clock this morning 82 mcg/mL and so within therapeutic range. The patient's blood chemistry this morning showed normal electrolytes with the exception of a potassium of 3.2. Blood sugar 114.   Kidney functioning tests normal.  Liver function tests normal.    ALCOHOL AND DRUG HISTORY:  This appears to be negative for the use of alcohol, illicit substances and/or abusing over-the-counter medications or prescription medications. PERSONAL HISTORY AND FAMILY HISTORY:  The patient quit school in grade 12. He denied any history of legal problems. He has never been . He does not have any children. He is currently staying in a boarding house with four other individuals. He described being very close to his cousin, he said, who is perhaps the one that became concerned when he did not go to work at around 5:30 on the day of admission to the ER. Apparently, the patient works for a [employer]. MENTAL STATUS EXAM:  This is a male who looks his stated age. During this evaluation, there was no evidence of alcohol or any other type of drug-related signs of intoxication or withdrawal symptoms. He was found to be coherent, showing quality of continuity of associations without evidence of flight of ideas and/or pressure of speech. The patient showed no evidence of ideas of reference or influence and denied any hallucinatory process. However, he did change his statement regarding the above-mentioned overdose with Depakote which initially he had said was a suicidal attempt, now he said it was a mistake that he had made. He was not able to explain as to how he made a mistake of taking 13 tablets rather than 1 tablet of Depakote. When I mentioned to the patient the possibility of his being admitted psychiatrically, he refused to do so. He only said that he wanted to be discharged from the hospital.  So, for that reason, a CSB evaluation for a TDO will follow. CLINICAL IMPRESSION:  AXIS I:  Schizophrenia, chronic undifferentiated type. AXIS II:  Borderline intellectual functional capacity. AXIS III:  Status post intentional overdose with Depakote without evidence of liver dysfunction. RECOMMENDATIONS:  This consultation is appreciated.   1.  Concerns are being raised regarding the different statements that the patient is making regarding his having taken this overdose accidentally or purposefully. Obviously, it is very difficult to explain how he took these 13 tablets of Depakote 500 mg each in an accidental manner, and so with his refusal to consider transfer to the behavioral unit, I will suggest a CSB evaluation for a temporary detaining order. 2.  I have already discussed the case with our crisis staff who will contact the CSB for a TDO evaluation. 3.  If the patient by any chance is found not to be detainable, I will suggest for him to be discharged against medical advice if Dr. Ai Chirinos is planning on discharging him today. The consultation is appreciated.       Lyle Pedraza MD, LFAPA      [id number]  D:  07/22/2021 13:22  T:  07/22/2021 18:26  JOB #:  [id number]

## 2021-07-22 NOTE — PROGRESS NOTES
about level. Also noted his sodium level was 145 and trending up. Wanted to know if she wanted NS to continue. Was told to consult poison control.

## 2021-07-22 NOTE — BSMART NOTE
Spoke with [facility] clinician and requested TDO evaluation. Clinicals faxed over for review. Patient to be provided crisis phone once [facility] clinician available for evaluation.

## 2021-07-22 NOTE — PROGRESS NOTES
Bedside turnover given from [address]. SBAR,MAR,ED summary given with updates R/T the night, a chance to ask questions was given. PT is on the cardiac tele monitor in stable condition. Bed is in the lowest position with the wheels locked. Call bell and personal effects  are on the bedside table within reach. Double checked with RN coming on the IV drips. Patient resting comfortably. Whiteboard updated. Sitter at bedside. Saferoom check done.

## 2021-07-22 NOTE — PROGRESS NOTES
conducted an initial consultation and Spiritual Assessment for Farhat Tenorio, who is a 27 y.o.,male. Patient's Primary Language is: [place]. According to the patient's EMR Scientology Affiliation is: [place]. The reason the Patient came to the hospital is:   Patient Active Problem List    Diagnosis Date Noted    Intentional poisoning by valproate sodium ([facility] [address] 07/21/2021    Schizophrenia ([facility] [address] 01/12/2015        The  provided the following Interventions:  Initiated a relationship of care and support. Explored issues of dennis, belief, spirituality and Restorationism/ritual needs while hospitalized. Listened empathically. Provided chaplaincy education. Provided information about Spiritual Care Services. Offered prayer and assurance of continued prayers on patient's behalf. Chart reviewed. The following outcomes where achieved:  Patient shared limited information about both their medical narrative and spiritual journey/beliefs. 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 Patient said he took too much medicine. He was anxious, but he is willing \"to leave\". When I asked about Behavioral Medicine, he doesn't want to do that and will get better. Assessment:  Patient does not have any Restorationism/cultural needs that will affect patient's preferences in health care. There are no spiritual or Restorationism issues which require intervention at this time. Plan:  Chaplains will continue to follow and will provide pastoral care on an as needed/requested basis.  recommends bedside caregivers page  on duty if patient shows signs of acute spiritual or emotional distress.     [address]   [phone]

## 2021-07-22 NOTE — PROGRESS NOTES
Problem: Suicide  Goal: *STG: Remains safe in hospital  Outcome: Progressing Towards Goal  Goal: *STG: Seeks staff when feelings of self harm or harm towards others arise  Outcome: Progressing Towards Goal  Goal: *STG: Attends activities and groups  Outcome: Progressing Towards Goal  Goal: *STG:  Verbalizes alternative ways of dealing with maladaptive feelings/behaviors  Outcome: Progressing Towards Goal  Goal: *STG/LTG: Complies with medication therapy  Outcome: Progressing Towards Goal  Goal: *STG/LTG: No longer expresses self destructive or suicidal thoughts  Outcome: Progressing Towards Goal  Goal: *LTG:  Identifies available community resources  Outcome: Progressing Towards Goal  Goal: *LTG:  Develops proactive suicide prevention plan  Outcome: Progressing Towards Goal  Goal: Interventions  Outcome: Progressing Towards Goal     Problem: Patient Education: Go to Patient Education Activity  Goal: Patient/Family Education  Outcome: Progressing Towards Goal

## 2021-07-22 NOTE — PROGRESS NOTES
Psychiatric Consult    The patient has been examined, his case was discussed with his attending hospitalist, the chart has been reviewed. Please be referred to the dictated psychiatric consultation report which is self-explanatory. Clinical impression  Axis I: Schizophrenia chronic undifferentiated type. Axis II: Borderline intellectual functioning. Axis III: Status post overdose with Depakote. Recommendations  1. Please be referred to the dictated psychiatric consultation report which is self-explanatory. 2.  Based upon the intensity of the patient's Depakote overdose, concerns have been raised regarding he is refusing to transfer to the behavioral unit for further inpatient treatment. 3.  Upon my discussion with Dr. Kinsey Pop, we both agreed with the patient needing to be evaluated for an involuntary admission. 4.  I have contacted our crisis staff whom we will contacted the community services Board for a temporary detaining order evaluation. 5.  For now I suggested for the patient to be maintained with a sitter for suicidal precautions, however if the patient is found not to detainable by the CSB, I will suggest if he is medically stable to be discharged, this to be done [address] 6.  The patient is being followed as an outpatient by [place] nurse practitioner who has a practice in the [place] area. Further outpatient treatment with Ms. Caitlin Pastrana,  is suggested.     Baylee Borrero MD, Zonia Tolbert

## 2021-07-22 NOTE — PROGRESS NOTES
Patient wanting to leave AMA, pulled off heart monitor and started to pull at IV. Patient standing in doorway stating he wants to leave now. Informed patient that we are waiting to hear about him being admitted to the in-patient psych unit, patient states \"I'm not going to the psych unit\". Primary RN, Diallo Deng called Dr Nathen Gauthier who states patient should stay awaiting possible TDO. Called security to come to room. Unit Director aware. Paged Dr Osmany Laguerre. When security arrived to the floor and was explained the situation, security states we cannot force patient to stay without an active TDO. Explained to patient that the physicians feel he needs further treatment in the in-patient psych unit and if he leaves it will be against medical advice. Patient states understanding, AMA form signed by patient, belongings returned to patient, IV removed. No return call from psych physician. Suicide Hotline number provided to patient per policy. Patient escorted out of the hospital by primary RN. Nursing Supervisor aware. Non Emergency [employer] notified.

## 2021-07-23 NOTE — DISCHARGE SUMMARY
Discharge Summary     Patient ID:  Clarke Durand  [id number]  61 y.o.  1995  Body mass index is 20.92 kg/m². PCP on record: Samia Fenton NP    Admit date: 7/21/2021  Discharge date and time: 7/22/2021    Discharge Diagnoses:                                           1. Intentional overdose, Suicide attempt with  depakote   2. Mild hypokalemia   3. Nausea and vomiting, resolved. 4. Depression disorder    5. Tobacco abuse   6. Alcohol abuse   7. ADHD  8. Schizophrenia       Consults: Psychiatry          Hospital Course by problems:  Patient who was admitted with suicidal ideation Depakote overdose, seen by psych, suggested inpatient psych treatment which patient refused so TDO was ordered, before TDO could be performed patient decided to sign out AMA, patient was explained regarding inpatient treatment and need for inpatient treatment, and he refused, per nursing staff psych was reconsulted but before they could get the answer back patient decided to leave. Nurse was in charge of patient's notice posted below.       ++++++++++++++++++++++++++++++++++  Per in charge nurse\" Patient wanting to leave AMA, pulled off heart monitor and started to pull at IV. Patient standing in doorway stating he wants to leave now. Informed patient that we are waiting to hear about him being admitted to the in-patient psych unit, patient states \"I'm not going to the psych unit\". Primary RN, Lisa Friedman called Dr Simon Cagle who states patient should stay awaiting possible TDO. Called security to come to room. Unit Director aware. Paged Dr Lenard Mensah. When security arrived to the floor and was explained the situation, security states we cannot force patient to stay without an active TDO. Explained to patient that the physicians feel he needs further treatment in the in-patient psych unit and if he leaves it will be against medical advice.  Patient states understanding, AMA form signed by patient, belongings returned to patient, IV removed. No return call from psych physician. Suicide Hotline number provided to patient per policy. Patient escorted out of the hospital by primary RN. Nursing Supervisor aware. Non Emergency [employer] notified. \"     Patient seen and examined by me on discharge day. Pertinent Findings:      Significant Diagnostic Studies:    7:43 AM - Yovani, Card Result In    Component Value Ref Range & Units Status   Ventricular Rate 102  BPM Final   Atrial Rate 102  BPM Final   P-R Interval 144  ms Final   QRS Duration 82  ms Final   Q-T Interval 354  ms Final   QTC Calculation (Bezet) 461  ms Final   Calculated P Axis 71  degrees Final   Calculated R Axis 70  degrees Final   Calculated T Axis 67  degrees Final   Diagnosis   Final   Sinus tachycardia   Possible Left atrial enlargement   ST elevation, probably due to early repolarization   Borderline ECG   When compared with ECG of 2018 17:33,   No significant change was found   Confirmed by Chase Mcintosh ([id number]) on 2021 7:43:10 AM    Result History        Pertinent Lab Data:  Recent Labs     21  0515 21  0658   WBC 6.6 7.4   HGB 13.6 15.0   HCT 41.0 45.5    292     Recent Labs     21  0515 21  0658    145   K 3.2* 3.7    106   CO2 25 29   * 95   BUN 11 12   CREA 0.88 1.25   CA 7.7* 8.7   MG 2.3  --    ALB 2.8* 3.6   ALT 17 23       DISCHARGE MEDICATIONS:   @  Discharge Medication List as of 2021  4:25 PM            My Recommended Diet, Activity, Wound Care, and follow-up labs are listed in the patient's Discharge Insturctions which I have personally completed and reviewed. Disposition:     [x]s/o AMA   Condition at Discharge:  Stable    Follow up with:   PCP : Anny Shah NP      Please follow-up tests/labs that are still pendin.  None  2.    >30 minutes spent coordinating this discharge (review instructions/follow-up, prescriptions, preparing report for sign off)    Disclaimer: Sections of this note are dictated utilizing voice recognition software, which may have resulted in some phonetic based errors in grammar and contents. Even though attempts were made to correct all the mistakes, some may have been missed, and remained in the body of the document. If questions arise, please contact our department.     Signed:  Aislinn Akbar MD

## 2021-08-06 ENCOUNTER — HOSPITAL ENCOUNTER (EMERGENCY)
Age: 26
Discharge: HOME | End: 2021-08-07
Attending: EMERGENCY MEDICINE
Payer: MEDICAID

## 2021-08-06 ENCOUNTER — APPOINTMENT (OUTPATIENT)
Dept: CT IMAGING | Age: 26
End: 2021-08-06
Attending: EMERGENCY MEDICINE
Payer: MEDICAID

## 2021-08-06 VITALS
HEIGHT: 71 IN | HEART RATE: 89 BPM | TEMPERATURE: 98 F | BODY MASS INDEX: 22.4 KG/M2 | WEIGHT: 160 LBS | RESPIRATION RATE: 15 BRPM | OXYGEN SATURATION: 97 % | SYSTOLIC BLOOD PRESSURE: 117 MMHG | DIASTOLIC BLOOD PRESSURE: 94 MMHG

## 2021-08-06 DIAGNOSIS — S00.03XA CONTUSION OF SCALP, INITIAL ENCOUNTER: Primary | ICD-10-CM

## 2021-08-06 PROCEDURE — 72125 CT NECK SPINE W/O DYE: CPT

## 2021-08-06 PROCEDURE — 70486 CT MAXILLOFACIAL W/O DYE: CPT

## 2021-08-06 PROCEDURE — 99283 EMERGENCY DEPT VISIT LOW MDM: CPT

## 2021-08-06 PROCEDURE — 99281 EMR DPT VST MAYX REQ PHY/QHP: CPT

## 2021-08-06 PROCEDURE — 70450 CT HEAD/BRAIN W/O DYE: CPT

## 2021-08-07 NOTE — ED TRIAGE NOTES
Arrives by EMS to triage. Patient states he tripped and fell last night. Reports hitting his head on the hardwood floor. Denies LOC/blurred vision. C/o left-sided head and jaw pain. Has not anything at home for the pain.

## 2021-08-07 NOTE — ED PROVIDER NOTES
The patient is a 24-year-old male with a history of schizophrenia, who presents to the ED today with left forehead pain and left jaw pain after tripping over his shoelaces while walking in the living room. He states that his injuries occurred over 24 hours ago. He is complaining of pain in his left temple and his left lower jaw. He denies any other injuries at this time. Past Medical History:   Diagnosis Date    ADHD (attention deficit hyperactivity disorder)     Anxiety disorder     Depression     [facility] disorder ([facility])     Psychiatric disorder     [facility] [address] [facility]     Schizophrenia [facility] [address]     Suicidal ideation     Trauma     \"His mother  this past November\"       History reviewed. No pertinent surgical history. Family History:   Problem Relation Age of Onset    Schizophrenia Mother     Schizophrenia Brother     Schizophrenia Sister        Social History     Socioeconomic History    Marital status: SINGLE     Spouse name: Not on file    Number of children: Not on file    Years of education: Not on file    Highest education level: Not on file   Occupational History    Not on file   Tobacco Use    Smoking status: Current Every Day Smoker    Smokeless tobacco: Never Used   Substance and Sexual Activity    Alcohol use: Not Currently    Drug use: Not Currently     Types: Marijuana    Sexual activity: Never   Other Topics Concern    Poor oral hygiene Not Asked    Bike safety Not Asked    Vehicle safety Not Asked   Social History Narrative    Not on file     Social Determinants of Health     Financial Resource Strain:     Difficulty of Paying Living Expenses:    Food Insecurity:     Worried About Running Out of Food in the Last Year:     Ran Out of Food in the Last Year:    Transportation Needs:     Lack of Transportation (Medical):      Lack of Transportation (Non-Medical):    Physical Activity:     Days of Exercise per Week:     Minutes of Exercise per Session: Stress:     Feeling of Stress :    Social Connections:     Frequency of Communication with Friends and Family:     Frequency of Social Gatherings with Friends and Family:     Attends [employer] Services:     Active Member of Clubs or Organizations:     Attends Club or Organization Meetings:     Marital Status:    Intimate Partner Violence:     Fear of Current or Ex-Partner:     Emotionally Abused:     Physically Abused:     Sexually Abused: ALLERGIES: Patient has no known allergies. Review of Systems   All other systems reviewed and are negative. Vitals:    08/06/21 2344   BP: (!) 117/94   Pulse: 89   Resp: 15   Temp: 98 °F (36.7 °C)   SpO2: 97%   Weight: 72.6 kg (160 lb)   Height: 5' 11\" (1.803 m)            Physical Exam  Vitals and nursing note reviewed. Constitutional:       Appearance: Normal appearance. HENT:      Head: Normocephalic. Comments: Tenderness to palpation over the left temple and just above the left eyebrow. There is a small amount of swelling of the lower jaw at the angle of the manubrium. Right Ear: External ear normal.      Left Ear: External ear normal.      Nose: Nose normal.      Mouth/Throat:      Mouth: Mucous membranes are moist.      Pharynx: Oropharynx is clear. Eyes:      Extraocular Movements: Extraocular movements intact. Conjunctiva/sclera: Conjunctivae normal.      Pupils: Pupils are equal, round, and reactive to light. Cardiovascular:      Rate and Rhythm: Normal rate and regular rhythm. Pulses: Normal pulses. Heart sounds: Normal heart sounds. Pulmonary:      Effort: Pulmonary effort is normal.      Breath sounds: Normal breath sounds. Abdominal:      General: Abdomen is flat. Bowel sounds are normal.      Palpations: Abdomen is soft. Musculoskeletal:         General: Normal range of motion. Cervical back: Normal range of motion and neck supple. No tenderness. Skin:     General: Skin is warm and dry. Capillary Refill: Capillary refill takes less than 2 seconds. Neurological:      General: No focal deficit present. Mental Status: He is alert and oriented to person, place, and time. Psychiatric:         Mood and Affect: Mood normal.         Behavior: Behavior normal.         Thought Content: Thought content normal.         Judgment: Judgment normal.        CT HEAD WO CONT   Final Result      No acute intracranial findings. CT SPINE CERV WO CONT   Final Result      No acute fracture or focal malalignment. CT MAXILLOFACIAL WO CONT   Final Result      No acute fracture identified. MDM  Number of Diagnoses or Management Options  Diagnosis management comments: The patient is a 24-year-old male with a history of schizophrenia that presents to the ED today after falling yesterday and hitting his head. He had some left-sided forehead and temple pain as well as left sided jaw pain and some lower facial swelling. His imaging is negative for any acute traumatic injuries. He will be discharged home and advised to take Motrin or Tylenol for pain. He will be advised to follow-up with his primary care physician in 2 to 3 days. Return precautions have been given.          Procedures

## 2021-08-11 ENCOUNTER — HOSPITAL ENCOUNTER (OUTPATIENT)
Dept: INFUSION THERAPY | Age: 26
End: 2021-08-11

## 2021-08-12 ENCOUNTER — HOSPITAL ENCOUNTER (OUTPATIENT)
Dept: INFUSION THERAPY | Age: 26
Discharge: HOME | End: 2021-08-12
Payer: MEDICAID

## 2021-08-12 VITALS
DIASTOLIC BLOOD PRESSURE: 75 MMHG | RESPIRATION RATE: 16 BRPM | TEMPERATURE: 97 F | OXYGEN SATURATION: 100 % | HEART RATE: 83 BPM | SYSTOLIC BLOOD PRESSURE: 120 MMHG

## 2021-08-12 DIAGNOSIS — F20.9 SCHIZOPHRENIA, UNSPECIFIED TYPE (HCC): Primary | ICD-10-CM

## 2021-08-12 PROCEDURE — 74011250636 HC RX REV CODE- 250/636: Performed by: PSYCHIATRY & NEUROLOGY

## 2021-08-12 PROCEDURE — 96372 THER/PROPH/DIAG INJ SC/IM: CPT

## 2021-08-12 RX ORDER — ARIPIPRAZOLE 400 MG
400 KIT INTRAMUSCULAR ONCE
Status: COMPLETED | OUTPATIENT
Start: 2021-08-12 | End: 2021-08-12

## 2021-08-12 RX ORDER — ARIPIPRAZOLE 400 MG
400 KIT INTRAMUSCULAR ONCE
Status: CANCELLED | OUTPATIENT
Start: 2021-09-08 | End: 2021-09-08

## 2021-08-12 RX ADMIN — ARIPIPRAZOLE 400 MG: KIT at 15:12

## 2021-08-12 NOTE — PROGRESS NOTES
[facility] Progress Note    Date: 2021    Name: Christina Henderson    MRN: [id number]         : 1995      Mr. Saige Beckwith arrived in the [facility] today at 1500, in stable condition, here for Q 28 Day (Q 4 Week), IM Abilify Injection. He was assessed and education was provided. Mr. Delma Jauregui vitals were reviewed. Visit Vitals  /75 (BP 1 Location: Right upper arm, BP Patient Position: Sitting)   Pulse 83   Temp 97 °F (36.1 °C)   Resp 16   SpO2 100%           ABILIFY MAINTENA (Aripiprazole) 400 mg, was given IM, in his left deltoid at 1512, per order, and without incident.   (would only allow me to administer the injection in his left deltoid)           Mr. Saige Beckwith tolerated well, and had no complaints. Mr. Saige Beckwith was discharged from [place] [address] in stable condition at 1515. Oscar Akbar He is to return in 4 weeks, on Wednesday, 21, at 1100, for his next appointment, for his next Abilify Injection.      Frank Jones RN  2021  3:00 PM

## 2021-09-09 ENCOUNTER — HOSPITAL ENCOUNTER (OUTPATIENT)
Dept: INFUSION THERAPY | Age: 26
Discharge: HOME | End: 2021-09-09
Payer: MEDICAID

## 2021-09-09 VITALS
SYSTOLIC BLOOD PRESSURE: 117 MMHG | RESPIRATION RATE: 18 BRPM | OXYGEN SATURATION: 97 % | DIASTOLIC BLOOD PRESSURE: 76 MMHG | HEART RATE: 76 BPM | TEMPERATURE: 97.8 F

## 2021-09-09 DIAGNOSIS — F20.9 SCHIZOPHRENIA, UNSPECIFIED TYPE (HCC): Primary | ICD-10-CM

## 2021-09-09 PROCEDURE — 96372 THER/PROPH/DIAG INJ SC/IM: CPT

## 2021-09-09 PROCEDURE — 74011250636 HC RX REV CODE- 250/636: Performed by: PSYCHIATRY & NEUROLOGY

## 2021-09-09 RX ORDER — ARIPIPRAZOLE 400 MG
400 KIT INTRAMUSCULAR ONCE
Status: CANCELLED | OUTPATIENT
Start: 2021-10-06 | End: 2021-10-06

## 2021-09-09 RX ORDER — ARIPIPRAZOLE 400 MG
400 KIT INTRAMUSCULAR ONCE
Status: COMPLETED | OUTPATIENT
Start: 2021-09-09 | End: 2021-09-09

## 2021-09-09 RX ADMIN — ARIPIPRAZOLE 400 MG: KIT at 10:17

## 2021-09-09 NOTE — PROGRESS NOTES
[facility] Progress Note    Date: 2021    Name: Ammon Swartz    MRN: [id number]         : 1995    Abilify Injection [id number]       Mr. Caitie Velasquez was assessed and education was provided. Mr. Ellie James vitals were reviewed and patient was observed for 5 minutes prior to treatment. Visit Vitals  /76 (BP 1 Location: Left upper arm, BP Patient Position: Sitting)   Pulse 76   Temp 97.8 °F (36.6 °C)   Resp 18   SpO2 97%       Ability 400 mg was administered IM in the left deltoid per request. No bleeding or redness noted. Band-aid applied  to the site. Mr. Caitie Velasquez tolerated well, and had no complaints. Patient armband removed and shredded. Mr. Caitie Velasquez was discharged from [place] [address] in stable condition at 1025. He is to return on 10/06/2021 at 1100 for his next appointment.       Sally Burgos RN  2021

## 2021-10-06 ENCOUNTER — HOSPITAL ENCOUNTER (OUTPATIENT)
Dept: INFUSION THERAPY | Age: 26
Discharge: HOME | End: 2021-10-06
Payer: MEDICAID

## 2021-10-06 VITALS
DIASTOLIC BLOOD PRESSURE: 87 MMHG | TEMPERATURE: 98.1 F | SYSTOLIC BLOOD PRESSURE: 136 MMHG | OXYGEN SATURATION: 97 % | RESPIRATION RATE: 18 BRPM

## 2021-10-06 DIAGNOSIS — F20.9 SCHIZOPHRENIA, UNSPECIFIED TYPE (HCC): Primary | ICD-10-CM

## 2021-10-06 PROCEDURE — 74011250636 HC RX REV CODE- 250/636: Performed by: PSYCHIATRY & NEUROLOGY

## 2021-10-06 PROCEDURE — 96372 THER/PROPH/DIAG INJ SC/IM: CPT

## 2021-10-06 RX ORDER — ARIPIPRAZOLE 400 MG
400 KIT INTRAMUSCULAR ONCE
Status: CANCELLED | OUTPATIENT
Start: 2021-11-03 | End: 2021-11-03

## 2021-10-06 RX ORDER — ARIPIPRAZOLE 400 MG
400 KIT INTRAMUSCULAR ONCE
Status: COMPLETED | OUTPATIENT
Start: 2021-10-06 | End: 2021-10-06

## 2021-10-06 RX ADMIN — ARIPIPRAZOLE 400 MG: KIT at 11:27

## 2021-10-06 NOTE — PROGRESS NOTES
[facility] Progress Note    Date: 2021    Name: Sheree Patino              MRN: [id number]              : 1995    Abilify Injection [id number]        Mr. Sherren Avena was assessed and education was provided. Mr. Peter Veras vitals were reviewed. Visit Vitals  /87 (BP 1 Location: Left upper arm, BP Patient Position: Sitting)   Temp 98.1 °F (36.7 °C)   Resp 18   SpO2 97%       Ability 400 mg was administered IM in the left deltoid per request. No bleeding or redness noted. Band-aid applied  to the site.      Mr. Lazcano tolerated well, and had no complaints.       Patient armband removed and shredded.       Mr. Sherren Avena was discharged from [place] [address] in stable condition at 1130. He is to return on 11/3/21 at 1130 for his next appointment.     Yumiko Mclaughlin RN  2021  11:33 AM

## 2021-10-27 RX ORDER — ALBUTEROL SULFATE 0.83 MG/ML
2.5 SOLUTION RESPIRATORY (INHALATION) AS NEEDED
Status: CANCELLED
Start: 2021-11-03

## 2021-10-27 RX ORDER — ACETAMINOPHEN 325 MG/1
650 TABLET ORAL AS NEEDED
Status: CANCELLED
Start: 2021-11-03

## 2021-10-27 RX ORDER — EPINEPHRINE 1 MG/ML
0.3 INJECTION, SOLUTION, CONCENTRATE INTRAVENOUS AS NEEDED
Status: CANCELLED | OUTPATIENT
Start: 2021-11-03

## 2021-10-27 RX ORDER — DIPHENHYDRAMINE HYDROCHLORIDE 50 MG/ML
50 INJECTION, SOLUTION INTRAMUSCULAR; INTRAVENOUS AS NEEDED
Status: CANCELLED
Start: 2021-11-03

## 2021-10-27 RX ORDER — DIPHENHYDRAMINE HYDROCHLORIDE 50 MG/ML
25 INJECTION, SOLUTION INTRAMUSCULAR; INTRAVENOUS AS NEEDED
Status: CANCELLED
Start: 2021-11-03

## 2021-10-27 RX ORDER — ONDANSETRON 2 MG/ML
8 INJECTION INTRAMUSCULAR; INTRAVENOUS AS NEEDED
Status: CANCELLED | OUTPATIENT
Start: 2021-11-03

## 2021-10-27 RX ORDER — HYDROCORTISONE SODIUM SUCCINATE 100 MG/2ML
100 INJECTION, POWDER, FOR SOLUTION INTRAMUSCULAR; INTRAVENOUS AS NEEDED
Status: CANCELLED | OUTPATIENT
Start: 2021-11-03

## 2021-11-03 ENCOUNTER — HOSPITAL ENCOUNTER (OUTPATIENT)
Dept: INFUSION THERAPY | Age: 26
Discharge: HOME | End: 2021-11-03
Payer: MEDICAID

## 2021-11-03 VITALS
SYSTOLIC BLOOD PRESSURE: 121 MMHG | HEART RATE: 88 BPM | OXYGEN SATURATION: 95 % | RESPIRATION RATE: 18 BRPM | TEMPERATURE: 98.2 F | DIASTOLIC BLOOD PRESSURE: 74 MMHG

## 2021-11-03 DIAGNOSIS — F20.9 SCHIZOPHRENIA, UNSPECIFIED TYPE (HCC): Primary | ICD-10-CM

## 2021-11-03 PROCEDURE — 96372 THER/PROPH/DIAG INJ SC/IM: CPT

## 2021-11-03 PROCEDURE — 74011250636 HC RX REV CODE- 250/636: Performed by: NURSE PRACTITIONER

## 2021-11-03 RX ORDER — ACETAMINOPHEN 325 MG/1
650 TABLET ORAL AS NEEDED
Status: CANCELLED
Start: 2021-12-01

## 2021-11-03 RX ORDER — ARIPIPRAZOLE 400 MG
400 KIT INTRAMUSCULAR ONCE
Status: COMPLETED | OUTPATIENT
Start: 2021-11-03 | End: 2021-11-03

## 2021-11-03 RX ORDER — EPINEPHRINE 1 MG/ML
0.3 INJECTION, SOLUTION, CONCENTRATE INTRAVENOUS AS NEEDED
Status: CANCELLED | OUTPATIENT
Start: 2021-12-01

## 2021-11-03 RX ORDER — ALBUTEROL SULFATE 0.83 MG/ML
2.5 SOLUTION RESPIRATORY (INHALATION) AS NEEDED
Status: CANCELLED
Start: 2021-12-01

## 2021-11-03 RX ORDER — DIPHENHYDRAMINE HYDROCHLORIDE 50 MG/ML
50 INJECTION, SOLUTION INTRAMUSCULAR; INTRAVENOUS AS NEEDED
Status: CANCELLED
Start: 2021-12-01

## 2021-11-03 RX ORDER — ARIPIPRAZOLE 400 MG
400 KIT INTRAMUSCULAR ONCE
Status: CANCELLED | OUTPATIENT
Start: 2021-12-01 | End: 2021-12-01

## 2021-11-03 RX ORDER — HYDROCORTISONE SODIUM SUCCINATE 100 MG/2ML
100 INJECTION, POWDER, FOR SOLUTION INTRAMUSCULAR; INTRAVENOUS AS NEEDED
Status: CANCELLED | OUTPATIENT
Start: 2021-12-01

## 2021-11-03 RX ORDER — ONDANSETRON 2 MG/ML
8 INJECTION INTRAMUSCULAR; INTRAVENOUS AS NEEDED
Status: CANCELLED | OUTPATIENT
Start: 2021-12-01

## 2021-11-03 RX ORDER — DIPHENHYDRAMINE HYDROCHLORIDE 50 MG/ML
25 INJECTION, SOLUTION INTRAMUSCULAR; INTRAVENOUS AS NEEDED
Status: CANCELLED
Start: 2021-12-01

## 2021-11-03 RX ADMIN — ARIPIPRAZOLE 400 MG: KIT at 12:19

## 2021-11-03 NOTE — PROGRESS NOTES
[facility] Progress Note    Date: November 3, 2021    Name: Suman Denson              MRN: [id number]              : 1995    Abilify Injection     Mr. Kenia Henderson was assessed and education was provided. Mr. Karey Rousseau vitals were reviewed. Visit Vitals  /74 (BP 1 Location: Left upper arm, BP Patient Position: Sitting)   Pulse 88   Temp 98.2 °F (36.8 °C)   Resp 18   SpO2 95%       Ability 400 mg was administered IM in the left deltoid per request. No bleeding or redness noted. Band-aid applied  to the site.      Mr. Lazcano tolerated well, and had no complaints.       Discharge/ follow-up instructions discussed w/ pt. Pt verbalized understanding. Patient armband removed and shredded.     Mr. Kenia Henderson was discharged from [place] [address] in stable condition at 1220. He is to return on 21 at 1130 for his next appointment.     Ben Allen RN  November 3, 2021

## 2021-11-28 ENCOUNTER — HOSPITAL ENCOUNTER (EMERGENCY)
Age: 26
Discharge: LEFT BEFORE DISCHARGE | End: 2021-11-28
Attending: STUDENT IN AN ORGANIZED HEALTH CARE EDUCATION/TRAINING PROGRAM
Payer: MEDICAID

## 2021-11-28 VITALS
SYSTOLIC BLOOD PRESSURE: 134 MMHG | TEMPERATURE: 98.3 F | HEART RATE: 82 BPM | RESPIRATION RATE: 16 BRPM | DIASTOLIC BLOOD PRESSURE: 72 MMHG | OXYGEN SATURATION: 98 %

## 2021-11-28 DIAGNOSIS — R45.851 SUICIDAL IDEATION: Primary | ICD-10-CM

## 2021-11-28 LAB
AMPHET UR QL SCN: NEGATIVE
ANION GAP SERPL CALC-SCNC: 4 MMOL/L (ref 3–18)
BARBITURATES UR QL SCN: NEGATIVE
BASOPHILS # BLD: 0 K/UL (ref 0–0.1)
BASOPHILS NFR BLD: 1 % (ref 0–2)
BENZODIAZ UR QL: NEGATIVE
BUN SERPL-MCNC: 9 MG/DL (ref 7–18)
BUN/CREAT SERPL: 9 (ref 12–20)
CALCIUM SERPL-MCNC: 8.6 MG/DL (ref 8.5–10.1)
CANNABINOIDS UR QL SCN: POSITIVE
CHLORIDE SERPL-SCNC: 110 MMOL/L (ref 100–111)
CO2 SERPL-SCNC: 29 MMOL/L (ref 21–32)
COCAINE UR QL SCN: NEGATIVE
COVID-19 RAPID TEST, COVR: NOT DETECTED
CREAT SERPL-MCNC: 1.03 MG/DL (ref 0.6–1.3)
DIFFERENTIAL METHOD BLD: ABNORMAL
EOSINOPHIL # BLD: 0.2 K/UL (ref 0–0.4)
EOSINOPHIL NFR BLD: 2 % (ref 0–5)
ERYTHROCYTE [DISTWIDTH] IN BLOOD BY AUTOMATED COUNT: 12.9 % (ref 11.6–14.5)
ETHANOL SERPL-MCNC: <3 MG/DL (ref 0–3)
GLUCOSE SERPL-MCNC: 111 MG/DL (ref 74–99)
HCT VFR BLD AUTO: 44.9 % (ref 36–48)
HDSCOM,HDSCOM: ABNORMAL
HGB BLD-MCNC: 15 G/DL (ref 13–16)
IMM GRANULOCYTES # BLD AUTO: 0 K/UL (ref 0–0.04)
IMM GRANULOCYTES NFR BLD AUTO: 0 % (ref 0–0.5)
LYMPHOCYTES # BLD: 3.8 K/UL (ref 0.9–3.6)
LYMPHOCYTES NFR BLD: 45 % (ref 21–52)
MCH RBC QN AUTO: 30.1 PG (ref 24–34)
MCHC RBC AUTO-ENTMCNC: 33.4 G/DL (ref 31–37)
MCV RBC AUTO: 90.2 FL (ref 78–100)
METHADONE UR QL: NEGATIVE
MONOCYTES # BLD: 0.8 K/UL (ref 0.05–1.2)
MONOCYTES NFR BLD: 9 % (ref 3–10)
NEUTS SEG # BLD: 3.8 K/UL (ref 1.8–8)
NEUTS SEG NFR BLD: 44 % (ref 40–73)
NRBC # BLD: 0 K/UL (ref 0–0.01)
NRBC BLD-RTO: 0 PER 100 WBC
OPIATES UR QL: NEGATIVE
PCP UR QL: NEGATIVE
PLATELET # BLD AUTO: 256 K/UL (ref 135–420)
PMV BLD AUTO: 10.4 FL (ref 9.2–11.8)
POTASSIUM SERPL-SCNC: 3.8 MMOL/L (ref 3.5–5.5)
RBC # BLD AUTO: 4.98 M/UL (ref 4.35–5.65)
SODIUM SERPL-SCNC: 143 MMOL/L (ref 136–145)
SOURCE, COVRS: NORMAL
WBC # BLD AUTO: 8.6 K/UL (ref 4.6–13.2)

## 2021-11-28 PROCEDURE — 80048 BASIC METABOLIC PNL TOTAL CA: CPT

## 2021-11-28 PROCEDURE — 99282 EMERGENCY DEPT VISIT SF MDM: CPT

## 2021-11-28 PROCEDURE — 82077 ASSAY SPEC XCP UR&BREATH IA: CPT

## 2021-11-28 PROCEDURE — 87635 SARS-COV-2 COVID-19 AMP PRB: CPT

## 2021-11-28 PROCEDURE — 80307 DRUG TEST PRSMV CHEM ANLYZR: CPT

## 2021-11-28 PROCEDURE — 85025 COMPLETE CBC W/AUTO DIFF WBC: CPT

## 2021-11-29 NOTE — ED TRIAGE NOTES
Patient arrived via medic from home with SI. Says he usually gets like this when his Abilify wears off. Currently has no plan.

## 2021-11-29 NOTE — ED PROVIDER NOTES
EMERGENCY DEPARTMENT HISTORY AND PHYSICAL EXAM    9:27 PM      Date: 2021  Patient Name: Suman Denson    History of Presenting Illness     Chief Complaint   Patient presents with    Suicidal     History Provided By: Patient, EMS     Additional History (Context): Suman Denson is a 27 y.o. male with hx of anxiety, ADHD, depression, schizophrenia, and other noted PMH who presents with complaint of suicidal ideation prior to arrival.  Patient notes \"I thought I was the ugliest fernanda, so I wanted to kill myself, I don't want to kill myself anymore. I just needed to get out of the house \". Patient denies current SI, HI, visual or auditory hallucinations. Denies alcohol use, notes marijuana use. Patient is requesting to leave. PCP: Miranda Clements NP    Current Outpatient Medications   Medication Sig Dispense Refill    ARIPiprazole (ABILIFY MAINTENA) 400 mg injection 400 mg by IntraMUSCular route every four (4) weeks. 400 mg 0    divalproex ER (DEPAKOTE ER) 500 mg ER tablet Take 1 Tab by mouth two (2) times a day. 30 Tab 1    traZODone (DESYREL) 50 mg tablet Take 1 Tab by mouth nightly as needed for Sleep. 15 Tab 1       Past History     Past Medical History:  Past Medical History:   Diagnosis Date    ADHD (attention deficit hyperactivity disorder)     Anxiety disorder     Depression     Mood disorder (HCC)     Psychiatric disorder     Psychotic disorder ([facility] [address]     Schizophrenia ([facility] [address]     Suicidal ideation     Trauma     \"His mother  this past November\"       Past Surgical History:  No past surgical history on file.     Family History:  Family History   Problem Relation Age of Onset    Schizophrenia Mother     Schizophrenia Brother     Schizophrenia Sister        Social History:  Social History     Tobacco Use    Smoking status: Current Every Day Smoker    Smokeless tobacco: Never Used   Substance Use Topics    Alcohol use: Not Currently    Drug use: Not Currently     Types: Marijuana       Allergies:  No Known Allergies      Review of Systems       Review of Systems   Constitutional: Negative for chills and fever. Respiratory: Negative for shortness of breath. Cardiovascular: Negative for chest pain. Gastrointestinal: Negative for abdominal pain, nausea and vomiting. Skin: Negative for rash. Neurological: Negative for weakness. Psychiatric/Behavioral: Positive for suicidal ideas. All other systems reviewed and are negative. Physical Exam     Visit Vitals  /72 (BP 1 Location: Right upper arm, BP Patient Position: Sitting)   Pulse 82   Temp 98.3 °F (36.8 °C)   Resp 16   SpO2 98%         Physical Exam  Vitals and nursing note reviewed. Constitutional:       General: He is not in acute distress. Appearance: He is well-developed. He is not diaphoretic. HENT:      Head: Normocephalic and atraumatic. Cardiovascular:      Rate and Rhythm: Normal rate and regular rhythm. Heart sounds: Normal heart sounds. No murmur heard. No friction rub. No gallop. Pulmonary:      Effort: Pulmonary effort is normal. No respiratory distress. Breath sounds: Normal breath sounds. No wheezing or rales. Musculoskeletal:         General: Normal range of motion. Cervical back: Normal range of motion and neck supple. Skin:     General: Skin is warm. Findings: No rash. Neurological:      Mental Status: He is alert. Psychiatric:         Attention and Perception: Attention normal.         Mood and Affect: Mood normal.         Speech: Speech normal.         Behavior: Behavior normal.         Thought Content: Thought content does not include homicidal or suicidal ideation.            Diagnostic Study Results     Labs -  Recent Results (from the past 12 hour(s))   CBC WITH AUTOMATED DIFF    Collection Time: 11/28/21  9:27 PM   Result Value Ref Range    WBC 8.6 4.6 - 13.2 K/uL    RBC 4.98 4.35 - 5.65 M/uL    HGB 15.0 13.0 - 16.0 g/dL    HCT 44.9 36.0 - 48.0 %    MCV 90.2 78.0 - 100.0 FL    MCH 30.1 24.0 - 34.0 PG    MCHC 33.4 31.0 - 37.0 g/dL    RDW 12.9 11.6 - 14.5 %    PLATELET 735 751 - 812 K/uL    MPV 10.4 9.2 - 11.8 FL    NRBC 0.0 0  WBC    ABSOLUTE NRBC 0.00 0.00 - 0.01 K/uL    NEUTROPHILS 44 40 - 73 %    LYMPHOCYTES 45 21 - 52 %    MONOCYTES 9 3 - 10 %    EOSINOPHILS 2 0 - 5 %    BASOPHILS 1 0 - 2 %    IMMATURE GRANULOCYTES 0 0.0 - 0.5 %    ABS. NEUTROPHILS 3.8 1.8 - 8.0 K/UL    ABS. LYMPHOCYTES 3.8 (H) 0.9 - 3.6 K/UL    ABS. MONOCYTES 0.8 0.05 - 1.2 K/UL    ABS. EOSINOPHILS 0.2 0.0 - 0.4 K/UL    ABS. BASOPHILS 0.0 0.0 - 0.1 K/UL    ABS. IMM. GRANS. 0.0 0.00 - 0.04 K/UL    DF AUTOMATED     METABOLIC PANEL, BASIC    Collection Time: 11/28/21  9:27 PM   Result Value Ref Range    Sodium 143 136 - 145 mmol/L    Potassium 3.8 3.5 - 5.5 mmol/L    Chloride 110 100 - 111 mmol/L    CO2 29 21 - 32 mmol/L    Anion gap 4 3.0 - 18 mmol/L    Glucose 111 (H) 74 - 99 mg/dL    BUN 9 7.0 - 18 MG/DL    Creatinine 1.03 0.6 - 1.3 MG/DL    BUN/Creatinine ratio 9 (L) 12 - 20      GFR est AA >60 >60 ml/min/1.73m2    GFR est non-AA >60 >60 ml/min/1.73m2    Calcium 8.6 8.5 - 10.1 MG/DL   ETHYL ALCOHOL    Collection Time: 11/28/21  9:27 PM   Result Value Ref Range    ALCOHOL(ETHYL),SERUM <3 0 - 3 MG/DL   DRUG SCREEN, URINE    Collection Time: 11/28/21  9:27 PM   Result Value Ref Range    BENZODIAZEPINES Negative NEG      BARBITURATES Negative NEG      THC (TH-CANNABINOL) Positive (A) NEG      OPIATES Negative NEG      PCP(PHENCYCLIDINE) Negative NEG      COCAINE Negative NEG      AMPHETAMINES Negative NEG      METHADONE Negative NEG      HDSCOM (NOTE)    COVID-19 RAPID TEST    Collection Time: 11/28/21  9:35 PM   Result Value Ref Range    Specimen source Nasopharyngeal      COVID-19 rapid test Not detected NOTD         Radiologic Studies -   No orders to display         Medical Decision Making   I am the first provider for this patient.     I reviewed the vital signs, available nursing notes, past medical history, past surgical history, family history and social history. Vital Signs-Reviewed the patient's vital signs. Records Reviewed: Nursing Notes and Old Medical Records (Time of Review: 9:27 PM)    ED Course: Progress Notes, Reevaluation, and Consults:  10:18 PM: Pt jigar. Notified charge nurse. [address] [facility] [address] [place] and reported incident, will send officer. Diagnosis     Clinical Impression:   1. Suicidal ideation        Disposition: eloped     Follow-up Information    None          Patient's Medications   Start Taking    No medications on file   Continue Taking    ARIPIPRAZOLE (ABILIFY MAINTENA) 400 MG INJECTION    400 mg by IntraMUSCular route every four (4) weeks. DIVALPROEX ER (DEPAKOTE ER) 500 MG ER TABLET    Take 1 Tab by mouth two (2) times a day. TRAZODONE (DESYREL) 50 MG TABLET    Take 1 Tab by mouth nightly as needed for Sleep. These Medications have changed    No medications on file   Stop Taking    No medications on file       Dictation disclaimer:  Please note that this dictation was completed with [employer], the computer voice recognition software. Quite often unanticipated grammatical, syntax, homophones, and other interpretive errors are inadvertently transcribed by the computer software. Please disregard these errors. Please excuse any errors that have escaped final proofreading.

## 2021-12-01 ENCOUNTER — HOSPITAL ENCOUNTER (OUTPATIENT)
Dept: INFUSION THERAPY | Age: 26
End: 2021-12-01
Payer: MEDICAID

## 2021-12-03 ENCOUNTER — HOSPITAL ENCOUNTER (OUTPATIENT)
Dept: INFUSION THERAPY | Age: 26
Discharge: HOME | End: 2021-12-03
Payer: MEDICAID

## 2021-12-03 VITALS
SYSTOLIC BLOOD PRESSURE: 113 MMHG | HEART RATE: 86 BPM | RESPIRATION RATE: 18 BRPM | DIASTOLIC BLOOD PRESSURE: 67 MMHG | TEMPERATURE: 97.5 F | OXYGEN SATURATION: 96 %

## 2021-12-03 DIAGNOSIS — F20.9 SCHIZOPHRENIA, UNSPECIFIED TYPE (HCC): Primary | ICD-10-CM

## 2021-12-03 PROCEDURE — 74011250636 HC RX REV CODE- 250/636: Performed by: NURSE PRACTITIONER

## 2021-12-03 PROCEDURE — 96372 THER/PROPH/DIAG INJ SC/IM: CPT

## 2021-12-03 RX ORDER — ONDANSETRON 2 MG/ML
8 INJECTION INTRAMUSCULAR; INTRAVENOUS AS NEEDED
Status: CANCELLED | OUTPATIENT
Start: 2021-12-31

## 2021-12-03 RX ORDER — ACETAMINOPHEN 325 MG/1
650 TABLET ORAL AS NEEDED
Status: CANCELLED
Start: 2021-12-31

## 2021-12-03 RX ORDER — ARIPIPRAZOLE 400 MG
400 KIT INTRAMUSCULAR ONCE
Status: COMPLETED | OUTPATIENT
Start: 2021-12-03 | End: 2021-12-03

## 2021-12-03 RX ORDER — DIPHENHYDRAMINE HYDROCHLORIDE 50 MG/ML
50 INJECTION, SOLUTION INTRAMUSCULAR; INTRAVENOUS AS NEEDED
Status: CANCELLED
Start: 2021-12-31

## 2021-12-03 RX ORDER — ALBUTEROL SULFATE 0.83 MG/ML
2.5 SOLUTION RESPIRATORY (INHALATION) AS NEEDED
Status: CANCELLED
Start: 2021-12-31

## 2021-12-03 RX ORDER — HYDROCORTISONE SODIUM SUCCINATE 100 MG/2ML
100 INJECTION, POWDER, FOR SOLUTION INTRAMUSCULAR; INTRAVENOUS AS NEEDED
Status: CANCELLED | OUTPATIENT
Start: 2021-12-31

## 2021-12-03 RX ORDER — EPINEPHRINE 1 MG/ML
0.3 INJECTION, SOLUTION, CONCENTRATE INTRAVENOUS AS NEEDED
Status: CANCELLED | OUTPATIENT
Start: 2021-12-31

## 2021-12-03 RX ORDER — DIPHENHYDRAMINE HYDROCHLORIDE 50 MG/ML
25 INJECTION, SOLUTION INTRAMUSCULAR; INTRAVENOUS AS NEEDED
Status: CANCELLED
Start: 2021-12-31

## 2021-12-03 RX ORDER — ARIPIPRAZOLE 400 MG
400 KIT INTRAMUSCULAR ONCE
Status: CANCELLED | OUTPATIENT
Start: 2021-12-30 | End: 2021-12-30

## 2021-12-03 RX ADMIN — ARIPIPRAZOLE 400 MG: KIT at 11:51

## 2021-12-03 NOTE — PROGRESS NOTES
[facility] Progress Note    Date: December 3, 2021    Name: Joyce Ortiz              MRN: [id number]              : 1995    Abilify Injection     Mr. Sheridan Brown was assessed and education was provided. Mr. Sushil Tee vitals were reviewed. Visit Vitals  /67 (BP 1 Location: Left upper arm, BP Patient Position: Sitting)   Pulse 86   Temp 97.5 °F (36.4 °C)   Resp 18   SpO2 96%       Ability 400 mg was administered IM in the left deltoid per request. No bleeding or redness noted. Band-aid applied  to the site.      Mr. Lazcano tolerated well, and had no complaints.       Discharge/ follow-up instructions discussed w/ pt. Pt verbalized understanding. Patient armband removed and shredded.     Mr. Sheridan Brown was discharged from [place] [address] in stable condition at 1155. He is to return on 21 at 1430 for his next appointment.     Quintin Raymundo RN  December 3, 2021

## 2021-12-29 ENCOUNTER — APPOINTMENT (OUTPATIENT)
Dept: INFUSION THERAPY | Age: 26
End: 2021-12-29
Payer: MEDICAID

## 2021-12-30 ENCOUNTER — HOSPITAL ENCOUNTER (OUTPATIENT)
Dept: INFUSION THERAPY | Age: 26
Discharge: HOME | End: 2021-12-30
Payer: MEDICAID

## 2021-12-30 VITALS
TEMPERATURE: 98 F | HEART RATE: 96 BPM | SYSTOLIC BLOOD PRESSURE: 114 MMHG | RESPIRATION RATE: 18 BRPM | OXYGEN SATURATION: 96 % | DIASTOLIC BLOOD PRESSURE: 74 MMHG

## 2021-12-30 DIAGNOSIS — F20.9 SCHIZOPHRENIA, UNSPECIFIED TYPE (HCC): Primary | ICD-10-CM

## 2021-12-30 PROCEDURE — 74011250636 HC RX REV CODE- 250/636: Performed by: NURSE PRACTITIONER

## 2021-12-30 PROCEDURE — 96372 THER/PROPH/DIAG INJ SC/IM: CPT

## 2021-12-30 RX ORDER — HYDROCORTISONE SODIUM SUCCINATE 100 MG/2ML
100 INJECTION, POWDER, FOR SOLUTION INTRAMUSCULAR; INTRAVENOUS AS NEEDED
Status: CANCELLED | OUTPATIENT
Start: 2022-01-27

## 2021-12-30 RX ORDER — DIPHENHYDRAMINE HYDROCHLORIDE 50 MG/ML
50 INJECTION, SOLUTION INTRAMUSCULAR; INTRAVENOUS AS NEEDED
Status: CANCELLED
Start: 2022-01-27

## 2021-12-30 RX ORDER — ACETAMINOPHEN 325 MG/1
650 TABLET ORAL AS NEEDED
Status: CANCELLED
Start: 2022-01-27

## 2021-12-30 RX ORDER — ALBUTEROL SULFATE 0.83 MG/ML
2.5 SOLUTION RESPIRATORY (INHALATION) AS NEEDED
Status: CANCELLED
Start: 2022-01-27

## 2021-12-30 RX ORDER — ONDANSETRON 2 MG/ML
8 INJECTION INTRAMUSCULAR; INTRAVENOUS AS NEEDED
Status: CANCELLED | OUTPATIENT
Start: 2022-01-27

## 2021-12-30 RX ORDER — ARIPIPRAZOLE 400 MG
400 KIT INTRAMUSCULAR ONCE
Status: COMPLETED | OUTPATIENT
Start: 2021-12-30 | End: 2021-12-30

## 2021-12-30 RX ORDER — DIPHENHYDRAMINE HYDROCHLORIDE 50 MG/ML
25 INJECTION, SOLUTION INTRAMUSCULAR; INTRAVENOUS AS NEEDED
Status: CANCELLED
Start: 2022-01-27

## 2021-12-30 RX ORDER — ARIPIPRAZOLE 400 MG
400 KIT INTRAMUSCULAR ONCE
Status: CANCELLED | OUTPATIENT
Start: 2022-01-27 | End: 2022-01-27

## 2021-12-30 RX ORDER — EPINEPHRINE 1 MG/ML
0.3 INJECTION, SOLUTION, CONCENTRATE INTRAVENOUS AS NEEDED
Status: CANCELLED | OUTPATIENT
Start: 2022-01-27

## 2021-12-30 RX ADMIN — ARIPIPRAZOLE 400 MG: KIT at 13:47

## 2021-12-30 NOTE — PROGRESS NOTES
[facility] Progress Note    Date: 2021    Name: Javy Mcgee              MRN: [id number]              : 1995    Abilify Injection     Mr. Salinas Bloom was assessed and education was provided. Mr. Josue Smith vitals were reviewed. Visit Vitals  /74 (BP 1 Location: Left upper arm, BP Patient Position: Sitting)   Pulse 96   Temp 98 °F (36.7 °C)   Resp 18   SpO2 96%       Ability 400 mg was administered IM in the left deltoid per request. No bleeding or redness noted. Band-aid applied  to the site.      Mr. Lazcano tolerated well, and had no complaints.       Discharge/ follow-up instructions discussed w/ pt. Pt verbalized understanding. Patient armband removed and shredded.     Mr. Salinas Bloom was discharged from [place] [address] in stable condition at 1350. He is to return on  2022 at 1130 for his next appointment.       Jo-Ann Ybarra  2021

## 2022-01-27 ENCOUNTER — HOSPITAL ENCOUNTER (OUTPATIENT)
Dept: INFUSION THERAPY | Age: 27
Discharge: HOME | End: 2022-01-27
Payer: MEDICAID

## 2022-01-27 VITALS
DIASTOLIC BLOOD PRESSURE: 75 MMHG | RESPIRATION RATE: 18 BRPM | TEMPERATURE: 98.2 F | SYSTOLIC BLOOD PRESSURE: 108 MMHG | OXYGEN SATURATION: 97 %

## 2022-01-27 DIAGNOSIS — F20.9 SCHIZOPHRENIA, UNSPECIFIED TYPE (HCC): Primary | ICD-10-CM

## 2022-01-27 PROCEDURE — 96372 THER/PROPH/DIAG INJ SC/IM: CPT

## 2022-01-27 PROCEDURE — 74011250636 HC RX REV CODE- 250/636: Performed by: NURSE PRACTITIONER

## 2022-01-27 RX ORDER — ARIPIPRAZOLE 400 MG
400 KIT INTRAMUSCULAR ONCE
Status: CANCELLED | OUTPATIENT
Start: 2022-02-24 | End: 2022-02-24

## 2022-01-27 RX ORDER — DIPHENHYDRAMINE HYDROCHLORIDE 50 MG/ML
25 INJECTION, SOLUTION INTRAMUSCULAR; INTRAVENOUS AS NEEDED
Status: CANCELLED
Start: 2022-02-24

## 2022-01-27 RX ORDER — ARIPIPRAZOLE 400 MG
400 KIT INTRAMUSCULAR ONCE
Status: COMPLETED | OUTPATIENT
Start: 2022-01-27 | End: 2022-01-27

## 2022-01-27 RX ORDER — ALBUTEROL SULFATE 0.83 MG/ML
2.5 SOLUTION RESPIRATORY (INHALATION) AS NEEDED
Status: CANCELLED
Start: 2022-02-24

## 2022-01-27 RX ORDER — DIPHENHYDRAMINE HYDROCHLORIDE 50 MG/ML
50 INJECTION, SOLUTION INTRAMUSCULAR; INTRAVENOUS AS NEEDED
Status: CANCELLED
Start: 2022-02-24

## 2022-01-27 RX ORDER — ACETAMINOPHEN 325 MG/1
650 TABLET ORAL AS NEEDED
Status: CANCELLED
Start: 2022-02-24

## 2022-01-27 RX ORDER — EPINEPHRINE 1 MG/ML
0.3 INJECTION, SOLUTION, CONCENTRATE INTRAVENOUS AS NEEDED
Status: CANCELLED | OUTPATIENT
Start: 2022-02-24

## 2022-01-27 RX ORDER — HYDROCORTISONE SODIUM SUCCINATE 100 MG/2ML
100 INJECTION, POWDER, FOR SOLUTION INTRAMUSCULAR; INTRAVENOUS AS NEEDED
Status: CANCELLED | OUTPATIENT
Start: 2022-02-24

## 2022-01-27 RX ORDER — ONDANSETRON 2 MG/ML
8 INJECTION INTRAMUSCULAR; INTRAVENOUS AS NEEDED
Status: CANCELLED | OUTPATIENT
Start: 2022-02-24

## 2022-01-27 RX ADMIN — ARIPIPRAZOLE 400 MG: KIT at 11:46

## 2022-01-27 NOTE — PROGRESS NOTES
[facility] Progress Note    Date: 2022    Name: Gabriela Cage              MRN: [id number]              : 1995    Abilify Injection     Mr. Loulou An arrived at [facility] at 11:35 and was assessed and education was provided. Mr. Juani Javier vitals were reviewed. Visit Vitals  /75 (BP 1 Location: Left upper arm, BP Patient Position: Sitting)   Temp 98.2 °F (36.8 °C)   Resp 18   SpO2 97%       Ability 400 mg was administered IM in the right deltoid per request. No bleeding or redness noted. Band-aid applied  to the site.      Mr. Lazcano tolerated well, and had no complaints.       Discharge/ follow-up instructions discussed w/ pt. Pt verbalized understanding. Patient armband removed and shredded.     Mr. Loulou An was discharged from [place] [address] in stable condition at 1150. He is to return on  2022 at 1130 for his next appointment.       Hilton Moses, RN,RN  2022

## 2022-02-24 ENCOUNTER — HOSPITAL ENCOUNTER (OUTPATIENT)
Dept: INFUSION THERAPY | Age: 27
Discharge: HOME | End: 2022-02-24
Payer: MEDICAID

## 2022-02-24 VITALS
DIASTOLIC BLOOD PRESSURE: 75 MMHG | TEMPERATURE: 97.4 F | SYSTOLIC BLOOD PRESSURE: 119 MMHG | RESPIRATION RATE: 16 BRPM | HEART RATE: 100 BPM | OXYGEN SATURATION: 100 %

## 2022-02-24 DIAGNOSIS — F20.9 SCHIZOPHRENIA, UNSPECIFIED TYPE (HCC): Primary | ICD-10-CM

## 2022-02-24 PROCEDURE — 96372 THER/PROPH/DIAG INJ SC/IM: CPT

## 2022-02-24 PROCEDURE — 74011250636 HC RX REV CODE- 250/636: Performed by: NURSE PRACTITIONER

## 2022-02-24 RX ORDER — ALBUTEROL SULFATE 0.83 MG/ML
2.5 SOLUTION RESPIRATORY (INHALATION) AS NEEDED
Status: CANCELLED
Start: 2022-03-24

## 2022-02-24 RX ORDER — ONDANSETRON 2 MG/ML
8 INJECTION INTRAMUSCULAR; INTRAVENOUS AS NEEDED
Status: CANCELLED | OUTPATIENT
Start: 2022-03-24

## 2022-02-24 RX ORDER — HYDROCORTISONE SODIUM SUCCINATE 100 MG/2ML
100 INJECTION, POWDER, FOR SOLUTION INTRAMUSCULAR; INTRAVENOUS AS NEEDED
Status: CANCELLED | OUTPATIENT
Start: 2022-03-24

## 2022-02-24 RX ORDER — ARIPIPRAZOLE 400 MG
400 KIT INTRAMUSCULAR ONCE
Status: COMPLETED | OUTPATIENT
Start: 2022-02-24 | End: 2022-02-24

## 2022-02-24 RX ORDER — ACETAMINOPHEN 325 MG/1
650 TABLET ORAL AS NEEDED
Status: CANCELLED
Start: 2022-03-24

## 2022-02-24 RX ORDER — ARIPIPRAZOLE 400 MG
400 KIT INTRAMUSCULAR ONCE
Status: CANCELLED | OUTPATIENT
Start: 2022-03-24 | End: 2022-03-24

## 2022-02-24 RX ORDER — DIPHENHYDRAMINE HYDROCHLORIDE 50 MG/ML
25 INJECTION, SOLUTION INTRAMUSCULAR; INTRAVENOUS AS NEEDED
Status: CANCELLED
Start: 2022-03-24

## 2022-02-24 RX ORDER — DIPHENHYDRAMINE HYDROCHLORIDE 50 MG/ML
50 INJECTION, SOLUTION INTRAMUSCULAR; INTRAVENOUS AS NEEDED
Status: CANCELLED
Start: 2022-03-24

## 2022-02-24 RX ORDER — EPINEPHRINE 1 MG/ML
0.3 INJECTION, SOLUTION, CONCENTRATE INTRAVENOUS AS NEEDED
Status: CANCELLED | OUTPATIENT
Start: 2022-03-24

## 2022-02-24 RX ADMIN — ARIPIPRAZOLE 400 MG: KIT at 13:16

## 2022-02-24 NOTE — PROGRESS NOTES
[facility] Progress Note    Date: 2022    Name: Tonya Joseph    MRN: [id number]         : 1995      Mr. Danay Bean arrived in the [facility] today at 12, in stable condition, here for Q 28 Day (Q 4 Week), IM Abilify Injection. He was assessed and education was provided. Mr. Deedee Chow vitals were reviewed. Visit Vitals  /75 (BP 1 Location: Left upper arm, BP Patient Position: Sitting)   Pulse 100   Temp 97.4 °F (36.3 °C)   Resp 16   SpO2 100%           ABILIFY MAINTENA (Aripiprazole) 400 mg, was given IM, in his left deltoid at 1316, per order, and without incident.   (would only allow me to administer the injection in his left deltoid)           Mr. Danay Bean tolerated well, and had no complaints. Mr. Danay Bean was discharged from [place] [address] in stable condition at 1320. Connie Henderson He is to return in 4 weeks, on Thursday, 3-24-22, at 1130, for his next appointment, for his next Abilify Injection.      Estela Grier RN  2022  1:05 PM

## 2022-03-19 PROBLEM — T42.6X2A: Status: ACTIVE | Noted: 2021-07-21

## 2022-03-24 ENCOUNTER — HOSPITAL ENCOUNTER (OUTPATIENT)
Dept: INFUSION THERAPY | Age: 27
Discharge: HOME | End: 2022-03-24
Payer: MEDICAID

## 2022-03-24 VITALS
TEMPERATURE: 98.8 F | RESPIRATION RATE: 16 BRPM | HEART RATE: 91 BPM | OXYGEN SATURATION: 99 % | DIASTOLIC BLOOD PRESSURE: 83 MMHG | SYSTOLIC BLOOD PRESSURE: 129 MMHG

## 2022-03-24 DIAGNOSIS — F20.9 SCHIZOPHRENIA, UNSPECIFIED TYPE (HCC): Primary | ICD-10-CM

## 2022-03-24 PROCEDURE — 96372 THER/PROPH/DIAG INJ SC/IM: CPT

## 2022-03-24 PROCEDURE — 74011250636 HC RX REV CODE- 250/636: Performed by: NURSE PRACTITIONER

## 2022-03-24 RX ORDER — ALBUTEROL SULFATE 0.83 MG/ML
2.5 SOLUTION RESPIRATORY (INHALATION) AS NEEDED
Status: CANCELLED
Start: 2022-04-21

## 2022-03-24 RX ORDER — ARIPIPRAZOLE 400 MG
400 KIT INTRAMUSCULAR ONCE
Status: COMPLETED | OUTPATIENT
Start: 2022-03-24 | End: 2022-03-24

## 2022-03-24 RX ORDER — ACETAMINOPHEN 325 MG/1
650 TABLET ORAL AS NEEDED
Status: CANCELLED
Start: 2022-04-21

## 2022-03-24 RX ORDER — ARIPIPRAZOLE 400 MG
400 KIT INTRAMUSCULAR ONCE
Status: CANCELLED | OUTPATIENT
Start: 2022-04-21 | End: 2022-04-21

## 2022-03-24 RX ORDER — ONDANSETRON 2 MG/ML
8 INJECTION INTRAMUSCULAR; INTRAVENOUS AS NEEDED
Status: CANCELLED | OUTPATIENT
Start: 2022-04-21

## 2022-03-24 RX ORDER — EPINEPHRINE 1 MG/ML
0.3 INJECTION, SOLUTION, CONCENTRATE INTRAVENOUS AS NEEDED
Status: CANCELLED | OUTPATIENT
Start: 2022-04-21

## 2022-03-24 RX ORDER — DIPHENHYDRAMINE HYDROCHLORIDE 50 MG/ML
25 INJECTION, SOLUTION INTRAMUSCULAR; INTRAVENOUS AS NEEDED
Status: CANCELLED
Start: 2022-04-21

## 2022-03-24 RX ORDER — DIPHENHYDRAMINE HYDROCHLORIDE 50 MG/ML
50 INJECTION, SOLUTION INTRAMUSCULAR; INTRAVENOUS AS NEEDED
Status: CANCELLED
Start: 2022-04-21

## 2022-03-24 RX ORDER — HYDROCORTISONE SODIUM SUCCINATE 100 MG/2ML
100 INJECTION, POWDER, FOR SOLUTION INTRAMUSCULAR; INTRAVENOUS AS NEEDED
Status: CANCELLED | OUTPATIENT
Start: 2022-04-21

## 2022-03-24 RX ADMIN — ARIPIPRAZOLE 400 MG: KIT at 14:45

## 2022-03-24 NOTE — PROGRESS NOTES
[facility] Progress Note    Date: 2022    Name: Jere Goodman    MRN: [id number]         : 1995     Abilify    Mr. Melvin Dubois arrived in the [facility] today at [phone], in stable condition, here for Q 28 Day (Q 4 Week), IM Abilify Injection. He was assessed and education was provided. Mr. Sanya Ellington vitals were reviewed. Patient Vitals for the past 12 hrs:   Temp Pulse Resp BP SpO2   22 1443 98.8 °F (37.1 °C) 91 16 129/83 99 %       ABILIFY MAINTENA (Aripiprazole) 400 mg, was given IM, in his left deltoid per order, and without incident. Mr. Melvin Dubois tolerated well, and had no complaints. Mr. Melvin Dubois was discharged from [place] [address] in stable condition at 1450. He is to return in 4 weeks, on Thursday, 22, at 1100, for his next appointment, for his next Abilify Injection.      Qi Caal RN  2022

## 2022-04-07 ENCOUNTER — HOSPITAL ENCOUNTER (EMERGENCY)
Age: 27
Discharge: STILL ADMITTED OR ADMINISTRATIVE | End: 2022-04-07

## 2022-04-18 ENCOUNTER — HOSPITAL ENCOUNTER (OUTPATIENT)
Dept: LAB | Age: 27
Discharge: HOME | End: 2022-04-18

## 2022-04-18 LAB — XX-LABCORP SPECIMEN COL,LCBCF: NORMAL

## 2022-04-18 PROCEDURE — 99001 SPECIMEN HANDLING PT-LAB: CPT

## 2022-04-21 ENCOUNTER — HOSPITAL ENCOUNTER (OUTPATIENT)
Dept: INFUSION THERAPY | Age: 27
Discharge: HOME | End: 2022-04-21
Payer: MEDICAID

## 2022-04-21 VITALS
RESPIRATION RATE: 16 BRPM | DIASTOLIC BLOOD PRESSURE: 77 MMHG | SYSTOLIC BLOOD PRESSURE: 134 MMHG | HEART RATE: 93 BPM | OXYGEN SATURATION: 97 % | TEMPERATURE: 97.7 F

## 2022-04-21 DIAGNOSIS — F20.9 SCHIZOPHRENIA, UNSPECIFIED TYPE (HCC): Primary | ICD-10-CM

## 2022-04-21 PROCEDURE — 96372 THER/PROPH/DIAG INJ SC/IM: CPT

## 2022-04-21 PROCEDURE — 74011250636 HC RX REV CODE- 250/636: Performed by: NURSE PRACTITIONER

## 2022-04-21 RX ORDER — DIPHENHYDRAMINE HYDROCHLORIDE 50 MG/ML
25 INJECTION, SOLUTION INTRAMUSCULAR; INTRAVENOUS AS NEEDED
Status: CANCELLED
Start: 2022-05-19

## 2022-04-21 RX ORDER — ARIPIPRAZOLE 400 MG
400 KIT INTRAMUSCULAR ONCE
Status: COMPLETED | OUTPATIENT
Start: 2022-04-21 | End: 2022-04-21

## 2022-04-21 RX ORDER — ONDANSETRON 2 MG/ML
8 INJECTION INTRAMUSCULAR; INTRAVENOUS AS NEEDED
Status: CANCELLED | OUTPATIENT
Start: 2022-05-19

## 2022-04-21 RX ORDER — DIPHENHYDRAMINE HYDROCHLORIDE 50 MG/ML
50 INJECTION, SOLUTION INTRAMUSCULAR; INTRAVENOUS AS NEEDED
Status: CANCELLED
Start: 2022-05-19

## 2022-04-21 RX ORDER — HYDROCORTISONE SODIUM SUCCINATE 100 MG/2ML
100 INJECTION, POWDER, FOR SOLUTION INTRAMUSCULAR; INTRAVENOUS AS NEEDED
Status: CANCELLED | OUTPATIENT
Start: 2022-05-19

## 2022-04-21 RX ORDER — ALBUTEROL SULFATE 0.83 MG/ML
2.5 SOLUTION RESPIRATORY (INHALATION) AS NEEDED
Status: CANCELLED
Start: 2022-05-19

## 2022-04-21 RX ORDER — ACETAMINOPHEN 325 MG/1
650 TABLET ORAL AS NEEDED
Status: CANCELLED
Start: 2022-05-19

## 2022-04-21 RX ORDER — ARIPIPRAZOLE 400 MG
400 KIT INTRAMUSCULAR ONCE
Status: CANCELLED | OUTPATIENT
Start: 2022-05-19 | End: 2022-05-19

## 2022-04-21 RX ORDER — EPINEPHRINE 1 MG/ML
0.3 INJECTION, SOLUTION, CONCENTRATE INTRAVENOUS AS NEEDED
Status: CANCELLED | OUTPATIENT
Start: 2022-05-19

## 2022-04-21 RX ADMIN — ARIPIPRAZOLE 400 MG: KIT at 11:19

## 2022-04-21 NOTE — PROGRESS NOTES
[facility] Progress Note    Date: 2022    Name: Wade Koo    MRN: [id number]         : 1995     Abilify    Mr. Estrellita Green arrived in the [facility] today at 1115, in stable condition, here for Q 28 Day (Q 4 Week), IM Abilify Injection. He was assessed and education was provided. Mr. Tami Wilkinson vitals were reviewed. Patient Vitals for the past 12 hrs:   Temp Pulse Resp BP SpO2   22 1115 97.7 °F (36.5 °C) 93 16 134/77 97 %       ABILIFY MAINTENA (Aripiprazole) 400 mg, was given IM, in his left deltoid per order, and without incident. Mr. Estrellita Green tolerated well, and had no complaints. Mr. Estrellita Green was discharged from [place] [address] in stable condition at 1125. He is to return in 4 weeks, on 22, at 1100, for his next appointment, for his next Abilify Injection.        Brisa Cobian  2022

## 2022-04-24 ENCOUNTER — HOSPITAL ENCOUNTER (EMERGENCY)
Age: 27
Discharge: LEFT BEFORE DISCHARGE | End: 2022-04-24

## 2022-05-19 ENCOUNTER — HOSPITAL ENCOUNTER (OUTPATIENT)
Dept: INFUSION THERAPY | Age: 27
Discharge: HOME | End: 2022-05-19
Payer: MEDICAID

## 2022-05-19 VITALS
DIASTOLIC BLOOD PRESSURE: 77 MMHG | SYSTOLIC BLOOD PRESSURE: 120 MMHG | HEART RATE: 78 BPM | TEMPERATURE: 97.4 F | OXYGEN SATURATION: 97 % | RESPIRATION RATE: 16 BRPM

## 2022-05-19 DIAGNOSIS — F20.9 SCHIZOPHRENIA, UNSPECIFIED TYPE (HCC): Primary | ICD-10-CM

## 2022-05-19 PROCEDURE — 96372 THER/PROPH/DIAG INJ SC/IM: CPT

## 2022-05-19 PROCEDURE — 74011250636 HC RX REV CODE- 250/636: Performed by: NURSE PRACTITIONER

## 2022-05-19 RX ORDER — ACETAMINOPHEN 325 MG/1
650 TABLET ORAL AS NEEDED
Status: CANCELLED
Start: 2022-06-16

## 2022-05-19 RX ORDER — ARIPIPRAZOLE 400 MG
400 KIT INTRAMUSCULAR ONCE
Status: CANCELLED | OUTPATIENT
Start: 2022-06-16 | End: 2022-06-16

## 2022-05-19 RX ORDER — ONDANSETRON 2 MG/ML
8 INJECTION INTRAMUSCULAR; INTRAVENOUS AS NEEDED
Status: CANCELLED | OUTPATIENT
Start: 2022-06-16

## 2022-05-19 RX ORDER — DIPHENHYDRAMINE HYDROCHLORIDE 50 MG/ML
50 INJECTION, SOLUTION INTRAMUSCULAR; INTRAVENOUS AS NEEDED
Status: CANCELLED
Start: 2022-06-16

## 2022-05-19 RX ORDER — ARIPIPRAZOLE 400 MG
400 KIT INTRAMUSCULAR ONCE
Status: COMPLETED | OUTPATIENT
Start: 2022-05-19 | End: 2022-05-19

## 2022-05-19 RX ORDER — DIPHENHYDRAMINE HYDROCHLORIDE 50 MG/ML
25 INJECTION, SOLUTION INTRAMUSCULAR; INTRAVENOUS AS NEEDED
Status: CANCELLED
Start: 2022-06-16

## 2022-05-19 RX ORDER — ALBUTEROL SULFATE 0.83 MG/ML
2.5 SOLUTION RESPIRATORY (INHALATION) AS NEEDED
Status: CANCELLED
Start: 2022-06-16

## 2022-05-19 RX ORDER — EPINEPHRINE 1 MG/ML
0.3 INJECTION, SOLUTION, CONCENTRATE INTRAVENOUS AS NEEDED
Status: CANCELLED | OUTPATIENT
Start: 2022-06-16

## 2022-05-19 RX ORDER — HYDROCORTISONE SODIUM SUCCINATE 100 MG/2ML
100 INJECTION, POWDER, FOR SOLUTION INTRAMUSCULAR; INTRAVENOUS AS NEEDED
Status: CANCELLED | OUTPATIENT
Start: 2022-06-16

## 2022-05-19 RX ADMIN — ARIPIPRAZOLE 400 MG: KIT at 11:23

## 2022-05-19 NOTE — PROGRESS NOTES
[facility] Progress Note    Date: May 19, 2022    Name: Kobe Morales    MRN: [id number]         : 1995     Abilify    Mr. Christina Boyce arrived in the [facility] today at 1115, in stable condition, here for Q 28 Day (Q 4 Week), IM Abilify Injection. He was assessed and education was provided. Mr. Bandar Chatman vitals were reviewed. Patient Vitals for the past 12 hrs:   Temp Pulse Resp BP SpO2   22 1115 97.4 °F (36.3 °C) 78 16 120/77 97 %       ABILIFY MAINTENA (Aripiprazole) 400 mg, was given IM, in his left deltoid per order, and without incident. Mr. Christina Boyce tolerated well, and had no complaints. Mr. Christina Boyce was discharged from [place] [address] in stable condition at 1125. He is to return in 4 weeks, on 22, at 1100, for his next appointment, for his next Abilify Injection.        Maggie Lenz  May 19, 2022

## 2022-06-08 ENCOUNTER — HOSPITAL ENCOUNTER (EMERGENCY)
Age: 27
Discharge: HOME | End: 2022-06-08
Attending: STUDENT IN AN ORGANIZED HEALTH CARE EDUCATION/TRAINING PROGRAM
Payer: MEDICAID

## 2022-06-08 VITALS
RESPIRATION RATE: 18 BRPM | DIASTOLIC BLOOD PRESSURE: 80 MMHG | HEART RATE: 99 BPM | OXYGEN SATURATION: 100 % | TEMPERATURE: 98.7 F | SYSTOLIC BLOOD PRESSURE: 132 MMHG

## 2022-06-08 DIAGNOSIS — H92.01 RIGHT EAR PAIN: Primary | ICD-10-CM

## 2022-06-08 PROCEDURE — 99283 EMERGENCY DEPT VISIT LOW MDM: CPT

## 2022-06-08 RX ORDER — IBUPROFEN 600 MG/1
600 TABLET ORAL
Qty: 20 TABLET | Refills: 0 | OUTPATIENT
Start: 2022-06-08 | End: 2022-06-11

## 2022-06-08 RX ORDER — CETIRIZINE HCL 10 MG
TABLET ORAL
Qty: 15 TABLET | Refills: 0 | Status: SHIPPED | OUTPATIENT
Start: 2022-06-08

## 2022-06-08 NOTE — ED PROVIDER NOTES
EMERGENCY DEPARTMENT HISTORY AND PHYSICAL EXAM    11:28 AM      Date: 2022  Patient Name: Isaias Patino    History of Presenting Illness     Chief Complaint   Patient presents with    Ear Pain         History Provided By: Patient    Additional History (Context): Isaias Patino is a 24 y.o. male with hx of ADHD, anxiety d/o, schizophrenia, and other noted PMH who presents with complaint of right ear pain x1 month. Notes pain radiates into head, causing mild headache. Patient denies fever or chills, sore throat, drainage, cough, nausea or vomiting. Notes he has not tried any medication for the symptoms prior to arrival.      PCP: Hyacinth Simmonds, NP    Current Outpatient Medications   Medication Sig Dispense Refill    cetirizine (ZyrTEC) 10 mg tablet Take one tablets by mouth daily for 7 days and then as needed after that. Restart forseven days if sinus congestion recurs. 15 Tablet 0    ibuprofen (MOTRIN) 600 mg tablet Take 1 Tablet by mouth every six (6) hours as needed for Pain. 20 Tablet 0    ARIPiprazole (ABILIFY MAINTENA) 400 mg injection 400 mg by IntraMUSCular route every four (4) weeks. 400 mg 0    divalproex ER (DEPAKOTE ER) 500 mg ER tablet Take 1 Tab by mouth two (2) times a day. 30 Tab 1    traZODone (DESYREL) 50 mg tablet Take 1 Tab by mouth nightly as needed for Sleep. 15 Tab 1       Past History     Past Medical History:  Past Medical History:   Diagnosis Date    ADHD (attention deficit hyperactivity disorder)     Anxiety disorder     Depression     Mood disorder (HCC)     Psychiatric disorder     [facility]     Schizophrenia [facility]     Suicidal ideation     Trauma     \"His mother  this past November\"       Past Surgical History:  No past surgical history on file.     Family History:  Family History   Problem Relation Age of Onset    Schizophrenia Mother     Schizophrenia Brother     Schizophrenia Sister        Social History:  Social History     Tobacco Use  Smoking status: Current Every Day Smoker    Smokeless tobacco: Never Used   Substance Use Topics    Alcohol use: Not Currently    Drug use: Not Currently     Types: Marijuana       Allergies:  No Known Allergies      Review of Systems       Review of Systems   Constitutional: Negative for chills and fever. HENT: Positive for ear pain. Respiratory: Negative for shortness of breath. Cardiovascular: Negative for chest pain. Gastrointestinal: Negative for abdominal pain, nausea and vomiting. Skin: Negative for rash. Neurological: Negative for weakness. All other systems reviewed and are negative. Physical Exam     Visit Vitals  /80 (BP 1 Location: Left upper arm, BP Patient Position: At rest)   Pulse 99   Temp 98.7 °F (37.1 °C)   Resp 18   SpO2 100%         Physical Exam  Vitals and nursing note reviewed. Constitutional:       General: He is not in acute distress. Appearance: Normal appearance. He is well-developed. He is not ill-appearing, toxic-appearing or diaphoretic. HENT:      Head: Normocephalic and atraumatic. Right Ear: Hearing, ear canal and external ear normal. No decreased hearing noted. No drainage or swelling. No middle ear effusion. There is no impacted cerumen. No mastoid tenderness. Tympanic membrane is bulging. Tympanic membrane is not erythematous. Left Ear: Hearing, tympanic membrane, ear canal and external ear normal. There is no impacted cerumen. Nose: Nose normal.      Mouth/Throat:      Mouth: Mucous membranes are moist.      Pharynx: Oropharynx is clear. Uvula midline. No pharyngeal swelling, oropharyngeal exudate, posterior oropharyngeal erythema or uvula swelling. Eyes:      General:         Right eye: No discharge. Left eye: No discharge. Extraocular Movements: Extraocular movements intact. Pupils: Pupils are equal, round, and reactive to light.    Cardiovascular:      Rate and Rhythm: Normal rate and regular rhythm. Heart sounds: Normal heart sounds. No murmur heard. No friction rub. No gallop. Pulmonary:      Effort: Pulmonary effort is normal. No respiratory distress. Breath sounds: Normal breath sounds. No wheezing or rales. Musculoskeletal:         General: Normal range of motion. Cervical back: Normal range of motion and neck supple. No rigidity. Lymphadenopathy:      Cervical: No cervical adenopathy. Skin:     General: Skin is warm. Findings: No rash. Neurological:      Mental Status: He is alert. Diagnostic Study Results     Labs -  No results found for this or any previous visit (from the past 12 hour(s)). Radiologic Studies -   No orders to display         Medical Decision Making   I am the first provider for this patient. I reviewed the vital signs, available nursing notes, past medical history, past surgical history, family history and social history. Vital Signs-Reviewed the patient's vital signs. Records Reviewed: Nursing Notes and Old Medical Records (Time of Review: 11:28 AM)    ED Course: Progress Notes, Reevaluation, and Consults:  11:28 AM  Reviewed plan with patient. Discussed need for close outpatient follow-up this week for reassessment. Discussed strict return precautions, including fever, drainage, or any other medical concerns. Pt in agreement with plan. Provider Notes (Medical Decision Making): 22-year-old male who presents to the ED due to right ear pain. Afebrile, nontoxic-appearing, looks well. No evidence of otitis media/externa, mastoiditis, cerumen impaction, strep pharyngitis. Patient is stable for discharge with symptomatic management, close outpatient follow-up for further assessment. Strict return precautions provided      Diagnosis     Clinical Impression:   1.  Right ear pain        Disposition: home     Follow-up Information     Follow up With Specialties Details Why [address] [facility] EMERGENCY DEPT Emergency Medicine If symptoms worsen [address] [employer]    Oral Devante, NP Nurse Practitioner Schedule an appointment as soon as possible for a visit   [address]  [phone]             Patient's Medications   Start Taking    CETIRIZINE (ZYRTEC) 10 MG TABLET    Take one tablets by mouth daily for 7 days and then as needed after that. Restart forseven days if sinus congestion recurs. IBUPROFEN (MOTRIN) 600 MG TABLET    Take 1 Tablet by mouth every six (6) hours as needed for Pain. Continue Taking    ARIPIPRAZOLE (ABILIFY MAINTENA) 400 MG INJECTION    400 mg by IntraMUSCular route every four (4) weeks. DIVALPROEX ER (DEPAKOTE ER) 500 MG ER TABLET    Take 1 Tab by mouth two (2) times a day. TRAZODONE (DESYREL) 50 MG TABLET    Take 1 Tab by mouth nightly as needed for Sleep. These Medications have changed    No medications on file   Stop Taking    No medications on file       Dictation disclaimer:  Please note that this dictation was completed with [email or web address], the [employer] voice recognition software. Quite often unanticipated grammatical, syntax, homophones, and other interpretive errors are inadvertently transcribed by the computer software. Please disregard these errors. Please excuse any errors that have escaped final proofreading.

## 2022-06-08 NOTE — Clinical Note
[address] [place] [address] Dr [facility] EMERGENCY DEPT  [address] [address] [phone]    Work/School Note    Date: 6/8/2022    To Whom It May concern:      Kofi Schmid was seen and treated today in the emergency room by the following provider(s):  Attending Provider: Shady Young MD  Physician Assistant: Timbo Levin, [address]. Kofi Schmid is excused from work/school on 06/08/22. He is clear to return to work/school on 06/09/22.         Sincerely,          GEORGES Diaz

## 2022-06-08 NOTE — Clinical Note
[address] [place] [address] Dr [facility] EMERGENCY DEPT  [address] [address] [phone]    Work/School Note    Date: 6/8/2022    To Whom It May concern:      Lars Ba was seen and treated today in the emergency room by the following provider(s):  Attending Provider: Guadalupe Cha MD  Physician Assistant: Arianna Gonzales. Lars Ba is excused from work/school on 06/08/22. He is clear to return to work/school on 06/09/22.         Sincerely,          GEORGES Broderick

## 2022-06-10 PROCEDURE — 99283 EMERGENCY DEPT VISIT LOW MDM: CPT

## 2022-06-11 ENCOUNTER — HOSPITAL ENCOUNTER (EMERGENCY)
Age: 27
Discharge: LEFT BEFORE DISCHARGE | End: 2022-06-11
Attending: EMERGENCY MEDICINE
Payer: MEDICAID

## 2022-06-11 VITALS
OXYGEN SATURATION: 99 % | HEIGHT: 71 IN | WEIGHT: 160 LBS | RESPIRATION RATE: 18 BRPM | TEMPERATURE: 98.1 F | BODY MASS INDEX: 22.4 KG/M2 | DIASTOLIC BLOOD PRESSURE: 87 MMHG | HEART RATE: 88 BPM | SYSTOLIC BLOOD PRESSURE: 142 MMHG

## 2022-06-11 DIAGNOSIS — H65.191 OTHER NON-RECURRENT ACUTE NONSUPPURATIVE OTITIS MEDIA OF RIGHT EAR: Primary | ICD-10-CM

## 2022-06-11 RX ORDER — IBUPROFEN 600 MG/1
600 TABLET ORAL
Qty: 20 TABLET | Refills: 0 | Status: SHIPPED | OUTPATIENT
Start: 2022-06-11

## 2022-06-11 RX ORDER — AMOXICILLIN 500 MG/1
1000 TABLET, FILM COATED ORAL 3 TIMES DAILY
Qty: 60 TABLET | Refills: 0 | Status: SHIPPED | OUTPATIENT
Start: 2022-06-11 | End: 2022-06-21

## 2022-06-11 RX ORDER — IBUPROFEN 600 MG/1
600 TABLET ORAL
Status: DISCONTINUED | OUTPATIENT
Start: 2022-06-11 | End: 2022-06-11 | Stop reason: HOSPADM

## 2022-06-11 RX ORDER — AMOXICILLIN 250 MG/1
1000 CAPSULE ORAL
Status: DISCONTINUED | OUTPATIENT
Start: 2022-06-11 | End: 2022-06-11 | Stop reason: HOSPADM

## 2022-06-11 NOTE — ED NOTES
Pt has not been seen in ER lobby. Checked with Security about pt.  has not seen pt neither in ER, nor in parking lot. Will chart that pt has eloped. Will inform PA.

## 2022-06-11 NOTE — ED PROVIDER NOTES
[facility] EMERGENCY DEPT    Date: 6/11/2022  Patient Name: Myra Harris    History of Presenting Illness     Chief Complaint   Patient presents with    Ear Pain    Headache     24 y.o. male presents the ED via EMS for complaints of right ear pain onset 1 month ago. Patient describes having a constant moderate throbbing, which worsened this evening. Patient did not over-the-counter medications for this. He denies any fever, chills, otorrhea, injury, other symptoms. Patient denies any other associated signs or symptoms. Patient denies any other complaints. Nursing notes regarding the HPI and triage nursing notes were reviewed. Prior medical records were reviewed    Current Facility-Administered Medications   Medication Dose Route Frequency Provider Last Rate Last Admin    amoxicillin (AMOXIL) capsule 1,000 mg  1,000 mg Oral NOW Romy Machuca PA        ibuprofen (MOTRIN) tablet 600 mg  600 mg Oral NOW GEORGES Fowler         Current Outpatient Medications   Medication Sig Dispense Refill    amoxicillin 500 mg tab Take 1,000 mg by mouth three (3) times daily for 10 days. 60 Tablet 0    ibuprofen (MOTRIN) 600 mg tablet Take 1 Tablet by mouth every six (6) hours as needed for Pain. 20 Tablet 0    cetirizine (ZyrTEC) 10 mg tablet Take one tablets by mouth daily for 7 days and then as needed after that. Restart forseven days if sinus congestion recurs. 15 Tablet 0    ARIPiprazole (ABILIFY MAINTENA) 400 mg injection 400 mg by IntraMUSCular route every four (4) weeks. 400 mg 0    divalproex ER (DEPAKOTE ER) 500 mg ER tablet Take 1 Tab by mouth two (2) times a day. 30 Tab 1    traZODone (DESYREL) 50 mg tablet Take 1 Tab by mouth nightly as needed for Sleep.  15 Tab 1       Past History     Past Medical History:  Past Medical History:   Diagnosis Date    ADHD (attention deficit hyperactivity disorder)     Anxiety disorder     Depression     [facility]     Psychiatric disorder     Psychotic disorder ([facility]     Schizophrenia ([facility]     Suicidal ideation     Trauma     \"His mother  this past November\"       Past Surgical History:  No past surgical history on file. Family History:  Family History   Problem Relation Age of Onset    Schizophrenia Mother     Schizophrenia Brother     Schizophrenia Sister        Social History:  Social History     Tobacco Use    Smoking status: Current Every Day Smoker    Smokeless tobacco: Never Used   Substance Use Topics    Alcohol use: Not Currently    Drug use: Not Currently     Types: Marijuana       Allergies:  No Known Allergies    Patient's primary care provider (as noted in EPIC):  Nely Mccarty NP    Review of Systems   Constitutional:  Denies malaise, fever, chills. Head:  Denies injury. Face:  Denies injury or pain. ENMT: + right ear pain. Neck:  Denies injury or pain. Chest:  Denies injury. Cardiac:  Denies chest pain or palpitations. Respiratory:  Denies cough, wheezing, difficulty breathing, shortness of breath. Neuro: + right sided headache  Skin: Denies injury, rash, itching or skin changes. All other systems negative as reviewed. Visit Vitals  BP (!) 142/87 (BP 1 Location: Left upper arm, BP Patient Position: Sitting)   Pulse 88   Temp 98.1 °F (36.7 °C)   Resp 18   Ht 5' 11\" (1.803 m)   Wt 72.6 kg (160 lb)   SpO2 99%   BMI 22.32 kg/m²       PHYSICAL EXAM:    CONSTITUTIONAL:  Alert, in no apparent distress;  well developed;  well nourished. HEAD:  Normocephalic, atraumatic. EYES:  EOMI. Non-icteric sclera. Normal conjunctiva. ENTM:  Nose:  no rhinorrhea. Throat:  no erythema or exudate, mucous membranes moist.  Ears:  Normal bilateral external ear canals. Left ear: Unremarkable  Right ear: TM injected  NECK:  Supple  RESPIRATORY:  Chest clear, equal breath sounds, good air movement. CARDIOVASCULAR:  Regular rate and rhythm. No murmurs, rubs, or gallops.   NEURO:  Moves all four extremities, and grossly normal motor exam.  SKIN:  No rashes;  Normal for age. PSYCH:  Alert and normal affect. IMPRESSION AND MEDICAL DECISION MAKING:  Based upon the patient's presentation with noted HPI and PE, along with the work up done in the emergency department, I believe that the patient is having otitis media. Rx for Motrin and amoxicillin. Patient given same in the ED. He may follow-up with primary care doctor, return for any acute worsening. Diagnosis:   1. Other non-recurrent acute nonsuppurative otitis media of right ear      Disposition: Discharge    Follow-up Information     Follow up With Specialties Details Why Contact Info    Christopher Chun NP Nurse Practitioner Schedule an appointment as soon as possible for a visit   [facility] [address]  [place] [id number]  [phone]      [facility] EMERGENCY DEPT Emergency Medicine  If symptoms worsen [address]  [phone]          Patient's Medications   Start Taking    AMOXICILLIN 500 MG TAB    Take 1,000 mg by mouth three (3) times daily for 10 days. IBUPROFEN (MOTRIN) 600 MG TABLET    Take 1 Tablet by mouth every six (6) hours as needed for Pain. Continue Taking    ARIPIPRAZOLE (ABILIFY MAINTENA) 400 MG INJECTION    400 mg by IntraMUSCular route every four (4) weeks. CETIRIZINE (ZYRTEC) 10 MG TABLET    Take one tablets by mouth daily for 7 days and then as needed after that. Restart forseven days if sinus congestion recurs. DIVALPROEX ER (DEPAKOTE ER) 500 MG ER TABLET    Take 1 Tab by mouth two (2) times a day. TRAZODONE (DESYREL) 50 MG TABLET    Take 1 Tab by mouth nightly as needed for Sleep. These Medications have changed    No medications on file   Stop Taking    IBUPROFEN (MOTRIN) 600 MG TABLET    Take 1 Tablet by mouth every six (6) hours as needed for Pain.      GEORGES Andrews

## 2022-06-11 NOTE — ED TRIAGE NOTES
Pt arrives ambulatory with Syed #3 with complaints of R ear pain causing R sided headache x1 month. Reports increased in R ear pain tonight leading him to call [facility] for transport. States pain was 9/10 at home, but took Motrin with slight symptom relief. Rates pain 2/10 at this time. Describes pain as ear fullness/pressure.        Past Medical History:   Diagnosis Date    ADHD (attention deficit hyperactivity disorder)     Anxiety disorder     Depression     [facility]     Psychiatric disorder     [facility] ([facility]     Schizophrenia ([facility]     Suicidal ideation     Trauma     \"His mother  this past November\"

## 2022-06-11 NOTE — ED NOTES
Attempted to medicate pt. Pt seen out side walking around ER entrance. Notified pt that I had medications in triage to give him. Pt continued to walk around entrance. Talking on cell phone. Will attempt to medicate once pt comes back into ER lobby.

## 2022-06-16 ENCOUNTER — HOSPITAL ENCOUNTER (OUTPATIENT)
Dept: INFUSION THERAPY | Age: 27
Discharge: HOME | End: 2022-06-16
Payer: MEDICAID

## 2022-06-16 VITALS
TEMPERATURE: 98.5 F | OXYGEN SATURATION: 100 % | DIASTOLIC BLOOD PRESSURE: 63 MMHG | SYSTOLIC BLOOD PRESSURE: 120 MMHG | HEART RATE: 88 BPM | RESPIRATION RATE: 16 BRPM

## 2022-06-16 DIAGNOSIS — F20.9 SCHIZOPHRENIA, UNSPECIFIED TYPE (HCC): Primary | ICD-10-CM

## 2022-06-16 PROCEDURE — 74011250636 HC RX REV CODE- 250/636: Performed by: NURSE PRACTITIONER

## 2022-06-16 PROCEDURE — 96372 THER/PROPH/DIAG INJ SC/IM: CPT

## 2022-06-16 RX ORDER — ONDANSETRON 2 MG/ML
8 INJECTION INTRAMUSCULAR; INTRAVENOUS AS NEEDED
Status: CANCELLED | OUTPATIENT
Start: 2022-07-14

## 2022-06-16 RX ORDER — DIPHENHYDRAMINE HYDROCHLORIDE 50 MG/ML
50 INJECTION, SOLUTION INTRAMUSCULAR; INTRAVENOUS AS NEEDED
Status: CANCELLED
Start: 2022-07-14

## 2022-06-16 RX ORDER — DIPHENHYDRAMINE HYDROCHLORIDE 50 MG/ML
25 INJECTION, SOLUTION INTRAMUSCULAR; INTRAVENOUS AS NEEDED
Status: CANCELLED
Start: 2022-07-14

## 2022-06-16 RX ORDER — ARIPIPRAZOLE 400 MG
400 KIT INTRAMUSCULAR ONCE
Status: CANCELLED | OUTPATIENT
Start: 2022-07-14 | End: 2022-07-14

## 2022-06-16 RX ORDER — ARIPIPRAZOLE 400 MG
400 KIT INTRAMUSCULAR ONCE
Status: COMPLETED | OUTPATIENT
Start: 2022-06-16 | End: 2022-06-16

## 2022-06-16 RX ORDER — EPINEPHRINE 1 MG/ML
0.3 INJECTION, SOLUTION, CONCENTRATE INTRAVENOUS AS NEEDED
Status: CANCELLED | OUTPATIENT
Start: 2022-07-14

## 2022-06-16 RX ORDER — ALBUTEROL SULFATE 0.83 MG/ML
2.5 SOLUTION RESPIRATORY (INHALATION) AS NEEDED
Status: CANCELLED
Start: 2022-07-14

## 2022-06-16 RX ORDER — ACETAMINOPHEN 325 MG/1
650 TABLET ORAL AS NEEDED
Status: CANCELLED
Start: 2022-07-14

## 2022-06-16 RX ORDER — HYDROCORTISONE SODIUM SUCCINATE 100 MG/2ML
100 INJECTION, POWDER, FOR SOLUTION INTRAMUSCULAR; INTRAVENOUS AS NEEDED
Status: CANCELLED | OUTPATIENT
Start: 2022-07-14

## 2022-06-16 RX ADMIN — ARIPIPRAZOLE 400 MG: KIT at 11:10

## 2022-06-16 NOTE — PROGRESS NOTES
[facility] OPIC Progress Note    Date: 2022    Name: Froylan Musa    MRN: [id number]         : 1995      Mr. Deedee Norton arrived in the [facility] today at 80, in stable condition, here for his Every 29 Day (Every 4 Week), IM Abilify Injection. He was assessed and education was provided. Mr. Patricia Noland vitals were reviewed. Visit Vitals  /63 (BP 1 Location: Left upper arm, BP Patient Position: Sitting)   Pulse 88   Temp 98.5 °F (36.9 °C)   Resp 16   SpO2 100%           ABILIFY MAINTENA (Aripiprazole) 400 mg, was given IM, in his LEFT deltoid at 1110, per order, and without incident.   (would only allow me to administer the injection in his left deltoid)           Mr. Deedee Norton tolerated well, and had no complaints. Mr. Deedee Norton was discharged from [place] [address] in stable condition at 1115. Susan Records He is to return in 4 weeks, on Thursday, 22, at 1030, for his next appointment, for his next Abilify Injection.      Miguel Gan RN  2022  11:00 AM

## 2022-07-14 ENCOUNTER — HOSPITAL ENCOUNTER (OUTPATIENT)
Dept: INFUSION THERAPY | Age: 27
Discharge: HOME | End: 2022-07-14
Payer: MEDICAID

## 2022-07-14 VITALS
TEMPERATURE: 98.4 F | SYSTOLIC BLOOD PRESSURE: 119 MMHG | RESPIRATION RATE: 16 BRPM | DIASTOLIC BLOOD PRESSURE: 79 MMHG | HEART RATE: 86 BPM | OXYGEN SATURATION: 99 %

## 2022-07-14 DIAGNOSIS — F20.9 SCHIZOPHRENIA, UNSPECIFIED TYPE (HCC): Primary | ICD-10-CM

## 2022-07-14 PROCEDURE — 74011250636 HC RX REV CODE- 250/636: Performed by: NURSE PRACTITIONER

## 2022-07-14 PROCEDURE — 96372 THER/PROPH/DIAG INJ SC/IM: CPT

## 2022-07-14 RX ORDER — ACETAMINOPHEN 325 MG/1
650 TABLET ORAL AS NEEDED
Status: CANCELLED
Start: 2022-08-11

## 2022-07-14 RX ORDER — DIPHENHYDRAMINE HYDROCHLORIDE 50 MG/ML
25 INJECTION, SOLUTION INTRAMUSCULAR; INTRAVENOUS AS NEEDED
Status: CANCELLED
Start: 2022-08-11

## 2022-07-14 RX ORDER — ONDANSETRON 2 MG/ML
8 INJECTION INTRAMUSCULAR; INTRAVENOUS AS NEEDED
Status: CANCELLED | OUTPATIENT
Start: 2022-08-11

## 2022-07-14 RX ORDER — HYDROCORTISONE SODIUM SUCCINATE 100 MG/2ML
100 INJECTION, POWDER, FOR SOLUTION INTRAMUSCULAR; INTRAVENOUS AS NEEDED
Status: CANCELLED | OUTPATIENT
Start: 2022-08-11

## 2022-07-14 RX ORDER — ARIPIPRAZOLE 400 MG
400 KIT INTRAMUSCULAR ONCE
Status: COMPLETED | OUTPATIENT
Start: 2022-07-14 | End: 2022-07-14

## 2022-07-14 RX ORDER — ARIPIPRAZOLE 400 MG
400 KIT INTRAMUSCULAR ONCE
Status: CANCELLED | OUTPATIENT
Start: 2022-08-11 | End: 2022-08-11

## 2022-07-14 RX ORDER — ALBUTEROL SULFATE 0.83 MG/ML
2.5 SOLUTION RESPIRATORY (INHALATION) AS NEEDED
Status: CANCELLED
Start: 2022-08-11

## 2022-07-14 RX ORDER — DIPHENHYDRAMINE HYDROCHLORIDE 50 MG/ML
50 INJECTION, SOLUTION INTRAMUSCULAR; INTRAVENOUS AS NEEDED
Status: CANCELLED
Start: 2022-08-11

## 2022-07-14 RX ORDER — EPINEPHRINE 1 MG/ML
0.3 INJECTION, SOLUTION, CONCENTRATE INTRAVENOUS AS NEEDED
Status: CANCELLED | OUTPATIENT
Start: 2022-08-11

## 2022-07-14 RX ADMIN — ARIPIPRAZOLE 400 MG: KIT at 10:50

## 2022-07-14 NOTE — PROGRESS NOTES
[facility] OPIC Progress Note    Date: 2022    Name: Patsy Wolf    MRN: [id number]         : 1995      Mr. Vahid Miller arrived in the [facility] today at 200, in stable condition, here for his Every 29 Day (Every 4 Week), IM Abilify Injection. He was assessed and education was provided. Mr. Francie Miller vitals were reviewed. Visit Vitals  /79 (BP 1 Location: Right upper arm, BP Patient Position: Sitting)   Pulse 86   Temp 98.4 °F (36.9 °C)   Resp 16   SpO2 99%           ABILIFY MAINTENA (Aripiprazole) 400 mg, was given IM, in his LEFT deltoid at 1050, per order, and without incident.   (would only allow me to administer the injection in his left deltoid)           Mr. Vahid Miller tolerated well, and had no complaints. Mr. Vahid Miller was discharged from [place] [address] in stable condition at 1055. Mauro Bowens He is to return in 4 weeks, on Thursday, 22, at 1130, for his next appointment, for his next Abilify Injection.      Anna Rivas RN  2022  10:45 AM

## 2022-08-11 ENCOUNTER — HOSPITAL ENCOUNTER (OUTPATIENT)
Dept: INFUSION THERAPY | Age: 27
Discharge: HOME | End: 2022-08-11
Payer: MEDICAID

## 2022-08-11 VITALS
TEMPERATURE: 98.9 F | HEART RATE: 89 BPM | SYSTOLIC BLOOD PRESSURE: 125 MMHG | OXYGEN SATURATION: 97 % | RESPIRATION RATE: 16 BRPM | DIASTOLIC BLOOD PRESSURE: 77 MMHG

## 2022-08-11 DIAGNOSIS — F20.9 SCHIZOPHRENIA, UNSPECIFIED TYPE (HCC): Primary | ICD-10-CM

## 2022-08-11 PROCEDURE — 74011250636 HC RX REV CODE- 250/636: Performed by: NURSE PRACTITIONER

## 2022-08-11 PROCEDURE — 96372 THER/PROPH/DIAG INJ SC/IM: CPT

## 2022-08-11 RX ORDER — DIPHENHYDRAMINE HYDROCHLORIDE 50 MG/ML
50 INJECTION, SOLUTION INTRAMUSCULAR; INTRAVENOUS AS NEEDED
Status: CANCELLED
Start: 2022-09-08

## 2022-08-11 RX ORDER — ARIPIPRAZOLE 400 MG
400 KIT INTRAMUSCULAR ONCE
Status: CANCELLED | OUTPATIENT
Start: 2022-09-08 | End: 2022-09-08

## 2022-08-11 RX ORDER — HYDROCORTISONE SODIUM SUCCINATE 100 MG/2ML
100 INJECTION, POWDER, FOR SOLUTION INTRAMUSCULAR; INTRAVENOUS AS NEEDED
Status: CANCELLED | OUTPATIENT
Start: 2022-09-08

## 2022-08-11 RX ORDER — DIPHENHYDRAMINE HYDROCHLORIDE 50 MG/ML
25 INJECTION, SOLUTION INTRAMUSCULAR; INTRAVENOUS AS NEEDED
Status: CANCELLED
Start: 2022-09-08

## 2022-08-11 RX ORDER — EPINEPHRINE 1 MG/ML
0.3 INJECTION, SOLUTION, CONCENTRATE INTRAVENOUS AS NEEDED
Status: CANCELLED | OUTPATIENT
Start: 2022-09-08

## 2022-08-11 RX ORDER — ACETAMINOPHEN 325 MG/1
650 TABLET ORAL AS NEEDED
Status: CANCELLED
Start: 2022-09-08

## 2022-08-11 RX ORDER — ONDANSETRON 2 MG/ML
8 INJECTION INTRAMUSCULAR; INTRAVENOUS AS NEEDED
Status: CANCELLED | OUTPATIENT
Start: 2022-09-08

## 2022-08-11 RX ORDER — ARIPIPRAZOLE 400 MG
400 KIT INTRAMUSCULAR ONCE
Status: COMPLETED | OUTPATIENT
Start: 2022-08-11 | End: 2022-08-11

## 2022-08-11 RX ORDER — ALBUTEROL SULFATE 0.83 MG/ML
2.5 SOLUTION RESPIRATORY (INHALATION) AS NEEDED
Status: CANCELLED
Start: 2022-09-08

## 2022-08-11 RX ADMIN — ARIPIPRAZOLE 400 MG: KIT at 11:37

## 2022-08-11 NOTE — PROGRESS NOTES
[facility] Progress Note    Date: 2022    Name: Rashid Vega    MRN: [id number]         : 1995      Mr. Sherita Veras arrived in the [facility] today at 36, in stable condition, here for his Every 29 Day (Every 4 Week), IM Abilify Injection. He was assessed and education was provided. Mr. Morelia Man vitals were reviewed. Visit Vitals  /77 (BP 1 Location: Right upper arm, BP Patient Position: Sitting)   Pulse 89   Temp 98.9 °F (37.2 °C)   Resp 16   SpO2 97%           ABILIFY MAINTENA (Aripiprazole) 400 mg, was given IM, in his RIGHT deltoid. Band-aid to site. Mr. Sherita Veras tolerated well, and had no complaints. Mr. Sherita Veras was discharged from [place] [address] in stable condition at 1140. He is to return in 4 weeks, on 22, at 1130, for his next appointment, for his next Abilify Injection.        Lashaun Mendez  2022

## 2022-08-20 ENCOUNTER — HOSPITAL ENCOUNTER (EMERGENCY)
Age: 27
Discharge: HOME | End: 2022-08-20
Attending: STUDENT IN AN ORGANIZED HEALTH CARE EDUCATION/TRAINING PROGRAM
Payer: MEDICAID

## 2022-08-20 VITALS
DIASTOLIC BLOOD PRESSURE: 71 MMHG | SYSTOLIC BLOOD PRESSURE: 111 MMHG | OXYGEN SATURATION: 97 % | RESPIRATION RATE: 16 BRPM | HEART RATE: 79 BPM | TEMPERATURE: 97.9 F

## 2022-08-20 DIAGNOSIS — F20.9 SCHIZOPHRENIA, UNSPECIFIED TYPE (HCC): Primary | ICD-10-CM

## 2022-08-20 LAB
ALBUMIN SERPL-MCNC: 3.8 G/DL (ref 3.4–5)
ALBUMIN/GLOB SERPL: 1.1 {RATIO} (ref 0.8–1.7)
ALP SERPL-CCNC: 79 U/L (ref 45–117)
ALT SERPL-CCNC: 45 U/L (ref 16–61)
AMPHET UR QL SCN: NEGATIVE
ANION GAP SERPL CALC-SCNC: 5 MMOL/L (ref 3–18)
AST SERPL-CCNC: 28 U/L (ref 10–38)
BARBITURATES UR QL SCN: NEGATIVE
BASOPHILS # BLD: 0 K/UL (ref 0–0.1)
BASOPHILS NFR BLD: 1 % (ref 0–2)
BENZODIAZ UR QL: NEGATIVE
BILIRUB SERPL-MCNC: 0.3 MG/DL (ref 0.2–1)
BUN SERPL-MCNC: 11 MG/DL (ref 7–18)
BUN/CREAT SERPL: 11 (ref 12–20)
CALCIUM SERPL-MCNC: 9 MG/DL (ref 8.5–10.1)
CANNABINOIDS UR QL SCN: NEGATIVE
CHLORIDE SERPL-SCNC: 105 MMOL/L (ref 100–111)
CO2 SERPL-SCNC: 29 MMOL/L (ref 21–32)
COCAINE UR QL SCN: NEGATIVE
CREAT SERPL-MCNC: 0.96 MG/DL (ref 0.6–1.3)
DIFFERENTIAL METHOD BLD: ABNORMAL
EOSINOPHIL # BLD: 0.2 K/UL (ref 0–0.4)
EOSINOPHIL NFR BLD: 3 % (ref 0–5)
ERYTHROCYTE [DISTWIDTH] IN BLOOD BY AUTOMATED COUNT: 12.3 % (ref 11.6–14.5)
ETHANOL SERPL-MCNC: <3 MG/DL (ref 0–3)
FLUAV RNA SPEC QL NAA+PROBE: NOT DETECTED
FLUBV RNA SPEC QL NAA+PROBE: NOT DETECTED
GLOBULIN SER CALC-MCNC: 3.5 G/DL (ref 2–4)
GLUCOSE SERPL-MCNC: 91 MG/DL (ref 74–99)
HCT VFR BLD AUTO: 44.2 % (ref 36–48)
HDSCOM,HDSCOM: NORMAL
HGB BLD-MCNC: 15.6 G/DL (ref 13–16)
IMM GRANULOCYTES # BLD AUTO: 0 K/UL (ref 0–0.04)
IMM GRANULOCYTES NFR BLD AUTO: 0 % (ref 0–0.5)
LYMPHOCYTES # BLD: 4.4 K/UL (ref 0.9–3.6)
LYMPHOCYTES NFR BLD: 52 % (ref 21–52)
MCH RBC QN AUTO: 30.4 PG (ref 24–34)
MCHC RBC AUTO-ENTMCNC: 35.3 G/DL (ref 31–37)
MCV RBC AUTO: 86.2 FL (ref 78–100)
METHADONE UR QL: NEGATIVE
MONOCYTES # BLD: 0.6 K/UL (ref 0.05–1.2)
MONOCYTES NFR BLD: 8 % (ref 3–10)
NEUTS SEG # BLD: 3.2 K/UL (ref 1.8–8)
NEUTS SEG NFR BLD: 37 % (ref 40–73)
NRBC # BLD: 0 K/UL (ref 0–0.01)
NRBC BLD-RTO: 0 PER 100 WBC
OPIATES UR QL: NEGATIVE
PCP UR QL: NEGATIVE
PLATELET # BLD AUTO: 218 K/UL (ref 135–420)
PMV BLD AUTO: 10.2 FL (ref 9.2–11.8)
POTASSIUM SERPL-SCNC: 3.7 MMOL/L (ref 3.5–5.5)
PROT SERPL-MCNC: 7.3 G/DL (ref 6.4–8.2)
RBC # BLD AUTO: 5.13 M/UL (ref 4.35–5.65)
SARS-COV-2, COV2: NOT DETECTED
SODIUM SERPL-SCNC: 139 MMOL/L (ref 136–145)
WBC # BLD AUTO: 8.6 K/UL (ref 4.6–13.2)

## 2022-08-20 PROCEDURE — 99285 EMERGENCY DEPT VISIT HI MDM: CPT

## 2022-08-20 PROCEDURE — 87636 SARSCOV2 & INF A&B AMP PRB: CPT

## 2022-08-20 PROCEDURE — 80053 COMPREHEN METABOLIC PANEL: CPT

## 2022-08-20 PROCEDURE — 85025 COMPLETE CBC W/AUTO DIFF WBC: CPT

## 2022-08-20 PROCEDURE — 80307 DRUG TEST PRSMV CHEM ANLYZR: CPT

## 2022-08-20 PROCEDURE — 82077 ASSAY SPEC XCP UR&BREATH IA: CPT
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
## 2022-08-20 NOTE — ED NOTES
Patient's Aunt called to report that the patient stated that he really needs help she states that he feels like running away and was missing for three days. She states that he hearing voices that are telling him to do something to somebody and he does not want to do what they are saying. He takes a monthly shot but does not take the other pills for about six months now.

## 2022-08-20 NOTE — ED PROVIDER NOTES
HPI   Is a 71-year-old male who presents with a request to see crisis to get back on his medications. Patient has a known history of schizophrenia and states he has been off his medications for approximately 3 months. He states that he was initially having suicidal ideations however these have since passed. He did not have a plan but does not think he would actually go through with that. He denies any drug or alcohol use. He denies any physical complaints or concerns at this time. He does have an outpatient psychiatrist that he last saw a month ago but states he was only given ADHD medications and he needs more. Past Medical History:   Diagnosis Date    ADHD (attention deficit hyperactivity disorder)     Anxiety disorder     Depression     Mood disorder ([facility]     Psychiatric disorder     Psychotic disorder ([facility]     [facility]     Suicidal ideation     Trauma     \"His mother  this past November\"       History reviewed. No pertinent surgical history.       Family History:   Problem Relation Age of Onset    Schizophrenia Mother     Schizophrenia Brother     Schizophrenia Sister        Social History     Socioeconomic History    Marital status: SINGLE     Spouse name: Not on file    Number of children: Not on file    Years of education: Not on file    Highest education level: Not on file   Occupational History    Not on file   Tobacco Use    Smoking status: Every Day    Smokeless tobacco: Never   Substance and Sexual Activity    Alcohol use: Not Currently    Drug use: Not Currently     Types: Marijuana    Sexual activity: Never   Other Topics Concern    Poor oral hygiene Not Asked    Bike safety Not Asked    Vehicle safety Not Asked   Social History Narrative    Not on file     Social Determinants of Health     Financial Resource Strain: Not on file   Food Insecurity: Not on file   Transportation Needs: Not on file   Physical Activity: Not on file   Stress: Not on file   Social Connections: Not on file   Intimate Partner Violence: Not on file   Housing Stability: Not on file         ALLERGIES: Patient has no known allergies. Review of Systems  Constitutional: No fever  HENT: No ear pain  Eyes: No change in vision  Respiratory: No SOB  Cardio: No chest pain  GI: No blood in stool  : No hematuria  MSK: No back pain  Skin: No rashes  Neuro: No headache    Vitals:    08/20/22 0343   BP: 111/71   Pulse: 79   Resp: 16   Temp: 97.9 °F (36.6 °C)   SpO2: 97%            Physical Exam   General: No acute distress  Head: Normocephalic, atraumatic  Psych: Cooperative and alert  Eyes: No scleral icterus, normal conjunctiva  ENT: Moist oral mucosa  Neck: Supple  CV: Regular rate and rhythm  Pulm: Clear breath sounds bilaterally without any wheezing or rhonchi, normal respiratory rate  GI: Normal bowel sounds, soft, non-tender  MSK: Moves all four extremities  Skin: No rashes  Neuro: Alert and conversive    MDM  Patient is a 21-year-old male who presents with fleeting suicidal ideation and requests a crisis to discuss his medications. Patient has a known history of schizophrenia and mental health issues. He does not have any concerning physical findings and has stable vitals. Do not think patient is a threat to himself or others at this time however per his request we will have him evaluated by crisis and set up with additional resources. Patient will be medically cleared for evaluation. CBC, CMP, alcohol level, UDS are within normal limits. This point patient is medically clear for psychiatric evaluation.        Procedures

## 2022-08-20 NOTE — DISCHARGE INSTRUCTIONS
Please return to the ER with any new or worsening symptoms or any other concerns. Please return to the Emergency Department if you develop a fever, chills, cannot eat or drink due to nausea or vomiting, or if any of your symptoms worsen. Also, It is extremely important that you follow-up with a primary care physician and if you do not have one currently use the contact information provided to obtain an appointment. If none was provided please call the number on the back of your insurance card to locate a Primary care doctor. Many offices have \"cancellation lists\" that you can ask to be placed on; should a patient with an earlier appointment cancel you will be notified to take their place. Please return to the Emergency Room immediately if your symptoms worsen. Please carefully read all discharge instructions    [email or web address]    What are GoodRx coupons? GoodRx coupons will help you pay less than the cash price for your prescription. [place] [place] free to use and are accepted at virtually every [place] pharmacy.   Your pharmacist will know how to enter the codes on the coupon to pull up the lowest discount available

## 2022-08-20 NOTE — ED NOTES
Discussed patient case with crisis who believes that patient is stable enough to be cleared for discharge. We will plan to discharge patient.

## 2022-08-26 NOTE — BH NOTES
Patient has been awake at intervals. Noted to pace some in his room . Has been out in the day area for a snack. Denies feeling agitated or anxious. Refused offer of prn medication. Rakesh Minor A/P:  PADMINI JAIMES is a 29y  now PPD#2 s/p spontaneous vaginal delivery at 41w1d gestation, c/b b/l labial laceration, lower uterine segment atony (s/p Methergine and Cytotec MS) who is currently doing well.   - Vital signs stable  - Hgb: 10.3 -> 10.1  - Patient is O- (s/p Rhogam on ), will receive Rhogam prior to discharge  - SW consult prior to discharge     - Voiding, tolerating PO, bowel function nml   -Advance care as tolerated   -Continue routine postpartum care and education  -Healthy male infant, desires circumcision  -Dispo: Patient to be discharged today when meeting all postpartum and postoperative milestones and pending attending approval.

## 2022-09-08 ENCOUNTER — HOSPITAL ENCOUNTER (OUTPATIENT)
Dept: INFUSION THERAPY | Age: 27
Discharge: HOME | End: 2022-09-08
Payer: MEDICAID

## 2022-09-08 VITALS
HEART RATE: 90 BPM | OXYGEN SATURATION: 97 % | SYSTOLIC BLOOD PRESSURE: 110 MMHG | RESPIRATION RATE: 16 BRPM | DIASTOLIC BLOOD PRESSURE: 71 MMHG | TEMPERATURE: 98.8 F

## 2022-09-08 DIAGNOSIS — F20.9 SCHIZOPHRENIA, UNSPECIFIED TYPE (HCC): Primary | ICD-10-CM

## 2022-09-08 PROCEDURE — 74011250636 HC RX REV CODE- 250/636: Performed by: NURSE PRACTITIONER

## 2022-09-08 PROCEDURE — 96372 THER/PROPH/DIAG INJ SC/IM: CPT

## 2022-09-08 RX ORDER — ARIPIPRAZOLE 400 MG
400 KIT INTRAMUSCULAR ONCE
Status: COMPLETED | OUTPATIENT
Start: 2022-09-08 | End: 2022-09-08

## 2022-09-08 RX ORDER — DIPHENHYDRAMINE HYDROCHLORIDE 50 MG/ML
50 INJECTION, SOLUTION INTRAMUSCULAR; INTRAVENOUS AS NEEDED
Status: CANCELLED
Start: 2022-10-06

## 2022-09-08 RX ORDER — HYDROCORTISONE SODIUM SUCCINATE 100 MG/2ML
100 INJECTION, POWDER, FOR SOLUTION INTRAMUSCULAR; INTRAVENOUS AS NEEDED
Status: CANCELLED | OUTPATIENT
Start: 2022-10-06

## 2022-09-08 RX ORDER — ARIPIPRAZOLE 400 MG
400 KIT INTRAMUSCULAR ONCE
Status: CANCELLED | OUTPATIENT
Start: 2022-10-06 | End: 2022-10-06

## 2022-09-08 RX ORDER — DIPHENHYDRAMINE HYDROCHLORIDE 50 MG/ML
25 INJECTION, SOLUTION INTRAMUSCULAR; INTRAVENOUS AS NEEDED
Status: CANCELLED
Start: 2022-10-06

## 2022-09-08 RX ORDER — ONDANSETRON 2 MG/ML
8 INJECTION INTRAMUSCULAR; INTRAVENOUS AS NEEDED
Status: CANCELLED | OUTPATIENT
Start: 2022-10-06

## 2022-09-08 RX ORDER — ALBUTEROL SULFATE 0.83 MG/ML
2.5 SOLUTION RESPIRATORY (INHALATION) AS NEEDED
Status: CANCELLED
Start: 2022-10-06

## 2022-09-08 RX ORDER — ACETAMINOPHEN 325 MG/1
650 TABLET ORAL AS NEEDED
Status: CANCELLED
Start: 2022-10-06

## 2022-09-08 RX ORDER — EPINEPHRINE 1 MG/ML
0.3 INJECTION, SOLUTION, CONCENTRATE INTRAVENOUS AS NEEDED
Status: CANCELLED | OUTPATIENT
Start: 2022-10-06

## 2022-09-08 RX ADMIN — ARIPIPRAZOLE 400 MG: KIT at 11:45

## 2022-09-08 NOTE — BH NOTES
GROUP THERAPY PROGRESS NOTE Laly Du is participating in [place] [facility] [employer]. Group time: 30 minutes Goal orientation: personal 
 
Group therapy participation: active Therapeutic interventions reviewed and discussed: Unit guidelines and daily routine were reviewed. Patients chose a positive quote to share and discuss with the group. Impression of participation:   Williams Henderson fully participated in the group. He gave positive feedback to peers. Patient called back, left  regarding [facility]scope on 9/14.

## 2022-09-08 NOTE — PROGRESS NOTES
[facility] Progress Note    Date: 2022    Name: Brian Elizabeth    MRN: [id number]         : 1995      Mr. Cirilo Kisre arrived in the [facility] today at [address] [place] [address], in stable condition, here for his Every 29 Day (Every 4 Week), IM Abilify Injection. He was assessed and education was provided. Mr. Iain Mills vitals were reviewed. Visit Vitals  /71 (BP 1 Location: Right upper arm, BP Patient Position: Sitting)   Pulse 90   Temp 98.8 °F (37.1 °C)   Resp 16   SpO2 97%           ABILIFY MAINTENA (Aripiprazole) 400 mg, was given IM, in his LEFT deltoid at 1145, per order, and without incident. Mr. Cirilo Kiser tolerated well, and had no complaints. Mr. Cirilo Kiser was discharged from [place] [address] in stable condition at 1150. Juan Godoy He is to return in 4 weeks, on Thursday, 10-6-22, at 1130, for his next appointment, for his next Abilify Injection.      Og Lyon RN  2022  11:45 AM

## 2022-10-06 ENCOUNTER — HOSPITAL ENCOUNTER (OUTPATIENT)
Dept: INFUSION THERAPY | Age: 27
Discharge: HOME | End: 2022-10-06
Payer: MEDICAID

## 2022-10-06 VITALS
DIASTOLIC BLOOD PRESSURE: 81 MMHG | SYSTOLIC BLOOD PRESSURE: 122 MMHG | RESPIRATION RATE: 16 BRPM | OXYGEN SATURATION: 100 % | HEART RATE: 93 BPM | TEMPERATURE: 97.6 F

## 2022-10-06 DIAGNOSIS — F20.9 SCHIZOPHRENIA, UNSPECIFIED TYPE (HCC): Primary | ICD-10-CM

## 2022-10-06 PROCEDURE — 74011250636 HC RX REV CODE- 250/636: Performed by: NURSE PRACTITIONER

## 2022-10-06 PROCEDURE — 96372 THER/PROPH/DIAG INJ SC/IM: CPT

## 2022-10-06 RX ORDER — ONDANSETRON 2 MG/ML
8 INJECTION INTRAMUSCULAR; INTRAVENOUS AS NEEDED
Status: CANCELLED | OUTPATIENT
Start: 2022-11-03

## 2022-10-06 RX ORDER — ARIPIPRAZOLE 400 MG
400 KIT INTRAMUSCULAR ONCE
Status: CANCELLED | OUTPATIENT
Start: 2022-11-03 | End: 2022-11-03

## 2022-10-06 RX ORDER — ALBUTEROL SULFATE 0.83 MG/ML
2.5 SOLUTION RESPIRATORY (INHALATION) AS NEEDED
Status: CANCELLED
Start: 2022-11-03

## 2022-10-06 RX ORDER — HYDROCORTISONE SODIUM SUCCINATE 100 MG/2ML
100 INJECTION, POWDER, FOR SOLUTION INTRAMUSCULAR; INTRAVENOUS AS NEEDED
Status: CANCELLED | OUTPATIENT
Start: 2022-11-03

## 2022-10-06 RX ORDER — DIPHENHYDRAMINE HYDROCHLORIDE 50 MG/ML
50 INJECTION, SOLUTION INTRAMUSCULAR; INTRAVENOUS AS NEEDED
Status: CANCELLED
Start: 2022-11-03

## 2022-10-06 RX ORDER — DIPHENHYDRAMINE HYDROCHLORIDE 50 MG/ML
25 INJECTION, SOLUTION INTRAMUSCULAR; INTRAVENOUS AS NEEDED
Status: CANCELLED
Start: 2022-11-03

## 2022-10-06 RX ORDER — EPINEPHRINE 1 MG/ML
0.3 INJECTION, SOLUTION, CONCENTRATE INTRAVENOUS AS NEEDED
Status: CANCELLED | OUTPATIENT
Start: 2022-11-03

## 2022-10-06 RX ORDER — ACETAMINOPHEN 325 MG/1
650 TABLET ORAL AS NEEDED
Status: CANCELLED
Start: 2022-11-03

## 2022-10-06 RX ORDER — ARIPIPRAZOLE 400 MG
400 KIT INTRAMUSCULAR ONCE
Status: COMPLETED | OUTPATIENT
Start: 2022-10-06 | End: 2022-10-06

## 2022-10-06 RX ADMIN — ARIPIPRAZOLE 400 MG: KIT at 11:33

## 2022-10-06 NOTE — PROGRESS NOTES
[facility] Progress Note    Date: 2022    Name: Debbie Diez    MRN: [id number]         : 1995      Mr. Tamela Mcmullen arrived in the [facility] today at 80, here for his Every 29 Day (Every 4 Week), IM Abilify Injection. He was assessed and education was provided. Mr. Silvana Villavicencio vitals were reviewed. Visit Vitals  /81 (BP 1 Location: Right upper arm, BP Patient Position: Sitting)   Pulse 93   Temp 97.6 °F (36.4 °C)   Resp 16   SpO2 100%       ABILIFY MAINTENA (Aripiprazole) 400 mg, was given IM, in his Right Deltoid. Band-Aid to site. Mr. Tamela Mcmullen tolerated well, and had no complaints. Armband removed and shredded. Mr. Tamela Mcmullen was discharged from [place] [address] in stable condition at 1140. He is to return on 11/3/22 at 1300, for his next appointment, for his next Abilify Injection.      Maame Cruz, LENNOX  2022

## 2022-11-03 ENCOUNTER — HOSPITAL ENCOUNTER (OUTPATIENT)
Dept: INFUSION THERAPY | Age: 27
Discharge: HOME | End: 2022-11-03
Payer: MEDICAID

## 2022-11-03 VITALS
HEART RATE: 102 BPM | TEMPERATURE: 97.9 F | RESPIRATION RATE: 16 BRPM | OXYGEN SATURATION: 96 % | SYSTOLIC BLOOD PRESSURE: 135 MMHG | DIASTOLIC BLOOD PRESSURE: 80 MMHG

## 2022-11-03 DIAGNOSIS — F20.9 SCHIZOPHRENIA, UNSPECIFIED TYPE (HCC): Primary | ICD-10-CM

## 2022-11-03 PROCEDURE — 96372 THER/PROPH/DIAG INJ SC/IM: CPT

## 2022-11-03 PROCEDURE — 74011250636 HC RX REV CODE- 250/636: Performed by: NURSE PRACTITIONER

## 2022-11-03 RX ORDER — ARIPIPRAZOLE 400 MG
400 KIT INTRAMUSCULAR ONCE
Start: 2022-12-02 | End: 2022-12-02

## 2022-11-03 RX ORDER — DIPHENHYDRAMINE HYDROCHLORIDE 50 MG/ML
50 INJECTION, SOLUTION INTRAMUSCULAR; INTRAVENOUS AS NEEDED
Start: 2022-12-01

## 2022-11-03 RX ORDER — ALBUTEROL SULFATE 0.83 MG/ML
2.5 SOLUTION RESPIRATORY (INHALATION) AS NEEDED
Start: 2022-12-01

## 2022-11-03 RX ORDER — ONDANSETRON 2 MG/ML
8 INJECTION INTRAMUSCULAR; INTRAVENOUS AS NEEDED
OUTPATIENT
Start: 2022-12-01

## 2022-11-03 RX ORDER — ACETAMINOPHEN 325 MG/1
650 TABLET ORAL AS NEEDED
Start: 2022-12-01

## 2022-11-03 RX ORDER — ARIPIPRAZOLE 400 MG
400 KIT INTRAMUSCULAR ONCE
Status: COMPLETED | OUTPATIENT
Start: 2022-11-03 | End: 2022-11-03

## 2022-11-03 RX ORDER — EPINEPHRINE 1 MG/ML
0.3 INJECTION, SOLUTION, CONCENTRATE INTRAVENOUS AS NEEDED
OUTPATIENT
Start: 2022-12-01

## 2022-11-03 RX ORDER — HYDROCORTISONE SODIUM SUCCINATE 100 MG/2ML
100 INJECTION, POWDER, FOR SOLUTION INTRAMUSCULAR; INTRAVENOUS AS NEEDED
OUTPATIENT
Start: 2022-12-01

## 2022-11-03 RX ORDER — DIPHENHYDRAMINE HYDROCHLORIDE 50 MG/ML
25 INJECTION, SOLUTION INTRAMUSCULAR; INTRAVENOUS AS NEEDED
Start: 2022-12-01

## 2022-11-03 RX ADMIN — ARIPIPRAZOLE 400 MG: KIT at 13:33

## 2022-11-03 NOTE — PROGRESS NOTES
[facility] Progress Note    Date: November 3, 2022    Name: Maxx Bautista    MRN: [id number]         : 1995    Abilify Injection. Arrived ambulatory to [facility] at 1325, accompanied by care giver. No complaints or concerns voiced      Mr. Halina Proctor was assessed and education was provided. Mr. Kenton Abarca vitals were reviewed and patient was observed for 5 minutes prior to treatment. Visit Vitals  /80 (BP 1 Location: Left upper arm, BP Patient Position: Sitting)   Pulse (!) 102   Temp 97.9 °F (36.6 °C)   Resp 16   SpO2 96%       Ability 400 mg was administered IM in the left deltoid. No bleeding or redness noted. Band-aid applied  to the site. Mr. Halina Proctor tolerated well, and had no complaints. Reviewed discharge instructions with patient. He verbalized understanding. Patient armband removed and shredded. Mr. Halina Proctor was discharged from [place] [address] in stable condition at 1335. He is to return on 2022 at 1100 for his next appointment.     Austin Gonzalez RN  November 3, 2022

## 2022-12-02 ENCOUNTER — HOSPITAL ENCOUNTER (OUTPATIENT)
Dept: INFUSION THERAPY | Age: 27
End: 2022-12-02
Payer: MEDICAID

## 2022-12-02 VITALS
TEMPERATURE: 97.5 F | HEART RATE: 90 BPM | OXYGEN SATURATION: 99 % | SYSTOLIC BLOOD PRESSURE: 124 MMHG | DIASTOLIC BLOOD PRESSURE: 86 MMHG | RESPIRATION RATE: 18 BRPM

## 2022-12-02 DIAGNOSIS — F20.9 SCHIZOPHRENIA, UNSPECIFIED TYPE (HCC): Primary | ICD-10-CM

## 2022-12-02 PROCEDURE — 96372 THER/PROPH/DIAG INJ SC/IM: CPT

## 2022-12-02 PROCEDURE — 74011250636 HC RX REV CODE- 250/636: Performed by: NURSE PRACTITIONER

## 2022-12-02 RX ORDER — ALBUTEROL SULFATE 0.83 MG/ML
2.5 SOLUTION RESPIRATORY (INHALATION) AS NEEDED
Start: 2022-12-30

## 2022-12-02 RX ORDER — ACETAMINOPHEN 325 MG/1
650 TABLET ORAL AS NEEDED
Start: 2022-12-30

## 2022-12-02 RX ORDER — ARIPIPRAZOLE 400 MG
400 KIT INTRAMUSCULAR ONCE
Status: COMPLETED | OUTPATIENT
Start: 2022-12-02 | End: 2022-12-02

## 2022-12-02 RX ORDER — DIPHENHYDRAMINE HYDROCHLORIDE 50 MG/ML
25 INJECTION, SOLUTION INTRAMUSCULAR; INTRAVENOUS AS NEEDED
Start: 2022-12-30

## 2022-12-02 RX ORDER — HYDROCORTISONE SODIUM SUCCINATE 100 MG/2ML
100 INJECTION, POWDER, FOR SOLUTION INTRAMUSCULAR; INTRAVENOUS AS NEEDED
OUTPATIENT
Start: 2022-12-30

## 2022-12-02 RX ORDER — ONDANSETRON 2 MG/ML
8 INJECTION INTRAMUSCULAR; INTRAVENOUS AS NEEDED
OUTPATIENT
Start: 2022-12-30

## 2022-12-02 RX ORDER — ARIPIPRAZOLE 400 MG
400 KIT INTRAMUSCULAR ONCE
Start: 2022-12-30 | End: 2022-12-30

## 2022-12-02 RX ORDER — EPINEPHRINE 1 MG/ML
0.3 INJECTION, SOLUTION, CONCENTRATE INTRAVENOUS AS NEEDED
OUTPATIENT
Start: 2022-12-30

## 2022-12-02 RX ORDER — DIPHENHYDRAMINE HYDROCHLORIDE 50 MG/ML
50 INJECTION, SOLUTION INTRAMUSCULAR; INTRAVENOUS AS NEEDED
Start: 2022-12-30

## 2022-12-02 RX ADMIN — ARIPIPRAZOLE 400 MG: KIT at 11:13

## 2022-12-02 NOTE — PROGRESS NOTES
[facility] Progress Note    Date: 2022    Name: Vera Villafana    MRN: [id number]         : 1995      Mr. Nicole Woodruff arrived in the [facility] today at 12, here for his Every 29 Day (Every 4 Week), IM Abilify Injection. He was assessed and education was provided. Caregiver is not with patient today. Pt voices no complaints or concerns. Mr. Kristyn Baldwin vitals were reviewed. Visit Vitals  /86 (BP 1 Location: Left upper arm, BP Patient Position: Sitting)   Pulse 90   Temp 97.5 °F (36.4 °C)   Resp 18   SpO2 99%       ABILIFY MAINTENA (Aripiprazole) 400 mg given IM to LEFT Deltoid per order. Band-Aid to site. Mr. Nicole Woodruff tolerated well, and had no complaints. Patient armband removed and shredded. I have reviewed discharge instructions with the patient. The patient verbalized understanding. Mr. Nicole Woodruff was discharged from [place] [address] in stable condition at 1115. He is to return on 22 at 1100, for his next appointment, for his next Abilify Injection.      Bird Ramirez RN  2022

## 2022-12-30 ENCOUNTER — HOSPITAL ENCOUNTER (OUTPATIENT)
Dept: INFUSION THERAPY | Age: 27
End: 2022-12-30
Payer: MEDICAID

## 2022-12-30 VITALS
OXYGEN SATURATION: 98 % | RESPIRATION RATE: 18 BRPM | TEMPERATURE: 98.1 F | SYSTOLIC BLOOD PRESSURE: 120 MMHG | DIASTOLIC BLOOD PRESSURE: 77 MMHG | HEART RATE: 87 BPM

## 2022-12-30 DIAGNOSIS — F20.9 SCHIZOPHRENIA, UNSPECIFIED TYPE (HCC): Primary | ICD-10-CM

## 2022-12-30 PROCEDURE — 96372 THER/PROPH/DIAG INJ SC/IM: CPT

## 2022-12-30 PROCEDURE — 74011250636 HC RX REV CODE- 250/636: Performed by: NURSE PRACTITIONER

## 2022-12-30 RX ORDER — HYDROCORTISONE SODIUM SUCCINATE 100 MG/2ML
100 INJECTION, POWDER, FOR SOLUTION INTRAMUSCULAR; INTRAVENOUS AS NEEDED
OUTPATIENT
Start: 2023-01-27

## 2022-12-30 RX ORDER — ALBUTEROL SULFATE 0.83 MG/ML
2.5 SOLUTION RESPIRATORY (INHALATION) AS NEEDED
Start: 2023-01-27

## 2022-12-30 RX ORDER — DIPHENHYDRAMINE HYDROCHLORIDE 50 MG/ML
25 INJECTION, SOLUTION INTRAMUSCULAR; INTRAVENOUS AS NEEDED
Start: 2023-01-27

## 2022-12-30 RX ORDER — ARIPIPRAZOLE 400 MG
400 KIT INTRAMUSCULAR ONCE
Status: COMPLETED | OUTPATIENT
Start: 2022-12-30 | End: 2022-12-30

## 2022-12-30 RX ORDER — ACETAMINOPHEN 325 MG/1
650 TABLET ORAL AS NEEDED
Start: 2023-01-27

## 2022-12-30 RX ORDER — EPINEPHRINE 1 MG/ML
0.3 INJECTION, SOLUTION, CONCENTRATE INTRAVENOUS AS NEEDED
OUTPATIENT
Start: 2023-01-27

## 2022-12-30 RX ORDER — ARIPIPRAZOLE 400 MG
400 KIT INTRAMUSCULAR ONCE
Start: 2023-01-27 | End: 2023-01-27

## 2022-12-30 RX ORDER — ONDANSETRON 2 MG/ML
8 INJECTION INTRAMUSCULAR; INTRAVENOUS AS NEEDED
OUTPATIENT
Start: 2023-01-27

## 2022-12-30 RX ORDER — DIPHENHYDRAMINE HYDROCHLORIDE 50 MG/ML
50 INJECTION, SOLUTION INTRAMUSCULAR; INTRAVENOUS AS NEEDED
Start: 2023-01-27

## 2022-12-30 RX ADMIN — ARIPIPRAZOLE 400 MG: KIT at 10:58

## 2022-12-30 NOTE — PROGRESS NOTES
[facility] Progress Note    Date: 2022    Name: Carmien Grayson    MRN: [id number]         : 1995      Mr. Hasmukh Lorenzo arrived in the [facility] today at 1, here for his Every 29 Day (Every 4 Week), IM Abilify Injection. He was assessed and education was provided. Caregiver is not with patient today. Pt voices no complaints or concerns. Mr. Leandra Grayson vitals were reviewed. Visit Vitals  /77 (BP 1 Location: Left upper arm, BP Patient Position: Sitting)   Pulse 87   Temp 98.1 °F (36.7 °C)   Resp 18   SpO2 98%       ABILIFY MAINTENA (Aripiprazole) 400 mg given IM to LEFT Deltoid per order. Band-Aid to site. Mr. Hasmukh Lorenzo tolerated well, and had no complaints. Patient armband removed and shredded. I have reviewed discharge instructions with the patient. The patient verbalized understanding. Mr. Hasmukh Lorenzo was discharged from [place] [address] in stable condition at 1105. He is to return on 2023 at 80, for his next appointment, for his next Abilify Injection.      Sathya Mcguire RN  2022

## 2023-01-19 ENCOUNTER — HOSPITAL ENCOUNTER (EMERGENCY)
Age: 28
Discharge: LEFT BEFORE DISCHARGE | End: 2023-01-20
Payer: MEDICAID

## 2023-01-19 DIAGNOSIS — R44.3 HALLUCINATIONS: Primary | ICD-10-CM

## 2023-01-19 PROCEDURE — 75810000275 HC EMERGENCY DEPT VISIT NO LEVEL OF CARE

## 2023-01-24 RX ORDER — ONDANSETRON 2 MG/ML
8 INJECTION INTRAMUSCULAR; INTRAVENOUS
OUTPATIENT
Start: 2023-02-23

## 2023-01-24 RX ORDER — ACETAMINOPHEN 325 MG/1
650 TABLET ORAL
OUTPATIENT
Start: 2023-02-23

## 2023-01-24 RX ORDER — SODIUM CHLORIDE 9 MG/ML
INJECTION, SOLUTION INTRAVENOUS CONTINUOUS
OUTPATIENT
Start: 2023-02-23

## 2023-01-24 RX ORDER — DIPHENHYDRAMINE HYDROCHLORIDE 50 MG/ML
50 INJECTION INTRAMUSCULAR; INTRAVENOUS
OUTPATIENT
Start: 2023-02-23

## 2023-01-24 RX ORDER — ALBUTEROL SULFATE 90 UG/1
4 AEROSOL, METERED RESPIRATORY (INHALATION) PRN
OUTPATIENT
Start: 2023-02-23

## 2023-01-24 RX ORDER — EPINEPHRINE 1 MG/ML
0.3 INJECTION, SOLUTION, CONCENTRATE INTRAVENOUS PRN
OUTPATIENT
Start: 2023-02-23

## 2023-01-26 ENCOUNTER — HOSPITAL ENCOUNTER (OUTPATIENT)
Dept: INFUSION THERAPY | Age: 28
End: 2023-01-26
Payer: MEDICAID

## 2023-01-26 VITALS
SYSTOLIC BLOOD PRESSURE: 127 MMHG | TEMPERATURE: 97.8 F | OXYGEN SATURATION: 100 % | RESPIRATION RATE: 18 BRPM | HEART RATE: 110 BPM | DIASTOLIC BLOOD PRESSURE: 80 MMHG

## 2023-01-26 DIAGNOSIS — F20.9 SCHIZOPHRENIA, UNSPECIFIED TYPE (HCC): Primary | ICD-10-CM

## 2023-01-26 PROCEDURE — 96372 THER/PROPH/DIAG INJ SC/IM: CPT

## 2023-01-26 PROCEDURE — 74011250636 HC RX REV CODE- 250/636: Performed by: NURSE PRACTITIONER

## 2023-01-26 RX ORDER — EPINEPHRINE 1 MG/ML
0.3 INJECTION, SOLUTION, CONCENTRATE INTRAVENOUS AS NEEDED
OUTPATIENT
Start: 2023-02-23

## 2023-01-26 RX ORDER — DIPHENHYDRAMINE HYDROCHLORIDE 50 MG/ML
25 INJECTION, SOLUTION INTRAMUSCULAR; INTRAVENOUS AS NEEDED
Start: 2023-02-23

## 2023-01-26 RX ORDER — DIPHENHYDRAMINE HYDROCHLORIDE 50 MG/ML
50 INJECTION, SOLUTION INTRAMUSCULAR; INTRAVENOUS AS NEEDED
Start: 2023-02-23

## 2023-01-26 RX ORDER — ALBUTEROL SULFATE 0.83 MG/ML
2.5 SOLUTION RESPIRATORY (INHALATION) AS NEEDED
Start: 2023-02-23

## 2023-01-26 RX ORDER — HYDROCORTISONE SODIUM SUCCINATE 100 MG/2ML
100 INJECTION, POWDER, FOR SOLUTION INTRAMUSCULAR; INTRAVENOUS AS NEEDED
OUTPATIENT
Start: 2023-02-23

## 2023-01-26 RX ORDER — ARIPIPRAZOLE 400 MG
400 KIT INTRAMUSCULAR ONCE
Status: COMPLETED | OUTPATIENT
Start: 2023-01-26 | End: 2023-01-26

## 2023-01-26 RX ORDER — ONDANSETRON 2 MG/ML
8 INJECTION INTRAMUSCULAR; INTRAVENOUS AS NEEDED
OUTPATIENT
Start: 2023-02-23

## 2023-01-26 RX ORDER — ACETAMINOPHEN 325 MG/1
650 TABLET ORAL AS NEEDED
Start: 2023-02-23

## 2023-01-26 RX ORDER — ARIPIPRAZOLE 400 MG
400 KIT INTRAMUSCULAR ONCE
Start: 2023-02-23 | End: 2023-02-23

## 2023-01-26 RX ADMIN — ARIPIPRAZOLE 400 MG: KIT at 10:53

## 2023-02-14 ENCOUNTER — HOSPITAL ENCOUNTER (EMERGENCY)
Facility: HOSPITAL | Age: 28
Discharge: HOME | End: 2023-02-14
Attending: EMERGENCY MEDICINE
Payer: MEDICAID

## 2023-02-14 VITALS
HEART RATE: 95 BPM | RESPIRATION RATE: 18 BRPM | SYSTOLIC BLOOD PRESSURE: 112 MMHG | BODY MASS INDEX: 21 KG/M2 | OXYGEN SATURATION: 99 % | HEIGHT: 71 IN | DIASTOLIC BLOOD PRESSURE: 90 MMHG | TEMPERATURE: 98.2 F | WEIGHT: 150 LBS

## 2023-02-14 DIAGNOSIS — H92.01 RIGHT EAR PAIN: Primary | ICD-10-CM

## 2023-02-14 PROCEDURE — 99283 EMERGENCY DEPT VISIT LOW MDM: CPT

## 2023-02-14 RX ORDER — NEOMYCIN SULFATE, POLYMYXIN B SULFATE, HYDROCORTISONE 3.5; 10000; 1 MG/ML; [USP'U]/ML; MG/ML
2 SOLUTION/ DROPS AURICULAR (OTIC) EVERY 8 HOURS SCHEDULED
Qty: 2 ML | Refills: 0 | Status: SHIPPED | OUTPATIENT
Start: 2023-02-14 | End: 2023-02-21

## 2023-02-14 ASSESSMENT — ENCOUNTER SYMPTOMS
COUGH: 0
VOMITING: 0
SHORTNESS OF BREATH: 0
ABDOMINAL PAIN: 0
DIARRHEA: 0
RHINORRHEA: 0
CHEST TIGHTNESS: 0
NAUSEA: 0
SORE THROAT: 0
CONSTIPATION: 0

## 2023-02-14 NOTE — DISCHARGE INSTRUCTIONS
Take tylenol or ibuprofen as needed for pain. Place 2 drops in the right ear every 8 hours for 7 days. Follow-up with PCP within the next 2 days in order to be re-evaluated.

## 2023-02-14 NOTE — ED PROVIDER NOTES
EMERGENCY DEPARTMENT HISTORY AND PHYSICAL EXAM    5:50 PM      Date: 2023  Patient Name: Wilman Deleon    History of Presenting Illness     Chief Complaint   Patient presents with    Ear Problem         History Provided By: the patient. Additional History (Context): Wilman Deleon is a 24 y.o. male with a history of ADHD, anxiety, depression, schizophrenia, and previous suicidal ideation presenting to the emergency department due to concerns for ear pain. States that the ear pain started yesterday. States that it is on the right side and is sharp and stabbing in nature. Patient denies any drainage from the area. Denies any fever or chills. States that he has had pain like this in the past and was previously seen for ear infections. Patient states that yesterday he did pour peroxide into his ear and cleaned it with Q-tips to help remove anything causing pain. Denies sore throat, rhinorrhea, congestion, left-sided ear pain, chest pain, shortness of breath, abdominal pain, nausea, vomiting, diarrhea, constipation. PCP: PATRICIA Stuart NP    No current facility-administered medications for this encounter. Current Outpatient Medications   Medication Sig Dispense Refill    neomycin-polymyxin-hydrocortisone 1 % SOLN otic solution Place 2 drops into the right ear every 8 hours for 7 days 2 mL 0       Past History     Past Medical History:  Past Medical History:   Diagnosis Date    ADHD (attention deficit hyperactivity disorder)     Anxiety disorder     Depression     Mood disorder ([facility]     Psychiatric disorder     Psychotic disorder ([facility]     Schizophrenia ([facility]     Suicidal ideation     Trauma     \"His mother  this past November\"       Past Surgical History:  No past surgical history on file.     Family History:  Family History   Problem Relation Age of Onset    Schizophrenia Mother     Schizophrenia Brother     Schizophrenia Sister        Social History:  Social History     Tobacco Use    Smoking status: Every Day    Smokeless tobacco: Never   Substance Use Topics    Alcohol use: Not Currently    Drug use: Not Currently     Types: Marijuana (Weed)       Allergies:  No Known Allergies      Review of Systems       Review of Systems   Constitutional:  Negative for chills and fever.   HENT:  Positive for ear pain. Negative for congestion, ear discharge, rhinorrhea and sore throat.    Respiratory:  Negative for cough, chest tightness and shortness of breath.    Cardiovascular:  Negative for chest pain.   Gastrointestinal:  Negative for abdominal pain, constipation, diarrhea, nausea and vomiting.   Skin:  Negative for rash and wound.       Physical Exam   BP (!) 112/90   Pulse 95   Temp 98.2 °F (36.8 °C) (Oral)   Resp 18   Ht 5' 11\" (1.803 m)   Wt 150 lb (68 kg)   SpO2 99%   BMI 20.92 kg/m²       Physical Exam  Vitals and nursing note reviewed.   Constitutional:       General: He is awake. He is not in acute distress.     Appearance: Normal appearance. He is well-developed, well-groomed and overweight. He is not ill-appearing.   HENT:      Head: Normocephalic and atraumatic.      Right Ear: Tympanic membrane and external ear normal. No drainage, swelling or tenderness. Tympanic membrane is not injected, perforated, erythematous, retracted or bulging.      Left Ear: Tympanic membrane, ear canal and external ear normal.      Ears:      Comments: Mild erythema of the right ear canal. TM intact with no abnormalities.  Right ear able to be manipulated without pain.  No exudate from the ear.  Left ear with no abnormalities.    No evidence of mastoiditis.  No erythema or edema to the mastoid process.  No tenderness on palpation.     Mouth/Throat:      Mouth: Mucous membranes are moist. No oral lesions.      Pharynx: Oropharynx is clear. Uvula midline. No pharyngeal swelling, oropharyngeal exudate or posterior oropharyngeal erythema.      Tonsils: No tonsillar exudate.   Neck:      Comments: No  cervical lymphadenopathy. Cardiovascular:      Rate and Rhythm: Normal rate and regular rhythm. Pulses: Normal pulses. Heart sounds: Normal heart sounds. Pulmonary:      Effort: Pulmonary effort is normal. No tachypnea or respiratory distress. Breath sounds: Normal breath sounds and air entry. No decreased breath sounds, wheezing, rhonchi or rales. Abdominal:      General: Abdomen is flat. Bowel sounds are normal. There is no distension. Palpations: Abdomen is soft. Tenderness: There is no abdominal tenderness. Musculoskeletal:      Cervical back: Full passive range of motion without pain. Right lower leg: No edema. Left lower leg: No edema. Lymphadenopathy:      Cervical: No cervical adenopathy. Skin:     General: Skin is warm and dry. Capillary Refill: Capillary refill takes less than 2 seconds. Neurological:      Mental Status: He is alert. Psychiatric:         Behavior: Behavior is cooperative. Diagnostic Study Results     Labs -  No results found for this or any previous visit (from the past 12 hour(s)). Radiologic Studies -   No orders to display         Medical Decision Making   I am the first provider for this patient. I reviewed the vital signs, available nursing notes, past medical history, past surgical history, family history and social history. Vital Signs-Reviewed the patient's vital signs. Pulse Oximetry Analysis -  99% on room air (Interpretation)    Records Reviewed: Prior medical records(Time of Review: 5:50 PM)    ED Course: Progress Notes, Reevaluation, and Consults:      Provider Notes (Medical Decision Making):  Ddx: AOM, OE, TM perforation, mastoiditis, cerumen impaction    Patient is a 22-year-old male with a history of ADHD, anxiety, depression, schizophrenia, and previous suicidal ideation presenting to the emergency department due to concerns for right ear pain.   On exam, patient is alert, oriented x3, in no acute distress. Heart regular rate and rhythm. No murmurs appreciated. No lower extremity edema. Lungs clear to auscultation bilaterally. Abdomen is soft and nontender. Head is NC/AT. Neck ROM intact without pain. No cervical lymphadenopathy. Left ear with no abnormalities. Right ear with mild erythema of the internal canal.  TM intact without bulging or retractions. No erythema of the TM. External ear normal.  No tenderness, erythema, or edema of the right mastoid. Posterior pharynx without erythema or edema. No exudate in posterior pharynx or bilateral tonsils. Suspect right inner ear irritation is due to patient using peroxide in the ear. Patient is instructed to no longer do this. Ear does not appear to be infected, but will be given prescription for otic eardrops to help with any irritation and prevent any infection. Patient is educated on signs and symptoms to monitor for and given strict return precautions. Patient is instructed to follow-up with PCP within the next few days in order to be re-evaluated. Patient displays understanding and is in agreement with plan. Patient's vital signs were reassessed prior to discharge and are within normal limits. Attempted to give patient his discharge paperwork and prescription, but found that he had already left the department. Diagnosis     Clinical Impression:   1.  Right ear pain        Disposition: Discharge to home     Mckinnon , APRN - NP  [place] [id number]  [phone]    Call in 1 day      [facility] EMERGENCY DEPT  [address]  [phone]    As needed, If symptoms worsen        Medication List        START taking these medications      neomycin-polymyxin-hydrocortisone 1 % Soln otic solution  Place 2 drops into the right ear every 8 hours for 7 days               Where to Get Your Medications        Information about where to get these medications is not yet available    Ask your nurse or doctor about these medications  neomycin-polymyxin-hydrocortisone 1 % Soln otic solution          Dictation disclaimer:  Please note that this dictation was completed with [employer], the computer voice recognition software. Quite often unanticipated grammatical, syntax, homophones, and other interpretive errors are inadvertently transcribed by the computer software. Please disregard these errors. Please excuse any errors that have escaped final proofreading.          Jb Barahona PA-C  02/14/23 [id number] [address] Elina Fernández PA-C  02/14/23 [id number]

## 2023-02-23 ENCOUNTER — HOSPITAL ENCOUNTER (OUTPATIENT)
Dept: INFUSION THERAPY | Age: 28
End: 2023-02-23

## 2023-02-23 ENCOUNTER — HOSPITAL ENCOUNTER (OUTPATIENT)
Facility: HOSPITAL | Age: 28
Setting detail: INFUSION SERIES
End: 2023-02-23
Payer: MEDICAID

## 2023-02-23 VITALS
DIASTOLIC BLOOD PRESSURE: 81 MMHG | SYSTOLIC BLOOD PRESSURE: 120 MMHG | HEART RATE: 86 BPM | OXYGEN SATURATION: 99 % | TEMPERATURE: 97.5 F | RESPIRATION RATE: 18 BRPM

## 2023-02-23 DIAGNOSIS — T42.6X2A INTENTIONAL POISONING BY VALPROATE SODIUM (HCC): Primary | ICD-10-CM

## 2023-02-23 PROCEDURE — 6360000002 HC RX W HCPCS: Performed by: NURSE PRACTITIONER

## 2023-02-23 PROCEDURE — 96372 THER/PROPH/DIAG INJ SC/IM: CPT

## 2023-02-23 RX ORDER — ONDANSETRON 2 MG/ML
8 INJECTION INTRAMUSCULAR; INTRAVENOUS
Status: CANCELLED | OUTPATIENT
Start: 2023-03-23

## 2023-02-23 RX ORDER — EPINEPHRINE 1 MG/ML
0.3 INJECTION, SOLUTION, CONCENTRATE INTRAVENOUS PRN
Status: CANCELLED | OUTPATIENT
Start: 2023-03-23

## 2023-02-23 RX ORDER — SODIUM CHLORIDE 9 MG/ML
INJECTION, SOLUTION INTRAVENOUS CONTINUOUS
Status: CANCELLED | OUTPATIENT
Start: 2023-03-23

## 2023-02-23 RX ORDER — DIPHENHYDRAMINE HYDROCHLORIDE 50 MG/ML
50 INJECTION INTRAMUSCULAR; INTRAVENOUS
Status: CANCELLED | OUTPATIENT
Start: 2023-03-23

## 2023-02-23 RX ORDER — ARIPIPRAZOLE 15 MG/1
TABLET ORAL
COMMUNITY
Start: 2023-01-22 | End: 2023-06-15

## 2023-02-23 RX ORDER — ALBUTEROL SULFATE 90 UG/1
4 AEROSOL, METERED RESPIRATORY (INHALATION) PRN
Status: CANCELLED | OUTPATIENT
Start: 2023-03-23

## 2023-02-23 RX ORDER — ATOMOXETINE 40 MG/1
80 CAPSULE ORAL
COMMUNITY
Start: 2022-12-23 | End: 2023-06-15

## 2023-02-23 RX ORDER — ACETAMINOPHEN 325 MG/1
650 TABLET ORAL
Status: CANCELLED | OUTPATIENT
Start: 2023-03-23

## 2023-02-23 RX ADMIN — ARIPIPRAZOLE 400 MG: KIT at 11:15

## 2023-02-23 NOTE — PROGRESS NOTES
[facility] Progress Note    Date: 2023    Name: Siobhan Moran    MRN: [id number]         : 1995      Mr. Rodriguez to [facility], ambulatory at  for his Every 28 Day (Every 4 Week), IM Abilify Injection. . Pt was assessed and education was provided. Mr. Bhavin Kothari vitals were reviewed and patient was observed for 5 minutes prior to treatment. Vitals:    23 1105   BP: 120/81   Pulse: 86   Resp: 18   Temp: 97.5 °F (36.4 °C)   SpO2: 99%       ABILIFY MAINTENA (Aripiprazole) 400 mg was given IM in his Left Deltoid. Band-Aid to site. Mr. Gisella Medina tolerated well, and had no complaints. Patient armband removed and shredded. Mr. Gisella Medina was discharged from [place] [address] in stable condition at 1120. He is to return on 23 at 1100 for his next Abilify Injection appointment.       Emil Duncan RN  2023  11:59 AM

## 2023-03-23 ENCOUNTER — HOSPITAL ENCOUNTER (OUTPATIENT)
Facility: HOSPITAL | Age: 28
Setting detail: INFUSION SERIES
End: 2023-03-23
Payer: MEDICAID

## 2023-03-23 VITALS
OXYGEN SATURATION: 100 % | SYSTOLIC BLOOD PRESSURE: 142 MMHG | RESPIRATION RATE: 16 BRPM | TEMPERATURE: 98.6 F | HEART RATE: 83 BPM | DIASTOLIC BLOOD PRESSURE: 91 MMHG

## 2023-03-23 DIAGNOSIS — T42.6X2A INTENTIONAL POISONING BY VALPROATE SODIUM (HCC): Primary | ICD-10-CM

## 2023-03-23 PROCEDURE — 6360000002 HC RX W HCPCS: Performed by: NURSE PRACTITIONER

## 2023-03-23 PROCEDURE — 96372 THER/PROPH/DIAG INJ SC/IM: CPT

## 2023-03-23 RX ORDER — SODIUM CHLORIDE 9 MG/ML
INJECTION, SOLUTION INTRAVENOUS CONTINUOUS
Status: CANCELLED | OUTPATIENT
Start: 2023-04-20

## 2023-03-23 RX ORDER — DIPHENHYDRAMINE HYDROCHLORIDE 50 MG/ML
50 INJECTION INTRAMUSCULAR; INTRAVENOUS
Status: CANCELLED | OUTPATIENT
Start: 2023-04-20

## 2023-03-23 RX ORDER — ALBUTEROL SULFATE 90 UG/1
4 AEROSOL, METERED RESPIRATORY (INHALATION) PRN
Status: CANCELLED | OUTPATIENT
Start: 2023-04-20

## 2023-03-23 RX ORDER — EPINEPHRINE 1 MG/ML
0.3 INJECTION, SOLUTION, CONCENTRATE INTRAVENOUS PRN
Status: CANCELLED | OUTPATIENT
Start: 2023-04-20

## 2023-03-23 RX ORDER — ACETAMINOPHEN 325 MG/1
650 TABLET ORAL
Status: CANCELLED | OUTPATIENT
Start: 2023-04-20

## 2023-03-23 RX ORDER — ONDANSETRON 2 MG/ML
8 INJECTION INTRAMUSCULAR; INTRAVENOUS
Status: CANCELLED | OUTPATIENT
Start: 2023-04-20

## 2023-03-23 RX ADMIN — ARIPIPRAZOLE 400 MG: KIT at 11:15

## 2023-03-23 ASSESSMENT — PAIN - FUNCTIONAL ASSESSMENT: PAIN_FUNCTIONAL_ASSESSMENT: NONE - DENIES PAIN

## 2023-03-23 NOTE — PROGRESS NOTES
[facility] Progress Note    Date: 2023    Name: Nini Marie    MRN: [id number]         : 1995    Mr. Wilhemena Pallas arrived in the [facility] today at , in stable condition, here for his ABILIFY INJECTION (EVERY 4 WEEKS). He was assessed and education was provided. Mr. Garret Nelson vitals were reviewed. Vitals:    23 1105   BP: (!) 142/91   Pulse: 83   Resp: 16   Temp: 98.6 °F (37 °C)   SpO2: 100%              ABILIFY MAINTENA (Aripiprazole) 400 mg, was given IM, in his RIGHT DELTOID at 1115, per order, and without incident. Mr. Wilhemena Pallas tolerated well, and voiced no complaints. Mr. Wilhemena Pallas was discharged from [place] [address] in stable condition at 1120. Marjorie Gianfranco He is to return in 4 weeks, on Thursday, 23, at 1100, for his next appointment, for his next Abilify Injection.           Ashlyn Whitaker RN  2023  11:11 AM

## 2023-04-20 ENCOUNTER — HOSPITAL ENCOUNTER (OUTPATIENT)
Facility: HOSPITAL | Age: 28
Setting detail: INFUSION SERIES
End: 2023-04-20
Payer: MEDICAID

## 2023-04-20 VITALS
TEMPERATURE: 98.1 F | HEART RATE: 111 BPM | DIASTOLIC BLOOD PRESSURE: 70 MMHG | RESPIRATION RATE: 16 BRPM | OXYGEN SATURATION: 100 % | SYSTOLIC BLOOD PRESSURE: 108 MMHG

## 2023-04-20 DIAGNOSIS — T42.6X2A INTENTIONAL POISONING BY VALPROATE SODIUM (HCC): Primary | ICD-10-CM

## 2023-04-20 PROCEDURE — 96372 THER/PROPH/DIAG INJ SC/IM: CPT

## 2023-04-20 PROCEDURE — 6360000002 HC RX W HCPCS: Performed by: NURSE PRACTITIONER

## 2023-04-20 RX ORDER — ONDANSETRON 2 MG/ML
8 INJECTION INTRAMUSCULAR; INTRAVENOUS
Status: CANCELLED | OUTPATIENT
Start: 2023-05-18

## 2023-04-20 RX ORDER — ALBUTEROL SULFATE 90 UG/1
4 AEROSOL, METERED RESPIRATORY (INHALATION) PRN
Status: CANCELLED | OUTPATIENT
Start: 2023-05-18

## 2023-04-20 RX ORDER — EPINEPHRINE 1 MG/ML
0.3 INJECTION, SOLUTION, CONCENTRATE INTRAVENOUS PRN
Status: CANCELLED | OUTPATIENT
Start: 2023-05-18

## 2023-04-20 RX ORDER — SODIUM CHLORIDE 9 MG/ML
INJECTION, SOLUTION INTRAVENOUS CONTINUOUS
Status: CANCELLED | OUTPATIENT
Start: 2023-05-18

## 2023-04-20 RX ORDER — DIPHENHYDRAMINE HYDROCHLORIDE 50 MG/ML
50 INJECTION INTRAMUSCULAR; INTRAVENOUS
Status: CANCELLED | OUTPATIENT
Start: 2023-05-18

## 2023-04-20 RX ORDER — ACETAMINOPHEN 325 MG/1
650 TABLET ORAL
Status: CANCELLED | OUTPATIENT
Start: 2023-05-18

## 2023-04-20 RX ADMIN — ARIPIPRAZOLE 400 MG: KIT at 11:03

## 2023-04-20 NOTE — PROGRESS NOTES
[facility] Progress Note    Date: 2023    Name: Juan Diego Jorge    MRN: [id number]         : 1995    Mr. Félix Sutherland arrived in the [facility] today at 80, here for his ABILIFY INJECTION (EVERY 4 WEEKS). He was assessed and education was provided. Mr. Rebecca hCandra vitals were reviewed. Vitals:    23 1101   BP: 108/70   Pulse: (!) 111   Resp: 16   Temp: 98.1 °F (36.7 °C)   SpO2: 100%       ABILIFY MAINTENA (Aripiprazole) 400 mg was given IM in his RIGHT DELTOID per pt request.  Band-Aid to site. Mr. Félix Sutherland tolerated well, and voiced no complaints. Pt armband removed and shredded. Mr. Félix Sutherland was discharged from [place] [address] in stable condition at 1110. He is to return on 23 at 56, for his next appointment, for his next Abilify Injection.       Radames Magana RN  2023  11:12 AM

## 2023-04-23 ENCOUNTER — HOSPITAL ENCOUNTER (EMERGENCY)
Facility: HOSPITAL | Age: 28
Discharge: LEFT BEFORE DISCHARGE | End: 2023-04-23
Attending: EMERGENCY MEDICINE

## 2023-04-23 VITALS
HEIGHT: 71 IN | BODY MASS INDEX: 23.8 KG/M2 | SYSTOLIC BLOOD PRESSURE: 140 MMHG | WEIGHT: 170 LBS | DIASTOLIC BLOOD PRESSURE: 88 MMHG | TEMPERATURE: 97.9 F | OXYGEN SATURATION: 100 % | HEART RATE: 93 BPM | RESPIRATION RATE: 16 BRPM

## 2023-05-18 ENCOUNTER — HOSPITAL ENCOUNTER (OUTPATIENT)
Facility: HOSPITAL | Age: 28
Setting detail: INFUSION SERIES
End: 2023-05-18
Payer: MEDICAID

## 2023-05-18 VITALS
TEMPERATURE: 97.5 F | SYSTOLIC BLOOD PRESSURE: 122 MMHG | DIASTOLIC BLOOD PRESSURE: 76 MMHG | OXYGEN SATURATION: 98 % | RESPIRATION RATE: 16 BRPM | HEART RATE: 88 BPM

## 2023-05-18 DIAGNOSIS — T42.6X2A INTENTIONAL POISONING BY VALPROATE SODIUM (HCC): Primary | ICD-10-CM

## 2023-05-18 PROCEDURE — 96372 THER/PROPH/DIAG INJ SC/IM: CPT

## 2023-05-18 PROCEDURE — 6360000002 HC RX W HCPCS: Performed by: NURSE PRACTITIONER

## 2023-05-18 RX ORDER — ALBUTEROL SULFATE 90 UG/1
4 AEROSOL, METERED RESPIRATORY (INHALATION) PRN
Status: CANCELLED | OUTPATIENT
Start: 2023-06-15

## 2023-05-18 RX ORDER — EPINEPHRINE 1 MG/ML
0.3 INJECTION, SOLUTION, CONCENTRATE INTRAVENOUS PRN
Status: CANCELLED | OUTPATIENT
Start: 2023-06-15

## 2023-05-18 RX ORDER — SODIUM CHLORIDE 9 MG/ML
INJECTION, SOLUTION INTRAVENOUS CONTINUOUS
Status: CANCELLED | OUTPATIENT
Start: 2023-06-15

## 2023-05-18 RX ORDER — RISPERIDONE 0.5 MG/1
0.5 TABLET ORAL 2 TIMES DAILY
COMMUNITY
End: 2023-06-15

## 2023-05-18 RX ORDER — DIPHENHYDRAMINE HYDROCHLORIDE 50 MG/ML
50 INJECTION INTRAMUSCULAR; INTRAVENOUS
Status: CANCELLED | OUTPATIENT
Start: 2023-06-15

## 2023-05-18 RX ORDER — ONDANSETRON 2 MG/ML
8 INJECTION INTRAMUSCULAR; INTRAVENOUS
Status: CANCELLED | OUTPATIENT
Start: 2023-06-15

## 2023-05-18 RX ORDER — ACETAMINOPHEN 325 MG/1
650 TABLET ORAL
Status: CANCELLED | OUTPATIENT
Start: 2023-06-15

## 2023-05-18 RX ADMIN — ARIPIPRAZOLE 400 MG: KIT at 09:59

## 2023-05-18 NOTE — PROGRESS NOTES
hospitals Progress Note    Date: May 18, 2023    Name: Javier Beaver    MRN: [id number]         : 1995    Mr. Kevin Pelletier arrived in the [facility] today at 1000, here for his ABILIFY INJECTION (EVERY 4 WEEKS). He was assessed and education was provided. Mr. Sommer Demarco vitals were reviewed. Vitals:    23 1000   BP: 122/76   Pulse: 88   Resp: 16   Temp: 97.5 °F (36.4 °C)   SpO2: 98%     ABILIFY MAINTENA (Aripiprazole) 400mg IM was administered to L detoid. Band-Aid to site. Mr. Kevin Pelletier tolerated well and voiced no complaints. Pt armband removed and shredded. Mr. Kevin Pelletier was discharged from [place] [address] in stable condition at 1005. He is to return on 6/15/23 at 56, for his next appointment, for his next Abilify Injection.       Hans Childers RN  May 18, 2023  10:30 AM

## 2023-06-15 ENCOUNTER — HOSPITAL ENCOUNTER (OUTPATIENT)
Facility: HOSPITAL | Age: 28
Setting detail: INFUSION SERIES
Discharge: HOME | End: 2023-06-18
Payer: MEDICAID

## 2023-06-15 VITALS
DIASTOLIC BLOOD PRESSURE: 76 MMHG | RESPIRATION RATE: 16 BRPM | TEMPERATURE: 98.3 F | HEART RATE: 82 BPM | OXYGEN SATURATION: 100 % | SYSTOLIC BLOOD PRESSURE: 132 MMHG

## 2023-06-15 DIAGNOSIS — T42.6X2A INTENTIONAL POISONING BY VALPROATE SODIUM (HCC): Primary | ICD-10-CM

## 2023-06-15 PROCEDURE — 6360000002 HC RX W HCPCS: Performed by: NURSE PRACTITIONER

## 2023-06-15 PROCEDURE — 96372 THER/PROPH/DIAG INJ SC/IM: CPT

## 2023-06-15 RX ORDER — EPINEPHRINE 1 MG/ML
0.3 INJECTION, SOLUTION, CONCENTRATE INTRAVENOUS PRN
Status: CANCELLED | OUTPATIENT
Start: 2023-07-13

## 2023-06-15 RX ORDER — ARIPIPRAZOLE 20 MG/1
20 TABLET ORAL DAILY
COMMUNITY
Start: 2023-05-31

## 2023-06-15 RX ORDER — RISPERIDONE 1 MG/1
1 TABLET ORAL NIGHTLY
COMMUNITY
Start: 2023-05-24

## 2023-06-15 RX ORDER — ALBUTEROL SULFATE 90 UG/1
4 AEROSOL, METERED RESPIRATORY (INHALATION) PRN
Status: CANCELLED | OUTPATIENT
Start: 2023-07-13

## 2023-06-15 RX ORDER — SODIUM CHLORIDE 9 MG/ML
INJECTION, SOLUTION INTRAVENOUS CONTINUOUS
Status: CANCELLED | OUTPATIENT
Start: 2023-07-13

## 2023-06-15 RX ORDER — DIPHENHYDRAMINE HYDROCHLORIDE 50 MG/ML
50 INJECTION INTRAMUSCULAR; INTRAVENOUS
Status: CANCELLED | OUTPATIENT
Start: 2023-07-13

## 2023-06-15 RX ORDER — ACETAMINOPHEN 325 MG/1
650 TABLET ORAL
Status: CANCELLED | OUTPATIENT
Start: 2023-07-13

## 2023-06-15 RX ORDER — ATOMOXETINE 80 MG/1
CAPSULE ORAL
COMMUNITY
Start: 2023-06-10

## 2023-06-15 RX ORDER — ONDANSETRON 2 MG/ML
8 INJECTION INTRAMUSCULAR; INTRAVENOUS
Status: CANCELLED | OUTPATIENT
Start: 2023-07-13

## 2023-06-15 RX ADMIN — ARIPIPRAZOLE 400 MG: KIT at 10:25

## 2023-06-15 NOTE — PROGRESS NOTES
[facility] Progress Note    Date: Dinora 15, 2023    Name: Evelina Edward    MRN: [id number]         : 1995    Mr. Alyssa Astudillo arrived in the [facility] today at 21 , here for his ABILIFY INJECTION (EVERY 4 WEEKS). He was assessed and education was provided. Mr. Sergio Hein vitals were reviewed. Vitals:    06/15/23 1015   BP: 132/76   Pulse: 82   Resp: 16   Temp: 98.3 °F (36.8 °C)   SpO2: 100%     ABILIFY MAINTENA (Aripiprazole) 400mg IM was administered to R deltoid. Band-Aid to site. Mr. Alyssa Astudillo tolerated well and voiced no complaints. Pt armband removed and shredded. Mr. Alyssa Astudillo was discharged from [place] [address] in stable condition at 1025. He is to return on 23 at 1100, for his next appointment for his next Abilify Injection.       Sherrill Newsome RN  Dinora 15, 2023  10:28 AM

## 2023-07-13 ENCOUNTER — HOSPITAL ENCOUNTER (OUTPATIENT)
Facility: HOSPITAL | Age: 28
Setting detail: INFUSION SERIES
End: 2023-07-13
Payer: MEDICAID

## 2023-07-13 VITALS
OXYGEN SATURATION: 98 % | SYSTOLIC BLOOD PRESSURE: 114 MMHG | DIASTOLIC BLOOD PRESSURE: 78 MMHG | HEART RATE: 114 BPM | RESPIRATION RATE: 16 BRPM | TEMPERATURE: 98.1 F

## 2023-07-13 DIAGNOSIS — T42.6X2A INTENTIONAL POISONING BY VALPROATE SODIUM (HCC): Primary | ICD-10-CM

## 2023-07-13 PROCEDURE — 6360000002 HC RX W HCPCS: Performed by: NURSE PRACTITIONER

## 2023-07-13 PROCEDURE — 96372 THER/PROPH/DIAG INJ SC/IM: CPT

## 2023-07-13 RX ORDER — SODIUM CHLORIDE 9 MG/ML
INJECTION, SOLUTION INTRAVENOUS CONTINUOUS
Status: CANCELLED | OUTPATIENT
Start: 2023-08-10

## 2023-07-13 RX ORDER — ACETAMINOPHEN 325 MG/1
650 TABLET ORAL
Status: CANCELLED | OUTPATIENT
Start: 2023-08-10

## 2023-07-13 RX ORDER — ALBUTEROL SULFATE 90 UG/1
4 AEROSOL, METERED RESPIRATORY (INHALATION) PRN
Status: CANCELLED | OUTPATIENT
Start: 2023-08-10

## 2023-07-13 RX ORDER — DIPHENHYDRAMINE HYDROCHLORIDE 50 MG/ML
50 INJECTION INTRAMUSCULAR; INTRAVENOUS
Status: CANCELLED | OUTPATIENT
Start: 2023-08-10

## 2023-07-13 RX ORDER — EPINEPHRINE 1 MG/ML
0.3 INJECTION, SOLUTION, CONCENTRATE INTRAVENOUS PRN
Status: CANCELLED | OUTPATIENT
Start: 2023-08-10

## 2023-07-13 RX ORDER — ONDANSETRON 2 MG/ML
8 INJECTION INTRAMUSCULAR; INTRAVENOUS
Status: CANCELLED | OUTPATIENT
Start: 2023-08-10

## 2023-07-13 RX ADMIN — ARIPIPRAZOLE 400 MG: KIT at 10:33

## 2023-08-10 ENCOUNTER — HOSPITAL ENCOUNTER (OUTPATIENT)
Facility: HOSPITAL | Age: 28
Setting detail: INFUSION SERIES
End: 2023-08-10
Payer: MEDICAID

## 2023-08-10 VITALS
TEMPERATURE: 97.8 F | OXYGEN SATURATION: 99 % | RESPIRATION RATE: 16 BRPM | HEART RATE: 97 BPM | DIASTOLIC BLOOD PRESSURE: 83 MMHG | SYSTOLIC BLOOD PRESSURE: 138 MMHG

## 2023-08-10 DIAGNOSIS — T42.6X2A INTENTIONAL POISONING BY VALPROATE SODIUM (HCC): Primary | ICD-10-CM

## 2023-08-10 PROCEDURE — 6360000002 HC RX W HCPCS: Performed by: NURSE PRACTITIONER

## 2023-08-10 PROCEDURE — 96372 THER/PROPH/DIAG INJ SC/IM: CPT

## 2023-08-10 RX ORDER — ONDANSETRON 2 MG/ML
8 INJECTION INTRAMUSCULAR; INTRAVENOUS
Status: CANCELLED | OUTPATIENT
Start: 2023-09-07

## 2023-08-10 RX ORDER — DIPHENHYDRAMINE HYDROCHLORIDE 50 MG/ML
50 INJECTION INTRAMUSCULAR; INTRAVENOUS
Status: CANCELLED | OUTPATIENT
Start: 2023-09-07

## 2023-08-10 RX ORDER — ACETAMINOPHEN 325 MG/1
650 TABLET ORAL
Status: CANCELLED | OUTPATIENT
Start: 2023-09-07

## 2023-08-10 RX ORDER — SODIUM CHLORIDE 9 MG/ML
INJECTION, SOLUTION INTRAVENOUS CONTINUOUS
Status: CANCELLED | OUTPATIENT
Start: 2023-09-07

## 2023-08-10 RX ORDER — EPINEPHRINE 1 MG/ML
0.3 INJECTION, SOLUTION, CONCENTRATE INTRAVENOUS PRN
Status: CANCELLED | OUTPATIENT
Start: 2023-09-07

## 2023-08-10 RX ORDER — ALBUTEROL SULFATE 90 UG/1
4 AEROSOL, METERED RESPIRATORY (INHALATION) PRN
Status: CANCELLED | OUTPATIENT
Start: 2023-09-07

## 2023-08-10 RX ADMIN — ARIPIPRAZOLE 400 MG: KIT at 10:16

## 2023-09-07 ENCOUNTER — HOSPITAL ENCOUNTER (OUTPATIENT)
Facility: HOSPITAL | Age: 28
Setting detail: INFUSION SERIES
End: 2023-09-07
Payer: MEDICAID

## 2023-09-07 VITALS
HEART RATE: 94 BPM | RESPIRATION RATE: 16 BRPM | TEMPERATURE: 98.1 F | OXYGEN SATURATION: 100 % | DIASTOLIC BLOOD PRESSURE: 84 MMHG | SYSTOLIC BLOOD PRESSURE: 132 MMHG

## 2023-09-07 DIAGNOSIS — T42.6X2A INTENTIONAL POISONING BY VALPROATE SODIUM (HCC): Primary | ICD-10-CM

## 2023-09-07 PROCEDURE — 6360000002 HC RX W HCPCS: Performed by: NURSE PRACTITIONER

## 2023-09-07 PROCEDURE — 96372 THER/PROPH/DIAG INJ SC/IM: CPT

## 2023-09-07 RX ORDER — HALOPERIDOL 20 MG/1
TABLET ORAL
COMMUNITY
Start: 2023-08-28

## 2023-09-07 RX ORDER — ONDANSETRON 2 MG/ML
8 INJECTION INTRAMUSCULAR; INTRAVENOUS
Status: CANCELLED | OUTPATIENT
Start: 2023-10-05

## 2023-09-07 RX ORDER — EPINEPHRINE 1 MG/ML
0.3 INJECTION, SOLUTION, CONCENTRATE INTRAVENOUS PRN
Status: CANCELLED | OUTPATIENT
Start: 2023-10-05

## 2023-09-07 RX ORDER — DIPHENHYDRAMINE HYDROCHLORIDE 50 MG/ML
50 INJECTION INTRAMUSCULAR; INTRAVENOUS
Status: CANCELLED | OUTPATIENT
Start: 2023-10-05

## 2023-09-07 RX ORDER — SODIUM CHLORIDE 9 MG/ML
INJECTION, SOLUTION INTRAVENOUS CONTINUOUS
Status: CANCELLED | OUTPATIENT
Start: 2023-10-05

## 2023-09-07 RX ORDER — ALBUTEROL SULFATE 90 UG/1
4 AEROSOL, METERED RESPIRATORY (INHALATION) PRN
Status: CANCELLED | OUTPATIENT
Start: 2023-10-05

## 2023-09-07 RX ORDER — ACETAMINOPHEN 325 MG/1
650 TABLET ORAL
Status: CANCELLED | OUTPATIENT
Start: 2023-10-05

## 2023-09-07 RX ADMIN — ARIPIPRAZOLE 400 MG: KIT at 10:23

## 2023-10-05 ENCOUNTER — HOSPITAL ENCOUNTER (OUTPATIENT)
Facility: HOSPITAL | Age: 28
Setting detail: INFUSION SERIES
End: 2023-10-05
Payer: MEDICAID

## 2023-10-05 VITALS
TEMPERATURE: 97 F | OXYGEN SATURATION: 100 % | DIASTOLIC BLOOD PRESSURE: 67 MMHG | SYSTOLIC BLOOD PRESSURE: 109 MMHG | RESPIRATION RATE: 16 BRPM | HEART RATE: 88 BPM

## 2023-10-05 DIAGNOSIS — T42.6X2A INTENTIONAL POISONING BY VALPROATE SODIUM (HCC): Primary | ICD-10-CM

## 2023-10-05 PROCEDURE — 96372 THER/PROPH/DIAG INJ SC/IM: CPT

## 2023-10-05 PROCEDURE — 6360000002 HC RX W HCPCS: Performed by: NURSE PRACTITIONER

## 2023-10-05 RX ORDER — EPINEPHRINE 1 MG/ML
0.3 INJECTION, SOLUTION, CONCENTRATE INTRAVENOUS PRN
OUTPATIENT
Start: 2023-10-05

## 2023-10-05 RX ORDER — DIPHENHYDRAMINE HYDROCHLORIDE 50 MG/ML
50 INJECTION INTRAMUSCULAR; INTRAVENOUS
OUTPATIENT
Start: 2023-10-05

## 2023-10-05 RX ORDER — ONDANSETRON 2 MG/ML
8 INJECTION INTRAMUSCULAR; INTRAVENOUS
OUTPATIENT
Start: 2023-10-05

## 2023-10-05 RX ORDER — ALBUTEROL SULFATE 90 UG/1
4 AEROSOL, METERED RESPIRATORY (INHALATION) PRN
OUTPATIENT
Start: 2023-10-05

## 2023-10-05 RX ORDER — SODIUM CHLORIDE 9 MG/ML
INJECTION, SOLUTION INTRAVENOUS CONTINUOUS
OUTPATIENT
Start: 2023-10-05

## 2023-10-05 RX ORDER — ACETAMINOPHEN 325 MG/1
650 TABLET ORAL
OUTPATIENT
Start: 2023-10-05

## 2023-10-05 RX ADMIN — ARIPIPRAZOLE 400 MG: KIT at 11:39

## 2023-11-02 ENCOUNTER — HOSPITAL ENCOUNTER (OUTPATIENT)
Facility: HOSPITAL | Age: 28
Setting detail: INFUSION SERIES
End: 2023-11-02
Payer: MEDICAID

## 2023-11-02 VITALS
RESPIRATION RATE: 16 BRPM | DIASTOLIC BLOOD PRESSURE: 80 MMHG | HEART RATE: 99 BPM | OXYGEN SATURATION: 100 % | TEMPERATURE: 98.1 F | SYSTOLIC BLOOD PRESSURE: 138 MMHG

## 2023-11-02 DIAGNOSIS — T42.6X2A INTENTIONAL POISONING BY VALPROATE SODIUM (HCC): Primary | ICD-10-CM

## 2023-11-02 PROCEDURE — 96372 THER/PROPH/DIAG INJ SC/IM: CPT

## 2023-11-02 PROCEDURE — 6360000002 HC RX W HCPCS: Performed by: NURSE PRACTITIONER

## 2023-11-02 RX ORDER — SODIUM CHLORIDE 9 MG/ML
INJECTION, SOLUTION INTRAVENOUS CONTINUOUS
OUTPATIENT
Start: 2023-11-30

## 2023-11-02 RX ORDER — EPINEPHRINE 1 MG/ML
0.3 INJECTION, SOLUTION, CONCENTRATE INTRAVENOUS PRN
OUTPATIENT
Start: 2023-11-30

## 2023-11-02 RX ORDER — ALBUTEROL SULFATE 90 UG/1
4 AEROSOL, METERED RESPIRATORY (INHALATION) PRN
OUTPATIENT
Start: 2023-11-30

## 2023-11-02 RX ORDER — ACETAMINOPHEN 325 MG/1
650 TABLET ORAL
OUTPATIENT
Start: 2023-11-30

## 2023-11-02 RX ORDER — DIPHENHYDRAMINE HYDROCHLORIDE 50 MG/ML
50 INJECTION INTRAMUSCULAR; INTRAVENOUS
OUTPATIENT
Start: 2023-11-30

## 2023-11-02 RX ORDER — ONDANSETRON 2 MG/ML
8 INJECTION INTRAMUSCULAR; INTRAVENOUS
OUTPATIENT
Start: 2023-11-30

## 2023-11-02 RX ADMIN — ARIPIPRAZOLE 400 MG: KIT at 11:13

## 2023-11-30 ENCOUNTER — HOSPITAL ENCOUNTER (OUTPATIENT)
Facility: HOSPITAL | Age: 28
Setting detail: INFUSION SERIES
End: 2023-11-30
Payer: MEDICAID

## 2023-11-30 VITALS
SYSTOLIC BLOOD PRESSURE: 152 MMHG | DIASTOLIC BLOOD PRESSURE: 91 MMHG | TEMPERATURE: 97.6 F | RESPIRATION RATE: 16 BRPM | HEART RATE: 100 BPM | OXYGEN SATURATION: 100 %

## 2023-11-30 DIAGNOSIS — T42.6X2A INTENTIONAL POISONING BY VALPROATE SODIUM (HCC): Primary | ICD-10-CM

## 2023-11-30 PROCEDURE — 96372 THER/PROPH/DIAG INJ SC/IM: CPT

## 2023-11-30 PROCEDURE — 6360000002 HC RX W HCPCS: Performed by: NURSE PRACTITIONER

## 2023-11-30 RX ORDER — ACETAMINOPHEN 325 MG/1
650 TABLET ORAL
OUTPATIENT
Start: 2023-12-28

## 2023-11-30 RX ORDER — DIPHENHYDRAMINE HYDROCHLORIDE 50 MG/ML
50 INJECTION INTRAMUSCULAR; INTRAVENOUS
OUTPATIENT
Start: 2023-12-28

## 2023-11-30 RX ORDER — ONDANSETRON 2 MG/ML
8 INJECTION INTRAMUSCULAR; INTRAVENOUS
OUTPATIENT
Start: 2023-12-28

## 2023-11-30 RX ORDER — SODIUM CHLORIDE 9 MG/ML
INJECTION, SOLUTION INTRAVENOUS CONTINUOUS
OUTPATIENT
Start: 2023-12-28

## 2023-11-30 RX ORDER — EPINEPHRINE 1 MG/ML
0.3 INJECTION, SOLUTION, CONCENTRATE INTRAVENOUS PRN
OUTPATIENT
Start: 2023-12-28

## 2023-11-30 RX ORDER — ALBUTEROL SULFATE 90 UG/1
4 AEROSOL, METERED RESPIRATORY (INHALATION) PRN
OUTPATIENT
Start: 2023-12-28

## 2023-11-30 RX ADMIN — ARIPIPRAZOLE 400 MG: KIT at 11:30

## 2023-11-30 ASSESSMENT — PAIN - FUNCTIONAL ASSESSMENT: PAIN_FUNCTIONAL_ASSESSMENT: NONE - DENIES PAIN

## 2023-12-10 ENCOUNTER — HOSPITAL ENCOUNTER (EMERGENCY)
Facility: HOSPITAL | Age: 28
Discharge: HOME | End: 2023-12-10
Attending: STUDENT IN AN ORGANIZED HEALTH CARE EDUCATION/TRAINING PROGRAM
Payer: MEDICAID

## 2023-12-10 VITALS
TEMPERATURE: 98.6 F | RESPIRATION RATE: 18 BRPM | DIASTOLIC BLOOD PRESSURE: 76 MMHG | HEIGHT: 70 IN | HEART RATE: 109 BPM | OXYGEN SATURATION: 98 % | BODY MASS INDEX: 24.39 KG/M2 | SYSTOLIC BLOOD PRESSURE: 114 MMHG

## 2023-12-10 DIAGNOSIS — R44.3 HALLUCINATIONS: Primary | ICD-10-CM

## 2023-12-10 PROCEDURE — 99283 EMERGENCY DEPT VISIT LOW MDM: CPT

## 2023-12-10 ASSESSMENT — LIFESTYLE VARIABLES
HOW OFTEN DO YOU HAVE A DRINK CONTAINING ALCOHOL: NEVER
HOW MANY STANDARD DRINKS CONTAINING ALCOHOL DO YOU HAVE ON A TYPICAL DAY: PATIENT DOES NOT DRINK

## 2023-12-10 ASSESSMENT — PAIN - FUNCTIONAL ASSESSMENT: PAIN_FUNCTIONAL_ASSESSMENT: NONE - DENIES PAIN

## 2023-12-10 NOTE — ED NOTES
Pt arrived via EMS to the ED with c/o hearing voices and insomnia.  Yonnyies SI/HI     Earl Stout RN  12/10/23 8726

## 2023-12-10 NOTE — ED NOTES
Pt discharged via ambulatory to Home. Pt Verbalized understanding of discharge instructions. Vital signs stable.           Beau Alfaro, [address]  12/10/23 2513

## 2023-12-10 NOTE — DISCHARGE INSTRUCTIONS
I would recommend continuing to take your medications as prescribed until you can follow-up with your doctor.

## 2023-12-28 ENCOUNTER — HOSPITAL ENCOUNTER (OUTPATIENT)
Facility: HOSPITAL | Age: 28
Setting detail: INFUSION SERIES
End: 2023-12-28
Payer: MEDICAID

## 2023-12-28 VITALS
DIASTOLIC BLOOD PRESSURE: 63 MMHG | TEMPERATURE: 98.5 F | RESPIRATION RATE: 18 BRPM | OXYGEN SATURATION: 97 % | HEART RATE: 109 BPM | SYSTOLIC BLOOD PRESSURE: 120 MMHG

## 2023-12-28 DIAGNOSIS — T42.6X2A INTENTIONAL POISONING BY VALPROATE SODIUM (HCC): Primary | ICD-10-CM

## 2023-12-28 PROCEDURE — 96372 THER/PROPH/DIAG INJ SC/IM: CPT

## 2023-12-28 PROCEDURE — 6360000002 HC RX W HCPCS: Performed by: NURSE PRACTITIONER

## 2023-12-28 RX ORDER — EPINEPHRINE 1 MG/ML
0.3 INJECTION, SOLUTION, CONCENTRATE INTRAVENOUS PRN
OUTPATIENT
Start: 2024-01-25

## 2023-12-28 RX ORDER — ONDANSETRON 2 MG/ML
8 INJECTION INTRAMUSCULAR; INTRAVENOUS
OUTPATIENT
Start: 2024-01-25

## 2023-12-28 RX ORDER — ALBUTEROL SULFATE 90 UG/1
4 AEROSOL, METERED RESPIRATORY (INHALATION) PRN
OUTPATIENT
Start: 2024-01-25

## 2023-12-28 RX ORDER — SODIUM CHLORIDE 9 MG/ML
INJECTION, SOLUTION INTRAVENOUS CONTINUOUS
OUTPATIENT
Start: 2024-01-25

## 2023-12-28 RX ORDER — CARIPRAZINE 3 MG/1
3 CAPSULE, GELATIN COATED ORAL DAILY
COMMUNITY
Start: 2023-12-15

## 2023-12-28 RX ORDER — DIPHENHYDRAMINE HYDROCHLORIDE 50 MG/ML
50 INJECTION INTRAMUSCULAR; INTRAVENOUS
OUTPATIENT
Start: 2024-01-25

## 2023-12-28 RX ORDER — ACETAMINOPHEN 325 MG/1
650 TABLET ORAL
OUTPATIENT
Start: 2024-01-25

## 2023-12-28 RX ADMIN — ARIPIPRAZOLE 400 MG: KIT at 11:11

## 2023-12-31 ENCOUNTER — HOSPITAL ENCOUNTER (EMERGENCY)
Facility: HOSPITAL | Age: 28
Discharge: HOME | End: 2023-12-31
Payer: MEDICAID

## 2023-12-31 VITALS
HEART RATE: 102 BPM | SYSTOLIC BLOOD PRESSURE: 135 MMHG | RESPIRATION RATE: 15 BRPM | DIASTOLIC BLOOD PRESSURE: 77 MMHG | WEIGHT: 170 LBS | OXYGEN SATURATION: 99 % | TEMPERATURE: 99.6 F | BODY MASS INDEX: 24.34 KG/M2 | HEIGHT: 70 IN

## 2023-12-31 DIAGNOSIS — H60.391 INFECTIVE OTITIS EXTERNA OF RIGHT EAR: Primary | ICD-10-CM

## 2023-12-31 PROCEDURE — 99283 EMERGENCY DEPT VISIT LOW MDM: CPT

## 2023-12-31 RX ORDER — CIPROFLOXACIN AND DEXAMETHASONE 3; 1 MG/ML; MG/ML
4 SUSPENSION/ DROPS AURICULAR (OTIC) 2 TIMES DAILY
Qty: 7.5 ML | Refills: 0 | Status: SHIPPED | OUTPATIENT
Start: 2023-12-31 | End: 2024-01-07

## 2023-12-31 RX ORDER — BENZTROPINE MESYLATE 0.5 MG/1
0.5 TABLET ORAL 2 TIMES DAILY
COMMUNITY
Start: 2023-12-08

## 2023-12-31 RX ORDER — BUPROPION HYDROCHLORIDE 150 MG/1
TABLET, EXTENDED RELEASE ORAL
COMMUNITY
Start: 2023-12-14

## 2023-12-31 ASSESSMENT — PAIN - FUNCTIONAL ASSESSMENT: PAIN_FUNCTIONAL_ASSESSMENT: 0-10

## 2023-12-31 ASSESSMENT — PAIN SCALES - GENERAL: PAINLEVEL_OUTOF10: 3

## 2024-01-01 NOTE — ED PROVIDER NOTES
capsule  Generic drug: cariprazine hcl          This list has 2 medication(s) that are the same as other medications prescribed for you. Read the directions carefully, and ask your doctor or other care provider to review them with you.                   Where to Get Your Medications        These medications were sent to [id number] - [place], [place] - [address] [place] [address] - P [phone] - F [phone]  [address] [place] [id number]      Phone: [phone]   ciprofloxacin-dexAMETHasone 0.3-0.1 % otic suspension             Diagnosis     Clinical Impression:   1. Infective otitis externa of right ear                Mima Moody PA-C  12/31/23 7024

## 2024-01-25 ENCOUNTER — HOSPITAL ENCOUNTER (OUTPATIENT)
Facility: HOSPITAL | Age: 29
Setting detail: INFUSION SERIES
End: 2024-01-25
Payer: MEDICAID

## 2024-01-25 VITALS
RESPIRATION RATE: 16 BRPM | SYSTOLIC BLOOD PRESSURE: 135 MMHG | DIASTOLIC BLOOD PRESSURE: 87 MMHG | TEMPERATURE: 97.7 F | HEART RATE: 112 BPM | OXYGEN SATURATION: 99 %

## 2024-01-25 DIAGNOSIS — T42.6X2A INTENTIONAL POISONING BY VALPROATE SODIUM (HCC): Primary | ICD-10-CM

## 2024-01-25 PROCEDURE — 96372 THER/PROPH/DIAG INJ SC/IM: CPT

## 2024-01-25 PROCEDURE — 6360000002 HC RX W HCPCS: Performed by: NURSE PRACTITIONER

## 2024-01-25 RX ORDER — SODIUM CHLORIDE 9 MG/ML
INJECTION, SOLUTION INTRAVENOUS CONTINUOUS
OUTPATIENT
Start: 2024-02-22

## 2024-01-25 RX ORDER — ALBUTEROL SULFATE 90 UG/1
4 AEROSOL, METERED RESPIRATORY (INHALATION) PRN
OUTPATIENT
Start: 2024-02-22

## 2024-01-25 RX ORDER — SODIUM CHLORIDE 9 MG/ML
INJECTION, SOLUTION INTRAVENOUS CONTINUOUS
Status: CANCELLED | OUTPATIENT
Start: 2024-01-25

## 2024-01-25 RX ORDER — EPINEPHRINE 1 MG/ML
0.3 INJECTION, SOLUTION, CONCENTRATE INTRAVENOUS PRN
Status: CANCELLED | OUTPATIENT
Start: 2024-01-25

## 2024-01-25 RX ORDER — ONDANSETRON 2 MG/ML
8 INJECTION INTRAMUSCULAR; INTRAVENOUS
OUTPATIENT
Start: 2024-02-22

## 2024-01-25 RX ORDER — ACETAMINOPHEN 325 MG/1
650 TABLET ORAL
OUTPATIENT
Start: 2024-02-22

## 2024-01-25 RX ORDER — DIPHENHYDRAMINE HYDROCHLORIDE 50 MG/ML
50 INJECTION INTRAMUSCULAR; INTRAVENOUS
Status: CANCELLED | OUTPATIENT
Start: 2024-01-25

## 2024-01-25 RX ORDER — ONDANSETRON 2 MG/ML
8 INJECTION INTRAMUSCULAR; INTRAVENOUS
Status: CANCELLED | OUTPATIENT
Start: 2024-01-25

## 2024-01-25 RX ORDER — ACETAMINOPHEN 325 MG/1
650 TABLET ORAL
Status: CANCELLED | OUTPATIENT
Start: 2024-01-25

## 2024-01-25 RX ORDER — DIPHENHYDRAMINE HYDROCHLORIDE 50 MG/ML
50 INJECTION INTRAMUSCULAR; INTRAVENOUS
OUTPATIENT
Start: 2024-02-22

## 2024-01-25 RX ORDER — EPINEPHRINE 1 MG/ML
0.3 INJECTION, SOLUTION, CONCENTRATE INTRAVENOUS PRN
OUTPATIENT
Start: 2024-02-22

## 2024-01-25 RX ORDER — ALBUTEROL SULFATE 90 UG/1
4 AEROSOL, METERED RESPIRATORY (INHALATION) PRN
Status: CANCELLED | OUTPATIENT
Start: 2024-01-25

## 2024-01-25 RX ADMIN — ARIPIPRAZOLE 400 MG: KIT at 09:55

## 2024-01-25 ASSESSMENT — PAIN - FUNCTIONAL ASSESSMENT: PAIN_FUNCTIONAL_ASSESSMENT: NONE - DENIES PAIN

## 2024-01-25 NOTE — PROGRESS NOTES
[facility] Progress Note    Date: 2024    Name: Nolan Rodriguez    MRN: [id number]         : 1995    Mr. Rodriguez arrived in the [facility] today at 0935, in stable condition, here for his ABILIFY INJECTION (EVERY 4 WEEKS). He was assessed and education was provided.     Mr. Rodriguez's vitals were reviewed.    Vitals:    24 0935   BP: 135/87   Pulse: (!) 112   Resp: 16   Temp: 97.7 °F (36.5 °C)   TempSrc: Oral   SpO2: 99%           Mr. Rodriguez stated that he was doing well, and he voiced no complaints today, other than feeling really tired. .          ABILIFY MAINTENA (Aripiprazole) 400 mg, was given IM, in his LEFT DELTOID at 0955, per order, and without incident.                Mr. Rodriguez tolerated well, and voiced no complaints.     [facility] at 1000..     He is to return in 4 weeks, on Thursday, 24, at 1000, for his next appointment, for his next Abilify Injection.          Brandon Lazo RN  2024  9:47 AM

## 2024-02-22 ENCOUNTER — HOSPITAL ENCOUNTER (OUTPATIENT)
Facility: HOSPITAL | Age: 29
Setting detail: INFUSION SERIES
End: 2024-02-22
Payer: MEDICAID

## 2024-02-22 VITALS
OXYGEN SATURATION: 96 % | SYSTOLIC BLOOD PRESSURE: 134 MMHG | RESPIRATION RATE: 16 BRPM | HEART RATE: 110 BPM | DIASTOLIC BLOOD PRESSURE: 81 MMHG | TEMPERATURE: 98.9 F

## 2024-02-22 DIAGNOSIS — T42.6X2A INTENTIONAL POISONING BY VALPROATE SODIUM (HCC): Primary | ICD-10-CM

## 2024-02-22 PROCEDURE — 6360000002 HC RX W HCPCS: Performed by: NURSE PRACTITIONER

## 2024-02-22 PROCEDURE — 96372 THER/PROPH/DIAG INJ SC/IM: CPT

## 2024-02-22 RX ORDER — ALBUTEROL SULFATE 90 UG/1
4 AEROSOL, METERED RESPIRATORY (INHALATION) PRN
OUTPATIENT
Start: 2024-03-21

## 2024-02-22 RX ORDER — DIPHENHYDRAMINE HYDROCHLORIDE 50 MG/ML
50 INJECTION INTRAMUSCULAR; INTRAVENOUS
OUTPATIENT
Start: 2024-03-21

## 2024-02-22 RX ORDER — ACETAMINOPHEN 325 MG/1
650 TABLET ORAL
OUTPATIENT
Start: 2024-03-21

## 2024-02-22 RX ORDER — ONDANSETRON 2 MG/ML
8 INJECTION INTRAMUSCULAR; INTRAVENOUS
OUTPATIENT
Start: 2024-03-21

## 2024-02-22 RX ORDER — SODIUM CHLORIDE 9 MG/ML
INJECTION, SOLUTION INTRAVENOUS CONTINUOUS
OUTPATIENT
Start: 2024-03-21

## 2024-02-22 RX ORDER — EPINEPHRINE 1 MG/ML
0.3 INJECTION, SOLUTION, CONCENTRATE INTRAVENOUS PRN
OUTPATIENT
Start: 2024-03-21

## 2024-02-22 RX ADMIN — ARIPIPRAZOLE 400 MG: KIT at 10:10

## 2024-02-22 ASSESSMENT — PAIN - FUNCTIONAL ASSESSMENT: PAIN_FUNCTIONAL_ASSESSMENT: NONE - DENIES PAIN

## 2024-02-22 NOTE — PROGRESS NOTES
[facility] Progress Note    Date: 2024    Name: Nolan Rodriguez    MRN: [id number]         : 1995    Mr. Rodriguez arrived in the [facility] today at 1000, in stable condition, here for his ABILIFY INJECTION (EVERY 4 WEEKS). He was assessed and education was provided.     Mr. Rodriguez's vitals were reviewed.    Vitals:    24 1000   BP: 134/81   Pulse: (!) 110   Resp: 16   Temp: 98.9 °F (37.2 °C)   TempSrc: Oral   SpO2: 96%           Mr. Rodriguez stated that he was doing well, and he voiced no complaints today, other than feeling really tired. .          ABILIFY MAINTENA (Aripiprazole) 400 mg, was given IM, in his RIGHT DELTOID at 1010, per order, and without incident.                Mr. Rodriguez tolerated well, and voiced no complaints.     [facility] condition at 1015..     He is to return in 4 weeks, on Thursday, 3-21-24, at 1000, for his next appointment, for his next Abilify Injection.          Brandon Lazo RN  2024  10:09 AM

## 2024-03-21 ENCOUNTER — HOSPITAL ENCOUNTER (OUTPATIENT)
Facility: HOSPITAL | Age: 29
Setting detail: INFUSION SERIES
End: 2024-03-21
Payer: MEDICAID

## 2024-03-21 VITALS
OXYGEN SATURATION: 99 % | HEART RATE: 104 BPM | RESPIRATION RATE: 16 BRPM | TEMPERATURE: 97.7 F | SYSTOLIC BLOOD PRESSURE: 124 MMHG | DIASTOLIC BLOOD PRESSURE: 78 MMHG

## 2024-03-21 DIAGNOSIS — T42.6X2A INTENTIONAL POISONING BY VALPROATE SODIUM (HCC): Primary | ICD-10-CM

## 2024-03-21 PROCEDURE — 6360000002 HC RX W HCPCS: Performed by: NURSE PRACTITIONER

## 2024-03-21 PROCEDURE — 96372 THER/PROPH/DIAG INJ SC/IM: CPT

## 2024-03-21 RX ORDER — SODIUM CHLORIDE 9 MG/ML
INJECTION, SOLUTION INTRAVENOUS CONTINUOUS
OUTPATIENT
Start: 2024-04-18

## 2024-03-21 RX ORDER — DIPHENHYDRAMINE HYDROCHLORIDE 50 MG/ML
50 INJECTION INTRAMUSCULAR; INTRAVENOUS
OUTPATIENT
Start: 2024-04-18

## 2024-03-21 RX ORDER — ALBUTEROL SULFATE 90 UG/1
4 AEROSOL, METERED RESPIRATORY (INHALATION) PRN
OUTPATIENT
Start: 2024-04-18

## 2024-03-21 RX ORDER — EPINEPHRINE 1 MG/ML
0.3 INJECTION, SOLUTION, CONCENTRATE INTRAVENOUS PRN
OUTPATIENT
Start: 2024-04-18

## 2024-03-21 RX ORDER — ONDANSETRON 2 MG/ML
8 INJECTION INTRAMUSCULAR; INTRAVENOUS
OUTPATIENT
Start: 2024-04-18

## 2024-03-21 RX ORDER — ACETAMINOPHEN 325 MG/1
650 TABLET ORAL
OUTPATIENT
Start: 2024-04-18

## 2024-03-21 RX ADMIN — ARIPIPRAZOLE 400 MG: KIT at 10:15

## 2024-03-21 NOTE — PROGRESS NOTES
[facility] Progress Note    Date: 2024    Name: Nolan Rodriguez    MRN: [id number]         : 1995      ABILIFY INJECTIONS Q4 WEEKS      Mr. Rodriguez arrived to [facility] at 1010 ambulatory. No complaints or concerns voiced.    Mr. Rodriguez was assessed and education was provided.     Pt denies complaints.    Mr. Rodriguez's vitals were reviewed.  Vitals:    24 1012   BP: 124/78   Pulse: (!) 104   Resp: 16   Temp: 97.7 °F (36.5 °C)   SpO2: 99%       Abilify 400mg was administered as ordered IM in patient's L deltoid muscle. No irritation or bleeding at site.Band-aid applied to site.     Mr. Rodriguez tolerated well without complaints.    Discharge/ follow-up instructions discussed w/ pt. Pt verbalized understanding.    Pt armband removed & shredded.    [facility]. [facility] was [facility] in stable condition at 1020. He is to return on 24 at 1000 for his next appointment.    ARTEM GRAF RN  2024

## 2024-04-18 ENCOUNTER — HOSPITAL ENCOUNTER (OUTPATIENT)
Facility: HOSPITAL | Age: 29
Setting detail: INFUSION SERIES
End: 2024-04-18
Payer: MEDICAID

## 2024-04-18 VITALS
RESPIRATION RATE: 16 BRPM | DIASTOLIC BLOOD PRESSURE: 90 MMHG | OXYGEN SATURATION: 100 % | HEART RATE: 100 BPM | TEMPERATURE: 98.4 F | SYSTOLIC BLOOD PRESSURE: 147 MMHG

## 2024-04-18 DIAGNOSIS — T42.6X2A INTENTIONAL POISONING BY VALPROATE SODIUM (HCC): Primary | ICD-10-CM

## 2024-04-18 PROCEDURE — 6360000002 HC RX W HCPCS: Performed by: NURSE PRACTITIONER

## 2024-04-18 PROCEDURE — 96372 THER/PROPH/DIAG INJ SC/IM: CPT

## 2024-04-18 RX ORDER — ONDANSETRON 2 MG/ML
8 INJECTION INTRAMUSCULAR; INTRAVENOUS
OUTPATIENT
Start: 2024-05-16

## 2024-04-18 RX ORDER — DIPHENHYDRAMINE HYDROCHLORIDE 50 MG/ML
50 INJECTION INTRAMUSCULAR; INTRAVENOUS
OUTPATIENT
Start: 2024-05-16

## 2024-04-18 RX ORDER — ALBUTEROL SULFATE 90 UG/1
4 AEROSOL, METERED RESPIRATORY (INHALATION) PRN
OUTPATIENT
Start: 2024-05-16

## 2024-04-18 RX ORDER — EPINEPHRINE 1 MG/ML
0.3 INJECTION, SOLUTION, CONCENTRATE INTRAVENOUS PRN
OUTPATIENT
Start: 2024-05-16

## 2024-04-18 RX ORDER — SODIUM CHLORIDE 9 MG/ML
INJECTION, SOLUTION INTRAVENOUS CONTINUOUS
OUTPATIENT
Start: 2024-05-16

## 2024-04-18 RX ORDER — ACETAMINOPHEN 325 MG/1
650 TABLET ORAL
OUTPATIENT
Start: 2024-05-16

## 2024-04-18 RX ADMIN — ARIPIPRAZOLE 400 MG: KIT at 10:15

## 2024-04-18 ASSESSMENT — PAIN - FUNCTIONAL ASSESSMENT: PAIN_FUNCTIONAL_ASSESSMENT: NONE - DENIES PAIN

## 2024-04-18 NOTE — PROGRESS NOTES
[facility] Progress Note    Date: 2024    Name: Nolan Rodriguez    MRN: [id number]         : 1995    Mr. Rodriguez arrived in the [facility] today at 1005, in stable condition, here for his ABILIFY INJECTION (EVERY 4 WEEKS). He was assessed and education was provided.     Mr. Rodriguez's vitals were reviewed.    Vitals:    24 1005   BP: (!) 147/90   Pulse: 100   Resp: 16   Temp: 98.4 °F (36.9 °C)   TempSrc: Oral   SpO2: 100%           Mr. Rodriguez stated that he was doing well, and he voiced no complaints today.          ABILIFY MAINTENA (Aripiprazole) 400 mg, was given IM, in his RIGHT DELTOID at 1015, per order, and without incident.                Mr. Rodriguez tolerated well, and voiced no complaints.     Mr. Rodriguez was [facility] in stable condition at 1020..     He is to return in 4 weeks, on Thursday, 24, at 1000, for his next appointment, for his next Abilify Injection.          Brandon Lazo RN  2024  10:12 AM

## 2024-05-08 ENCOUNTER — HOSPITAL ENCOUNTER (EMERGENCY)
Facility: HOSPITAL | Age: 29
Discharge: HOME | End: 2024-05-08
Attending: STUDENT IN AN ORGANIZED HEALTH CARE EDUCATION/TRAINING PROGRAM
Payer: MEDICAID

## 2024-05-08 VITALS
HEIGHT: 70 IN | DIASTOLIC BLOOD PRESSURE: 76 MMHG | HEART RATE: 97 BPM | SYSTOLIC BLOOD PRESSURE: 125 MMHG | BODY MASS INDEX: 26.05 KG/M2 | RESPIRATION RATE: 18 BRPM | OXYGEN SATURATION: 97 % | WEIGHT: 182 LBS | TEMPERATURE: 98.5 F

## 2024-05-08 DIAGNOSIS — K92.0 HEMATEMESIS WITH NAUSEA: Primary | ICD-10-CM

## 2024-05-08 DIAGNOSIS — E86.0 DEHYDRATION: ICD-10-CM

## 2024-05-08 LAB
ALBUMIN SERPL-MCNC: 3.9 G/DL (ref 3.4–5)
ALBUMIN/GLOB SERPL: 1.1 (ref 0.8–1.7)
ALP SERPL-CCNC: 85 U/L (ref 45–117)
ALT SERPL-CCNC: 41 U/L (ref 16–61)
ANION GAP SERPL CALC-SCNC: 5 MMOL/L (ref 3–18)
AST SERPL-CCNC: 16 U/L (ref 10–38)
BASOPHILS # BLD: 0.1 K/UL (ref 0–0.1)
BASOPHILS NFR BLD: 1 % (ref 0–2)
BILIRUB DIRECT SERPL-MCNC: 0.1 MG/DL (ref 0–0.2)
BILIRUB SERPL-MCNC: 0.2 MG/DL (ref 0.2–1)
BUN SERPL-MCNC: 12 MG/DL (ref 7–18)
BUN/CREAT SERPL: 10 (ref 12–20)
CALCIUM SERPL-MCNC: 9.3 MG/DL (ref 8.5–10.1)
CHLORIDE SERPL-SCNC: 104 MMOL/L (ref 100–111)
CO2 SERPL-SCNC: 27 MMOL/L (ref 21–32)
CREAT SERPL-MCNC: 1.18 MG/DL (ref 0.6–1.3)
DIFFERENTIAL METHOD BLD: ABNORMAL
EOSINOPHIL # BLD: 0.3 K/UL (ref 0–0.4)
EOSINOPHIL NFR BLD: 3 % (ref 0–5)
ERYTHROCYTE [DISTWIDTH] IN BLOOD BY AUTOMATED COUNT: 12.1 % (ref 11.6–14.5)
GLOBULIN SER CALC-MCNC: 3.5 G/DL (ref 2–4)
GLUCOSE SERPL-MCNC: 98 MG/DL (ref 74–99)
HCT VFR BLD AUTO: 47.4 % (ref 36–48)
HGB BLD-MCNC: 16.3 G/DL (ref 13–16)
IMM GRANULOCYTES # BLD AUTO: 0 K/UL (ref 0–0.04)
IMM GRANULOCYTES NFR BLD AUTO: 0 % (ref 0–0.5)
LIPASE SERPL-CCNC: 34 U/L (ref 13–75)
LYMPHOCYTES # BLD: 3.4 K/UL (ref 0.9–3.6)
LYMPHOCYTES NFR BLD: 34 % (ref 21–52)
MCH RBC QN AUTO: 29.6 PG (ref 24–34)
MCHC RBC AUTO-ENTMCNC: 34.4 G/DL (ref 31–37)
MCV RBC AUTO: 86.2 FL (ref 78–100)
MONOCYTES # BLD: 0.8 K/UL (ref 0.05–1.2)
MONOCYTES NFR BLD: 8 % (ref 3–10)
NEUTS SEG # BLD: 5.5 K/UL (ref 1.8–8)
NEUTS SEG NFR BLD: 55 % (ref 40–73)
NRBC # BLD: 0 K/UL (ref 0–0.01)
NRBC BLD-RTO: 0 PER 100 WBC
PLATELET # BLD AUTO: 288 K/UL (ref 135–420)
PMV BLD AUTO: 9.6 FL (ref 9.2–11.8)
POTASSIUM SERPL-SCNC: 3.8 MMOL/L (ref 3.5–5.5)
PROT SERPL-MCNC: 7.4 G/DL (ref 6.4–8.2)
RBC # BLD AUTO: 5.5 M/UL (ref 4.35–5.65)
SODIUM SERPL-SCNC: 136 MMOL/L (ref 136–145)
WBC # BLD AUTO: 10 K/UL (ref 4.6–13.2)

## 2024-05-08 PROCEDURE — 80048 BASIC METABOLIC PNL TOTAL CA: CPT

## 2024-05-08 PROCEDURE — 85025 COMPLETE CBC W/AUTO DIFF WBC: CPT

## 2024-05-08 PROCEDURE — 83690 ASSAY OF LIPASE: CPT

## 2024-05-08 PROCEDURE — 2580000003 HC RX 258: Performed by: STUDENT IN AN ORGANIZED HEALTH CARE EDUCATION/TRAINING PROGRAM

## 2024-05-08 PROCEDURE — 99284 EMERGENCY DEPT VISIT MOD MDM: CPT

## 2024-05-08 PROCEDURE — 80076 HEPATIC FUNCTION PANEL: CPT

## 2024-05-08 RX ORDER — 0.9 % SODIUM CHLORIDE 0.9 %
1000 INTRAVENOUS SOLUTION INTRAVENOUS ONCE
Status: COMPLETED | OUTPATIENT
Start: 2024-05-08 | End: 2024-05-08

## 2024-05-08 RX ADMIN — SODIUM CHLORIDE 1000 ML: 9 INJECTION, SOLUTION INTRAVENOUS at 16:22

## 2024-05-08 ASSESSMENT — PAIN - FUNCTIONAL ASSESSMENT: PAIN_FUNCTIONAL_ASSESSMENT: NONE - DENIES PAIN

## 2024-05-08 NOTE — ED TRIAGE NOTES
Pt came via EMS stretcher from home c/o nausea, vomiting and weakness. Pt states he vomited once and noted that it was bloody.     Denies chest pain, dizziness or SOB.    Pt smoked 3 cigars today. Denies alcohol or drug use.

## 2024-05-08 NOTE — ED PROVIDER NOTES
STRATTERA     benztropine 0.5 MG tablet  Commonly known as: COGENTIN     buPROPion 150 MG extended release tablet  Commonly known as: WELLBUTRIN SR     haloperidol 20 MG tablet  Commonly known as: HALDOL     ibuprofen 600 MG tablet  Commonly known as: ADVIL;MOTRIN     risperiDONE 1 MG tablet  Commonly known as: RISPERDAL     Vraylar 3 MG Caps capsule  Generic drug: cariprazine hcl           * This list has 2 medication(s) that are the same as other medications prescribed for you. Read the directions carefully, and ask your doctor or other care provider to review them with you.                    DISCONTINUED MEDICATIONS:  Discharge Medication List as of 5/8/2024  4:47 PM          I am the Primary Clinician of Record.   Jamison Syed DO (electronically signed)    (Please note that parts of this dictation were completed with voice recognition software. Quite often unanticipated grammatical, syntax, homophones, and other interpretive errors are inadvertently transcribed by the computer software. Please disregards these errors. Please excuse any errors that have escaped final proofreading.)         Remberto Quevedo Jr., DO  05/09/24 0812

## 2024-05-10 ENCOUNTER — HOSPITAL ENCOUNTER (EMERGENCY)
Facility: HOSPITAL | Age: 29
Discharge: HOME | End: 2024-05-11
Attending: EMERGENCY MEDICINE
Payer: MEDICAID

## 2024-05-10 DIAGNOSIS — F20.9 SCHIZOPHRENIA, UNSPECIFIED TYPE (HCC): Primary | ICD-10-CM

## 2024-05-10 LAB
ALBUMIN SERPL-MCNC: 4.2 G/DL (ref 3.4–5)
ALBUMIN/GLOB SERPL: 1.2 (ref 0.8–1.7)
ALP SERPL-CCNC: 93 U/L (ref 45–117)
ALT SERPL-CCNC: 40 U/L (ref 16–61)
AMPHET UR QL SCN: NEGATIVE
ANION GAP SERPL CALC-SCNC: 5 MMOL/L (ref 3–18)
APAP SERPL-MCNC: <2 UG/ML (ref 10–30)
AST SERPL-CCNC: 20 U/L (ref 10–38)
BARBITURATES UR QL SCN: NEGATIVE
BENZODIAZ UR QL: NEGATIVE
BILIRUB SERPL-MCNC: 0.4 MG/DL (ref 0.2–1)
BUN SERPL-MCNC: 10 MG/DL (ref 7–18)
BUN/CREAT SERPL: 8 (ref 12–20)
CALCIUM SERPL-MCNC: 9.7 MG/DL (ref 8.5–10.1)
CANNABINOIDS UR QL SCN: NEGATIVE
CHLORIDE SERPL-SCNC: 104 MMOL/L (ref 100–111)
CO2 SERPL-SCNC: 29 MMOL/L (ref 21–32)
COCAINE UR QL SCN: NEGATIVE
CREAT SERPL-MCNC: 1.32 MG/DL (ref 0.6–1.3)
ERYTHROCYTE [DISTWIDTH] IN BLOOD BY AUTOMATED COUNT: 11.9 % (ref 11.6–14.5)
ETHANOL SERPL-MCNC: <3 MG/DL (ref 0–3)
GLOBULIN SER CALC-MCNC: 3.5 G/DL (ref 2–4)
GLUCOSE SERPL-MCNC: 97 MG/DL (ref 74–99)
HCT VFR BLD AUTO: 48.2 % (ref 36–48)
HGB BLD-MCNC: 16.7 G/DL (ref 13–16)
Lab: NORMAL
MCH RBC QN AUTO: 30 PG (ref 24–34)
MCHC RBC AUTO-ENTMCNC: 34.6 G/DL (ref 31–37)
MCV RBC AUTO: 86.7 FL (ref 78–100)
METHADONE UR QL: NEGATIVE
NRBC # BLD: 0 K/UL (ref 0–0.01)
NRBC BLD-RTO: 0 PER 100 WBC
OPIATES UR QL: NEGATIVE
PCP UR QL: NEGATIVE
PLATELET # BLD AUTO: 294 K/UL (ref 135–420)
PMV BLD AUTO: 9.9 FL (ref 9.2–11.8)
POTASSIUM SERPL-SCNC: 3.7 MMOL/L (ref 3.5–5.5)
PROT SERPL-MCNC: 7.7 G/DL (ref 6.4–8.2)
RBC # BLD AUTO: 5.56 M/UL (ref 4.35–5.65)
SALICYLATES SERPL-MCNC: 2.4 MG/DL (ref 2.8–20)
SODIUM SERPL-SCNC: 138 MMOL/L (ref 136–145)
WBC # BLD AUTO: 11.3 K/UL (ref 4.6–13.2)

## 2024-05-10 PROCEDURE — 87636 SARSCOV2 & INF A&B AMP PRB: CPT

## 2024-05-10 PROCEDURE — 82077 ASSAY SPEC XCP UR&BREATH IA: CPT

## 2024-05-10 PROCEDURE — 85027 COMPLETE CBC AUTOMATED: CPT

## 2024-05-10 PROCEDURE — 80307 DRUG TEST PRSMV CHEM ANLYZR: CPT

## 2024-05-10 PROCEDURE — 80143 DRUG ASSAY ACETAMINOPHEN: CPT

## 2024-05-10 PROCEDURE — 80053 COMPREHEN METABOLIC PANEL: CPT

## 2024-05-10 PROCEDURE — 99283 EMERGENCY DEPT VISIT LOW MDM: CPT

## 2024-05-10 PROCEDURE — 80179 DRUG ASSAY SALICYLATE: CPT

## 2024-05-10 ASSESSMENT — PAIN - FUNCTIONAL ASSESSMENT: PAIN_FUNCTIONAL_ASSESSMENT: NONE - DENIES PAIN

## 2024-05-11 VITALS
DIASTOLIC BLOOD PRESSURE: 64 MMHG | RESPIRATION RATE: 18 BRPM | SYSTOLIC BLOOD PRESSURE: 117 MMHG | TEMPERATURE: 97.1 F | HEART RATE: 68 BPM | OXYGEN SATURATION: 98 %

## 2024-05-11 LAB
FLUAV RNA SPEC QL NAA+PROBE: NOT DETECTED
FLUBV RNA SPEC QL NAA+PROBE: NOT DETECTED
SARS-COV-2 RNA RESP QL NAA+PROBE: NOT DETECTED

## 2024-05-11 PROCEDURE — 6370000000 HC RX 637 (ALT 250 FOR IP): Performed by: EMERGENCY MEDICINE

## 2024-05-11 RX ORDER — OLANZAPINE 5 MG/1
5 TABLET ORAL
Status: COMPLETED | OUTPATIENT
Start: 2024-05-11 | End: 2024-05-11

## 2024-05-11 RX ADMIN — OLANZAPINE 5 MG: 5 TABLET, FILM COATED ORAL at 04:40

## 2024-05-11 NOTE — BSMART NOTE
Behavioral Health Crisis Assessment    Chief Complaint: \"auditory and visual hallucinations, hearing kids in the background and people talking; and feeling like I  can't control my emotions and limbs.\"      Voluntary or Involuntary Status: Voluntarily      C-SSRS current suicide Risk (High, Moderate, Low): Moderate Risk due to previous suicide attempt 2-3 years ago.      Past Suicide Attempts (specify):  Patient reported he overdosed on Depakote 2-3 years ago.       Self Injurious/Self Mutilation behaviors (specify): Patient denies.      Protective Factors (specify): Patient has support system with his grandmother, and has access to mental health services; denies suicidal ideations. Patient denies substance use and there is no evidence of use, patient appears to be followed by outpatient services.       Risk Factors (specify): History of schizophrenia and non-compliant with medication management.       Substance use (current or past): Patient denies the use of mood altering substances, but admits to occasionally drinking a beer. UDS collected on 5/10/24 yielded negative results for AMP, BZO, BAR, CAILIN, PCP, THC and alcohol. Patient reported he smokes approximately 10 cigarettes daily.        MH & Substance use Treatment  (current and/or past): Patient reported a historical diagnosis of schizophrenia, and reported having case management and medication management with [facility].  Patient reported he has received acute inpatient treatment approximately 2-3 yeas ago; and denies having more than one episode. Patient is prescribed Abilify 20 mg tab daily,  Abilify 400 mg IM, Strattera 80 mg daily, Cogentin 0.51 tab BID, Wellbutrin 150 mg ER tab daily, Haldol 20 mg tab QHS, Risperdal 1 mg QHS, Vraylar 3 mg capsule daily. Patient reported he think he has an appointment on 5/17/24, for medication management.     Symptoms:  Auditory  hallucinations      Medical Issues: Asthma, HCC, GERD      Violence towards others (current and/or past:(specify):  Patient denies.      Legal issues (current or past): Patient hesitated and then advised he was charged with assault and battery 2-3 years ago.      Access to weapons: Patient denies having access to guns; but does have access to other weapons such as knives.       Trauma or Abuse (specify): Patient denies.      Living Situation: Patient reported he lives with his grandmother.      Employment: Patient reported he is unemployed, but receives social security.      : Patient denies.      Education level: Patient reported he completed the 11th grade; and denies receiving special education services.       ADLs: Patient is able to complete ADL's without assistance.       Appetite Disturbance: Patient reported his appetite is \"good\".      Sleep Disturbance: Patient reported his sleep is inconsistent; sleeping 3-7 hours a night.       Mental Status Exam: Patient presented alert and oriented to person, place, time and situation. Patient is wearing blue hospital scrubs. Patient had appropriate posture, Fair eye contact and presented with cooperative attitude, \"Fine\"  mood and flat affect. Thought process was Coherent with content that includes auditory hallucinations. Memory  unreliable . Patient's speech was within normal limits. Judgement and insight are fair.     Brief Clinical Summary: Patient is a 29 year-old male who arrived at [facility] ED due to auditory hallucinations.  Patient endorses AH and denies SI, HI, VH and delusions and lacked evidence of delusions. Patient reported having auditory hallucinations of hearing children and people talking in the background.  He also reported he feels he's unable to control his emotions and limbs at times. Patient reported he takes his medication as prescribed; but advised he took only one of his medications yesterday. Patient has a history of

## 2024-05-11 NOTE — BSMART NOTE
SAFETY PLAN     A suicide Safety Plan is a document that supports someone when they are having thoughts of suicide.     Warning Signs that indicate a suicidal crisis may be developing: What (situations, thoughts, feelings, body sensations, behaviors, etc.) do you experience that lets you know you are beginning to think about suicide?    Hopeless, like there is no point in living  Tearful and overwhelmed by negative thoughts  Unbearable pain that you can't imagine ending  Useless, not wanted or not needed by others  Desperate, as if you have no other choice  Like everyone would be better off without you  Cut off from your body or physically numb  Fascinated by death.     What you may experience:  Poor sleep, including waking up earlier than you want to  A change in appetite, weight gain or loss  No desire to take care of yourself, for example neglecting your physical appearance  Wanting to avoid others  Making a will or giving away possessions  Struggling to communicate  Self-loathing and low self-esteem  Urges to self-harm.    Internal Coping Strategies:  What things can I do (relaxation techniques, hobbies, physical activities, etc.) to take my mind off my problems without contacting another person?    Deep breathing/Meditation  Journaling/Writing  Exercising/Going for a walk  Self-talk  Arts and Crafts  Listen to music  Talking to someone who you can trust     People and social settings that provide distraction: Who can I call or where can I go to distract me? People whom I can ask for help: Who can I call when I need help - for example, friends, family, clergy, someone else?    Name: Vilma Hernandez        Relationship to Patient:Grandmother     Phone: [phone]    Professionals or Behavioral Health agencies I can contact during a crisis: Who can I call for help - for example, my doctor, my psychiatrist, my psychologist, a mental health provider, a suicide hotline?    [facility]

## 2024-05-11 NOTE — BSMART NOTE
BSMART Note: Acknowledge BSMART consult for Pt arriving to ED at 2159 presenting with auditory hallucinations. Crisis counselor to assess as soon as ETOH Level results, indicating Pt is within suitable level to complete an ethically appropriate assessment.    Lary Reyes, LPC, CSAC, CADC  BSMART Counselor

## 2024-05-11 NOTE — DISCHARGE INSTRUCTIONS
Please be sure to take all of your medications as directed.  Arrange follow-up with primary care in the coming week along with your mental health provider.  Return for any thoughts of self-harm or thoughts of harming anyone else.    SAFETY PLAN     A suicide Safety Plan is a document that supports someone when they are having thoughts of suicide.     Warning Signs that indicate a suicidal crisis may be developing: What (situations, thoughts, feelings, body sensations, behaviors, etc.) do you experience that lets you know you are beginning to think about suicide?    Hopeless, like there is no point in living  Tearful and overwhelmed by negative thoughts  Unbearable pain that you can't imagine ending  Useless, not wanted or not needed by others  Desperate, as if you have no other choice  Like everyone would be better off without you  Cut off from your body or physically numb  Fascinated by death.     What you may experience:  Poor sleep, including waking up earlier than you want to  A change in appetite, weight gain or loss  No desire to take care of yourself, for example neglecting your physical appearance  Wanting to avoid others  Making a will or giving away possessions  Struggling to communicate  Self-loathing and low self-esteem  Urges to self-harm.    Internal Coping Strategies:  What things can I do (relaxation techniques, hobbies, physical activities, etc.) to take my mind off my problems without contacting another person?    Deep breathing/Meditation  Journaling/Writing  Exercising/Going for a walk  Self-talk  Arts and Crafts  Listen to music  Talking to someone who you can trust     People and social settings that provide distraction: Who can I call or where can I go to distract me? People whom I can ask for help: Who can I call when I need help - for example, friends, family, clergy, someone else?    Name: Vilma Hernandez        Relationship to Patient:Grandmother     Phone: [phone]    Professionals or

## 2024-05-11 NOTE — ED PROVIDER NOTES
[facility] EMERGENCY DEPT  eMERGENCY dEPARTMENT eNCOUnter        Pt Name: Nolan Rodriguez  MRN: [id number]  Birthdate 1995  Date of evaluation: 5/10/2024  Provider: Jeffry Meneses MD  PCP: Avani Dewey APRN - NP  Note Started: 10:20 PM EDT 2024          CHIEF COMPLAINT       No chief complaint on file.      HISTORY OF PRESENT ILLNESS        Patient with a history of schizophrenia, coming in today because he has been having auditory hallucinations for the last 2 to 3 days and is very distressing to him.  No command hallucinations.  He denies any thoughts of suicide or homicide.  States that he has been taking his medications as directed.  He denies any medical concerns at this time.  No ingestions or self-injurious behavior    Nursing Notes were all reviewed and agreed with or any disagreements were addressed  in the HPI.    REVIEW OF SYSTEMS         The following 10 systems are reviewed and negative except as noted in the HPI: Constitutional, Eyes, ENT, cardiovascular, pulmonary, GI, , neuro, skin, and musculoskeletal       PASTMEDICAL HISTORY     Past Medical History:   Diagnosis Date    ADHD (attention deficit hyperactivity disorder)     Anxiety disorder     Depression     Mood disorder (HCC)     Psychiatric disorder     Psychotic disorder (HCC)     Schizophrenia ([facility])     Suicidal ideation     Trauma     \"His mother  this past November\"         SURGICAL HISTORY     No past surgical history on file.      CURRENT MEDICATIONS       Previous Medications    ARIPIPRAZOLE (ABILIFY) 20 MG TABLET    Take 1 tablet by mouth daily    ARIPIPRAZOLE ER (ABILIFY MAINTENA) 400 MG SRER    Inject 400 mg into the muscle    ATOMOXETINE (STRATTERA) 80 MG CAPSULE        BENZTROPINE (COGENTIN) 0.5 MG TABLET    Take 1 tablet by mouth 2 times daily    BUPROPION (WELLBUTRIN SR) 150 MG EXTENDED RELEASE TABLET    take 1 tablet by mouth twice a day for SMOKING CESSATION    HALOPERIDOL (HALDOL) 20 MG TABLET    Take 1

## 2024-05-16 ENCOUNTER — HOSPITAL ENCOUNTER (OUTPATIENT)
Facility: HOSPITAL | Age: 29
Setting detail: INFUSION SERIES
End: 2024-05-16
Payer: MEDICAID

## 2024-05-16 VITALS
OXYGEN SATURATION: 100 % | TEMPERATURE: 97.5 F | RESPIRATION RATE: 18 BRPM | DIASTOLIC BLOOD PRESSURE: 87 MMHG | SYSTOLIC BLOOD PRESSURE: 137 MMHG | HEART RATE: 114 BPM

## 2024-05-16 DIAGNOSIS — T42.6X2A INTENTIONAL POISONING BY VALPROATE SODIUM (HCC): Primary | ICD-10-CM

## 2024-05-16 PROCEDURE — 6360000002 HC RX W HCPCS: Performed by: NURSE PRACTITIONER

## 2024-05-16 PROCEDURE — 96372 THER/PROPH/DIAG INJ SC/IM: CPT

## 2024-05-16 RX ORDER — ALBUTEROL SULFATE 90 UG/1
4 AEROSOL, METERED RESPIRATORY (INHALATION) PRN
OUTPATIENT
Start: 2024-06-13

## 2024-05-16 RX ORDER — ACETAMINOPHEN 325 MG/1
650 TABLET ORAL
OUTPATIENT
Start: 2024-06-13

## 2024-05-16 RX ORDER — ONDANSETRON 2 MG/ML
8 INJECTION INTRAMUSCULAR; INTRAVENOUS
OUTPATIENT
Start: 2024-06-13

## 2024-05-16 RX ORDER — SODIUM CHLORIDE 9 MG/ML
INJECTION, SOLUTION INTRAVENOUS CONTINUOUS
OUTPATIENT
Start: 2024-06-13

## 2024-05-16 RX ORDER — DIPHENHYDRAMINE HYDROCHLORIDE 50 MG/ML
50 INJECTION INTRAMUSCULAR; INTRAVENOUS
OUTPATIENT
Start: 2024-06-13

## 2024-05-16 RX ORDER — EPINEPHRINE 1 MG/ML
0.3 INJECTION, SOLUTION, CONCENTRATE INTRAVENOUS PRN
OUTPATIENT
Start: 2024-06-13

## 2024-05-16 RX ADMIN — ARIPIPRAZOLE 400 MG: KIT at 10:16

## 2024-05-16 NOTE — PROGRESS NOTES
[facility] Progress Note    Date: May 16, 2024    Name: Nolan Rodriguez    MRN: [id number]         : 1995      ABILIFY INJECTIONS Q4 WEEKS      Mr. Rodriguez arrived to [facility] at 1005.    Mr. Rodriguez was assessed and education was provided.     Pt denies complaints.    Mr. Rodriguez's vitals were reviewed.  Vitals:    24 1005   BP: 137/87   Pulse: (!) 114   Resp: 18   Temp: 97.5 °F (36.4 °C)   SpO2: 100%       Abilify 400mg was administered as ordered IM in patient's R deltoid muscle. Band-aid applied to site.     Mr. Rodriguez tolerated well without complaints.    Discharge/ follow-up instructions discussed w/ pt. Pt verbalized understanding.    Pt armband removed & shredded.    [facility] condition at 1020. He is to return on 24 at 1000 for his next appointment.    Grisel Rockwell RN  May 16, 2024

## 2024-05-17 ENCOUNTER — HOSPITAL ENCOUNTER (INPATIENT)
Facility: HOSPITAL | Age: 29
LOS: 4 days | Discharge: HOME | DRG: 750 | End: 2024-05-21
Attending: PSYCHIATRY & NEUROLOGY | Admitting: PSYCHIATRY & NEUROLOGY
Payer: MEDICAID

## 2024-05-17 DIAGNOSIS — R44.2 TACTILE HALLUCINATION: ICD-10-CM

## 2024-05-17 DIAGNOSIS — R44.0 AUDITORY HALLUCINATION: Primary | ICD-10-CM

## 2024-05-17 LAB
ALBUMIN SERPL-MCNC: 4.3 G/DL (ref 3.4–5)
ALBUMIN/GLOB SERPL: 1 (ref 0.8–1.7)
ALP SERPL-CCNC: 103 U/L (ref 45–117)
ALT SERPL-CCNC: 40 U/L (ref 16–61)
AMPHET UR QL SCN: NEGATIVE
ANION GAP SERPL CALC-SCNC: 2 MMOL/L (ref 3–18)
APAP SERPL-MCNC: <2 UG/ML (ref 10–30)
APPEARANCE UR: CLEAR
AST SERPL-CCNC: 26 U/L (ref 10–38)
BARBITURATES UR QL SCN: NEGATIVE
BASOPHILS # BLD: 0.1 K/UL (ref 0–0.1)
BASOPHILS NFR BLD: 1 % (ref 0–2)
BENZODIAZ UR QL: NEGATIVE
BILIRUB SERPL-MCNC: 0.3 MG/DL (ref 0.2–1)
BILIRUB UR QL: NEGATIVE
BUN SERPL-MCNC: 14 MG/DL (ref 7–18)
BUN/CREAT SERPL: 12 (ref 12–20)
CALCIUM SERPL-MCNC: 9.2 MG/DL (ref 8.5–10.1)
CANNABINOIDS UR QL SCN: NEGATIVE
CHLORIDE SERPL-SCNC: 103 MMOL/L (ref 100–111)
CO2 SERPL-SCNC: 31 MMOL/L (ref 21–32)
COCAINE UR QL SCN: NEGATIVE
COLOR UR: YELLOW
CREAT SERPL-MCNC: 1.13 MG/DL (ref 0.6–1.3)
DIFFERENTIAL METHOD BLD: ABNORMAL
EOSINOPHIL # BLD: 0.3 K/UL (ref 0–0.4)
EOSINOPHIL NFR BLD: 3 % (ref 0–5)
ERYTHROCYTE [DISTWIDTH] IN BLOOD BY AUTOMATED COUNT: 12 % (ref 11.6–14.5)
ETHANOL SERPL-MCNC: <3 MG/DL (ref 0–3)
FLUAV RNA SPEC QL NAA+PROBE: NOT DETECTED
FLUBV RNA SPEC QL NAA+PROBE: NOT DETECTED
GLOBULIN SER CALC-MCNC: 4.2 G/DL (ref 2–4)
GLUCOSE SERPL-MCNC: 101 MG/DL (ref 74–99)
GLUCOSE UR STRIP.AUTO-MCNC: NEGATIVE MG/DL
HCT VFR BLD AUTO: 48.9 % (ref 36–48)
HGB BLD-MCNC: 16.4 G/DL (ref 13–16)
HGB UR QL STRIP: NEGATIVE
IMM GRANULOCYTES # BLD AUTO: 0 K/UL (ref 0–0.04)
IMM GRANULOCYTES NFR BLD AUTO: 0 % (ref 0–0.5)
KETONES UR QL STRIP.AUTO: NEGATIVE MG/DL
LEUKOCYTE ESTERASE UR QL STRIP.AUTO: NEGATIVE
LYMPHOCYTES # BLD: 3.4 K/UL (ref 0.9–3.6)
LYMPHOCYTES NFR BLD: 32 % (ref 21–52)
Lab: NORMAL
MCH RBC QN AUTO: 29.4 PG (ref 24–34)
MCHC RBC AUTO-ENTMCNC: 33.5 G/DL (ref 31–37)
MCV RBC AUTO: 87.6 FL (ref 78–100)
METHADONE UR QL: NEGATIVE
MONOCYTES # BLD: 0.7 K/UL (ref 0.05–1.2)
MONOCYTES NFR BLD: 6 % (ref 3–10)
NEUTS SEG # BLD: 6.1 K/UL (ref 1.8–8)
NEUTS SEG NFR BLD: 58 % (ref 40–73)
NITRITE UR QL STRIP.AUTO: NEGATIVE
NRBC # BLD: 0 K/UL (ref 0–0.01)
NRBC BLD-RTO: 0 PER 100 WBC
OPIATES UR QL: NEGATIVE
PCP UR QL: NEGATIVE
PH UR STRIP: 6.5 (ref 5–8)
PLATELET # BLD AUTO: 315 K/UL (ref 135–420)
PMV BLD AUTO: 9.9 FL (ref 9.2–11.8)
POTASSIUM SERPL-SCNC: 3.6 MMOL/L (ref 3.5–5.5)
PROT SERPL-MCNC: 8.5 G/DL (ref 6.4–8.2)
PROT UR STRIP-MCNC: NEGATIVE MG/DL
RBC # BLD AUTO: 5.58 M/UL (ref 4.35–5.65)
SALICYLATES SERPL-MCNC: 2.4 MG/DL (ref 2.8–20)
SARS-COV-2 RNA RESP QL NAA+PROBE: NOT DETECTED
SODIUM SERPL-SCNC: 136 MMOL/L (ref 136–145)
SP GR UR REFRACTOMETRY: 1.01 (ref 1–1.03)
UROBILINOGEN UR QL STRIP.AUTO: 0.2 EU/DL (ref 0.2–1)
WBC # BLD AUTO: 10.5 K/UL (ref 4.6–13.2)

## 2024-05-17 PROCEDURE — 87636 SARSCOV2 & INF A&B AMP PRB: CPT

## 2024-05-17 PROCEDURE — 81003 URINALYSIS AUTO W/O SCOPE: CPT

## 2024-05-17 PROCEDURE — 80143 DRUG ASSAY ACETAMINOPHEN: CPT

## 2024-05-17 PROCEDURE — 99285 EMERGENCY DEPT VISIT HI MDM: CPT

## 2024-05-17 PROCEDURE — 90791 PSYCH DIAGNOSTIC EVALUATION: CPT | Performed by: SOCIAL WORKER

## 2024-05-17 PROCEDURE — 80053 COMPREHEN METABOLIC PANEL: CPT

## 2024-05-17 PROCEDURE — 80179 DRUG ASSAY SALICYLATE: CPT

## 2024-05-17 PROCEDURE — 1140000000 HC RM PRIVATE PSYCH

## 2024-05-17 PROCEDURE — 82077 ASSAY SPEC XCP UR&BREATH IA: CPT

## 2024-05-17 PROCEDURE — 80307 DRUG TEST PRSMV CHEM ANLYZR: CPT

## 2024-05-17 PROCEDURE — 85025 COMPLETE CBC W/AUTO DIFF WBC: CPT

## 2024-05-17 RX ORDER — ACETAMINOPHEN 325 MG/1
650 TABLET ORAL EVERY 4 HOURS PRN
Status: CANCELLED | OUTPATIENT
Start: 2024-05-17

## 2024-05-17 RX ORDER — HYDROXYZINE HYDROCHLORIDE 25 MG/1
50 TABLET, FILM COATED ORAL 3 TIMES DAILY PRN
Status: CANCELLED | OUTPATIENT
Start: 2024-05-17

## 2024-05-17 RX ORDER — POLYETHYLENE GLYCOL 3350 17 G/17G
17 POWDER, FOR SOLUTION ORAL DAILY PRN
Status: CANCELLED | OUTPATIENT
Start: 2024-05-17

## 2024-05-17 RX ORDER — TRAZODONE HYDROCHLORIDE 50 MG/1
50 TABLET ORAL NIGHTLY PRN
Status: DISCONTINUED | OUTPATIENT
Start: 2024-05-17 | End: 2024-05-21 | Stop reason: HOSPADM

## 2024-05-17 RX ORDER — HYDROXYZINE 50 MG/1
50 TABLET, FILM COATED ORAL 3 TIMES DAILY PRN
Status: DISCONTINUED | OUTPATIENT
Start: 2024-05-17 | End: 2024-05-21 | Stop reason: HOSPADM

## 2024-05-17 RX ORDER — TRAZODONE HYDROCHLORIDE 50 MG/1
50 TABLET ORAL NIGHTLY PRN
Status: CANCELLED | OUTPATIENT
Start: 2024-05-17

## 2024-05-17 RX ORDER — POLYETHYLENE GLYCOL 3350 17 G/17G
17 POWDER, FOR SOLUTION ORAL DAILY PRN
Status: DISCONTINUED | OUTPATIENT
Start: 2024-05-17 | End: 2024-05-21 | Stop reason: HOSPADM

## 2024-05-17 RX ORDER — NICOTINE 21 MG/24HR
1 PATCH, TRANSDERMAL 24 HOURS TRANSDERMAL DAILY
Status: DISCONTINUED | OUTPATIENT
Start: 2024-05-18 | End: 2024-05-21 | Stop reason: HOSPADM

## 2024-05-17 RX ORDER — ACETAMINOPHEN 325 MG/1
650 TABLET ORAL EVERY 4 HOURS PRN
Status: DISCONTINUED | OUTPATIENT
Start: 2024-05-17 | End: 2024-05-21 | Stop reason: HOSPADM

## 2024-05-17 NOTE — ED TRIAGE NOTES
Patient brought in by [facility] on ECO for hallucinations and trying to get a chip out of his ear with scissors. Patient states that hallucinations come and go. Patient denies wanting to harm himself or others. Patient claims that he hears voices in his head.

## 2024-05-17 NOTE — VIRTUAL HEALTH
risperiDONE (RISPERDAL) 1 MG tablet Take 1 tablet by mouth nightly at bedtime.  Patient not taking: Reported on 11/30/2023 5/24/23   Provider, MD Ernesto   ARIPiprazole ER (ABILIFY MAINTENA) 400 MG SRER Inject 400 mg into the muscle 2/26/19   Automatic Reconciliation, Ar   ibuprofen (ADVIL;MOTRIN) 600 MG tablet Take 600 mg by mouth every 6 hours as needed 6/11/22   Automatic Reconciliation, Ar        Labs:  UDS: not available  ETOH: not available        Mental Status Exam:  Level of consciousness:  awake   Appearance:  street clothes.  Does appear stated age. No acute distress.  Behavior/Motor:  no abnormalities noted  Attitude toward examiner:  cooperative  SI/HI:Denies SI/HI  Speech:  normal rate , Tone: normal tone  Mood: anxious  Affect: mood congruent  Thought Processes:  hallucinations easily distract able .   Thought Content: Obsessions/instrusive thoughts:  hallucinations  Hallucinations:  Hallucinations: +Auditory Hallucinations + Tactile   Cognition:  oriented to person, place, and time   Concentration: intact  Memory: intact, though not formally tested.  Insight: fair   Judgement: fair   Fund of Knowledge: limited    Overall Level Suicide Risk: TelePsych CSSRS Risk Level: Low Risk  CSSR-S Screening: LOW    Brief ClinicalSummary:   Patient is a 29 y.o.  male who presents for psychosis and in need of a mental health exam. Pt endorses tactile hallucinations of feeling vibrations for a couple of days and auditory,hearing loud voices,shouts of one words slurs ( profanity words)the patient was in the ED on yesterday. Pt reports the hallucinations have increased and are interfering with his sleep endorsing 5-6 hours nightly, stating the medications do help. Pt also endorsing difficulty concentrating.Pt reports depression where he becomes agitated and may \"lash out\",  not violently but explains he may be a bit snappy. Pt endorses he may feel paranoid sometimes, states his appetite is fine

## 2024-05-17 NOTE — ED NOTES
Bedside and Verbal shift change report given to RAJEEV Lott (oncoming nurse) by RAJEEV Aldrich (offgoing nurse). Report included the following information Nurse Handoff Report, ED Encounter Summary, and Adult Overview.

## 2024-05-17 NOTE — ED PROVIDER NOTES
Reevaluation, and Consults:  DDx: Hallucinations, schizophrenia, electrolyte abnormality, drug use, alcohol use    Provider Notes (Medical Decision Making):   Patient is a 29-year-old male present to the emergency department accompanied by police due to an E CO.  On exam, patient is alert, oriented x 3, no acute distress.  Heart regular rate and rhythm.  No murmurs appreciated.  No lower extremity edema.  Lungs clear to auscultation bilaterally.  Abdomen is soft and nontender.  Patient admits to auditory and visual hallucinations.  Denies any suicidal homicidal ideations.  Will obtain baseline labs for medical clearance.    Patient is initially an ECO, however he agrees to stay voluntarily.  Patient was seen and evaluated by telepsych who recommends that the patient be admitted for increasing hallucinations.    CBC and CMP WNL.  Salicylate of 2.4.  Acetaminophen less than 2.  Ethanol less than 3. Patient is covid/influenza negative.  UDS negative.  UA with no signs of acute infection.    Patient is medically cleared for behavioral health evaluation.    Patient was seen and evaluated by behavioral health.    Patient accepted to our facility for inpatient workup.      For Hospitalized Patients:    1. Hospitalization Decision Time:  The decision to hospitalize the patient was made by Dr. Celestin at 2229 on 5/17/2024      Diagnosis     Clinical Impression:   1. Auditory hallucination    2. Tactile hallucination        Disposition: Admit    No follow-up provider specified.        Medication List        ASK your doctor about these medications      * ARIPiprazole  MG Srer  Commonly known as: ABILIFY MAINTENA     * ARIPiprazole 20 MG tablet  Commonly known as: ABILIFY     atomoxetine 80 MG capsule  Commonly known as: STRATTERA     benztropine 0.5 MG tablet  Commonly known as: COGENTIN     buPROPion 150 MG extended release tablet  Commonly known as: WELLBUTRIN SR     haloperidol 20 MG tablet  Commonly known as:

## 2024-05-17 NOTE — ED NOTES
Bedside and Verbal shift change report given to RAJEEV Lott (oncoming nurse) by RAJEEV Aldrich (offgoing nurse). Report included the following information Nurse Handoff Report, Adult Overview, and Event Log.

## 2024-05-18 PROCEDURE — 6370000000 HC RX 637 (ALT 250 FOR IP): Performed by: NURSE PRACTITIONER

## 2024-05-18 PROCEDURE — 6370000000 HC RX 637 (ALT 250 FOR IP)

## 2024-05-18 PROCEDURE — 99221 1ST HOSP IP/OBS SF/LOW 40: CPT | Performed by: PSYCHIATRY & NEUROLOGY

## 2024-05-18 PROCEDURE — 1240000000 HC EMOTIONAL WELLNESS R&B

## 2024-05-18 RX ORDER — POLYETHYLENE GLYCOL 3350 17 G
2 POWDER IN PACKET (EA) ORAL
Status: DISCONTINUED | OUTPATIENT
Start: 2024-05-18 | End: 2024-05-21 | Stop reason: HOSPADM

## 2024-05-18 RX ORDER — BUPROPION HYDROCHLORIDE 150 MG/1
150 TABLET ORAL 2 TIMES DAILY
Status: DISCONTINUED | OUTPATIENT
Start: 2024-05-18 | End: 2024-05-21 | Stop reason: HOSPADM

## 2024-05-18 RX ORDER — ARIPIPRAZOLE 5 MG/1
5 TABLET ORAL DAILY
Status: DISCONTINUED | OUTPATIENT
Start: 2024-05-19 | End: 2024-05-21 | Stop reason: HOSPADM

## 2024-05-18 RX ADMIN — BUPROPION HYDROCHLORIDE 150 MG: 150 TABLET, EXTENDED RELEASE ORAL at 20:49

## 2024-05-18 ASSESSMENT — SLEEP AND FATIGUE QUESTIONNAIRES
DO YOU HAVE DIFFICULTY SLEEPING: YES
SLEEP PATTERN: DIFFICULTY FALLING ASLEEP
AVERAGE NUMBER OF SLEEP HOURS: 4
DO YOU USE A SLEEP AID: NO

## 2024-05-18 ASSESSMENT — PAIN SCALES - GENERAL
PAINLEVEL_OUTOF10: 0
PAINLEVEL_OUTOF10: 0

## 2024-05-18 NOTE — BH NOTE
Patient valuables upon admission to the unit as follows:                   TAKEN BY SECURITY                                                  Cell phone                    IN STORAGE CLOSET    Black hoodie   1 black sock  1-- red t- shirt  1 pair of burgundy pants with strings attached.    Please refer to securities valuable sheet, of inventory taken in Emergency Room Department.     ITEMS LEFT WITH PATIENT     Undergarments and flip-floops

## 2024-05-18 NOTE — GROUP NOTE
Art Therapy Group Progress Note    PATIENT SCHEDULED FOR GROUP AT: 12:00 PM    GROUP STOP TIME:  12:45 PM     ATTENDANCE: Low (4/13 participants)     TOPIC / FOCUS:  Areli in the Rough     GOALS:  promote positive thinking skills, encourage self-esteem, identify positive traits in self, develop communication skills, promote goal setting skills, encourage focus, increase self-awareness, promote creativity    PARTICIPATION LEVEL:  Pt did not attend.     Derrick Lockhart  Art Therapist, MA, ATR-P  Provisional Registered Art Therapist

## 2024-05-18 NOTE — BSMART NOTE
Crisis note:    Shaye of [facility] called to report she has seen patient in the ER as he was brought in on an ECO. Shaye states the patient is voluntary for treatment.  
ability to ambulate without difficulty or the use of any assistive devices.    ADLs - Patient able to perform own ADLs without assistance.    DME - Patient requires none.    In consultation with on call provider [facility] provider Laverne Lozano NP . Patient has been accepted to [facility]..

## 2024-05-18 NOTE — H&P
[facility]  FAMILY MEDICINE CONSULT NOTE FOR BEHAVIORAL HEALTH UNIT    Patient:    Nolan Rodriguez , 29 y.o. male   Room/Bed:  124/01  Admission Date:   5/17/2024  Code status:  Full Code      Reason for Consult: Medical management  Psychiatry Attending Requesting Consult: Dr. Celestin    ASSESSMENT:  Nolan Rodriguez is a 29 y.o. male w/ a PMH of ADHD, anxiety, depression, schizophrenia presenting as an ECO with auditory hallucinations who is now admitted to the [facility].    Nurse Chaperone during History and Physical.    PLAN:    Schizophrenia with acute psychosis  Anxiety/depression  ADHD  -Presented with the police due to an ECO with 2-3 days of auditory hallucinations, saying he feels like there is an earpiece in his ear  -Home medications: Abilify 400mg IM, Abilify 20mg daily, Strattera 80mg daily, Cogentin 0.5mg BID, Wellbutrin 150mg BID (for smoking cessation), Haldol 20mg qhs, Risperdal 1mg qhs, Vraylar 3mg daily  -Current PRN medications: hydroxyzine, trazodone  -Management per inpatient psychiatry    Current smoker  -Takes Wellbutrin 150mg BID for smoking cessation - will order to start this evening  -Scheduled nicotine patches  -Nicotine lozenges prn    Global  Diet: Regular  Mobility: As tolerated    Thank you for this consult.       SUBJECTIVE:   Dr. Celestin has consulted Family Medicine to evaluate Nolan Rodriguez  in the Emergency Department. He  is a 29 y.o. male w/ PMHx of ADHD, anxiety, depression, schizophrenia  presenting for with the police due to an ECO with 2-3 days of auditory hallucinations who is now admitted to the [facility]. States he feels like there is an earpiece in his ear and that there is a force field around him he can do nothing about. Denies SI/HI, visual hallucinations. Denies HA, vision changes, CP, SOB, abdominal pain, urinary symptoms or swelling. Smokes cigarettes daily, denies alcohol and drug use.    ED Course:  -Vitals:

## 2024-05-19 PROCEDURE — 6370000000 HC RX 637 (ALT 250 FOR IP)

## 2024-05-19 PROCEDURE — 99231 SBSQ HOSP IP/OBS SF/LOW 25: CPT | Performed by: PSYCHIATRY & NEUROLOGY

## 2024-05-19 PROCEDURE — 6370000000 HC RX 637 (ALT 250 FOR IP): Performed by: NURSE PRACTITIONER

## 2024-05-19 PROCEDURE — 6370000000 HC RX 637 (ALT 250 FOR IP): Performed by: PSYCHIATRY & NEUROLOGY

## 2024-05-19 PROCEDURE — 1240000000 HC EMOTIONAL WELLNESS R&B

## 2024-05-19 RX ADMIN — ARIPIPRAZOLE 5 MG: 5 TABLET ORAL at 08:26

## 2024-05-19 RX ADMIN — HYDROXYZINE HYDROCHLORIDE 50 MG: 50 TABLET, FILM COATED ORAL at 21:50

## 2024-05-19 RX ADMIN — BUPROPION HYDROCHLORIDE 150 MG: 150 TABLET, EXTENDED RELEASE ORAL at 08:26

## 2024-05-19 RX ADMIN — BUPROPION HYDROCHLORIDE 150 MG: 150 TABLET, EXTENDED RELEASE ORAL at 20:00

## 2024-05-19 ASSESSMENT — PAIN SCALES - GENERAL
PAINLEVEL_OUTOF10: 0
PAINLEVEL_OUTOF10: 0

## 2024-05-19 NOTE — H&P
[facility]  Inpatient Admission Note    Date of Service:  05/18/24    Historian(s): Nolan Rodriguez and chart review  Referral Source: Self    Chief Complaint   Patient admitted for psychiatric stabilization     History of Present Illness     Nolan Rodriguez is a 29 y.o. Black /  male with a history of schizophrenia and ADHD.  He presented with increasing voices and paranoia.  States that the voices constantly been talking to him telling to hurt himself.  He reports previous history of schizophrenia but not able to give previous medication history.  He follows up with outpatient psychiatric provider Dr. Avani Dewey.  He also has a therapist Ms. Adriana Whitaker.   He lives with his family.  Denies any recent drug or alcohol use except vaping.       Past Psychiatric History:  Previous Diagnoses/symptoms: Schizophrenia, ADHD  Hx of SI attempts 2021 for OD attempt   Inpatient psychiatric hospitalizations: yes- 2019 \" hallucinations)   Current outpatient psychiatric provider: Dr Avani Dewey   Current therapist: Adriana Whitaker   Previous psychiatric medication trials: No prior medication trials  Current psychiatric medications: medications below   Family Psychiatric History: mom is schizophrenic      Sleep Hours: 5-6     Sleep concerns: difficulty maintaining sleep     Use of sleep medications:  clonidine          Psychiatric Review of Systems   Depression:  Endorses depressed mood, hopelessness, helplessness and worthlessness.  Energy is adequate.  Denied any thoughts of self-harm or suicidal ideation.    Anxiety: Endorses excessive worrying, social anxiety, panic attacks and OCD.     Irritability: Endorses  low threshold of frustration or anger.    Bipolar symptoms: Denied history of decreased need for sleep associated with increased energy, racing thoughts, rapid speech and risky behavior.    Abuse/Trauma/PTSD: Denied history of verbal, physical or sexual abuse.  Denies

## 2024-05-20 PROBLEM — F17.200 NICOTINE USE DISORDER: Status: ACTIVE | Noted: 2024-05-20

## 2024-05-20 PROCEDURE — 6370000000 HC RX 637 (ALT 250 FOR IP)

## 2024-05-20 PROCEDURE — 6370000000 HC RX 637 (ALT 250 FOR IP): Performed by: NURSE PRACTITIONER

## 2024-05-20 PROCEDURE — 1240000000 HC EMOTIONAL WELLNESS R&B

## 2024-05-20 PROCEDURE — 99232 SBSQ HOSP IP/OBS MODERATE 35: CPT | Performed by: PSYCHIATRY & NEUROLOGY

## 2024-05-20 PROCEDURE — 6370000000 HC RX 637 (ALT 250 FOR IP): Performed by: PSYCHIATRY & NEUROLOGY

## 2024-05-20 RX ORDER — FLUTICASONE PROPIONATE 50 MCG
1 SPRAY, SUSPENSION (ML) NASAL DAILY
Status: DISCONTINUED | OUTPATIENT
Start: 2024-05-20 | End: 2024-05-21 | Stop reason: HOSPADM

## 2024-05-20 RX ADMIN — ARIPIPRAZOLE 5 MG: 5 TABLET ORAL at 07:59

## 2024-05-20 RX ADMIN — BUPROPION HYDROCHLORIDE 150 MG: 150 TABLET, EXTENDED RELEASE ORAL at 20:01

## 2024-05-20 RX ADMIN — BUPROPION HYDROCHLORIDE 150 MG: 150 TABLET, EXTENDED RELEASE ORAL at 07:59

## 2024-05-20 RX ADMIN — TRAZODONE HYDROCHLORIDE 50 MG: 50 TABLET ORAL at 20:01

## 2024-05-20 RX ADMIN — ACETAMINOPHEN 650 MG: 325 TABLET ORAL at 09:10

## 2024-05-20 RX ADMIN — FLUTICASONE PROPIONATE 1 SPRAY: 50 SPRAY, METERED NASAL at 13:40

## 2024-05-20 ASSESSMENT — PAIN SCALES - GENERAL
PAINLEVEL_OUTOF10: 0
PAINLEVEL_OUTOF10: 2
PAINLEVEL_OUTOF10: 2
PAINLEVEL_OUTOF10: 0

## 2024-05-20 ASSESSMENT — PAIN DESCRIPTION - ORIENTATION
ORIENTATION: RIGHT
ORIENTATION: RIGHT

## 2024-05-20 ASSESSMENT — PAIN DESCRIPTION - DESCRIPTORS
DESCRIPTORS: PRESSURE
DESCRIPTORS: PRESSURE

## 2024-05-20 ASSESSMENT — PAIN - FUNCTIONAL ASSESSMENT
PAIN_FUNCTIONAL_ASSESSMENT: ACTIVITIES ARE NOT PREVENTED
PAIN_FUNCTIONAL_ASSESSMENT: ACTIVITIES ARE NOT PREVENTED

## 2024-05-20 ASSESSMENT — PAIN DESCRIPTION - LOCATION
LOCATION: EAR
LOCATION: EAR

## 2024-05-20 NOTE — GROUP NOTE
Group Therapy Note    Date: 5/20/2024    Group Start Time: 1500  Group End Time: 1550  Group Topic: Music Therapy      Yojana Zaragoza        Group Therapy Note    Attendees: 8/13    Group Focus: Music therapy group explored future-focused thinking and personally meaningful songs. Group members listened to and discussed a song with lyrics, \"today is where your book begins, the rest is still unwritten,\" and were encouraged to title the book of their life or their next chapter. Group members then selected songs that were meaningful to them to listen to and discuss, and therapist assisted with processing. The group may promote self-expression, future-focused thinking, and use of music as a coping skill.         Notes:  Pt did not attend group, reporting tiredness.      Discipline Responsible: /Counselor      Signature:  REMBERTO Bush, LPC  Board-Certified Music Therapist  Licensed Professional Counselor

## 2024-05-20 NOTE — GROUP NOTE
Group Therapy Note    Date: 5/20/2024    Group Start Time: 0930  Group End Time: 1015  Group Topic: Recreational    [facility] ADULT    Clarissa Stiles        Group Therapy Note    Attendees: 8/13      Group type: Exercise and Relaxation     Group Focus: This recreational group engaged patients in physical activity, therapist lead group members in guided exercises. Patients engaged in relaxation following guided exercises, prompting patients to focus there minds on a setting they would go to help them relax. This group may help reduce feelings of depression and stress and enhance mood and over emotional well-being.            Notes:  Patient engaged in group with low energy, minimally completing exercise and only sharing in discussion if encouraged. Therapist observed patient at times laying his head on the table.     Status After Intervention:  Unchanged    Participation Level: Minimal    Participation Quality: Appropriate, Sharing, and Lethargic      Speech:  normal      Thought Process/Content: Logical      Affective Functioning: Congruent      Mood: calm      Level of consciousness:  Drowsy and Inattentive      Endings: None Reported      Modes of Intervention: Socialization, Activity, Movement, and Media      Recreational Therapist  CLARISSA STILES

## 2024-05-20 NOTE — GROUP NOTE
Art Therapy Group Progress Note    PATIENT SCHEDULED FOR GROUP AT: 1:00 PM    GROUP STOP TIME:  1:45 PM     ATTENDANCE: High (9/13 participants)     TOPIC / FOCUS: Tree of Strength     GOALS:  identify strengths, identify ways to use strengths as positive coping skills, increase self-esteem, increase personal awareness, promote creativity, encourage focus, increase distress tolerance     Notes:  Pt was able to follow prompt directions. Pt identified their strengths as thinking, dreaming, and imagination. Pt shared that they over think. Pt artwork was low investment, lacked detail, color filled with quick coloring strokes. Pt appeared to be hypervigilant and anxious about all the activity in the day area. Pt asked to return to their room after finishing their art and sharing with this writer.    Status After Intervention:  Unchanged    Participation Level: Interactive    Participation Quality: Appropriate      Speech:  normal      Thought Process/Content: Logical      Affective Functioning: Blunted      Mood: anxious and euthymic      Level of consciousness:  Alert and Attentive      Response to Learning: Able to verbalize current knowledge/experience      Endings: None Reported    Modes of Intervention: Support, Socialization, Exploration, Problem-solving, Activity, Media, Confrontation, and Reality-testing      Discipline Responsible: Psychoeducational Specialist, Art Therapist     Derrick Lockhart  Art Therapist, MA, ATR-P  Provisional Registered Art Therapist

## 2024-05-20 NOTE — CONSULTS
[facility]   BRIEF PROGRESS NOTE    Assessment & Plan:    R ear fullness:  -No cerumen impaction or burden visualized  -R TM retracted. Suspect eustachion tube dysfunction    Plan:  -Start Flonase    Subjective:    Patient complaining of sensation of \"fullness\" in R ear. States he has had problems with wax build-up previously.    Objective:  Vitals:    05/20/24 0744   BP: 123/75   Pulse: 99   Resp: 16   Temp: 97.7 °F (36.5 °C)   SpO2: 98%       The above patient and plan were discussed with my supervising physician.     See daily progress note for full assessment/plan.      Armand Hays MD, PGY-2  [facility]  5/20/2024, 12:33 PM

## 2024-05-20 NOTE — BH NOTE
Pt got up in the middle of the night wanting a snack. Pt rested off and on. Pt slept about 5 hours.

## 2024-05-21 VITALS
SYSTOLIC BLOOD PRESSURE: 131 MMHG | HEART RATE: 99 BPM | BODY MASS INDEX: 25.2 KG/M2 | RESPIRATION RATE: 17 BRPM | OXYGEN SATURATION: 100 % | TEMPERATURE: 97.5 F | DIASTOLIC BLOOD PRESSURE: 73 MMHG | WEIGHT: 180 LBS | HEIGHT: 71 IN

## 2024-05-21 PROCEDURE — 6370000000 HC RX 637 (ALT 250 FOR IP): Performed by: PSYCHIATRY & NEUROLOGY

## 2024-05-21 PROCEDURE — 6370000000 HC RX 637 (ALT 250 FOR IP): Performed by: NURSE PRACTITIONER

## 2024-05-21 PROCEDURE — 6370000000 HC RX 637 (ALT 250 FOR IP)

## 2024-05-21 PROCEDURE — 99238 HOSP IP/OBS DSCHRG MGMT 30/<: CPT | Performed by: PSYCHIATRY & NEUROLOGY

## 2024-05-21 RX ORDER — BUPROPION HYDROCHLORIDE 150 MG/1
150 TABLET ORAL 2 TIMES DAILY
Qty: 30 TABLET | Refills: 0 | Status: SHIPPED | OUTPATIENT
Start: 2024-05-21

## 2024-05-21 RX ORDER — FLUTICASONE PROPIONATE 50 MCG
1 SPRAY, SUSPENSION (ML) NASAL DAILY
Qty: 16 G | Refills: 0 | Status: SHIPPED | OUTPATIENT
Start: 2024-05-22

## 2024-05-21 RX ADMIN — ARIPIPRAZOLE 5 MG: 5 TABLET ORAL at 07:47

## 2024-05-21 RX ADMIN — HYDROXYZINE HYDROCHLORIDE 50 MG: 50 TABLET, FILM COATED ORAL at 00:03

## 2024-05-21 RX ADMIN — FLUTICASONE PROPIONATE 1 SPRAY: 50 SPRAY, METERED NASAL at 07:48

## 2024-05-21 RX ADMIN — BUPROPION HYDROCHLORIDE 150 MG: 150 TABLET, EXTENDED RELEASE ORAL at 07:47

## 2024-05-21 NOTE — GROUP NOTE
Group Therapy Note    Date: 5/21/2024    Group Start Time: 0930  Group End Time: 1030  Group Topic: Music Therapy      Yojana Zaragoza        Group Therapy Note    Attendees: 9/15    Group Focus: Music therapy group explored themes including pursuing goals, determination, and not giving up through kenroy analysis of two songs previously suggested by group members. The group continued with listening to and discussing patient preferred music. The group may also promote motivation, self-expression, and use of music as a coping skill.      Notes:  While discussing goals, pt discussed how he likes motivational music. Pt discussed how you only get one shot at life. Pt engaged in discussion related to the music in an on-topic manner and demonstrated active listening.    Status After Intervention:  Unchanged    Participation Level: Interactive    Participation Quality: Appropriate and Attentive      Speech:  normal      Thought Process/Content: Logical      Affective Functioning: Congruent      Mood: euthymic      Level of consciousness:  Alert and Attentive      Response to Learning: Able to verbalize current knowledge/experience      Endings: None Reported    Modes of Intervention: Support, Socialization, Exploration, and Media      Discipline Responsible: /Counselor      Signature:  REMBERTO Bush, LPC  Board-Certified Music Therapist  Licensed Professional Counselor

## 2024-05-21 NOTE — PROGRESS NOTES
conducted an Spirituality Gnosticism Group for Nolan Rodriguez, who is a 29 y.o.,male.   Patient’s Primary Language is: English.   According to the patient’s EMR Adventist Affiliation is: Orthodox.     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    The  provided the following Interventions:  Continued the relationship of care and support.   Listened empathically.  Offered prayer and assurance of continued prayer on patients behalf.   Chart reviewed.    The following outcomes were achieved:  Patient expressed gratitude for 's visit.    Assessment:  There are no further spiritual or Church issues which require Spiritual Care Services interventions at this time.     Plan:  Chaplains will continue to follow and will provide pastoral care on an as needed/requested basis.   recommends bedside caregivers page  on duty if patient shows signs of acute spiritual or emotional distress.       Chaplain Elizabeth Dumas  Spiritual Health   [phone]   
Behavioral Health Progress Note    Admit Date: 5/17/2024  Hospital day 3    Vitals : Patient Vitals for the past 8 hrs:   BP Temp Temp src Pulse Resp SpO2   05/20/24 0744 123/75 97.7 °F (36.5 °C) Oral 99 16 98 %     Labs:  No results found for this or any previous visit (from the past 24 hour(s)).  Meds:   Current Facility-Administered Medications   Medication Dose Route Frequency    nicotine polacrilex (COMMIT) lozenge 2 mg  2 mg Oral Q1H PRN    buPROPion (WELLBUTRIN XL) extended release tablet 150 mg  150 mg Oral BID    ARIPiprazole (ABILIFY) tablet 5 mg  5 mg Oral Daily    acetaminophen (TYLENOL) tablet 650 mg  650 mg Oral Q4H PRN    polyethylene glycol (GLYCOLAX) packet 17 g  17 g Oral Daily PRN    hydrOXYzine HCl (ATARAX) tablet 50 mg  50 mg Oral TID PRN    traZODone (DESYREL) tablet 50 mg  50 mg Oral Nightly PRN    nicotine (NICODERM CQ) 14 MG/24HR 1 patch  1 patch TransDERmal Daily      Hospital Problems: Principal Problem:    Schizophrenia (HCC)  Active Problems:    Nicotine use disorder  Resolved Problems:    * No resolved hospital problems. *      Subjective:   Medication side effects: none      Mental Status Exam  Sensorium: alert  Orientation: oriented to time, place, person and situation  Relations: cooperative  Eye Contact: poor  Appearance: is tense  Thought Process: slow rate of thoughts, poor abstract reasoning/computation, and simple logical    Thought Content: poverty of thought   Suicidal: denies   Homicidal: denies   Mood: is euthymic   Affect:  odd, blunted  Memory: shows no evidence of impairment     Concentration: distractable  Abstraction: concrete  Insight: The patient shows little insight    OR Fair  Judgement: is psychologically impaired OR  Fair    Assessment/Plan:   improved    Continue close observation,    Nurses report patient has been quiet and cooperative.  He slept about 5 hours but did nap during the day.  Some problems with pressure in the right ear and being looked at by the 
Patient has received his discharge instructions, written prescriptions, personal belongings, and valuables.  Patient did not verbalize any concerns at the time of discharge.   
Patient verbalized mild discomfort in right ear. Patient reports he has a HX of wax build up in his ears. Patient reports mild pressure sensation. PRN tylenol 650 mg given at 0913 am. [facility] paged.   
Psychosocial Assessment     Admission Reason: AH telling him to hurt himself    C-SSRS Screening Completed - Current Suicide Risk:   [] No Risk  [x] Low [] Moderate [] High     Risk Factors: sometimes doesn't share when overwhelmed    Protective Factors: very supportive great aunt, regular attendance for med management & goes to therapist    After consideration of C-SSRS screening results, C-SSRS assessments, and this professional's assessment the patient's overall suicide risk assessed to be:  [x] Low   [] Moderate   [] High     [x] Discussed current suicide risk, protective and risk factors with treatment team to determine safety interventions as applicable.     Gender:  [x] Male [] Female [] Transgender  [] Other    Sexual Orientation:  [x] Heterosexual [] Homosexual [] Bisexual [] Other    Homicidal Ideation:  [] Past [] Present [x] Denies     Onset and Duration of Problem: all of adult life    Current or Past Mental Health and/or Addictions Treatment (and response to treatment):  [x] Yes, When and Where: [facility] couple years ago also other [facility] hospital admits back in those years.  [] No    Substance Use/Alcohol Use/Addiction (document name of substance, age of onset, how much and how often, route of use and date of last use):  [] Reports [x] Denies     History of Biomedical Complications Associated with Substance Use/Abuse:  [] Reports [x] Denies  Specify:    Family History of Mental Illness or Substance Use/Abuse: some family schizophrenic.  But cannot remember who was that way  [] Yes (Specify)  [] No    Trauma and Abuse History: denies    Legal History:  []  Yes (Specify)  [x] No      Involvement:  [] Yes (Specify)  [x] No    Level of Education and Cognitive Functioning:     Employment and/or Benefits:    [] Yes (Specify)  [x] No      Leisure & Recreational Interests and Hobbies/Coping Skills:  some video games    Ability to Complete Activities of Daily Living (ADLs):  [x] Yes  [] No 
Pt given Atarax as ordered for anxiety.  
SOCIAL WORK GROUP THERAPY PROGRESS NOTE    Group Time:  10:15am       Group Topic:  Coping Skills    C D Issues    Group Participation:      Pt moderately involved during group discussion but remained attentive.  Pt polite & respectful, commenting not as dysphoric & most of the voices are going away.  .   Book about ME which included past hx, most recent & future plans. Some comments rather delusional as commented on his having super shaikh as child, having lost many of them but wants to get them back. Hopes to maybe write more to get the words out of his head that overwhelm him at times.    
SW Contact:      Pt Contact: reviewed with pt progress he's made on tx plan, especially efforts he's made in groups. He reports no SI and moods better balanced.    Reviewed d/c & safety Plan to Include:    Pt will return for outpt tx with:    [facility]  [address] [place] [address]  [id number]  [place] [id number]  #[phone]  Medication Management: Avani EDMONDSON on 6/11/24 at 10:30am  Therapist: Ame Whitaker on  5/29/24 at 8pm  Tele-Health    Transportation: trying to get great aunt to pick him up.      Dr & tx team updated.  
  Psychiatric Diagnoses:   Patient Active Problem List   Diagnosis    Schizophrenia (HCC)    Intentional poisoning by valproate sodium (HCC)       Medical Diagnoses: As noted above    Psychosocial and contextual factors: As noted in H and P     Level of impairment/disability:     Nolan Rodriguez is a 29 y.o. who is currently admitted for behavioral stabilization.      1.  Continue current medications  2.  Reviewed instructions, risks, benefits and side effects of medications  3.  Disposition/Discharge Date: self-care/home, when clinically appropriate     Mohamud Modi MD  [facility]  Psychiatry

## 2024-05-21 NOTE — PLAN OF CARE
Problem: Self Harm/Suicidality  Goal: Will have no self-injury during hospital stay  Description: INTERVENTIONS:  1.  Ensure constant observer at bedside with Q15M safety checks  2.  Maintain a safe environment  3.  Secure patient belongings  4.  Ensure family/visitors adhere to safety recommendations  5.  Ensure safety tray has been added to patient's diet order  6.  Every shift and PRN: Re-assess suicidal risk via Frequent Screener    5/19/2024 0921 by Diandra Aguirre, RN  Outcome: Progressing  5/18/2024 2018 by Beny Shannon RN  Flowsheets (Taken 5/18/2024 0053)  Will have no self-injury during hospital stay:   Ensure constant observer at bedside with Q15M safety checks   Secure patient belongings   Ensure safety tray has been added to patient's diet order   Every shift and PRN: Re-assess suicidal risk via Frequent Screener     Problem: Behavior  Goal: Pt/Family maintain appropriate behavior and adhere to behavioral management agreement, if implemented  Description: INTERVENTIONS:  1. Assess patient/family's coping skills and  non-compliant behavior (including use of illegal substances)  2. Notify security of behavior or suspected illegal substances which indicate the need for search of the family and/or belongings  3. Encourage verbalization of thoughts and concerns in a socially appropriate manner  4. Utilize positive, consistent limit setting strategies supporting safety of patient, staff and others  5. Encourage participation in the decision making process about the behavioral management agreement  6. If a visitor's behavior poses a threat to safety call refer to organization policy.  7. Initiate consult with , Psychosocial CNS, Spiritual Care as appropriate  Outcome: Progressing     Problem: Anxiety  Goal: Will report anxiety at manageable levels  Description: INTERVENTIONS:  1. Administer medication as ordered  2. Teach and rehearse alternative coping skills  3. Provide emotional 
  Problem: Self Harm/Suicidality  Goal: Will have no self-injury during hospital stay  Description: INTERVENTIONS:  1.  Ensure constant observer at bedside with Q15M safety checks  2.  Maintain a safe environment  3.  Secure patient belongings  4.  Ensure family/visitors adhere to safety recommendations  5.  Ensure safety tray has been added to patient's diet order  6.  Every shift and PRN: Re-assess suicidal risk via Frequent Screener    5/19/2024 1946 by Beny Shannon RN  Flowsheets (Taken 5/18/2024 0053)  Will have no self-injury during hospital stay:   Ensure constant observer at bedside with Q15M safety checks   Secure patient belongings   Ensure safety tray has been added to patient's diet order   Every shift and PRN: Re-assess suicidal risk via Frequent Screener  5/19/2024 0921 by Diandra Aguirre RN  Outcome: Progressing     Problem: Behavior  Goal: Pt/Family maintain appropriate behavior and adhere to behavioral management agreement, if implemented  Description: INTERVENTIONS:  1. Assess patient/family's coping skills and  non-compliant behavior (including use of illegal substances)  2. Notify security of behavior or suspected illegal substances which indicate the need for search of the family and/or belongings  3. Encourage verbalization of thoughts and concerns in a socially appropriate manner  4. Utilize positive, consistent limit setting strategies supporting safety of patient, staff and others  5. Encourage participation in the decision making process about the behavioral management agreement  6. If a visitor's behavior poses a threat to safety call refer to organization policy.  7. Initiate consult with , Psychosocial CNS, Spiritual Care as appropriate  5/19/2024 0921 by Diandra Aguirre, RN  Outcome: Progressing     Problem: Anxiety  Goal: Will report anxiety at manageable levels  Description: INTERVENTIONS:  1. Administer medication as ordered  2. Teach and rehearse alternative 
  Problem: Self Harm/Suicidality  Goal: Will have no self-injury during hospital stay  Description: INTERVENTIONS:  1.  Ensure constant observer at bedside with Q15M safety checks  2.  Maintain a safe environment  3.  Secure patient belongings  4.  Ensure family/visitors adhere to safety recommendations  5.  Ensure safety tray has been added to patient's diet order  6.  Every shift and PRN: Re-assess suicidal risk via Frequent Screener    5/21/2024 1140 by Kimberly Lake RN  Outcome: Progressing     Problem: Psychosis  Goal: Will report no hallucinations or delusions  Description: INTERVENTIONS:  1. Administer medication as  ordered  2. Assist with reality testing to support increasing orientation  3. Assess if patient's hallucinations or delusions are encouraging self harm or harm to others and intervene as appropriate  5/21/2024 1140 by Kimberly Lake RN  Outcome: Progressing     Problem: Behavior  Goal: Pt/Family maintain appropriate behavior and adhere to behavioral management agreement, if implemented  Description: INTERVENTIONS:  1. Assess patient/family's coping skills and  non-compliant behavior (including use of illegal substances)  2. Notify security of behavior or suspected illegal substances which indicate the need for search of the family and/or belongings  3. Encourage verbalization of thoughts and concerns in a socially appropriate manner  4. Utilize positive, consistent limit setting strategies supporting safety of patient, staff and others  5. Encourage participation in the decision making process about the behavioral management agreement  6. If a visitor's behavior poses a threat to safety call refer to organization policy.  7. Initiate consult with , Psychosocial CNS, Spiritual Care as appropriate  5/21/2024 1140 by Kimberly Lake, RN  Outcome: Progressing            
  Problem: Self Harm/Suicidality  Goal: Will have no self-injury during hospital stay  Description: INTERVENTIONS:  1.  Ensure constant observer at bedside with Q15M safety checks  2.  Maintain a safe environment  3.  Secure patient belongings  4.  Ensure family/visitors adhere to safety recommendations  5.  Ensure safety tray has been added to patient's diet order  6.  Every shift and PRN: Re-assess suicidal risk via Frequent Screener    Flowsheets (Taken 5/18/2024 0053)  Will have no self-injury during hospital stay:   Ensure constant observer at bedside with Q15M safety checks   Secure patient belongings   Ensure safety tray has been added to patient's diet order   Every shift and PRN: Re-assess suicidal risk via Frequent Screener     Problem: Behavior  Goal: Pt/Family maintain appropriate behavior and adhere to behavioral management agreement, if implemented  Description: INTERVENTIONS:  1. Assess patient/family's coping skills and  non-compliant behavior (including use of illegal substances)  2. Notify security of behavior or suspected illegal substances which indicate the need for search of the family and/or belongings  3. Encourage verbalization of thoughts and concerns in a socially appropriate manner  4. Utilize positive, consistent limit setting strategies supporting safety of patient, staff and others  5. Encourage participation in the decision making process about the behavioral management agreement  6. If a visitor's behavior poses a threat to safety call refer to organization policy.  7. Initiate consult with , Psychosocial CNS, Spiritual Care as appropriate  Flowsheets (Taken 5/18/2024 0052)  Patient/family maintains appropriate behavior and adheres to behavioral management agreement, if implemented:   Utilize positive, consistent limit setting strategies supporting safety of patient, staff and others   Encourage verbalization of thoughts and concerns in a socially appropriate manner   
  Problem: Self Harm/Suicidality  Goal: Will have no self-injury during hospital stay  Description: INTERVENTIONS:  1.  Ensure constant observer at bedside with Q15M safety checks  2.  Maintain a safe environment  3.  Secure patient belongings  4.  Ensure family/visitors adhere to safety recommendations  5.  Ensure safety tray has been added to patient's diet order  6.  Every shift and PRN: Re-assess suicidal risk via Frequent Screener    Flowsheets (Taken 5/18/2024 0053)  Will have no self-injury during hospital stay:   Ensure constant observer at bedside with Q15M safety checks   Secure patient belongings   Ensure safety tray has been added to patient's diet order   Every shift and PRN: Re-assess suicidal risk via Frequent Screener     Problem: Psychosis  Goal: Will report no hallucinations or delusions  Description: INTERVENTIONS:  1. Administer medication as  ordered  2. Assist with reality testing to support increasing orientation  3. Assess if patient's hallucinations or delusions are encouraging self harm or harm to others and intervene as appropriate  5/18/2024 0906 by Diandra Aguirre, RN  Outcome: Progressing     Problem: Anxiety  Goal: Will report anxiety at manageable levels  Description: INTERVENTIONS:  1. Administer medication as ordered  2. Teach and rehearse alternative coping skills  3. Provide emotional support with 1:1 interaction with staff  5/18/2024 0906 by Diandra Aguirre, RN  Outcome: Progressing   Pt. Is isolative to his room. He is pleasant and compliant. He offers no complaint. He is free from falls, self harm or harming others.  
  Problem: Self Harm/Suicidality  Goal: Will have no self-injury during hospital stay  Description: INTERVENTIONS:  1.  Ensure constant observer at bedside with Q15M safety checks  2.  Maintain a safe environment  3.  Secure patient belongings  4.  Ensure family/visitors adhere to safety recommendations  5.  Ensure safety tray has been added to patient's diet order  6.  Every shift and PRN: Re-assess suicidal risk via Frequent Screener    Outcome: Progressing     Problem: Psychosis  Goal: Will report no hallucinations or delusions  Description: INTERVENTIONS:  1. Administer medication as  ordered  2. Assist with reality testing to support increasing orientation  3. Assess if patient's hallucinations or delusions are encouraging self harm or harm to others and intervene as appropriate  Outcome: Progressing     Problem: Behavior  Goal: Pt/Family maintain appropriate behavior and adhere to behavioral management agreement, if implemented  Description: INTERVENTIONS:  1. Assess patient/family's coping skills and  non-compliant behavior (including use of illegal substances)  2. Notify security of behavior or suspected illegal substances which indicate the need for search of the family and/or belongings  3. Encourage verbalization of thoughts and concerns in a socially appropriate manner  4. Utilize positive, consistent limit setting strategies supporting safety of patient, staff and others  5. Encourage participation in the decision making process about the behavioral management agreement  6. If a visitor's behavior poses a threat to safety call refer to organization policy.  7. Initiate consult with , Psychosocial CNS, Spiritual Care as appropriate  Outcome: Progressing     Problem: Anxiety  Goal: Will report anxiety at manageable levels  Description: INTERVENTIONS:  1. Administer medication as ordered  2. Teach and rehearse alternative coping skills  3. Provide emotional support with 1:1 interaction with 
Problem: Self Harm/Suicidality  Goal: Will have no self-injury during hospital stay  Description: INTERVENTIONS:  1.  Ensure constant observer at bedside with Q15M safety checks  2.  Maintain a safe environment  3.  Secure patient belongings  4.  Ensure family/visitors adhere to safety recommendations  5.  Ensure safety tray has been added to patient's diet order  6.  Every shift and PRN: Re-assess suicidal risk via Frequent Screener  5/20/2024 0940 by Andree Ely RN  Outcome: Progressing   Problem: Psychosis  Goal: Will report no hallucinations or delusions  Description: INTERVENTIONS:  1. Administer medication as  ordered  2. Assist with reality testing to support increasing orientation  3. Assess if patient's hallucinations or delusions are encouraging self harm or harm to others and intervene as appropriate  Outcome: Progressing   Problem: Anxiety  Goal: Will report anxiety at manageable levels  Description: INTERVENTIONS:  1. Administer medication as ordered  2. Teach and rehearse alternative coping skills  3. Provide emotional support with 1:1 interaction with staff  Outcome: Progressing   Behavior calm cooperative and participative in the milieu. Mood \"I am okay\"Affect appeared flat/constricted.  Denied SI/HI/AVH. Medication compliant, no S/E observed/reported. Thought process linear; no delusions observed. Safety checks, monitoring, emotional support and plan of care ongoing  
strategies supporting safety of patient, staff and others   Encourage verbalization of thoughts and concerns in a socially appropriate manner     Problem: Anxiety  Goal: Will report anxiety at manageable levels  Description: INTERVENTIONS:  1. Administer medication as ordered  2. Teach and rehearse alternative coping skills  3. Provide emotional support with 1:1 interaction with staff  Outcome: Progressing  Pt calm/cooperative w/ care. Pt compliant w/ scheduled medications. Denies SI/HI/AH. Denies tactile stimuli. No behaviors noted. Will continue to monitor for safety.

## 2024-05-22 NOTE — DISCHARGE SUMMARY
[address]  [address]                         PSYCH DISCHARGE SUMMARY      PATIENT NAME: CASI RECINOS             : 1995  MED REC NO: [id number]                       ROOM: 124  ACCOUNT NO: [id number]                       ADMIT DATE: 2024  PROVIDER: Koby Celestin MD    DISCHARGE DATE:  2024    IDENTIFYING DATA:  The patient is a 29-year-old single  male, who presented to emergency room over weekend.  The patient was admitted by Dr. Modi, complaining of increasing voices and paranoia.  He has a history of schizophrenia and had been followed in the past by Avani Dewey, nurse practitioner, and therapist, Ame Whitaker.  He had been living with his family.  He has a history in the past of repeated admissions and decompensations, but more recently had been receiving Abilify Maintena 400 mg ER every 4 weeks IM, with most recent dosing on 2024.  He was also on bupropion  mg twice a day, and possibly may have been on atomoxetine 80 mg capsule, though it is unknown.  After the fact the patient says that he realized he had not been taking the bupropion for the last several days before, he ended up decompensating and coming to the hospital.    Laboratory testing done in the emergency room included a normal basic metabolic panel, normal liver function tests, negative alcohol level, negative drug screen, negative acetaminophen level, and low salicylate level 2.4 mg/dL.  He had normal CBC, normal urinalysis, negative SARS-COVID by PCR.    HOSPITAL COURSE:  The patient was admitted to the Locked Adult Unit, where he was afforded individual, group, and milieu therapies.  He was given additional aripiprazole 5 mg daily and resumed back on his bupropion  mg b.i.d.  He had received Flonase nasal spray for allergic rhinitis.  He had also received nicotine patch 14 mg daily, several doses p.r.n.

## 2024-05-30 ENCOUNTER — HOSPITAL ENCOUNTER (EMERGENCY)
Facility: HOSPITAL | Age: 29
Discharge: LEFT BEFORE DISCHARGE | End: 2024-05-31
Attending: EMERGENCY MEDICINE

## 2024-05-30 VITALS
SYSTOLIC BLOOD PRESSURE: 131 MMHG | OXYGEN SATURATION: 98 % | HEIGHT: 71 IN | RESPIRATION RATE: 20 BRPM | WEIGHT: 170 LBS | BODY MASS INDEX: 23.8 KG/M2 | TEMPERATURE: 98.3 F | HEART RATE: 98 BPM | DIASTOLIC BLOOD PRESSURE: 95 MMHG

## 2024-05-30 DIAGNOSIS — Z13.30 ENCOUNTER FOR BEHAVIORAL HEALTH SCREENING: Primary | ICD-10-CM

## 2024-05-30 RX ORDER — BUPROPION HYDROCHLORIDE 150 MG/1
150 TABLET ORAL ONCE
Status: DISCONTINUED | OUTPATIENT
Start: 2024-05-30 | End: 2024-05-31 | Stop reason: HOSPADM

## 2024-05-31 NOTE — ED PROVIDER NOTES
EMERGENCY DEPARTMENT HISTORY AND PHYSICAL EXAM      Date: 2024  Patient Name: Nolan Rodriguez      History of Presenting Illness     No chief complaint on file.      Location/Duration/Severity/Modifying factors   No chief complaint on file.      HPI:  Nolan Rodriguez is a 29 y.o. male with PMH significant for Schizophrenia on Abilify extended release, Wellbutrin presents with concern for someone controlling his mind, unknown clear if he is taking the right medications.  Believes he was taking off some of his medications but is unsure which ones. Pt  denies thoughts of harming himself or others, any auditory or visual hallucinations.  Lives with his grandmother.    PCP: Avani Dewey APRN - NP    No current facility-administered medications for this encounter.     Current Outpatient Medications   Medication Sig Dispense Refill    buPROPion (WELLBUTRIN XL) 150 MG extended release tablet Take 1 tablet by mouth 2 times daily 30 tablet 0    fluticasone (FLONASE) 50 MCG/ACT nasal spray 1 spray by Each Nostril route daily 16 g 0    ARIPiprazole ER (ABILIFY MAINTENA) 400 MG SRER Inject 400 mg into the muscle         Past History     Past Medical History:  Past Medical History:   Diagnosis Date    ADHD (attention deficit hyperactivity disorder)     Anxiety disorder     Depression     Mood disorder (HCC)     Psychiatric disorder     Psychotic disorder (HCC)     Schizophrenia (HCC)     Suicidal ideation     Trauma     \"His mother  this past November\"       Past Surgical History:  No past surgical history on file.    Family History:  Family History   Problem Relation Age of Onset    Schizophrenia Mother     Schizophrenia Brother     Schizophrenia Sister        Social History:  Social History     Tobacco Use    Smoking status: Every Day     Types: Cigars    Smokeless tobacco: Never   Vaping Use    Vaping Use: Never used   Substance Use Topics    Alcohol use: Not Currently    Drug use: Not Currently     Types:

## 2024-05-31 NOTE — ED TRIAGE NOTES
Received patient in triage with c/o wanting to detox off of his medication but unable to tell me what medications have been stopped. Pt stated, \"My grandma called the  because I guess I spazzed out in front of the kids.\" Pt denies SI/Hi at this time but states that he is having hallucinations. When asked if he is having AH or VH patient denies both.

## 2024-05-31 NOTE — VIRTUAL HEALTH
Nolan Rodriguez  [id number]  1995     Reason for Cancel: TelePsych Reason for Cancel: Patient eloped       [facility] Consult to Tele-Psych  Consult performed by: Tamera Christensen LCSW  Consult ordered by: Arnie Naqvi DO         Total time spent on this encounter: Not billed by time    --Tamera Christensen LCSW on 5/30/2024 at 11:25 PM    An electronic signature was used to authenticate this note.

## 2024-06-13 ENCOUNTER — HOSPITAL ENCOUNTER (OUTPATIENT)
Facility: HOSPITAL | Age: 29
Setting detail: INFUSION SERIES
End: 2024-06-13
Payer: MEDICAID

## 2024-06-13 VITALS
TEMPERATURE: 98 F | OXYGEN SATURATION: 98 % | DIASTOLIC BLOOD PRESSURE: 73 MMHG | RESPIRATION RATE: 18 BRPM | HEART RATE: 92 BPM | SYSTOLIC BLOOD PRESSURE: 150 MMHG

## 2024-06-13 DIAGNOSIS — T42.6X2A INTENTIONAL POISONING BY VALPROATE SODIUM (HCC): Primary | ICD-10-CM

## 2024-06-13 PROCEDURE — 96372 THER/PROPH/DIAG INJ SC/IM: CPT

## 2024-06-13 PROCEDURE — 6360000002 HC RX W HCPCS: Performed by: NURSE PRACTITIONER

## 2024-06-13 RX ORDER — ACETAMINOPHEN 325 MG/1
650 TABLET ORAL
OUTPATIENT
Start: 2024-07-11

## 2024-06-13 RX ORDER — ONDANSETRON 2 MG/ML
8 INJECTION INTRAMUSCULAR; INTRAVENOUS
OUTPATIENT
Start: 2024-07-11

## 2024-06-13 RX ORDER — EPINEPHRINE 1 MG/ML
0.3 INJECTION, SOLUTION, CONCENTRATE INTRAVENOUS PRN
OUTPATIENT
Start: 2024-07-11

## 2024-06-13 RX ORDER — DIPHENHYDRAMINE HYDROCHLORIDE 50 MG/ML
50 INJECTION INTRAMUSCULAR; INTRAVENOUS
OUTPATIENT
Start: 2024-07-11

## 2024-06-13 RX ORDER — ALBUTEROL SULFATE 90 UG/1
4 AEROSOL, METERED RESPIRATORY (INHALATION) PRN
OUTPATIENT
Start: 2024-07-11

## 2024-06-13 RX ORDER — SODIUM CHLORIDE 9 MG/ML
INJECTION, SOLUTION INTRAVENOUS CONTINUOUS
OUTPATIENT
Start: 2024-07-11

## 2024-06-13 RX ADMIN — ARIPIPRAZOLE 400 MG: KIT at 10:13

## 2024-06-13 NOTE — PROGRESS NOTES
[facility] Progress Note    Date: 2024    Name: Nolan Rodriguez    MRN: [id number]         : 1995      ABILIFY INJECTIONS Q4 WEEKS      Mr. Rodriguez arrived to [facility] at 1010 ambulatory. No complaints or concerns voiced.    Mr. Rodriguez was assessed and education was provided.     Pt denies complaints.    Mr. Rodriguez's vitals were reviewed.  Vitals:    24 1012   BP: (!) 150/73   Pulse: 92   Resp: 18   Temp: 98 °F (36.7 °C)   SpO2: 98%       Abilify 400mg was administered as ordered IM in patient's L deltoid muscle. No irritation or bleeding at site.Band-aid applied to site.     Mr. Rodriguez tolerated well without complaints.    Discharge/ follow-up instructions discussed w/ pt. Pt verbalized understanding.    Pt armband removed & shredded.    [facility] at 1020. He is to return on 24 at 1000 for his next appointment.    ARTEM GRAF RN  2024

## 2024-07-05 ENCOUNTER — HOSPITAL ENCOUNTER (EMERGENCY)
Facility: HOSPITAL | Age: 29
Discharge: HOME | End: 2024-07-05
Attending: STUDENT IN AN ORGANIZED HEALTH CARE EDUCATION/TRAINING PROGRAM
Payer: MEDICAID

## 2024-07-05 VITALS
HEIGHT: 71 IN | DIASTOLIC BLOOD PRESSURE: 78 MMHG | BODY MASS INDEX: 25.2 KG/M2 | OXYGEN SATURATION: 99 % | WEIGHT: 180 LBS | HEART RATE: 101 BPM | RESPIRATION RATE: 18 BRPM | TEMPERATURE: 97.4 F | SYSTOLIC BLOOD PRESSURE: 129 MMHG

## 2024-07-05 DIAGNOSIS — H92.01 OTALGIA OF RIGHT EAR: Primary | ICD-10-CM

## 2024-07-05 PROCEDURE — 99284 EMERGENCY DEPT VISIT MOD MDM: CPT

## 2024-07-05 PROCEDURE — 6360000002 HC RX W HCPCS: Performed by: STUDENT IN AN ORGANIZED HEALTH CARE EDUCATION/TRAINING PROGRAM

## 2024-07-05 PROCEDURE — 96372 THER/PROPH/DIAG INJ SC/IM: CPT

## 2024-07-05 PROCEDURE — 6370000000 HC RX 637 (ALT 250 FOR IP): Performed by: STUDENT IN AN ORGANIZED HEALTH CARE EDUCATION/TRAINING PROGRAM

## 2024-07-05 RX ORDER — KETOROLAC TROMETHAMINE 15 MG/ML
15 INJECTION, SOLUTION INTRAMUSCULAR; INTRAVENOUS
Status: COMPLETED | OUTPATIENT
Start: 2024-07-05 | End: 2024-07-05

## 2024-07-05 RX ORDER — ACETAMINOPHEN 325 MG/1
650 TABLET ORAL
Status: COMPLETED | OUTPATIENT
Start: 2024-07-05 | End: 2024-07-05

## 2024-07-05 RX ADMIN — ACETAMINOPHEN 325MG 650 MG: 325 TABLET ORAL at 04:42

## 2024-07-05 RX ADMIN — KETOROLAC TROMETHAMINE 15 MG: 15 INJECTION, SOLUTION INTRAMUSCULAR; INTRAVENOUS at 04:38

## 2024-07-05 ASSESSMENT — PAIN - FUNCTIONAL ASSESSMENT: PAIN_FUNCTIONAL_ASSESSMENT: 0-10

## 2024-07-05 ASSESSMENT — PAIN DESCRIPTION - ORIENTATION
ORIENTATION: RIGHT
ORIENTATION: RIGHT

## 2024-07-05 ASSESSMENT — PAIN DESCRIPTION - LOCATION
LOCATION: EAR
LOCATION: EAR

## 2024-07-05 ASSESSMENT — PAIN SCALES - GENERAL
PAINLEVEL_OUTOF10: 8
PAINLEVEL_OUTOF10: 5

## 2024-07-05 NOTE — ED PROVIDER NOTES
EMERGENCY DEPARTMENT HISTORY AND PHYSICAL EXAM    4:28 AM      Date: 2024  Patient Name: Nolan Rodriguez    History of Presenting Illness     Chief Complaint   Patient presents with    Otalgia       History From: Patient    Nolan Rodriguez is a 29 y.o. male   HPI  29-year-old male with history of paranoid schizophrenia who presents with right ear pain.  Patient said the symptoms started today.  Patient's ear pain is at the mastoid area.  Denies any trauma, foreign objects in the ear or any other changes.  Says pressing on the back of his ear aggravates symptoms.  No alleviating factors close he has not tried any meds.  When assessing ROS he has no changes in hearing, pus or bleeding from the ear canal, nausea, vomiting, fevers, or any other changes.      Nursing Notes were all reviewed and agreed with or any disagreements were addressed in the HPI.    PCP: Avani Dewey APRN - NP    Current Facility-Administered Medications   Medication Dose Route Frequency Provider Last Rate Last Admin    ketorolac (TORADOL) injection 15 mg  15 mg IntraMUSCular NOW Karlos Diaz MD        acetaminophen (TYLENOL) tablet 650 mg  650 mg Oral NOW Karlos Diaz MD         Current Outpatient Medications   Medication Sig Dispense Refill    buPROPion (WELLBUTRIN XL) 150 MG extended release tablet Take 1 tablet by mouth 2 times daily 30 tablet 0    fluticasone (FLONASE) 50 MCG/ACT nasal spray 1 spray by Each Nostril route daily 16 g 0    ARIPiprazole ER (ABILIFY MAINTENA) 400 MG SRER Inject 400 mg into the muscle         Past History     Past Medical History:  Past Medical History:   Diagnosis Date    ADHD (attention deficit hyperactivity disorder)     Anxiety disorder     Depression     Mood disorder ([facility])     Psychiatric disorder     Psychotic disorder ([facility])     Schizophrenia ([facility])     Suicidal ideation     Trauma     \"His mother  this past November\"       Past Surgical History:  No past surgical history on  file.    Family History:  Family History   Problem Relation Age of Onset    Schizophrenia Mother     Schizophrenia Brother     Schizophrenia Sister        Social History:  Social History     Tobacco Use    Smoking status: Every Day     Types: Cigars    Smokeless tobacco: Never   Vaping Use    Vaping Use: Never used   Substance Use Topics    Alcohol use: Not Currently    Drug use: Not Currently     Types: Marijuana (Weed)       Allergies:  No Known Allergies      Review of Systems     All pertinent positives and negatives in the HPI     Review of Systems      Physical Exam   /78   Pulse (!) 101   Temp 97.4 °F (36.3 °C) (Oral)   Resp 18   Ht 1.803 m (5' 11\")   Wt 81.6 kg (180 lb)   SpO2 99%   BMI 25.10 kg/m²       Physical Exam  Constitutional:       Appearance: Normal appearance. He is not toxic-appearing.   HENT:      Head: Normocephalic and atraumatic.      Right Ear: Tympanic membrane, ear canal and external ear normal.      Left Ear: Tympanic membrane, ear canal and external ear normal.      Ears:      Comments: Mild tenderness to palpation behind the auricular region, no signs of erythema or deformities.  No tenderness palpation when pulling on the pinna of the ear.  Eyes:      Extraocular Movements: Extraocular movements intact.      Conjunctiva/sclera: Conjunctivae normal.      Pupils: Pupils are equal, round, and reactive to light.   Cardiovascular:      Rate and Rhythm: Normal rate.   Pulmonary:      Effort: Pulmonary effort is normal.      Breath sounds: Normal breath sounds.   Abdominal:      General: Abdomen is flat. Bowel sounds are normal.      Palpations: Abdomen is soft.   Musculoskeletal:         General: Normal range of motion.      Cervical back: Normal range of motion and neck supple.   Skin:     General: Skin is warm.      Capillary Refill: Capillary refill takes less than 2 seconds.   Neurological:      General: No focal deficit present.      Mental Status: He is alert. Mental

## 2024-07-05 NOTE — DISCHARGE INSTRUCTIONS
You were evaluated for ear pain .  Based on your work-up it was deemed that she was stable for discharge.  Please take Tylenol and Motrin to help with your ear pain.  Please follow-up with your primary care physician if you have any further concerns and go over your work-up.  If you experience any chest pain, shortness of breath, worsening abdominal pain, vomiting blood, worsening headache, seizures, or any worsening of your symptoms please return to the emergency department immediately.  If you have any pending results or any further questions please contact the emergency department at [phone].

## 2024-07-11 ENCOUNTER — HOSPITAL ENCOUNTER (OUTPATIENT)
Facility: HOSPITAL | Age: 29
Setting detail: INFUSION SERIES
End: 2024-07-11
Payer: MEDICAID

## 2024-07-11 VITALS
TEMPERATURE: 98.1 F | HEART RATE: 111 BPM | OXYGEN SATURATION: 100 % | SYSTOLIC BLOOD PRESSURE: 136 MMHG | RESPIRATION RATE: 16 BRPM | DIASTOLIC BLOOD PRESSURE: 79 MMHG

## 2024-07-11 DIAGNOSIS — T42.6X2A INTENTIONAL POISONING BY VALPROATE SODIUM (HCC): Primary | ICD-10-CM

## 2024-07-11 PROCEDURE — 6360000002 HC RX W HCPCS: Performed by: NURSE PRACTITIONER

## 2024-07-11 PROCEDURE — 96372 THER/PROPH/DIAG INJ SC/IM: CPT

## 2024-07-11 RX ORDER — ONDANSETRON 2 MG/ML
8 INJECTION INTRAMUSCULAR; INTRAVENOUS
OUTPATIENT
Start: 2024-08-08

## 2024-07-11 RX ORDER — EPINEPHRINE 1 MG/ML
0.3 INJECTION, SOLUTION, CONCENTRATE INTRAVENOUS PRN
OUTPATIENT
Start: 2024-08-08

## 2024-07-11 RX ORDER — SODIUM CHLORIDE 9 MG/ML
INJECTION, SOLUTION INTRAVENOUS CONTINUOUS
OUTPATIENT
Start: 2024-08-08

## 2024-07-11 RX ORDER — DIPHENHYDRAMINE HYDROCHLORIDE 50 MG/ML
50 INJECTION INTRAMUSCULAR; INTRAVENOUS
OUTPATIENT
Start: 2024-08-08

## 2024-07-11 RX ORDER — ALBUTEROL SULFATE 90 UG/1
4 AEROSOL, METERED RESPIRATORY (INHALATION) PRN
OUTPATIENT
Start: 2024-08-08

## 2024-07-11 RX ORDER — ACETAMINOPHEN 325 MG/1
650 TABLET ORAL
OUTPATIENT
Start: 2024-08-08

## 2024-07-11 RX ADMIN — ARIPIPRAZOLE 400 MG: KIT at 10:23

## 2024-07-11 NOTE — PROGRESS NOTES
[facility] Progress Note    Date: 2024    Name: Nolan Rodriguez    MRN: [id number]         : 1995    ABILIFY INJECTIONS Q4 WEEKS    Mr. Rodriguez arrived to [facility] at 1020.    Mr. Rodriguez was assessed and education was provided.     Pt denies complaints.    Mr. Rodriguez's vitals were reviewed.  Vitals:    24 1021   BP: 136/79   Pulse: (!) 111   Resp: 16   Temp: 98.1 °F (36.7 °C)   SpO2: 100%       Abilify 400mg was administered as ordered IM in patient's L deltoid muscle. Band-aid applied to site.     Mr. Rodriguez tolerated well without complaints.    Discharge/ follow-up instructions discussed w/ pt. Pt verbalized understanding.    Pt armband removed & shredded.    [facility] at 1025. He is to return on 24 at 1030 for his next Abilify appointment.    China Weaver RN  2024

## 2024-08-08 ENCOUNTER — HOSPITAL ENCOUNTER (OUTPATIENT)
Facility: HOSPITAL | Age: 29
Setting detail: INFUSION SERIES
End: 2024-08-08
Payer: MEDICAID

## 2024-08-08 VITALS
RESPIRATION RATE: 16 BRPM | HEART RATE: 106 BPM | OXYGEN SATURATION: 98 % | SYSTOLIC BLOOD PRESSURE: 125 MMHG | TEMPERATURE: 98.5 F | DIASTOLIC BLOOD PRESSURE: 78 MMHG

## 2024-08-08 DIAGNOSIS — T42.6X2A INTENTIONAL POISONING BY VALPROATE SODIUM (HCC): Primary | ICD-10-CM

## 2024-08-08 PROCEDURE — 6360000002 HC RX W HCPCS: Performed by: NURSE PRACTITIONER

## 2024-08-08 PROCEDURE — 96372 THER/PROPH/DIAG INJ SC/IM: CPT

## 2024-08-08 RX ORDER — ONDANSETRON 2 MG/ML
8 INJECTION INTRAMUSCULAR; INTRAVENOUS
OUTPATIENT
Start: 2024-09-05

## 2024-08-08 RX ORDER — DIPHENHYDRAMINE HYDROCHLORIDE 50 MG/ML
50 INJECTION INTRAMUSCULAR; INTRAVENOUS
OUTPATIENT
Start: 2024-09-05

## 2024-08-08 RX ORDER — ACETAMINOPHEN 325 MG/1
650 TABLET ORAL
OUTPATIENT
Start: 2024-09-05

## 2024-08-08 RX ORDER — EPINEPHRINE 1 MG/ML
0.3 INJECTION, SOLUTION, CONCENTRATE INTRAVENOUS PRN
OUTPATIENT
Start: 2024-09-05

## 2024-08-08 RX ORDER — SODIUM CHLORIDE 9 MG/ML
INJECTION, SOLUTION INTRAVENOUS CONTINUOUS
OUTPATIENT
Start: 2024-09-05

## 2024-08-08 RX ORDER — ALBUTEROL SULFATE 90 UG/1
4 AEROSOL, METERED RESPIRATORY (INHALATION) PRN
OUTPATIENT
Start: 2024-09-05

## 2024-08-08 RX ADMIN — ARIPIPRAZOLE 400 MG: KIT at 10:40

## 2024-08-08 ASSESSMENT — PAIN - FUNCTIONAL ASSESSMENT: PAIN_FUNCTIONAL_ASSESSMENT: NONE - DENIES PAIN

## 2024-08-08 NOTE — PROGRESS NOTES
[facility] Progress Note    Date: 2024    Name: Nolan Rodriguez    MRN: [id number]         : 1995    Mr. Rodriguez arrived in the [facility] today at 1030, in stable condition, here for his ABILIFY INJECTION (EVERY 4 WEEKS). He was assessed and education was provided.     Mr. Rodriguez's vitals were reviewed.    Vitals:    24 1030   BP: 125/78   Pulse: (!) 106   Resp: 16   Temp: 98.5 °F (36.9 °C)   TempSrc: Oral   SpO2: 98%           Mr. Rodriguez stated that he was doing well, and he voiced no complaints today.          ABILIFY MAINTENA (Aripiprazole) 400 mg, was given IM, in his RIGHT DELTOID at 1040, per order, and without incident.                Mr. Rodriguez tolerated well, and voiced no complaints.     [facility] condition at 1045..     He is to return in 4 weeks, on Thursday, 24, at 1030, for his next appointment, for his next Abilify Injection.          Brandon Lazo RN  2024  10:36 AM

## 2024-09-04 PROCEDURE — 99283 EMERGENCY DEPT VISIT LOW MDM: CPT

## 2024-09-05 ENCOUNTER — HOSPITAL ENCOUNTER (EMERGENCY)
Facility: HOSPITAL | Age: 29
Discharge: HOME | End: 2024-09-05
Attending: EMERGENCY MEDICINE
Payer: MEDICAID

## 2024-09-05 ENCOUNTER — HOSPITAL ENCOUNTER (OUTPATIENT)
Facility: HOSPITAL | Age: 29
Setting detail: INFUSION SERIES
Discharge: HOME | End: 2024-09-08
Payer: MEDICAID

## 2024-09-05 VITALS
HEIGHT: 70 IN | WEIGHT: 150 LBS | TEMPERATURE: 99.6 F | OXYGEN SATURATION: 98 % | RESPIRATION RATE: 17 BRPM | SYSTOLIC BLOOD PRESSURE: 138 MMHG | DIASTOLIC BLOOD PRESSURE: 89 MMHG | BODY MASS INDEX: 21.47 KG/M2 | HEART RATE: 99 BPM

## 2024-09-05 VITALS
OXYGEN SATURATION: 100 % | HEART RATE: 100 BPM | RESPIRATION RATE: 18 BRPM | SYSTOLIC BLOOD PRESSURE: 128 MMHG | TEMPERATURE: 97.2 F | DIASTOLIC BLOOD PRESSURE: 82 MMHG

## 2024-09-05 DIAGNOSIS — H92.01 EAR PAIN, RIGHT: Primary | ICD-10-CM

## 2024-09-05 DIAGNOSIS — T42.6X2A INTENTIONAL POISONING BY VALPROATE SODIUM (HCC): Primary | ICD-10-CM

## 2024-09-05 PROCEDURE — 6360000002 HC RX W HCPCS: Performed by: NURSE PRACTITIONER

## 2024-09-05 PROCEDURE — 6370000000 HC RX 637 (ALT 250 FOR IP): Performed by: EMERGENCY MEDICINE

## 2024-09-05 PROCEDURE — 96372 THER/PROPH/DIAG INJ SC/IM: CPT

## 2024-09-05 RX ORDER — AMOXICILLIN 500 MG/1
500 CAPSULE ORAL 3 TIMES DAILY
Qty: 30 CAPSULE | Refills: 0 | Status: SHIPPED | OUTPATIENT
Start: 2024-09-05 | End: 2024-09-15

## 2024-09-05 RX ORDER — ALBUTEROL SULFATE 90 UG/1
4 AEROSOL, METERED RESPIRATORY (INHALATION) PRN
OUTPATIENT
Start: 2024-10-03

## 2024-09-05 RX ORDER — DIPHENHYDRAMINE HYDROCHLORIDE 50 MG/ML
50 INJECTION INTRAMUSCULAR; INTRAVENOUS
OUTPATIENT
Start: 2024-10-03

## 2024-09-05 RX ORDER — AMOXICILLIN 250 MG/1
500 CAPSULE ORAL
Status: COMPLETED | OUTPATIENT
Start: 2024-09-05 | End: 2024-09-05

## 2024-09-05 RX ORDER — ONDANSETRON 2 MG/ML
8 INJECTION INTRAMUSCULAR; INTRAVENOUS
OUTPATIENT
Start: 2024-10-03

## 2024-09-05 RX ORDER — EPINEPHRINE 1 MG/ML
0.3 INJECTION, SOLUTION, CONCENTRATE INTRAVENOUS PRN
OUTPATIENT
Start: 2024-10-03

## 2024-09-05 RX ORDER — IBUPROFEN 400 MG/1
400 TABLET, FILM COATED ORAL
Status: COMPLETED | OUTPATIENT
Start: 2024-09-05 | End: 2024-09-05

## 2024-09-05 RX ORDER — ACETAMINOPHEN 325 MG/1
650 TABLET ORAL
OUTPATIENT
Start: 2024-10-03

## 2024-09-05 RX ORDER — SODIUM CHLORIDE 9 MG/ML
INJECTION, SOLUTION INTRAVENOUS CONTINUOUS
OUTPATIENT
Start: 2024-10-03

## 2024-09-05 RX ADMIN — ARIPIPRAZOLE 400 MG: KIT at 10:38

## 2024-09-05 RX ADMIN — IBUPROFEN 400 MG: 400 TABLET, FILM COATED ORAL at 00:20

## 2024-09-05 RX ADMIN — AMOXICILLIN 500 MG: 250 CAPSULE ORAL at 00:20

## 2024-09-05 ASSESSMENT — ENCOUNTER SYMPTOMS: SORE THROAT: 0

## 2024-09-05 ASSESSMENT — LIFESTYLE VARIABLES
HOW MANY STANDARD DRINKS CONTAINING ALCOHOL DO YOU HAVE ON A TYPICAL DAY: PATIENT DOES NOT DRINK
HOW OFTEN DO YOU HAVE A DRINK CONTAINING ALCOHOL: NEVER

## 2024-09-05 ASSESSMENT — PAIN SCALES - GENERAL
PAINLEVEL_OUTOF10: 8
PAINLEVEL_OUTOF10: 8

## 2024-09-05 ASSESSMENT — PAIN - FUNCTIONAL ASSESSMENT
PAIN_FUNCTIONAL_ASSESSMENT: 0-10
PAIN_FUNCTIONAL_ASSESSMENT: ACTIVITIES ARE NOT PREVENTED

## 2024-09-05 NOTE — PROGRESS NOTES
[facility] Progress Note    Date: 2024    Name: Nolan Rodriguez    MRN: [id number]         : 1995    ABILIFY INJECTIONS Q4 WEEKS    Mr. Rodriguez arrived to [facility] at 1030.    Mr. Rodriguez was assessed and education was provided.     Pt denies complaints.    Mr. Rodriguez's vitals were reviewed.  Vitals:    24 1032   BP: 128/82   Pulse: 100   Resp: 18   Temp: 97.2 °F (36.2 °C)   SpO2: 100%     Abilify 400mg was administered as ordered IM in patient's L deltoid muscle. Band-aid applied to site.     Mr. Rodriguez tolerated well without complaints.    Discharge/ follow-up instructions discussed w/ pt. Pt verbalized understanding.    Pt armband removed & shredded.    [facility] in [facility] condition at 1040. He is to return on 10/3/24 at 1030 for his next Abilify appointment.    China Weaver RN  2024

## 2024-09-05 NOTE — ED PROVIDER NOTES
[facility] EMERGENCY DEPT  EMERGENCY DEPARTMENT ENCOUNTER      Pt Name: Nolan Rodriguez  MRN: [id number]  Birthdate 1995  Date of evaluation: 2024  Provider: ZAKIYA VAZ MD  12:21 AM    CHIEF COMPLAINT       Chief Complaint   Patient presents with    Otalgia         HISTORY OF PRESENT ILLNESS    Nolan Rdoriguez is a 29 y.o. male who presents to the emergency department     29-year-old male states he has no medical problems on chart he has history of schizophrenia mood disorder depression ADHD and anxiety.  Patient states he has right ear pain for 3 days.  No history of trauma.  Pain is worse today.  No treatment with medications.  No history of similar episode.  Patient not put foreign body in the ear and denies trauma.  No drainage from ear no recent swimming no other issues expressed.  Patient has no fevers or chills.    The history is provided by the patient and medical records. No  was used.       Nursing Notes were reviewed.    REVIEW OF SYSTEMS       Review of Systems   Constitutional:  Negative for activity change, chills and fever.   HENT:  Positive for ear pain. Negative for hearing loss and sore throat.        Except as noted above the remainder of the review of systems was reviewed and negative.       PAST MEDICAL HISTORY     Past Medical History:   Diagnosis Date    ADHD (attention deficit hyperactivity disorder)     Anxiety disorder     Depression     Mood disorder (HCC)     Psychiatric disorder     Psychotic disorder (HCC)     Schizophrenia (HCC)     Schizophrenia (HCC)     Suicidal ideation     Trauma     \"His mother  this past November\"         SURGICAL HISTORY     History reviewed. No pertinent surgical history.      CURRENT MEDICATIONS       Previous Medications    ARIPIPRAZOLE ER (ABILIFY MAINTENA) 400 MG SRER    Inject 400 mg into the muscle    BUPROPION (WELLBUTRIN XL) 150 MG EXTENDED RELEASE TABLET    Take 1 tablet by mouth 2 times daily    FLUTICASONE (FLONASE) 50  atraumatic.      Comments: Redness noted to the right ear dry bulging of tympanic membrane     Nose: Nose normal.      Mouth/Throat:      Mouth: Mucous membranes are moist.   Cardiovascular:      Rate and Rhythm: Normal rate and regular rhythm.   Pulmonary:      Effort: Pulmonary effort is normal.   Skin:     General: Skin is warm.      Capillary Refill: Capillary refill takes less than 2 seconds.   Neurological:      Mental Status: He is alert and oriented to person, place, and time.         DIAGNOSTIC RESULTS     EKG: All EKG's are interpreted by the Emergency Department Physician who either signs or Co-signs this chart in the absence of a cardiologist.        RADIOLOGY:   Non-plain film images such as CT, Ultrasound and MRI are read by the radiologist. Plain radiographic images are visualized and preliminarily interpreted by the emergency physician with the below findings:        Interpretation per the Radiologist below, if available at the time of this note:    No orders to display         ED BEDSIDE ULTRASOUND:   Performed by ED Physician - none    LABS:  Labs Reviewed - No data to display    All other labs were within normal range or not returned as of this dictation.    EMERGENCY DEPARTMENT COURSE and DIFFERENTIAL DIAGNOSIS/MDM:   Vitals:    Vitals:    09/05/24 0014   BP: 138/89   Pulse: 99   Resp: 17   Temp: 99.6 °F (37.6 °C)   TempSrc: Oral   SpO2: 98%   Weight: 68 kg (150 lb)   Height: 1.778 m (5' 10\")           Medical Decision Making  Patient presented to the emergency department via EMS with right ear pain.  Will give amoxicillin and ibuprofen and looks like he has a ear infection.  Will discharge patient home TM is not perforated vital signs stable.  Differential diagnosis otitis media otitis externa TM perforation    Risk  Prescription drug management.            REASSESSMENT          CRITICAL CARE TIME       CONSULTS:  None    PROCEDURES:  Unless otherwise noted below, none

## 2024-09-11 ENCOUNTER — HOSPITAL ENCOUNTER (EMERGENCY)
Facility: HOSPITAL | Age: 29
Discharge: HOME | End: 2024-09-11
Attending: STUDENT IN AN ORGANIZED HEALTH CARE EDUCATION/TRAINING PROGRAM
Payer: MEDICAID

## 2024-09-11 VITALS
TEMPERATURE: 99.2 F | OXYGEN SATURATION: 98 % | HEART RATE: 67 BPM | DIASTOLIC BLOOD PRESSURE: 96 MMHG | RESPIRATION RATE: 17 BRPM | SYSTOLIC BLOOD PRESSURE: 131 MMHG

## 2024-09-11 DIAGNOSIS — H65.01 NON-RECURRENT ACUTE SEROUS OTITIS MEDIA OF RIGHT EAR: Primary | ICD-10-CM

## 2024-09-11 PROCEDURE — 99284 EMERGENCY DEPT VISIT MOD MDM: CPT

## 2024-09-11 PROCEDURE — 96372 THER/PROPH/DIAG INJ SC/IM: CPT

## 2024-09-11 PROCEDURE — 6360000002 HC RX W HCPCS: Performed by: STUDENT IN AN ORGANIZED HEALTH CARE EDUCATION/TRAINING PROGRAM

## 2024-09-11 PROCEDURE — 6370000000 HC RX 637 (ALT 250 FOR IP): Performed by: STUDENT IN AN ORGANIZED HEALTH CARE EDUCATION/TRAINING PROGRAM

## 2024-09-11 RX ORDER — KETOROLAC TROMETHAMINE 10 MG/1
10 TABLET, FILM COATED ORAL EVERY 6 HOURS PRN
Qty: 15 TABLET | Refills: 0 | Status: SHIPPED | OUTPATIENT
Start: 2024-09-11

## 2024-09-11 RX ORDER — KETOROLAC TROMETHAMINE 15 MG/ML
30 INJECTION, SOLUTION INTRAMUSCULAR; INTRAVENOUS ONCE
Status: DISCONTINUED | OUTPATIENT
Start: 2024-09-11 | End: 2024-09-11

## 2024-09-11 RX ORDER — KETOROLAC TROMETHAMINE 15 MG/ML
15 INJECTION, SOLUTION INTRAMUSCULAR; INTRAVENOUS ONCE
Status: COMPLETED | OUTPATIENT
Start: 2024-09-11 | End: 2024-09-11

## 2024-09-11 RX ADMIN — AMOXICILLIN AND CLAVULANATE POTASSIUM 1 TABLET: 875; 125 TABLET, FILM COATED ORAL at 04:43

## 2024-09-11 RX ADMIN — KETOROLAC TROMETHAMINE 15 MG: 15 INJECTION, SOLUTION INTRAMUSCULAR; INTRAVENOUS at 04:46

## 2024-09-11 ASSESSMENT — PAIN DESCRIPTION - LOCATION: LOCATION: EAR

## 2024-09-17 ENCOUNTER — HOSPITAL ENCOUNTER (EMERGENCY)
Facility: HOSPITAL | Age: 29
Discharge: LEFT BEFORE DISCHARGE | End: 2024-09-17

## 2024-10-03 ENCOUNTER — HOSPITAL ENCOUNTER (OUTPATIENT)
Facility: HOSPITAL | Age: 29
Setting detail: INFUSION SERIES
Discharge: HOME | End: 2024-10-06
Payer: MEDICAID

## 2024-10-03 VITALS
SYSTOLIC BLOOD PRESSURE: 141 MMHG | TEMPERATURE: 98.6 F | RESPIRATION RATE: 16 BRPM | OXYGEN SATURATION: 98 % | DIASTOLIC BLOOD PRESSURE: 78 MMHG | HEART RATE: 93 BPM

## 2024-10-03 DIAGNOSIS — T42.6X2A INTENTIONAL POISONING BY VALPROATE SODIUM (HCC): Primary | ICD-10-CM

## 2024-10-03 PROCEDURE — 6360000002 HC RX W HCPCS: Performed by: NURSE PRACTITIONER

## 2024-10-03 PROCEDURE — 96372 THER/PROPH/DIAG INJ SC/IM: CPT

## 2024-10-03 RX ORDER — DIPHENHYDRAMINE HYDROCHLORIDE 50 MG/ML
50 INJECTION INTRAMUSCULAR; INTRAVENOUS
OUTPATIENT
Start: 2024-10-03

## 2024-10-03 RX ORDER — ONDANSETRON 2 MG/ML
8 INJECTION INTRAMUSCULAR; INTRAVENOUS
OUTPATIENT
Start: 2024-10-03

## 2024-10-03 RX ORDER — EPINEPHRINE 1 MG/ML
0.3 INJECTION, SOLUTION, CONCENTRATE INTRAVENOUS PRN
OUTPATIENT
Start: 2024-10-03

## 2024-10-03 RX ORDER — ACETAMINOPHEN 325 MG/1
650 TABLET ORAL
OUTPATIENT
Start: 2024-10-03

## 2024-10-03 RX ORDER — SODIUM CHLORIDE 9 MG/ML
INJECTION, SOLUTION INTRAVENOUS CONTINUOUS
OUTPATIENT
Start: 2024-10-03

## 2024-10-03 RX ORDER — ALBUTEROL SULFATE 90 UG/1
4 INHALANT RESPIRATORY (INHALATION) PRN
OUTPATIENT
Start: 2024-10-03

## 2024-10-03 RX ADMIN — ARIPIPRAZOLE 400 MG: KIT at 11:02

## 2024-10-03 ASSESSMENT — PAIN - FUNCTIONAL ASSESSMENT: PAIN_FUNCTIONAL_ASSESSMENT: NONE - DENIES PAIN

## 2024-10-03 NOTE — PROGRESS NOTES
[facility] Progress Note    Date: October 3, 2024    Name: Nolan Rodriguez    MRN: [id number]         : 1995    Mr. Rodriguez arrived in the [facility] today at 1050, in stable condition, here for his ABILIFY INJECTION (EVERY 4 WEEKS). He was assessed and education was provided.     Mr. Rodriguez's vitals were reviewed.    Vitals:    10/03/24 1050   BP: (!) 141/78   Pulse: 93   Resp: 16   Temp: 98.6 °F (37 °C)   TempSrc: Oral   SpO2: 98%           Mr. Rodriguez stated that he was doing well, and he voiced no complaints today.          ABILIFY MAINTENA (Aripiprazole) 400 mg, was given IM, in his RIGHT DELTOID at 1102, per order, and without incident.                Mr. Rodriguez tolerated well, and voiced no complaints.     [facility] in [facility] condition at 1105..     He is to return in 4 weeks, on Thursday, 10-31-24, at 1030, for his next appointment, for his next Abilify Injection.          Brandon Lazo RN  October 3, 2024  10:59 AM

## 2024-10-31 ENCOUNTER — HOSPITAL ENCOUNTER (OUTPATIENT)
Facility: HOSPITAL | Age: 29
Setting detail: INFUSION SERIES
Discharge: HOME | End: 2024-11-03
Payer: MEDICAID

## 2024-10-31 VITALS
RESPIRATION RATE: 16 BRPM | TEMPERATURE: 98.1 F | HEART RATE: 100 BPM | DIASTOLIC BLOOD PRESSURE: 84 MMHG | SYSTOLIC BLOOD PRESSURE: 131 MMHG | OXYGEN SATURATION: 100 %

## 2024-10-31 DIAGNOSIS — T42.6X2A INTENTIONAL POISONING BY VALPROATE SODIUM (HCC): Primary | ICD-10-CM

## 2024-10-31 PROCEDURE — 96372 THER/PROPH/DIAG INJ SC/IM: CPT

## 2024-10-31 PROCEDURE — 6360000002 HC RX W HCPCS: Performed by: NURSE PRACTITIONER

## 2024-10-31 RX ORDER — ONDANSETRON 2 MG/ML
8 INJECTION INTRAMUSCULAR; INTRAVENOUS
OUTPATIENT
Start: 2024-11-28

## 2024-10-31 RX ORDER — EPINEPHRINE 1 MG/ML
0.3 INJECTION, SOLUTION, CONCENTRATE INTRAVENOUS PRN
OUTPATIENT
Start: 2024-11-28

## 2024-10-31 RX ORDER — SODIUM CHLORIDE 9 MG/ML
INJECTION, SOLUTION INTRAVENOUS CONTINUOUS
OUTPATIENT
Start: 2024-11-28

## 2024-10-31 RX ORDER — ALBUTEROL SULFATE 90 UG/1
4 INHALANT RESPIRATORY (INHALATION) PRN
OUTPATIENT
Start: 2024-11-28

## 2024-10-31 RX ORDER — DIPHENHYDRAMINE HYDROCHLORIDE 50 MG/ML
50 INJECTION INTRAMUSCULAR; INTRAVENOUS
OUTPATIENT
Start: 2024-11-28

## 2024-10-31 RX ORDER — ACETAMINOPHEN 325 MG/1
650 TABLET ORAL
OUTPATIENT
Start: 2024-11-28

## 2024-10-31 RX ADMIN — ARIPIPRAZOLE 400 MG: KIT at 10:29

## 2024-10-31 ASSESSMENT — PAIN - FUNCTIONAL ASSESSMENT: PAIN_FUNCTIONAL_ASSESSMENT: NONE - DENIES PAIN

## 2024-10-31 NOTE — PROGRESS NOTES
hospitals Progress Note    Date: 2024    Name: Nolan Rodriguez    MRN: [id number]         : 1995    Mr. Rodriguez arrived in the hospitals today at 1020 in stable condition, here for his ABILIFY INJECTION (EVERY 4 WEEKS). He was assessed and education was provided.     Mr. Rodriguez's vitals were reviewed.    Vitals:    10/31/24 1021   BP: 131/84   Pulse: 100   Resp: 16   Temp: 98.1 °F (36.7 °C)   SpO2: 100%     Mr. Rodriguez stated that he was doing well, and he voiced no complaints today. Reports tolerating Abilify well without adverse effects.       ABILIFY MAINTENA (Aripiprazole) 400 mg was given IM in his LEFT DELTOID at  per order and without incident.        Mr. Rodriguez tolerated well, and voiced no complaints.     [facility] was [facility] in stable condition at 1030.     He is to return in 4 weeks on 24 at 1030, for his next appointment, for his next Abilify Injection.          Anastasiya Carrera RN  2024  10:25 AM

## 2024-11-29 ENCOUNTER — HOSPITAL ENCOUNTER (OUTPATIENT)
Facility: HOSPITAL | Age: 29
Setting detail: INFUSION SERIES
Discharge: HOME | End: 2024-12-02
Payer: MEDICAID

## 2024-11-29 VITALS
OXYGEN SATURATION: 99 % | HEART RATE: 88 BPM | SYSTOLIC BLOOD PRESSURE: 144 MMHG | RESPIRATION RATE: 16 BRPM | TEMPERATURE: 98.3 F | DIASTOLIC BLOOD PRESSURE: 85 MMHG

## 2024-11-29 DIAGNOSIS — T42.6X2A INTENTIONAL POISONING BY VALPROATE SODIUM (HCC): Primary | ICD-10-CM

## 2024-11-29 PROCEDURE — 6360000002 HC RX W HCPCS: Performed by: NURSE PRACTITIONER

## 2024-11-29 PROCEDURE — 96372 THER/PROPH/DIAG INJ SC/IM: CPT

## 2024-11-29 RX ORDER — DIPHENHYDRAMINE HYDROCHLORIDE 50 MG/ML
50 INJECTION INTRAMUSCULAR; INTRAVENOUS
OUTPATIENT
Start: 2024-12-27

## 2024-11-29 RX ORDER — ACETAMINOPHEN 325 MG/1
650 TABLET ORAL
OUTPATIENT
Start: 2024-12-27

## 2024-11-29 RX ORDER — HYDROCORTISONE SODIUM SUCCINATE 100 MG/2ML
100 INJECTION INTRAMUSCULAR; INTRAVENOUS
OUTPATIENT
Start: 2024-12-27

## 2024-11-29 RX ORDER — EPINEPHRINE 1 MG/ML
0.3 INJECTION, SOLUTION, CONCENTRATE INTRAVENOUS PRN
OUTPATIENT
Start: 2024-12-27

## 2024-11-29 RX ORDER — ALBUTEROL SULFATE 90 UG/1
4 INHALANT RESPIRATORY (INHALATION) PRN
OUTPATIENT
Start: 2024-12-27

## 2024-11-29 RX ORDER — SODIUM CHLORIDE 9 MG/ML
INJECTION, SOLUTION INTRAVENOUS CONTINUOUS
OUTPATIENT
Start: 2024-12-27

## 2024-11-29 RX ORDER — ONDANSETRON 2 MG/ML
8 INJECTION INTRAMUSCULAR; INTRAVENOUS
OUTPATIENT
Start: 2024-12-27

## 2024-11-29 RX ADMIN — ARIPIPRAZOLE 400 MG: KIT at 10:40

## 2024-11-29 ASSESSMENT — PAIN - FUNCTIONAL ASSESSMENT: PAIN_FUNCTIONAL_ASSESSMENT: NONE - DENIES PAIN

## 2024-11-29 NOTE — PROGRESS NOTES
[facility] Progress Note    Date: 2024    Name: Nolan Rodriguez    MRN: [id number]         : 1995    Mr. Rodriguez arrived in the [facility] today at 1030 in stable condition, here for his ABILIFY INJECTION (EVERY 4 WEEKS). He was assessed and education was provided.     Mr. Rodriguez's vitals were reviewed.    Vitals:    24 1032   BP: (!) 144/85   Pulse: 88   Resp: 16   Temp: 98.3 °F (36.8 °C)   SpO2: 99%     Mr. Rodriguez stated that he was doing well, and he voiced no complaints today. Reports tolerating Abilify well without adverse effects.       ABILIFY MAINTENA (Aripiprazole) 400 mg was given IM in his RIGHT DELTOID per order. Band aid placed to site.        Mr. Rodriguez tolerated well, and voiced no complaints.     [facility] in stable condition at 1045.     He is to return in 4 weeks on 24 at 1030 for his next appointment, for his next Abilify Injection.          Anastasiya Carrera RN  2024  10:37 AM

## 2024-12-27 ENCOUNTER — HOSPITAL ENCOUNTER (OUTPATIENT)
Facility: HOSPITAL | Age: 29
Setting detail: INFUSION SERIES
Discharge: HOME | End: 2024-12-30
Payer: MEDICAID

## 2024-12-27 VITALS
TEMPERATURE: 99 F | HEART RATE: 81 BPM | OXYGEN SATURATION: 100 % | DIASTOLIC BLOOD PRESSURE: 87 MMHG | RESPIRATION RATE: 16 BRPM | SYSTOLIC BLOOD PRESSURE: 134 MMHG

## 2024-12-27 DIAGNOSIS — T42.6X2A INTENTIONAL POISONING BY VALPROATE SODIUM (HCC): Primary | ICD-10-CM

## 2024-12-27 PROCEDURE — 96372 THER/PROPH/DIAG INJ SC/IM: CPT

## 2024-12-27 PROCEDURE — 6360000002 HC RX W HCPCS: Performed by: NURSE PRACTITIONER

## 2024-12-27 RX ORDER — ACETAMINOPHEN 325 MG/1
650 TABLET ORAL
OUTPATIENT
Start: 2025-01-24

## 2024-12-27 RX ORDER — DIPHENHYDRAMINE HYDROCHLORIDE 50 MG/ML
50 INJECTION INTRAMUSCULAR; INTRAVENOUS
OUTPATIENT
Start: 2025-01-24

## 2024-12-27 RX ORDER — SODIUM CHLORIDE 9 MG/ML
INJECTION, SOLUTION INTRAVENOUS CONTINUOUS
OUTPATIENT
Start: 2025-01-24

## 2024-12-27 RX ORDER — ALBUTEROL SULFATE 90 UG/1
4 INHALANT RESPIRATORY (INHALATION) PRN
OUTPATIENT
Start: 2025-01-24

## 2024-12-27 RX ORDER — EPINEPHRINE 1 MG/ML
0.3 INJECTION, SOLUTION, CONCENTRATE INTRAVENOUS PRN
OUTPATIENT
Start: 2025-01-24

## 2024-12-27 RX ORDER — HYDROCORTISONE SODIUM SUCCINATE 100 MG/2ML
100 INJECTION INTRAMUSCULAR; INTRAVENOUS
OUTPATIENT
Start: 2025-01-24

## 2024-12-27 RX ORDER — ONDANSETRON 2 MG/ML
8 INJECTION INTRAMUSCULAR; INTRAVENOUS
OUTPATIENT
Start: 2025-01-24

## 2024-12-27 RX ADMIN — ARIPIPRAZOLE 400 MG: KIT at 10:31

## 2024-12-27 NOTE — PROGRESS NOTES
[facility] Progress Note    Date: 2024    Name: Nolan Rodriguez    MRN: [id number]         : 1995    Mr. Rodriguez arrived in the [facility] today at 1025 in stable condition, here for his ABILIFY INJECTION (EVERY 4 WEEKS). He was assessed and education was provided.     Mr. Rodriguez's vitals were reviewed.    Vitals:    24 1027   BP: 134/87   Pulse: 81   Resp: 16   Temp: 99 °F (37.2 °C)   SpO2: 100%     Mr. Rodriguez stated that he was doing well, and he voiced no complaints today. Reports tolerating Abilify well without adverse effects.    ABILIFY MAINTENA (Aripiprazole) 400 mg was given IM in his RIGHT DELTOID per order. Band aid placed to site.      Mr. Rodriguez tolerated well, and voiced no complaints.    [facility]. [facility] was [facility] in stable condition at 1035.     He is to return in 4 weeks on 25 at 1030 for his next appointment, for his next Abilify Injection.          ZEFERINO CRUMP RN  2024  10:56 AM

## 2025-01-09 ENCOUNTER — HOSPITAL ENCOUNTER (INPATIENT)
Facility: HOSPITAL | Age: 30
LOS: 3 days | Discharge: HOME | DRG: 750 | End: 2025-01-13
Attending: STUDENT IN AN ORGANIZED HEALTH CARE EDUCATION/TRAINING PROGRAM | Admitting: PSYCHIATRY & NEUROLOGY
Payer: MEDICAID

## 2025-01-09 DIAGNOSIS — R46.89 AGGRESSIVE BEHAVIOR: Primary | ICD-10-CM

## 2025-01-09 LAB
ALBUMIN SERPL-MCNC: 3.8 G/DL (ref 3.4–5)
ALBUMIN/GLOB SERPL: 1 (ref 0.8–1.7)
ALP SERPL-CCNC: 90 U/L (ref 45–117)
ALT SERPL-CCNC: 22 U/L (ref 16–61)
AMPHET UR QL SCN: NEGATIVE
ANION GAP SERPL CALC-SCNC: 5 MMOL/L (ref 3–18)
AST SERPL-CCNC: 21 U/L (ref 10–38)
BARBITURATES UR QL SCN: NEGATIVE
BENZODIAZ UR QL: NEGATIVE
BILIRUB SERPL-MCNC: 0.5 MG/DL (ref 0.2–1)
BUN SERPL-MCNC: 9 MG/DL (ref 7–18)
BUN/CREAT SERPL: 9 (ref 12–20)
CALCIUM SERPL-MCNC: 8.8 MG/DL (ref 8.5–10.1)
CANNABINOIDS UR QL SCN: NEGATIVE
CHLORIDE SERPL-SCNC: 111 MMOL/L (ref 100–111)
CO2 SERPL-SCNC: 24 MMOL/L (ref 21–32)
COCAINE UR QL SCN: NEGATIVE
CREAT SERPL-MCNC: 1.02 MG/DL (ref 0.6–1.3)
ERYTHROCYTE [DISTWIDTH] IN BLOOD BY AUTOMATED COUNT: 13 % (ref 11.6–14.5)
ETHANOL SERPL-MCNC: <3 MG/DL (ref 0–3)
GLOBULIN SER CALC-MCNC: 3.7 G/DL (ref 2–4)
GLUCOSE SERPL-MCNC: 52 MG/DL (ref 74–99)
HCT VFR BLD AUTO: 48.8 % (ref 36–48)
HGB BLD-MCNC: 16.7 G/DL (ref 13–16)
Lab: NORMAL
MCH RBC QN AUTO: 30.8 PG (ref 24–34)
MCHC RBC AUTO-ENTMCNC: 34.2 G/DL (ref 31–37)
MCV RBC AUTO: 89.9 FL (ref 78–100)
METHADONE UR QL: NEGATIVE
NRBC # BLD: 0 K/UL (ref 0–0.01)
NRBC BLD-RTO: 0 PER 100 WBC
OPIATES UR QL: NEGATIVE
PCP UR QL: NEGATIVE
PLATELET # BLD AUTO: 226 K/UL (ref 135–420)
PMV BLD AUTO: 11 FL (ref 9.2–11.8)
POTASSIUM SERPL-SCNC: 4.6 MMOL/L (ref 3.5–5.5)
PROT SERPL-MCNC: 7.5 G/DL (ref 6.4–8.2)
RBC # BLD AUTO: 5.43 M/UL (ref 4.35–5.65)
SODIUM SERPL-SCNC: 140 MMOL/L (ref 136–145)
WBC # BLD AUTO: 7.2 K/UL (ref 4.6–13.2)

## 2025-01-09 PROCEDURE — 80053 COMPREHEN METABOLIC PANEL: CPT

## 2025-01-09 PROCEDURE — 90792 PSYCH DIAG EVAL W/MED SRVCS: CPT | Performed by: REGISTERED NURSE

## 2025-01-09 PROCEDURE — 82077 ASSAY SPEC XCP UR&BREATH IA: CPT

## 2025-01-09 PROCEDURE — 99285 EMERGENCY DEPT VISIT HI MDM: CPT

## 2025-01-09 PROCEDURE — 80307 DRUG TEST PRSMV CHEM ANLYZR: CPT

## 2025-01-09 PROCEDURE — 85027 COMPLETE CBC AUTOMATED: CPT

## 2025-01-10 PROBLEM — F20.0 CHRONIC PARANOID SCHIZOPHRENIA (HCC): Status: ACTIVE | Noted: 2025-01-10

## 2025-01-10 LAB
FLUAV RNA SPEC QL NAA+PROBE: NOT DETECTED
FLUBV RNA SPEC QL NAA+PROBE: NOT DETECTED
GLUCOSE BLD STRIP.AUTO-MCNC: 103 MG/DL (ref 70–110)
GLUCOSE BLD STRIP.AUTO-MCNC: 77 MG/DL (ref 70–110)
SARS-COV-2 RNA RESP QL NAA+PROBE: NOT DETECTED
SOURCE: NORMAL

## 2025-01-10 PROCEDURE — 96372 THER/PROPH/DIAG INJ SC/IM: CPT

## 2025-01-10 PROCEDURE — 6370000000 HC RX 637 (ALT 250 FOR IP): Performed by: PSYCHIATRY & NEUROLOGY

## 2025-01-10 PROCEDURE — 82962 GLUCOSE BLOOD TEST: CPT

## 2025-01-10 PROCEDURE — 6360000002 HC RX W HCPCS: Performed by: EMERGENCY MEDICINE

## 2025-01-10 PROCEDURE — 87636 SARSCOV2 & INF A&B AMP PRB: CPT

## 2025-01-10 PROCEDURE — 1240000000 HC EMOTIONAL WELLNESS R&B

## 2025-01-10 RX ORDER — HYDROXYZINE HYDROCHLORIDE 50 MG/1
25 TABLET, FILM COATED ORAL EVERY 6 HOURS PRN
Status: DISCONTINUED | OUTPATIENT
Start: 2025-01-10 | End: 2025-01-13 | Stop reason: HOSPADM

## 2025-01-10 RX ORDER — MAGNESIUM HYDROXIDE/ALUMINUM HYDROXICE/SIMETHICONE 120; 1200; 1200 MG/30ML; MG/30ML; MG/30ML
30 SUSPENSION ORAL EVERY 6 HOURS PRN
Status: DISCONTINUED | OUTPATIENT
Start: 2025-01-10 | End: 2025-01-13 | Stop reason: HOSPADM

## 2025-01-10 RX ORDER — HALOPERIDOL 5 MG/ML
5 INJECTION INTRAMUSCULAR
Status: COMPLETED | OUTPATIENT
Start: 2025-01-10 | End: 2025-01-10

## 2025-01-10 RX ORDER — BENZTROPINE MESYLATE 1 MG/ML
1 INJECTION, SOLUTION INTRAMUSCULAR; INTRAVENOUS EVERY 6 HOURS PRN
Status: DISCONTINUED | OUTPATIENT
Start: 2025-01-10 | End: 2025-01-13 | Stop reason: HOSPADM

## 2025-01-10 RX ORDER — TRAZODONE HYDROCHLORIDE 50 MG/1
50 TABLET, FILM COATED ORAL NIGHTLY PRN
Status: DISCONTINUED | OUTPATIENT
Start: 2025-01-10 | End: 2025-01-13 | Stop reason: HOSPADM

## 2025-01-10 RX ORDER — BENZTROPINE MESYLATE 1 MG/1
1 TABLET ORAL EVERY 6 HOURS PRN
Status: DISCONTINUED | OUTPATIENT
Start: 2025-01-10 | End: 2025-01-13 | Stop reason: HOSPADM

## 2025-01-10 RX ORDER — NICOTINE 21 MG/24HR
1 PATCH, TRANSDERMAL 24 HOURS TRANSDERMAL DAILY
Status: DISCONTINUED | OUTPATIENT
Start: 2025-01-10 | End: 2025-01-13 | Stop reason: HOSPADM

## 2025-01-10 RX ORDER — HALOPERIDOL 5 MG/1
5 TABLET ORAL EVERY 6 HOURS PRN
Status: DISCONTINUED | OUTPATIENT
Start: 2025-01-10 | End: 2025-01-13 | Stop reason: HOSPADM

## 2025-01-10 RX ORDER — MIDAZOLAM HYDROCHLORIDE 1 MG/ML
4 INJECTION, SOLUTION INTRAMUSCULAR; INTRAVENOUS
Status: COMPLETED | OUTPATIENT
Start: 2025-01-10 | End: 2025-01-10

## 2025-01-10 RX ORDER — ACETAMINOPHEN 325 MG/1
650 TABLET ORAL EVERY 4 HOURS PRN
Status: DISCONTINUED | OUTPATIENT
Start: 2025-01-10 | End: 2025-01-13 | Stop reason: HOSPADM

## 2025-01-10 RX ORDER — HALOPERIDOL 5 MG/ML
5 INJECTION INTRAMUSCULAR EVERY 6 HOURS PRN
Status: DISCONTINUED | OUTPATIENT
Start: 2025-01-10 | End: 2025-01-13 | Stop reason: HOSPADM

## 2025-01-10 RX ADMIN — HALOPERIDOL LACTATE 5 MG: 5 INJECTION, SOLUTION INTRAMUSCULAR at 07:56

## 2025-01-10 RX ADMIN — MIDAZOLAM 4 MG: 1 INJECTION INTRAMUSCULAR; INTRAVENOUS at 07:56

## 2025-01-10 RX ADMIN — HALOPERIDOL 5 MG: 5 TABLET ORAL at 16:23

## 2025-01-10 ASSESSMENT — PAIN SCALES - GENERAL
PAINLEVEL_OUTOF10: 0
PAINLEVEL_OUTOF10: 0

## 2025-01-10 ASSESSMENT — PAIN - FUNCTIONAL ASSESSMENT: PAIN_FUNCTIONAL_ASSESSMENT: 0-10

## 2025-01-10 ASSESSMENT — SLEEP AND FATIGUE QUESTIONNAIRES
AVERAGE NUMBER OF SLEEP HOURS: 0
DO YOU HAVE DIFFICULTY SLEEPING: YES
SLEEP PATTERN: DIFFICULTY FALLING ASLEEP;DIFFICULTY ARISING;DISTURBED/INTERRUPTED SLEEP
DO YOU USE A SLEEP AID: NO
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
## 2025-01-10 NOTE — GROUP NOTE
Group Therapy Note    Date: 1/10/2025    Group Start Time: 1500  Group End Time: 1545  Group Topic: Music Therapy    Nik Dial        Group Therapy Note    Attendees: 5/8    Group Focus: Music therapy group explored the topic of music and emotions. The group started with psychoeducation regarding the DBT skills of Emotions on a Continuum and Emotions and their Opposites. The group continued with group members selecting a variety of emotions to represent musically. The group may promote emotion regulation and identification, tolerance of negative emotions, and use of music as a tool to help regulate mood.       Notes:  Pt did not attend group.    Discipline Responsible: /Counselor      Signature:  REMBERTO Bush, LPC  Board-Certified Music Therapist  Licensed Professional Counselor
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
## 2025-01-10 NOTE — ED NOTES
TRANSFER - OUT REPORT:    Verbal report given to Mara on Nolan Rodriguez  being transferred to Adult Unit for routine progression of patient care       Report consisted of patient's Situation, Background, Assessment and   Recommendations(SBAR).     Information from the following report(s) ED Encounter Summary, ED SBAR, and MAR was reviewed with the receiving nurse.    [place] Fall Assessment:                           Lines:       Opportunity for questions and clarification was provided.      Patient transported with:  Tech

## 2025-01-10 NOTE — BSMART NOTE
Crisis note:    Met with patient. Was resting on ED bed, right forensic restraint. Patient calm, eating his breakfast. Discussed patient being admitted, informed him he is on a TDO and admission will happen. Asked patient if he would try to harm self or others and he replied no. Patient replied yes when asked if he realizes he will be admitted to inpatient psych.     Discussed with on call Psychiatrist Dr. Caraballo. Patient accepted. Admit to Dr. Celestin's service. Admit to adult unit 2. Room 104. Report to ext. 2395. Orders received.     1042: attempted to call report to unit. No answer after several rings.

## 2025-01-11 LAB
CHOLEST SERPL-MCNC: 185 MG/DL
EKG ATRIAL RATE: 58 BPM
EKG DIAGNOSIS: NORMAL
EKG P AXIS: 54 DEGREES
EKG P-R INTERVAL: 146 MS
EKG Q-T INTERVAL: 380 MS
EKG QRS DURATION: 84 MS
EKG QTC CALCULATION (BAZETT): 373 MS
EKG R AXIS: 80 DEGREES
EKG T AXIS: 71 DEGREES
EKG VENTRICULAR RATE: 58 BPM
EST. AVERAGE GLUCOSE BLD GHB EST-MCNC: 100 MG/DL
HBA1C MFR BLD: 5.1 % (ref 4.2–5.6)
HDLC SERPL-MCNC: 55 MG/DL (ref 40–60)
HDLC SERPL: 3.4 (ref 0–5)
LDLC SERPL CALC-MCNC: 91.8 MG/DL (ref 0–100)
LIPID PANEL: ABNORMAL
TRIGL SERPL-MCNC: 191 MG/DL
TSH SERPL DL<=0.05 MIU/L-ACNC: 0.29 UIU/ML (ref 0.36–3.74)
VLDLC SERPL CALC-MCNC: 38.2 MG/DL

## 2025-01-11 PROCEDURE — 6370000000 HC RX 637 (ALT 250 FOR IP): Performed by: PSYCHIATRY & NEUROLOGY

## 2025-01-11 PROCEDURE — 84443 ASSAY THYROID STIM HORMONE: CPT

## 2025-01-11 PROCEDURE — 83036 HEMOGLOBIN GLYCOSYLATED A1C: CPT

## 2025-01-11 PROCEDURE — 99223 1ST HOSP IP/OBS HIGH 75: CPT | Performed by: PSYCHIATRY & NEUROLOGY

## 2025-01-11 PROCEDURE — 1240000000 HC EMOTIONAL WELLNESS R&B

## 2025-01-11 PROCEDURE — 36415 COLL VENOUS BLD VENIPUNCTURE: CPT

## 2025-01-11 PROCEDURE — 80061 LIPID PANEL: CPT

## 2025-01-11 RX ORDER — POLYETHYLENE GLYCOL 3350 17 G
2 POWDER IN PACKET (EA) ORAL
Status: DISCONTINUED | OUTPATIENT
Start: 2025-01-11 | End: 2025-01-13 | Stop reason: HOSPADM

## 2025-01-11 RX ADMIN — TRAZODONE HYDROCHLORIDE 50 MG: 50 TABLET ORAL at 20:12

## 2025-01-11 ASSESSMENT — PAIN SCALES - GENERAL
PAINLEVEL_OUTOF10: 0
PAINLEVEL_OUTOF10: 0

## 2025-01-11 NOTE — CONSULTS
[facility]  FAMILY MEDICINE CONSULT NOTE FOR BEHAVIORAL HEALTH UNIT    Patient:    Nolan Rodriguez , 29 y.o. male   Room/Bed:  104/02  Admission Date:   1/9/2025  Code status:  Full Code      Reason for Consult: Medical management  Psychiatry Attending Requesting Consult: Dr. Celestin    ASSESSMENT:  Nolan Rodriguez is a 29 y.o. male w/ a PMH of ADHD, anxiety, depression, schizophrenia presenting for auditory hallucinations and self harming behavior who is now admitted to the [facility].    Nurse Chaperone during History and Physical: Jessie    PLAN:    Schizophrenia with acute psychosis  Anxiety/depression  ADHD  -No scheduled meds ordered yet  -PRNs: Cogentin, Haldol, hydroxyzine, trazodone  -Management per inpatient psychiatry     Current smoker  -Smokes 2 packs/day  -Scheduled nicotine patches  -Nicotine lozenges prn      Global  Diet: Regular adult diet  Mobility: Ad jackson., Per protocol, suicide precautions    Thank you for this consult.         SUBJECTIVE:   Dr. Celestin has consulted Family Medicine to evaluate Nolan Rodriguez  in the Emergency Department. He  is a 29 y.o. male w/ PMH of ADHD, anxiety, depression, schizophrenia presenting for auditory hallucinations and self harming behavior who is now admitted to the [facility].  Patient is doing well and denies any complaints.  Denies chest pain, shortness of breath, palpitations, edema, nausea, vomiting, diarrhea, constipation, abdominal pain.    ED Course:  -Vitals: WNL  -Labs: UDS negative, ethanol negative CBC unremarkable and at baseline, CMP unremarkable except glucose 52, COVID and flu negative  -Imaging: N/A  -EKG: N/A  -Meds: Haldol, Versed  -IVF: N/A  -Procedures: N/A  -Consults: Psych    CURRENT MEDICATIONS:  Current Facility-Administered Medications   Medication Dose Route Frequency Provider Last Rate Last Admin    benztropine (COGENTIN) tablet 1 mg  1 mg Oral Q6H PRN Randy Caraballo MD

## 2025-01-11 NOTE — GROUP NOTE
Art Therapy Group Progress Note    PATIENT SCHEDULED FOR GROUP AT:  10:40 AM    GROUP STOP TIME:  11:10 AM    ATTENDANCE:  Moderate (3/7 participants)    TOPIC / FOCUS:  Self-Care Bucket     GOALS:  define self-care, identify ways to practice self-care, define burn out, identify ways that encourage burnout, identify positive coping skills to decrease burnout, increase self-awareness, encourage emotional awareness, promote creativity and creative problem solving, encourage meeting basic self-care goals     PARTICIPATION LEVEL:  Pt did not attend.     Derrick Lockhart  Art Therapist, MA, ATR-P  Provisional Registered Art Therapist
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
## 2025-01-12 PROCEDURE — 99232 SBSQ HOSP IP/OBS MODERATE 35: CPT | Performed by: PSYCHIATRY & NEUROLOGY

## 2025-01-12 PROCEDURE — 1240000000 HC EMOTIONAL WELLNESS R&B

## 2025-01-12 PROCEDURE — 6370000000 HC RX 637 (ALT 250 FOR IP): Performed by: PSYCHIATRY & NEUROLOGY

## 2025-01-12 RX ADMIN — HYDROXYZINE HYDROCHLORIDE 25 MG: 50 TABLET, FILM COATED ORAL at 08:11

## 2025-01-12 RX ADMIN — TRAZODONE HYDROCHLORIDE 50 MG: 50 TABLET ORAL at 21:51

## 2025-01-12 RX ADMIN — HYDROXYZINE HYDROCHLORIDE 25 MG: 50 TABLET, FILM COATED ORAL at 20:48

## 2025-01-12 ASSESSMENT — PAIN SCALES - GENERAL: PAINLEVEL_OUTOF10: 0

## 2025-01-12 NOTE — PROGRESS NOTES
Spiritual Health History and Assessment/Progress Note  [facility]    Loneliness/Social Isolation,  ,  ,      Name: Nolan Rodriguez MRN: [id number]    Age: 29 y.o.     Sex: male   Language: English   Mandaeism: Faith   Chronic paranoid schizophrenia (HCC)     Date: 1/12/2025            Total Time Calculated: 5 min              Spiritual Assessment began in [facility]        Referral/Consult From: Nurse   Encounter Overview/Reason: Loneliness/Social Isolation  Service Provided For: Patient    Carey, Belief, Meaning:   Patient has beliefs or practices that help with coping during difficult times  Family/Friends No family/friends present      Importance and Influence:  Patient has spiritual/personal beliefs that influence decisions regarding their health  Family/Friends No family/friends present    Community:  Patient expresses feelings of isolation: disconnected from family/friends  Family/Friends No family/friends present    Assessment and Plan of Care:     Patient Interventions include: Facilitated expression of thoughts and feelings  Family/Friends Interventions include: No family/friends present    Patient Plan of Care: Spiritual Care available upon further referral  Family/Friends Plan of Care: No family/friends present    Electronically signed by GORGE Najera on 1/12/2025 at 10:17 AM

## 2025-01-12 NOTE — PROGRESS NOTES
Pt coming up to desk c/o anxiety 4/10. Pt received PRN Atarax. Will continue to monitor for effectiveness.
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
## 2025-01-12 NOTE — PROGRESS NOTES
Pt slept approximately 7.5 hrs , pt was up through out the night constantly asking for snacks and what time it was to use the phone . No behavior issues pt is in room resting staff will continue to monitor

## 2025-01-13 VITALS
RESPIRATION RATE: 18 BRPM | SYSTOLIC BLOOD PRESSURE: 132 MMHG | OXYGEN SATURATION: 99 % | TEMPERATURE: 98.8 F | DIASTOLIC BLOOD PRESSURE: 71 MMHG | HEART RATE: 99 BPM

## 2025-01-13 PROCEDURE — 6370000000 HC RX 637 (ALT 250 FOR IP): Performed by: PSYCHIATRY & NEUROLOGY

## 2025-01-13 PROCEDURE — 99239 HOSP IP/OBS DSCHRG MGMT >30: CPT | Performed by: PSYCHIATRY & NEUROLOGY

## 2025-01-13 ASSESSMENT — PAIN SCALES - GENERAL: PAINLEVEL_OUTOF10: 0

## 2025-01-13 NOTE — BH NOTE
Court hearing held for pt 01/13/2025. Court dismissed petition for involuntary commitment.     -Quincy Jacques,   
Pt c/o his mouth tingling after eating a peanut butter bar for snack.  Pt denies any prior allergy or reaction to peanuts.  No swelling noted, no SOB or issues with airway.  Pt anxious about the tingling, given Atarax for anxiety but educated pt that it has similar properties to benadryl.  Pt returned 30 minutes later stating the atarax helped him calm down and also took away the tingling in his mouth.    He returned to the NS to report he was having trouble sleeping and was given trazodone at 2200.  Pt was up a couple more times during the night but returned to bed without any issues or concerns.  
packet:  yes.  Consents reviewed, signed no, pt refused. Refused yes. Patient verbalize understanding:  yes.    Patient education on precautions: yes               Pt admitted from the ED via w/c with his belongings except for items already with security.  Pt was unable to focus on admission questions x 3 attempt.  He kept holding his head, asking to have questions repeated.  Stated he had something in his ear.  He adamantly denied AH, although he did admit to having them years ago.  Pt ate dinner but left his tray several times to go to his room and was observed responding to internal stimuli while in there.  He became tearful at one point but unable to verbalize why.  He was given haldol and reluctantly took it after much encouragement.  Pt appears guarded, paranoid, distracted with latent responses and loudly talking to himself in an angry manner.      Mara Aden RN

## 2025-01-13 NOTE — PLAN OF CARE
Problem: Pain  Goal: Verbalizes/displays adequate comfort level or baseline comfort level  Outcome: Progressing     Problem: Risk for Elopement  Goal: Patient will not exit the unit/facility without proper excort  Outcome: Progressing     Problem: Self Harm/Suicidality  Goal: Will have no self-injury during hospital stay  Description: INTERVENTIONS:  1.  Ensure constant observer at bedside with Q15M safety checks  2.  Maintain a safe environment  3.  Secure patient belongings  4.  Ensure family/visitors adhere to safety recommendations  5.  Ensure safety tray has been added to patient's diet order  6.  Every shift and PRN: Re-assess suicidal risk via Frequent Screener    1/11/2025 0906 by Kellee Ng RN  Outcome: Progressing  1/11/2025 0858 by Mima Pickett RN  Outcome: Progressing  Flowsheets (Taken 1/11/2025 0858)  Will have no self-injury during hospital stay:   Ensure constant observer at bedside with Q15M safety checks   Maintain a safe environment   Secure patient belongings   Ensure family/visitors adhere to safety recommendations   Ensure safety tray has been added to patient's diet order  Note: Pt denies any suicidal ideations.  1/10/2025 2054 by Beny Shannon RN  Flowsheets (Taken 1/10/2025 2054)  Will have no self-injury during hospital stay:   Ensure constant observer at bedside with Q15M safety checks   Secure patient belongings   Ensure safety tray has been added to patient's diet order   Every shift and PRN: Re-assess suicidal risk via Frequent Screener     Pt presents with dull affect, depressed mood, poverty of content, paranoia. Pt has been withdrawn to self on the unit. Pt displays appropriate boundaries on the unit, adhering to unit guidelines. Pt denies SI/HI/AVH at this time. Pt is medication compliant.   
  Problem: Pain  Goal: Verbalizes/displays adequate comfort level or baseline comfort level  Outcome: Progressing     Problem: Risk for Elopement  Goal: Patient will not exit the unit/facility without proper excort  Outcome: Progressing    Pt denies pain this shift     Problem: Self Harm/Suicidality  Goal: Will have no self-injury during hospital stay  Description: INTERVENTIONS:  1.  Ensure constant observer at bedside with Q15M safety checks  2.  Maintain a safe environment  3.  Secure patient belongings  4.  Ensure family/visitors adhere to safety recommendations  5.  Ensure safety tray has been added to patient's diet order  6.  Every shift and PRN: Re-assess suicidal risk via Frequent Screener    Outcome: Progressing    Pt denies SI this shift     Problem: Depression  Goal: Will be euthymic at discharge  Description: INTERVENTIONS:  1. Administer medication as ordered  2. Provide emotional support via 1:1 interaction with staff  3. Encourage involvement in milieu/groups/activities  4. Monitor for social isolation  Outcome: Progressing    Pt has a bright affect this shift     Problem: Psychosis  Goal: Will report no hallucinations or delusions  Description: INTERVENTIONS:  1. Administer medication as  ordered  2. Assist with reality testing to support increasing orientation  3. Assess if patient's hallucinations or delusions are encouraging self harm or harm to others and intervene as appropriate  Outcome: Progressing    No s/s of AVH or delusions     Problem: Behavior  Goal: Pt/Family maintain appropriate behavior and adhere to behavioral management agreement, if implemented  Description: INTERVENTIONS:  1. Assess patient/family's coping skills and  non-compliant behavior (including use of illegal substances)  2. Notify security of behavior or suspected illegal substances which indicate the need for search of the family and/or belongings  3. Encourage verbalization of thoughts and concerns in a socially 
  Problem: Self Harm/Suicidality  Goal: Will have no self-injury during hospital stay  Description: INTERVENTIONS:  1.  Ensure constant observer at bedside with Q15M safety checks  2.  Maintain a safe environment  3.  Secure patient belongings  4.  Ensure family/visitors adhere to safety recommendations  5.  Ensure safety tray has been added to patient's diet order  6.  Every shift and PRN: Re-assess suicidal risk via Frequent Screener    1/10/2025 2054 by Beny Shannon RN  Flowsheets (Taken 1/10/2025 2054)  Will have no self-injury during hospital stay:   Ensure constant observer at bedside with Q15M safety checks   Secure patient belongings   Ensure safety tray has been added to patient's diet order   Every shift and PRN: Re-assess suicidal risk via Frequent Screener  1/10/2025 1755 by Mara Aden RN  Outcome: Progressing     Problem: Risk for Elopement  Goal: Patient will not exit the unit/facility without proper excort  1/10/2025 1755 by Mara Aden RN  Outcome: Progressing     Problem: Psychosis  Goal: Will report no hallucinations or delusions  Description: INTERVENTIONS:  1. Administer medication as  ordered  2. Assist with reality testing to support increasing orientation  3. Assess if patient's hallucinations or delusions are encouraging self harm or harm to others and intervene as appropriate  1/10/2025 1755 by Mara Aden RN  Outcome: Not Progressing     Problem: Pain  Goal: Verbalizes/displays adequate comfort level or baseline comfort level  1/10/2025 1755 by Mara Aden RN  Outcome: Not Progressing  1/10/2025 1753 by Mara Aden RN  Outcome: Progressing     Problem: Fanny  Goal: Will exhibit normal sleep and speech and no impulsivity  Description: INTERVENTIONS:  1. Administer medication as ordered  2. Set limits on impulsive behavior  3. Make attempts to decrease external stimuli as possible  1/10/2025 1755 by Mara Aden RN  Outcome: Not Progressing     Problem: Psychosis  Goal: Will 
  Problem: Self Harm/Suicidality  Goal: Will have no self-injury during hospital stay  Description: INTERVENTIONS:  1.  Ensure constant observer at bedside with Q15M safety checks  2.  Maintain a safe environment  3.  Secure patient belongings  4.  Ensure family/visitors adhere to safety recommendations  5.  Ensure safety tray has been added to patient's diet order  6.  Every shift and PRN: Re-assess suicidal risk via Frequent Screener    1/12/2025 0105 by Chandana Wells RN  Outcome: Progressing     Problem: Depression  Goal: Will be euthymic at discharge  Description: INTERVENTIONS:  1. Administer medication as ordered  2. Provide emotional support via 1:1 interaction with staff  3. Encourage involvement in milieu/groups/activities  4. Monitor for social isolation  1/12/2025 0829 by Kellee Ng RN  Outcome: Progressing  1/12/2025 0105 by Chandana Wells RN  Outcome: Progressing     Problem: Fanny  Goal: Will exhibit normal sleep and speech and no impulsivity  Description: INTERVENTIONS:  1. Administer medication as ordered  2. Set limits on impulsive behavior  3. Make attempts to decrease external stimuli as possible  Outcome: Progressing     Pt presents with dull affect, anxious mood, with linear and goal-directed thought process, fixed on food. Pt has been social on the unit. Pt displays appropriate boundaries on the unit, adhering to unit guidelines. Pt denies SI/HI/AVH at this time. Pt is medication compliant.   
  Problem: Self Harm/Suicidality  Goal: Will have no self-injury during hospital stay  Description: INTERVENTIONS:  1.  Ensure constant observer at bedside with Q15M safety checks  2.  Maintain a safe environment  3.  Secure patient belongings  4.  Ensure family/visitors adhere to safety recommendations  5.  Ensure safety tray has been added to patient's diet order  6.  Every shift and PRN: Re-assess suicidal risk via Frequent Screener    1/13/2025 1033 by Jazlyn Mac, RN  Outcome: Progressing  Flowsheets (Taken 1/13/2025 1033)  Will have no self-injury during hospital stay: Maintain a safe environment     Problem: Depression  Goal: Will be euthymic at discharge  Description: INTERVENTIONS:  1. Administer medication as ordered  2. Provide emotional support via 1:1 interaction with staff  3. Encourage involvement in milieu/groups/activities  4. Monitor for social isolation  1/13/2025 1033 by Jazlyn Mac, RN  Outcome: Progressing    Pt received in dayroom, alert and oriented x4, pt cooperative and interacted well with staff. Pt presented with a calm affect, goal-directed thought process and appearance is appropriate for climate. Pt denied any SI/HI and denied hallucinations. Pt eating, toileting and sleeping well; pt is compliant with medications. Pt verbalized that pt is feeling better this morning and is ready to go home. Received report from  that pt was discharged by the court. Stressed the importance of medication compliance and positive coping skills; encouraged pt to talk to staff in case pt feels anxious or agitated before the situation gets out of hand; pt nodded in understanding. No aggression noted at this time. Pt with active care plans in place.       
  Problem: Self Harm/Suicidality  Goal: Will have no self-injury during hospital stay  Description: INTERVENTIONS:  1.  Ensure constant observer at bedside with Q15M safety checks  2.  Maintain a safe environment  3.  Secure patient belongings  4.  Ensure family/visitors adhere to safety recommendations  5.  Ensure safety tray has been added to patient's diet order  6.  Every shift and PRN: Re-assess suicidal risk via Frequent Screener    Outcome: Progressing     Problem: Depression  Goal: Will be euthymic at discharge  Description: INTERVENTIONS:  1. Administer medication as ordered  2. Provide emotional support via 1:1 interaction with staff  3. Encourage involvement in milieu/groups/activities  4. Monitor for social isolation  Outcome: Progressing     Problem: Psychosis  Goal: Will report no hallucinations or delusions  Description: INTERVENTIONS:  1. Administer medication as  ordered  2. Assist with reality testing to support increasing orientation  3. Assess if patient's hallucinations or delusions are encouraging self harm or harm to others and intervene as appropriate  Outcome: Progressing     Pt is friendly, cooperative and medication compliant. Pt denies SI/HI/AVH and reports no feelings of depression or anxiety. Pt was social with staff and peers all evening while watching TV in the milieu. Pt is currently in room resting, will continue to monitor for safety and comfort.  
medication as ordered  2. Educate regarding involuntary admission procedures and rules  3. Contain excessive/inappropriate behavior per unit and hospital policies  Outcome: Not Progressing

## 2025-01-13 NOTE — GROUP NOTE
Art Therapy Group Progress Note     PATIENT SCHEDULED FOR GROUP AT:  9:30 AM     GROUP STOP TIME:  10:15 AM     ATTENDANCE:  Low (2/7 participants)     TOPIC / FOCUS: Emotion Color Wheel      GOALS:  To identify different emotions, discuss experiences of different emotions, reflect on emotions and reactions to emotions, modeling emotional communication skills, identify coping skills and provide alternative coping options, encourage self-expression, support creativity, encourage emotional management tools      PARTICIPATION LEVEL: Pt did not attend.      Derrick Lockhart   Art Therapist, MA, ATR-P   Provisional Registered Art Therapist       Pt was in day area, socializing with peers, and pacing waiting for discharge after being released by the courts.

## 2025-01-13 NOTE — DISCHARGE INSTRUCTIONS
BEHAVIORAL HEALTH NURSING DISCHARGE NOTE      The following personal items collected during your admission are returned to you.    PATIENT INSTRUCTIONS:    What to do at Home    Recommended diet: regular diet    Recommended activity: activity as tolerated    If you have problems relating to your recovery, call your physician.    The discharge information has been reviewed with the patient.  The patient verbalized understanding.

## 2025-01-13 NOTE — PROGRESS NOTES
sPsychosocial Assessment     Admission Reason: pt brought into ED on TDO, apparently for mood shifts, possibly having hallucinations & confrontations with family members.    ASSESSMENT NOT DONE DUE TO PT HAVING BEEN D/C BY THE COURT THIS AM.    Pt will return for outpt tx with:    [facility]  [address]  [id number]  [place] [address]  #[phone]  Medication Management: Avani Dewey FNP   Therapist: Ame Whitaker     WRITER CALLED TO THEM AND THEY ON \"VOICE MAIL\",  GAVE THEM HEADS UP PT WILL BE CALLING TO SCHEDULE HIS NEXT SET OF APPOINTMENTS. Pt needs to do this ASAP !    ALSO NEEDS TO FOLLOW UP WITH INFUSION CLINIC WITH NEXT SHOT DUE ABOUT 1/24/25.    TRANSPORTATION: Will be using Bus [employer]     C-SSRS Screening Completed - Current Suicide Risk:   [] No Risk  [] Low [] Moderate [] High     Risk Factors:    Protective Factors:     After consideration of C-SSRS screening results, C-SSRS assessments, and this professional's assessment the patient's overall suicide risk assessed to be:  [] Low   [] Moderate   [] High     [] Discussed current suicide risk, protective and risk factors with treatment team to determine safety interventions as applicable.     Gender:  [] Male [] Female [] Transgender  [] Other    Sexual Orientation:  [] Heterosexual [] Homosexual [] Bisexual [] Other    Homicidal Ideation:  [] Past [] Present [] Denies     Onset and Duration of Problem:    Current or Past Mental Health and/or Addictions Treatment (and response to treatment):  [] Yes, When and Where:   [] No    Substance Use/Alcohol Use/Addiction (document name of substance, age of onset, how much and how often, route of use and date of last use):  [] Reports [] Denies Patient reports only drinking on a special occassion such as his birthday.    History of Biomedical Complications Associated with Substance Use/Abuse:  [] Reports [] Denies  Specify:    Family History of Mental Illness or Substance Use/Abuse:

## 2025-01-13 NOTE — DISCHARGE SUMMARY
in the hospital with nicotine patch 14 mg daily, single dosing midazolam injection 4 mg for agitation and haloperidol.  All 5 mg IM for agitation and emergency room, 5 mg Haldol p.o. as needed and 2 doses of hydroxyzine 25 mg for agitation and 2 doses of trazodone 50 mg at night to assist with sleep.  Patient said he did not need any other medicines but would agree to go back onto his bupropion when he went home.  Patient went to temporary senior living order hearing and was released by the courts not meeting criteria for involuntary commitment.  He said he was going to follow-up back with his outpatient provider.    Assessment: Axis I.  Chronic paranoid schizophrenia.  Nicotine use disorder moderate.    Axis II.  None.    Axis III.  Allergic rhinitis history.    Condition on discharge fair prognosis fair assuming patient continues to take medications as directed.    Disposition.  Discharged to self by order of the courts not meeting criteria for involuntary commitment.  Follow-up Avani Dewey nurse practitioner.  Follow up with own primary care provider as needed.    Medications: Abilify Maintena  mg IM every 4 weeks next dosing 1/24/2025.  Fluticasone nasal spray 50 mcg per ACT 1 spray each nostril daily for congestion.  Bupropion  mg tablet 1 daily.  Patient reports that he has these at home and does not need a prescription.    Time spent: 5 minutes nursing report, 10-minute chart review, 20-minute interview, 20-minute charting and orders.

## 2025-01-13 NOTE — PROGRESS NOTES
Patient appeared to have slept 7hrs. He was up throughout the night often , this evening asking about coffee and cigarettes in which way he can them. Patient was redirected on unit rules. He is pleasant and offers no behavioral issues. Will continue to monitor for safety and changes in behavior.

## 2025-01-13 NOTE — PROGRESS NOTES
Pt calm and cooperative, no aggression no agitation. Denies any thoughts or wanting to hurt self or others. Patient was discharged home. Discharge instructions including medications, plan and contact information for follow up appointments, patient instructions, diagnosis were discussed and questions answered. Provided with bus pass. crisis hotline number, [facility] numbers to remember and instructed in use of.  Patient escorted to the front, took all belongings and left via bus.

## 2025-01-22 ENCOUNTER — HOSPITAL ENCOUNTER (EMERGENCY)
Facility: HOSPITAL | Age: 30
Discharge: HOME | End: 2025-01-22
Attending: EMERGENCY MEDICINE
Payer: MEDICAID

## 2025-01-22 VITALS
DIASTOLIC BLOOD PRESSURE: 80 MMHG | RESPIRATION RATE: 18 BRPM | BODY MASS INDEX: 2.52 KG/M2 | HEIGHT: 69 IN | WEIGHT: 17 LBS | OXYGEN SATURATION: 98 % | TEMPERATURE: 98.2 F | HEART RATE: 100 BPM | SYSTOLIC BLOOD PRESSURE: 130 MMHG

## 2025-01-22 DIAGNOSIS — F20.9 SCHIZOPHRENIA, UNSPECIFIED TYPE (HCC): ICD-10-CM

## 2025-01-22 DIAGNOSIS — F39 MOOD DISORDER (HCC): ICD-10-CM

## 2025-01-22 DIAGNOSIS — Z65.3 IN POLICE CUSTODY: Primary | ICD-10-CM

## 2025-01-22 LAB
ALBUMIN SERPL-MCNC: 4 G/DL (ref 3.4–5)
ALBUMIN/GLOB SERPL: 1.2 (ref 0.8–1.7)
ALP SERPL-CCNC: 78 U/L (ref 45–117)
ALT SERPL-CCNC: 38 U/L (ref 16–61)
AMPHET UR QL SCN: NEGATIVE
ANION GAP SERPL CALC-SCNC: 3 MMOL/L (ref 3–18)
APPEARANCE UR: CLEAR
AST SERPL-CCNC: 16 U/L (ref 10–38)
BARBITURATES UR QL SCN: NEGATIVE
BASOPHILS # BLD: 0.05 K/UL (ref 0–0.1)
BASOPHILS NFR BLD: 0.6 % (ref 0–2)
BENZODIAZ UR QL: NEGATIVE
BILIRUB SERPL-MCNC: 0.3 MG/DL (ref 0.2–1)
BILIRUB UR QL: NEGATIVE
BUN SERPL-MCNC: 11 MG/DL (ref 7–18)
BUN/CREAT SERPL: 10 (ref 12–20)
CALCIUM SERPL-MCNC: 9.2 MG/DL (ref 8.5–10.1)
CANNABINOIDS UR QL SCN: NEGATIVE
CHLORIDE SERPL-SCNC: 104 MMOL/L (ref 100–111)
CO2 SERPL-SCNC: 30 MMOL/L (ref 21–32)
COCAINE UR QL SCN: NEGATIVE
COLOR UR: YELLOW
CREAT SERPL-MCNC: 1.1 MG/DL (ref 0.6–1.3)
DIFFERENTIAL METHOD BLD: NORMAL
EOSINOPHIL # BLD: 0.05 K/UL (ref 0–0.4)
EOSINOPHIL NFR BLD: 0.6 % (ref 0–5)
ERYTHROCYTE [DISTWIDTH] IN BLOOD BY AUTOMATED COUNT: 12.6 % (ref 11.6–14.5)
ETHANOL SERPL-MCNC: <3 MG/DL (ref 0–3)
FLUAV RNA SPEC QL NAA+PROBE: NOT DETECTED
FLUBV RNA SPEC QL NAA+PROBE: NOT DETECTED
GLOBULIN SER CALC-MCNC: 3.3 G/DL (ref 2–4)
GLUCOSE SERPL-MCNC: 107 MG/DL (ref 74–99)
GLUCOSE UR STRIP.AUTO-MCNC: NEGATIVE MG/DL
HCT VFR BLD AUTO: 43.7 % (ref 36–48)
HGB BLD-MCNC: 15.1 G/DL (ref 13–16)
HGB UR QL STRIP: NEGATIVE
IMM GRANULOCYTES # BLD AUTO: 0.03 K/UL (ref 0–0.04)
IMM GRANULOCYTES NFR BLD AUTO: 0.3 % (ref 0–0.5)
KETONES UR QL STRIP.AUTO: NEGATIVE MG/DL
LEUKOCYTE ESTERASE UR QL STRIP.AUTO: NEGATIVE
LYMPHOCYTES # BLD: 2.62 K/UL (ref 0.9–3.6)
LYMPHOCYTES NFR BLD: 29.3 % (ref 21–52)
Lab: NORMAL
MCH RBC QN AUTO: 30.6 PG (ref 24–34)
MCHC RBC AUTO-ENTMCNC: 34.6 G/DL (ref 31–37)
MCV RBC AUTO: 88.5 FL (ref 78–100)
METHADONE UR QL: NEGATIVE
MONOCYTES # BLD: 0.78 K/UL (ref 0.05–1.2)
MONOCYTES NFR BLD: 8.7 % (ref 3–10)
NEUTS SEG # BLD: 5.42 K/UL (ref 1.8–8)
NEUTS SEG NFR BLD: 60.5 % (ref 40–73)
NITRITE UR QL STRIP.AUTO: NEGATIVE
NRBC # BLD: 0 K/UL (ref 0–0.01)
NRBC BLD-RTO: 0 PER 100 WBC
OPIATES UR QL: NEGATIVE
PCP UR QL: NEGATIVE
PH UR STRIP: 6.5 (ref 5–8)
PLATELET # BLD AUTO: 260 K/UL (ref 135–420)
PMV BLD AUTO: 9.8 FL (ref 9.2–11.8)
POTASSIUM SERPL-SCNC: 3.5 MMOL/L (ref 3.5–5.5)
PROT SERPL-MCNC: 7.3 G/DL (ref 6.4–8.2)
PROT UR STRIP-MCNC: NEGATIVE MG/DL
RBC # BLD AUTO: 4.94 M/UL (ref 4.35–5.65)
SARS-COV-2 RNA RESP QL NAA+PROBE: NOT DETECTED
SODIUM SERPL-SCNC: 137 MMOL/L (ref 136–145)
SOURCE: NORMAL
SP GR UR REFRACTOMETRY: 1.02 (ref 1–1.03)
UROBILINOGEN UR QL STRIP.AUTO: 1 EU/DL (ref 0.2–1)
WBC # BLD AUTO: 9 K/UL (ref 4.6–13.2)

## 2025-01-22 PROCEDURE — 6360000002 HC RX W HCPCS: Performed by: STUDENT IN AN ORGANIZED HEALTH CARE EDUCATION/TRAINING PROGRAM

## 2025-01-22 PROCEDURE — 81003 URINALYSIS AUTO W/O SCOPE: CPT

## 2025-01-22 PROCEDURE — 94761 N-INVAS EAR/PLS OXIMETRY MLT: CPT

## 2025-01-22 PROCEDURE — 90791 PSYCH DIAGNOSTIC EVALUATION: CPT | Performed by: COUNSELOR

## 2025-01-22 PROCEDURE — 80053 COMPREHEN METABOLIC PANEL: CPT

## 2025-01-22 PROCEDURE — 96372 THER/PROPH/DIAG INJ SC/IM: CPT

## 2025-01-22 PROCEDURE — 99285 EMERGENCY DEPT VISIT HI MDM: CPT

## 2025-01-22 PROCEDURE — 2500000003 HC RX 250 WO HCPCS: Performed by: STUDENT IN AN ORGANIZED HEALTH CARE EDUCATION/TRAINING PROGRAM

## 2025-01-22 PROCEDURE — 80307 DRUG TEST PRSMV CHEM ANLYZR: CPT

## 2025-01-22 PROCEDURE — 87636 SARSCOV2 & INF A&B AMP PRB: CPT

## 2025-01-22 PROCEDURE — 85025 COMPLETE CBC W/AUTO DIFF WBC: CPT

## 2025-01-22 PROCEDURE — 82077 ASSAY SPEC XCP UR&BREATH IA: CPT

## 2025-01-22 RX ORDER — QUETIAPINE FUMARATE 25 MG/1
25 TABLET, FILM COATED ORAL ONCE
Status: DISCONTINUED | OUTPATIENT
Start: 2025-01-22 | End: 2025-01-22 | Stop reason: HOSPADM

## 2025-01-22 RX ADMIN — WATER 10 MG: 1 INJECTION INTRAMUSCULAR; INTRAVENOUS; SUBCUTANEOUS at 08:32

## 2025-01-22 ASSESSMENT — PAIN - FUNCTIONAL ASSESSMENT: PAIN_FUNCTIONAL_ASSESSMENT: NONE - DENIES PAIN

## 2025-01-22 ASSESSMENT — ENCOUNTER SYMPTOMS
EYE DISCHARGE: 0
DIARRHEA: 0
SHORTNESS OF BREATH: 0
COUGH: 0
SORE THROAT: 0
ABDOMINAL PAIN: 0
NAUSEA: 0

## 2025-01-22 NOTE — BSMART NOTE
[facility]risis Note: [phone], verbalized that patient has been accepted at [facility]; will submit Report of Findings to the Crisis office. Dejan stated that accepting physician and number to call report was given to the ED-RN.

## 2025-01-22 NOTE — ED NOTES
EMTALA completed by previous shift, signed and given to [employer].  Patient escorted to police vehicle.

## 2025-01-22 NOTE — ED PROVIDER NOTES
EMERGENCY DEPARTMENT HISTORY AND PHYSICAL EXAM    Date: 2025  Patient Name: Nolan Rodriguez    History of Presenting Illness     Chief Complaint   Patient presents with    Mental Health Problem         History Provided By: Patient       Additional History (Context): Nolan Rodriguez is a 29 y.o. male with history of schizophrenia who presents today in police custody under an E CO that his family took out on him stating that he will not take his medications and he threatened to harm his grandmother and .  Patient states other than knowing why they claim they took this out he does not understand why he is here.  He states he just wants to go home.      PCP: Avani Dewey APRN - NP    No current facility-administered medications for this encounter.     Current Outpatient Medications   Medication Sig Dispense Refill    buPROPion (WELLBUTRIN XL) 150 MG extended release tablet Take 1 tablet by mouth 2 times daily 30 tablet 0    ARIPiprazole ER (ABILIFY MAINTENA) 400 MG SRER Inject 400 mg into the muscle         Past History     Past Medical History:  Past Medical History:   Diagnosis Date    ADHD (attention deficit hyperactivity disorder)     Anxiety disorder     Depression     Mood disorder (HCC)     Psychiatric disorder     Psychotic disorder (HCC)     Schizophrenia (HCC)     Schizophrenia (HCC)     Suicidal ideation     Trauma     \"His mother  this past November\"       Past Surgical History:  No past surgical history on file.    Family History:  Family History   Problem Relation Age of Onset    Schizophrenia Mother     Schizophrenia Brother     Schizophrenia Sister        Social History:  Social History     Tobacco Use    Smoking status: Every Day     Types: Cigars    Smokeless tobacco: Never   Vaping Use    Vaping status: Never Used   Substance Use Topics    Alcohol use: Not Currently    Drug use: Not Currently     Types: Marijuana (Weed)       Allergies:  No Known Allergies      Review of

## 2025-01-22 NOTE — ED NOTES
Bedside shift change report given to Kimberly (oncoming nurse) by MEÑO (offgoing nurse). Report included the following information ED Encounter Summary and ED SBAR.

## 2025-01-22 NOTE — VIRTUAL HEALTH
for this consult.  Discussed recommendations with Dr. Alanis at time of consult completion.    TelePsych recommendations:Inpatient psychiatric admission      Safety Plan:  updated        Electronically signed by OKSANA Lombardo on 1/22/2025 at 2:33 AM.  Nolan SOLANGE ClarkRodriguez, was evaluated through a synchronous (real-time) audio-video encounter. The patient (and/or guardian if applicable) is aware that this is a billable service, which includes applicable co-pays. This virtual visit was conducted with patient's (and/or legal guardian's) consent. Patient identification was verified, and a caregiver was present when appropriate.  The patient was located at Facility (Appt Department): [facility] EMERGENCY DEPARTMENT  [address] [facility] [address]  Loc: [phone]  The provider was located at Home (City/State): [place]  Confirm you are appropriately licensed, registered, or certified to deliver care in the state where the patient is located as indicated above. If you are not or unsure, please re-schedule the visit: Yes, I confirm.   [place] Consult to Tele-Psych  Consult performed by: Negra Alford LISW  Consult ordered by: Marjorie Becker PA  Reason for consult: unable to meet basic needs           Total time spent on this encounter: Not billed by time    --OKSANA Lombardo on 1/22/2025 at 2:33 AM    An electronic signature was used to authenticate this note.

## 2025-01-22 NOTE — ED NOTES
Per Dejan Motley [facility] Patient accepted at [facility]   Accepting DR Marbella Wright  Report to be called to : [phone]

## 2025-01-22 NOTE — ED NOTES
Transfer Center Handoff for Behavioral Health Transfers      Patient's Current Location: [facility] EMERGENCY DEPARTMENT     Chief Complaint   Patient presents with   • Mental Health Problem       Current or History of Violent Behavior: {YES/NO:19726}    Currently in Restraints Now or During this Encounter: {YES/NO:19726}  (Specify if Agitation or self harm is noted in ED?)  If yes, please describe behaviors requiring restraint:             Medical Clearance Documented and Verified in the Chart: {YES/NO:19726}    No Known Allergies     Can Patient Tolerate Lying Flat: {YES/NO:19726}    Able to Perform ADLs:  {Yes/No:88979}  (Specify if able to ambulate or uses any mobility devices such as cane or walker)  Activity:    Level of Assistance:    Assistive Device:    Miscellaneous Devices:      LABS    CBC:   Lab Results   Component Value Date/Time    WBC 9.0 01/22/2025 01:20 AM    RBC 4.94 01/22/2025 01:20 AM    HGB 15.1 01/22/2025 01:20 AM    HCT 43.7 01/22/2025 01:20 AM    MCV 88.5 01/22/2025 01:20 AM    MCH 30.6 01/22/2025 01:20 AM    MCHC 34.6 01/22/2025 01:20 AM    RDW 12.6 01/22/2025 01:20 AM     01/22/2025 01:20 AM    MPV 9.8 01/22/2025 01:20 AM     CMP:   Lab Results   Component Value Date/Time     01/22/2025 01:20 AM    K 3.5 01/22/2025 01:20 AM     01/22/2025 01:20 AM    CO2 30 01/22/2025 01:20 AM    BUN 11 01/22/2025 01:20 AM    CREATININE 1.10 01/22/2025 01:20 AM    GFRAA >60 08/20/2022 03:50 AM    AGRATIO 1.1 08/20/2022 03:50 AM    LABGLOM >90 01/22/2025 01:20 AM    GLUCOSE 107 01/22/2025 01:20 AM    CALCIUM 9.2 01/22/2025 01:20 AM    BILITOT 0.3 01/22/2025 01:20 AM    ALKPHOS 78 01/22/2025 01:20 AM    ALKPHOS 79 08/20/2022 03:50 AM    AST 16 01/22/2025 01:20 AM    ALT 38 01/22/2025 01:20 AM     Drug Panel:   Lab Results   Component Value Date/Time    AMPHETAMUR Negative 01/22/2025 01:20 AM    BARBITURATUR Negative 01/22/2025 01:20 AM    COCAINEUR Negative 01/22/2025 01:20 AM

## 2025-01-24 ENCOUNTER — HOSPITAL ENCOUNTER (OUTPATIENT)
Facility: HOSPITAL | Age: 30
Setting detail: INFUSION SERIES
End: 2025-01-24

## 2025-02-18 RX ORDER — SODIUM CHLORIDE 9 MG/ML
INJECTION, SOLUTION INTRAVENOUS CONTINUOUS
Status: CANCELLED | OUTPATIENT
Start: 2025-02-18

## 2025-02-18 RX ORDER — DIPHENHYDRAMINE HYDROCHLORIDE 50 MG/ML
50 INJECTION INTRAMUSCULAR; INTRAVENOUS
Status: CANCELLED | OUTPATIENT
Start: 2025-02-18

## 2025-02-18 RX ORDER — ALBUTEROL SULFATE 90 UG/1
4 INHALANT RESPIRATORY (INHALATION) PRN
Status: CANCELLED | OUTPATIENT
Start: 2025-02-18

## 2025-02-18 RX ORDER — ONDANSETRON 2 MG/ML
8 INJECTION INTRAMUSCULAR; INTRAVENOUS
Status: CANCELLED | OUTPATIENT
Start: 2025-02-18

## 2025-02-18 RX ORDER — HYDROCORTISONE SODIUM SUCCINATE 100 MG/2ML
100 INJECTION INTRAMUSCULAR; INTRAVENOUS
Status: CANCELLED | OUTPATIENT
Start: 2025-02-18

## 2025-02-18 RX ORDER — ACETAMINOPHEN 325 MG/1
650 TABLET ORAL
Status: CANCELLED | OUTPATIENT
Start: 2025-02-18

## 2025-02-18 RX ORDER — EPINEPHRINE 1 MG/ML
0.3 INJECTION, SOLUTION, CONCENTRATE INTRAVENOUS PRN
Status: CANCELLED | OUTPATIENT
Start: 2025-02-18

## 2025-02-21 ENCOUNTER — HOSPITAL ENCOUNTER (OUTPATIENT)
Facility: HOSPITAL | Age: 30
Setting detail: INFUSION SERIES
Discharge: HOME | End: 2025-02-24
Payer: MEDICAID

## 2025-02-21 VITALS
HEART RATE: 100 BPM | DIASTOLIC BLOOD PRESSURE: 72 MMHG | SYSTOLIC BLOOD PRESSURE: 117 MMHG | TEMPERATURE: 98.1 F | OXYGEN SATURATION: 98 % | RESPIRATION RATE: 18 BRPM

## 2025-02-21 DIAGNOSIS — T42.6X2A INTENTIONAL POISONING BY VALPROATE SODIUM (HCC): Primary | ICD-10-CM

## 2025-02-21 PROCEDURE — 6360000002 HC RX W HCPCS: Performed by: NURSE PRACTITIONER

## 2025-02-21 PROCEDURE — 96372 THER/PROPH/DIAG INJ SC/IM: CPT

## 2025-02-21 RX ORDER — EPINEPHRINE 1 MG/ML
0.3 INJECTION, SOLUTION, CONCENTRATE INTRAVENOUS PRN
OUTPATIENT
Start: 2025-03-21

## 2025-02-21 RX ORDER — HYDROCORTISONE SODIUM SUCCINATE 100 MG/2ML
100 INJECTION INTRAMUSCULAR; INTRAVENOUS
OUTPATIENT
Start: 2025-03-21

## 2025-02-21 RX ORDER — ALBUTEROL SULFATE 90 UG/1
4 INHALANT RESPIRATORY (INHALATION) PRN
OUTPATIENT
Start: 2025-03-21

## 2025-02-21 RX ORDER — ACETAMINOPHEN 325 MG/1
650 TABLET ORAL
OUTPATIENT
Start: 2025-03-21

## 2025-02-21 RX ORDER — DIPHENHYDRAMINE HYDROCHLORIDE 50 MG/ML
50 INJECTION INTRAMUSCULAR; INTRAVENOUS
OUTPATIENT
Start: 2025-03-21

## 2025-02-21 RX ORDER — ONDANSETRON 2 MG/ML
8 INJECTION INTRAMUSCULAR; INTRAVENOUS
OUTPATIENT
Start: 2025-03-21

## 2025-02-21 RX ORDER — SODIUM CHLORIDE 9 MG/ML
INJECTION, SOLUTION INTRAVENOUS CONTINUOUS
OUTPATIENT
Start: 2025-03-21

## 2025-02-21 RX ADMIN — ARIPIPRAZOLE 400 MG: KIT at 12:10

## 2025-02-21 ASSESSMENT — PAIN DESCRIPTION - ORIENTATION: ORIENTATION: LEFT;ANTERIOR

## 2025-02-21 ASSESSMENT — PAIN DESCRIPTION - ONSET: ONSET: GRADUAL

## 2025-02-21 ASSESSMENT — PAIN DESCRIPTION - FREQUENCY: FREQUENCY: INTERMITTENT

## 2025-02-21 ASSESSMENT — PAIN DESCRIPTION - DESCRIPTORS: DESCRIPTORS: ACHING

## 2025-02-21 ASSESSMENT — PAIN DESCRIPTION - PAIN TYPE: TYPE: ACUTE PAIN

## 2025-02-21 ASSESSMENT — PAIN SCALES - GENERAL: PAINLEVEL_OUTOF10: 6

## 2025-02-21 ASSESSMENT — PAIN DESCRIPTION - LOCATION: LOCATION: HEAD

## 2025-02-21 NOTE — PROGRESS NOTES
[facility] Progress Note    Date: 2025    Name: Nolan Rodriguez    MRN: [id number]         : 1995      ABILIFY INJECTIONS Q4 WEEKS      Mr. Rodriguez arrived to [facility] at 1200 ambulatory. He is calm and co-operative, but states he bumped the anterior right side of his head a couple of days ago. States pain to that area 6/10. On inspection no swelling or bruises/cuts noted. He is alert and oriented x 4. He was instructed to monitor site and go to ER for any swelling or change in mental status.    Mr. Rodriguez was assessed and education was provided.     Pt denies complaints.    Mr. Rodriguez's vitals were reviewed.  Vitals:    25 1204   BP: 117/72   Pulse: 100   Resp: 18   Temp: 98.1 °F (36.7 °C)   SpO2: 98%       Abilify 400mg was administered as ordered IM in patient's L deltoid muscle. No irritation or bleeding at site.Band-aid applied to site.     Mr. Rodriguez tolerated well without complaints.    Discharge/ follow-up instructions discussed w/ pt. Pt verbalized understanding.    Pt armband removed & shredded.    [facility] at 1215. He is to return on 3/21/24 at 1130 for his next appointment.    ARTEM GRAF RN  2025

## 2025-02-28 NOTE — PROGRESS NOTES
[facility] MEDICINE PROGRESS NOTE   Patient: Mandy Manzanares  Today's Date: 2/28/2025    YOB: 1925  Admission Date: 2/25/2025    MRN: [id number]  Inpatient LOS: 2    Room: [facility]  Hospital Day: Hospital Day: 4      HISTORY AND SUBJECTIVE COMPLAINTS     Chief Complaint:   Palpitations and Dizziness      Interval History / Subjective:   Reviewed history and physical  Cardiology input appreciated  No symptoms but again noted mild sinus pauses earlier this morning but asymptomatic  No recurrence of atrial fibrillation with rapid ventricular rate  Patient now agreeable to continue to closely monitor in the hospital with fluctuating blood pressures and heart rates  Discussed and updated to the daughter on the phone    Hospital Course:  Mandy Manzanares is a 89 year old female who presented on 2/25/2025 with complaints of Palpitations and Dizziness  Mandy Manzanares is a 89 year old female with past medical history of COPD, hypertension, GERD and atrial fibrillation, who presented to the ED for evaluation of heart palpitations and dizziness. Patient was hospitalized from 02/21/25-02/24/25 for hypertensive emergency and acute cystitis, Coreg was increased on discharge.   Patient reports that this morning she felt heart palpitations so she took a Coreg and then several hours took her scheduled dose. Following that she felt dizzy, therefore called her neighbor who recommended she came to the ED. Her heart rate was noted to be in the 40's per EMS. Patient denies chest pain, heart palpitations, N/V/D or abdominal pain.      ED Course: Initial vitals notable for heart rate-50 (atrial fibrillation with slow VR per ECG). Labs notable for: creatinine-1.79; GFR-25. CXR clear. Patient admitted for observation and cardiology was consulted.  Since admission patient heart rate was fluctuating from bradycardia and later developed bradycardia to atrial fibrillation with rapid ventricular  [address]  Inpatient Progress Note     Date of Service: 08/09/17  Hospital Day: 6     Subjective/Interval History   08/09/17    Treatment Team Notes:  Notes reviewed and/or discussed and report that Dixon Casillas is not displaying any disruptive or aggressive behaviors. He talks to himself. Pt did have a visitor. He denies SI/HI/AVH. Staff describe pt as bizarre. Patient interview: Dixon Casillas was interviewed by this writer today. Pt explained that he is admitted after running away when he learned that a friend passed away. HE denies any current grieving, flashbacks, nightmares or hypervigiliance. He reports medications are helping but gives vague reasons how the medications are helping. He denies any AVH today. Pt reports feeling sad without any anxiety. He is not interested in medication adjustment today.        Objective     Vitals:    08/07/17 1846 08/08/17 0759 08/08/17 2001 08/09/17 0741   BP: 118/73 114/73 127/77 122/60   Pulse: 94 95 96 80   Resp: 18 16 16 18   Temp: 96.9 °F (36.1 °C) 97.5 °F (36.4 °C)  97.5 °F (36.4 °C)   SpO2:       Weight:       Height:           Mental Status Examination     Appearance/Hygiene 24 yo -american male, unkempt   Behavior/Social Relatedness Non-aggressive, glaring eye contact   Musculoskeletal Gait/Station: appropriate  Tone (flaccid, cogwheeling, spastic): appropriate  Psychomotor (hyperkinetic, hypokinetic): hypoactive  Involuntary movements (tics, dyskinesias, akathisia, stereotypies): none   Speech   Rate, rhythm, volume, fluency and articulation are appropriate   Mood   sad   Affect    stable   Thought Process [place], some disorganization   Thought Content and Perceptual Disturbances Denies SI/HI/AVH   Sensorium and Cognition  AOx4, grossly intact   Insight  fair   Judgment fair           Assessment/Plan      Psychiatric Diagnoses:   Schizophrenia     Medical Diagnoses:   None identified     Psychosocial and contextual factors:   Medication non-compliance  Educational barriers     Level of impairment/disability:   Severe     Deo Quezada is a 23 y.o. who is currently displaying improved but on-going psychotic symptoms. Pt does not want Risperdal adjusted. 1.  Schizophrenia: continue Risperdal 1mg/2mg  2. Continue Cogentin 1mg BID for EPS. 3.  Pt refused EKG yesterday. 4.  Reviewed instructions, risks, benefits and side effects of medications  5.   Disposition/Discharge Date: Home/8-        Adrian Crowley [facility]  [facility].  Concern about tachybradycardia syndrome and the patient medications were reviewed by the cardiology and really adjusted and started on metoprolol and amiodarone.  Continue to hold rest of the blood pressure medications and nicardipine she was taking for a long time.  Continue with the antibiotic for recent urinary tract infection with oral Augmentin.  Patient will be continue to closely monitor with the telemetry .     ROS:  Pertinent systems negative except as above.    Current Medications  losartan, 100 mg, Daily  hydrALAZINE, 25 mg, 3 times per day  amoxicillin-clavulanate, 1 tablet, BID  aspirin, 81 mg, Daily  pantoprazole, 40 mg, QAM AC  sodium chloride, 2 mL, 2 times per day  heparin (porcine), 5,000 Units, 3 times per day  Potassium Standard Replacement Protocol (Levels 3.5 and lower), , See Admin Instructions  Magnesium Standard Replacement Protocol, , See Admin Instructions  Potassium Replacement (Levels 3.6 - 4), , See Admin Instructions      Current Facility-Administered Medications   Medication Dose Route Frequency Provider Last Rate Last Admin     PRN Medications  Current Facility-Administered Medications   Medication Dose Route Frequency Provider Last Rate Last Admin    ALPRAZolam (XANAX) tablet 0.25 mg  0.25 mg Oral Q8H PRN Jah Phelps MD   0.25 mg at 02/27/25 2146    sodium chloride 0.9 % injection 10 mL  10 mL Intravenous PRN Marcrow Daniela APNP        sodium chloride (NORMAL SALINE) 0.9 % bolus 500 mL  500 mL Intravenous PRN Marasco, Daniela, APNP        melatonin tablet 3 mg  3 mg Oral Nightly PRN Marcrow, Daniela, APNP             PHYSICAL EXAMINATION     Vital 24 Hour Range Most Recent Value   Temperature Temp  Min: 97.4 °F (36.3 °C)  Max: 98.1 °F (36.7 °C) 97.4 °F (36.3 °C)   Pulse Pulse  Min: 61  Max: 99 74   Respiratory Resp  Min: 16  Max: 18 18   Blood Pressure BP  Min: 136/72  Max: 184/82 (!) 152/88   Pulse Oximetry SpO2  Min: 93 %  Max: 97 % 93 %   Arterial BP No data  recorded     O2 No data recorded       Recorded Intake and Output:  Intake/Output Summary (Last 24 hours) at 2/28/2025 0935  Last data filed at 2/28/2025 0400  Gross per 24 hour   Intake 360 ml   Output --   Net 360 ml      Recorded Last Stool Occurrence:       Vital Most Recent Value First Value   Weight 68.8 kg (151 lb 11.2 oz) Weight: 72.1 kg (158 lb 15.2 oz)   Height 5' 3\" (160 cm) Height: 5' 3\" (160 cm)   BMI 26.87 N/A     Last 4 weights I/O last 3 shifts   Weight    02/25/25 1041 02/25/25 1611 02/27/25 0313 02/28/25 0400   Weight: 72.1 kg (158 lb 15.2 oz) 72.4 kg (159 lb 9.8 oz) 70.1 kg (154 lb 8.7 oz) 68.8 kg (151 lb 11.2 oz)    I/O last 3 completed shifts:  In: 360 [P.O.:360]  Out: -      Objective:  General Appearance:  In no acute distress.    Vital signs: (most recent): Blood pressure (!) 152/88, pulse 74, temperature 97.4 °F (36.3 °C), temperature source Temporal, resp. rate 18, height 5' 3\" (1.6 m), weight 68.8 kg (151 lb 11.2 oz), SpO2 93%.  No fever.    HEENT: Normal HEENT exam.    Lungs:  (Coarse bilateral breath sounds with diminished breath sounds at both bases on the mildly prolonged expiration without any wheezing)  Heart: Normal rate.  Irregular rhythm.  S1 normal and S2 normal.    Abdomen: Abdomen is soft.  Bowel sounds are normal.   There is no abdominal tenderness.     Extremities: There is dependent edema.    Neurological: Patient is alert and oriented to person, place and time.    Skin:  Dry and cool.        TEST RESULTS     Labs: The Laboratory values listed below have been reviewed and pertinent findings discussed in the Assessment and Plan.  Recent Labs   Lab 02/28/25  0523 02/26/25  0522 02/25/25  1137 02/24/25  0621 02/23/25  0455 02/22/25  0513 02/21/25  1205   WBC  --  7.5 7.6  --  8.1   < > 7.9   HCT  --  43.7 44.6  --  44.4   < > 41.9   HGB  --  14.3 14.6  --  14.3   < > 13.8   MCV  --  83.7 83.5  --  84.4   < > 82.8   PLT  --  249 266  --  254   < > 234   INR  --   --  1.2  --    --   --   --    PTT  --   --  28  --   --   --   --    SODIUM 138 137 135   < > 137   < > 137   POTASSIUM 3.7 3.9 4.3   < > 4.0  3.9   < > 4.0   CHLORIDE 105 103 100   < > 102   < > 102   CO2 26 29 28   < > 23   < > 29   CALCIUM 9.1 9.5 9.4   < > 9.9   < > 9.3   GLUCOSE 94 95 118*   < > 96   < > 96   BUN 38* 54* 52*   < > 31*   < > 22*   CREATININE 0.92 1.34* 1.79*   < > 1.08*   < > 0.98*   AST  --   --  16  --   --   --  18   GPT  --   --  20  --   --   --  22   ALKPT  --   --  102  --   --   --  120*   BILIRUBIN  --   --  0.5  --   --   --  0.5   ALBUMIN  --   --  3.5  --   --   --  3.6    < > = values in this interval not displayed.     Glucometer Checks    No results available in last 24 hours  Hemoglobin A1C (%)   Date Value   06/17/2022 5.2     TSH (mcUnits/mL)   Date Value   02/25/2025 2.978   11/04/2024 1.759   07/03/2024 2.195     Urinalysis:  Recent Labs   Lab 02/26/25  1010   USPG 1.023   UPH 5.0   UKET Negative   UPROT Trace*   UGLU Negative   UBILI Negative   UROB 0.2   UWBC Small*   UNITR Negative   URBC Negative        Radiology: Imaging studies have been reviewed and pertinent findings discussed in the Assessment and Plan.  No results found for any visits on 02/25/25 (from the past 48 hour(s)).       ANCILLARY ORDERS     Diet:  Regular; Yes, Medical Nutrition Management by Rd (Registered Dietitian) Diet  Telemetry: On  Consults:    IP CONSULT TO CARDIOLOGY  IP CONSULT TO NEPHROLOGY  IP CONSULT TO SOCIAL WORK  IP CONSULT TO ELECTROPHYSIOLOGY  Therapy Orders:   PT and OT Orders Placed this Encounter   Procedures    Occupational Therapy Evaluation    Occupational Therapy Treatment    Physical Therapy Evaluation    Physical Therapy Treatment     Advance Care Planning      ADVANCED DIRECTIVES     Code Status: Do Not Resuscitate          ASSESSMENT AND PLAN         Principal Problem:    Bradycardia  Active Problems:    Hypertensive urgency    Atrial fibrillation with rapid ventricular response  (CMD)     Gastroesophageal reflux disease    COPD (chronic obstructive pulmonary disease)  (CMD)    Recent urinary tract infection      Plan      Symptomatic bradycardia/?  Tachybradycardia syndrome/sinus pauses  - Patient presented for evaluation of dizziness after taking an extra carvedilol   - Heart rate 40's per EMS, patient asymptomatic at this point with stable blood pressures  - No other associated symptoms-suspected bradycardia medication induced, no need for BB reversal at this point  - Cardiology and electrophysiology closely following with the try to avoid AV block medications and closely need to monitor 48 hours to 72 hours   If she continued to have intermittent sinus pauses or severe bradycardia after stopping the beta-blockers AV alfred blocking medications patient most likely may need pacemaker    Atrial fibrillation with rapid ventricular rate  No more recurrence of the atrial fibrillation and recommended was given 1 dose and stopped almost 36 hours  No plans of anticoagulation with risk of fall     AKUA on CKD  Mild acute kidney injury and continue to closely monitor with avoiding nephrotoxic medications and nephrology input appreciated    Chronic diastolic heart failure  No signs of any exacerbation continue to hold the Lasix with acute kidney injury    Recent urinary tract infection  She will be finishing the full course of antibiotics and repeat urine analysis was negative     COPD  Not in acute exacerbation     Hypertension  Home blood pressure medications on hold with adjusting the medications with beta-blocker as advised above    GERD  Continue PPI    Patient now agreeable to continue to closely monitor with her ongoing fluctuating heart rate and blood pressures and further plans recommendations as per the direction of electrophysiology and cardiology              Jah Phelps MD  Hospitalist  2/28/2025  9:35 AM

## 2025-03-13 PROBLEM — F25.9 SCHIZOAFFECTIVE DISORDER (HCC): Status: ACTIVE | Noted: 2025-03-13

## 2025-03-13 RX ORDER — DIPHENHYDRAMINE HYDROCHLORIDE 50 MG/ML
50 INJECTION, SOLUTION INTRAMUSCULAR; INTRAVENOUS
OUTPATIENT
Start: 2025-03-21

## 2025-03-13 RX ORDER — ONDANSETRON 2 MG/ML
8 INJECTION INTRAMUSCULAR; INTRAVENOUS
OUTPATIENT
Start: 2025-03-21

## 2025-03-13 RX ORDER — HYDROCORTISONE SODIUM SUCCINATE 100 MG/2ML
100 INJECTION INTRAMUSCULAR; INTRAVENOUS
OUTPATIENT
Start: 2025-03-21

## 2025-03-13 RX ORDER — EPINEPHRINE 1 MG/ML
0.3 INJECTION, SOLUTION, CONCENTRATE INTRAVENOUS PRN
OUTPATIENT
Start: 2025-03-21

## 2025-03-13 RX ORDER — ALBUTEROL SULFATE 90 UG/1
4 INHALANT RESPIRATORY (INHALATION) PRN
OUTPATIENT
Start: 2025-03-21

## 2025-03-13 RX ORDER — SODIUM CHLORIDE 9 MG/ML
INJECTION, SOLUTION INTRAVENOUS CONTINUOUS
OUTPATIENT
Start: 2025-03-21

## 2025-03-13 RX ORDER — ACETAMINOPHEN 325 MG/1
650 TABLET ORAL
OUTPATIENT
Start: 2025-03-21

## 2025-03-17 ENCOUNTER — HOSPITAL ENCOUNTER (EMERGENCY)
Facility: HOSPITAL | Age: 30
Discharge: PSYCHIATRIC OR SUBSTANCE USE FACILITY | End: 2025-03-18
Attending: EMERGENCY MEDICINE
Payer: MEDICAID

## 2025-03-17 DIAGNOSIS — F20.0 PARANOID SCHIZOPHRENIA (HCC): Primary | ICD-10-CM

## 2025-03-17 LAB
AMPHET UR QL SCN: NEGATIVE
ANION GAP SERPL CALC-SCNC: 1 MMOL/L (ref 3–18)
BARBITURATES UR QL SCN: NEGATIVE
BASOPHILS # BLD: 0 K/UL (ref 0–0.1)
BASOPHILS NFR BLD: 0 % (ref 0–2)
BENZODIAZ UR QL: NEGATIVE
BUN SERPL-MCNC: 12 MG/DL (ref 7–18)
BUN/CREAT SERPL: 10 (ref 12–20)
CALCIUM SERPL-MCNC: 8.6 MG/DL (ref 8.5–10.1)
CANNABINOIDS UR QL SCN: POSITIVE
CHLORIDE SERPL-SCNC: 105 MMOL/L (ref 100–111)
CO2 SERPL-SCNC: 32 MMOL/L (ref 21–32)
COCAINE UR QL SCN: NEGATIVE
CREAT SERPL-MCNC: 1.18 MG/DL (ref 0.6–1.3)
DIFFERENTIAL METHOD BLD: ABNORMAL
EOSINOPHIL # BLD: 0.29 K/UL (ref 0–0.4)
EOSINOPHIL NFR BLD: 3 % (ref 0–5)
ERYTHROCYTE [DISTWIDTH] IN BLOOD BY AUTOMATED COUNT: 11.8 % (ref 11.6–14.5)
ETHANOL SERPL-MCNC: <3 MG/DL (ref 0–3)
GLUCOSE SERPL-MCNC: 98 MG/DL (ref 74–99)
HCT VFR BLD AUTO: 44.1 % (ref 36–48)
HGB BLD-MCNC: 14.8 G/DL (ref 13–16)
IMM GRANULOCYTES # BLD AUTO: 0 K/UL (ref 0–0.04)
IMM GRANULOCYTES NFR BLD AUTO: 0 % (ref 0–0.5)
LYMPHOCYTES # BLD: 4.81 K/UL (ref 0.9–3.6)
LYMPHOCYTES NFR BLD: 49 % (ref 21–52)
Lab: ABNORMAL
MCH RBC QN AUTO: 29 PG (ref 24–34)
MCHC RBC AUTO-ENTMCNC: 33.6 G/DL (ref 31–37)
MCV RBC AUTO: 86.5 FL (ref 78–100)
METHADONE UR QL: NEGATIVE
MONOCYTES # BLD: 0.88 K/UL (ref 0.05–1.2)
MONOCYTES NFR BLD: 9 % (ref 3–10)
NEUTS SEG # BLD: 3.82 K/UL (ref 1.8–8)
NEUTS SEG NFR BLD: 39 % (ref 40–73)
NRBC # BLD: 0 K/UL (ref 0–0.01)
NRBC BLD-RTO: 0 PER 100 WBC
OPIATES UR QL: NEGATIVE
PCP UR QL: NEGATIVE
PLATELET # BLD AUTO: 278 K/UL (ref 135–420)
PLATELET COMMENT: ABNORMAL
PMV BLD AUTO: 9.3 FL (ref 9.2–11.8)
POTASSIUM SERPL-SCNC: 3.6 MMOL/L (ref 3.5–5.5)
RBC # BLD AUTO: 5.1 M/UL (ref 4.35–5.65)
RBC MORPH BLD: ABNORMAL
SODIUM SERPL-SCNC: 138 MMOL/L (ref 136–145)
WBC # BLD AUTO: 9.8 K/UL (ref 4.6–13.2)
WBC MORPH BLD: ABNORMAL

## 2025-03-17 PROCEDURE — 85025 COMPLETE CBC W/AUTO DIFF WBC: CPT

## 2025-03-17 PROCEDURE — 80048 BASIC METABOLIC PNL TOTAL CA: CPT

## 2025-03-17 PROCEDURE — 99284 EMERGENCY DEPT VISIT MOD MDM: CPT

## 2025-03-17 PROCEDURE — 80307 DRUG TEST PRSMV CHEM ANLYZR: CPT

## 2025-03-17 PROCEDURE — 82077 ASSAY SPEC XCP UR&BREATH IA: CPT

## 2025-03-18 VITALS
RESPIRATION RATE: 18 BRPM | TEMPERATURE: 97.6 F | DIASTOLIC BLOOD PRESSURE: 66 MMHG | HEIGHT: 69 IN | SYSTOLIC BLOOD PRESSURE: 108 MMHG | BODY MASS INDEX: 2.51 KG/M2 | OXYGEN SATURATION: 98 % | HEART RATE: 76 BPM

## 2025-03-18 LAB
FLUAV RNA SPEC QL NAA+PROBE: NOT DETECTED
FLUBV RNA SPEC QL NAA+PROBE: NOT DETECTED
SARS-COV-2 RNA RESP QL NAA+PROBE: NOT DETECTED
SOURCE: NORMAL

## 2025-03-18 PROCEDURE — 90791 PSYCH DIAGNOSTIC EVALUATION: CPT | Performed by: COUNSELOR

## 2025-03-18 PROCEDURE — 93005 ELECTROCARDIOGRAM TRACING: CPT | Performed by: STUDENT IN AN ORGANIZED HEALTH CARE EDUCATION/TRAINING PROGRAM

## 2025-03-18 PROCEDURE — 6360000002 HC RX W HCPCS: Performed by: EMERGENCY MEDICINE

## 2025-03-18 PROCEDURE — 87636 SARSCOV2 & INF A&B AMP PRB: CPT

## 2025-03-18 PROCEDURE — 96372 THER/PROPH/DIAG INJ SC/IM: CPT

## 2025-03-18 RX ORDER — MIDAZOLAM HYDROCHLORIDE 1 MG/ML
6 INJECTION, SOLUTION INTRAMUSCULAR; INTRAVENOUS
Status: COMPLETED | OUTPATIENT
Start: 2025-03-18 | End: 2025-03-18

## 2025-03-18 RX ORDER — HALOPERIDOL 5 MG/ML
5 INJECTION INTRAMUSCULAR
Status: COMPLETED | OUTPATIENT
Start: 2025-03-18 | End: 2025-03-18

## 2025-03-18 RX ADMIN — MIDAZOLAM 6 MG: 1 INJECTION INTRAMUSCULAR; INTRAVENOUS at 04:15

## 2025-03-18 RX ADMIN — HALOPERIDOL LACTATE 5 MG: 5 INJECTION, SOLUTION INTRAMUSCULAR at 04:15

## 2025-03-18 NOTE — ED PROVIDER NOTES
8:44 AM : Pt care assumed from Dr. Hernandez  ,ED provider. History of patient complaint(s), available diagnostic reports and current treatment plan has been discussed thoroughly.   Medically cleared Yes  Status: TDO  Intended disposition of patient : pending placement.    Pending diagnostics reports and/or labs (please list): pending placement.    Transferred to admitting facility. 10:14 AM      Recent Results (from the past 12 hours)   Urine Drug Screen    Collection Time: 03/17/25  9:42 PM   Result Value Ref Range    Benzodiazepines, Urine Negative NEG      Barbiturates, Urine Negative NEG      THC, TH-Cannabinol, Urine Positive (A) NEG      Opiates, Urine Negative NEG      Phencyclidine, Urine Negative NEG      Cocaine, Urine Negative NEG      Amphetamine, Urine Negative NEG      Methadone, Urine Negative NEG      Comments: (NOTE)    CBC with Auto Differential    Collection Time: 03/17/25 10:20 PM   Result Value Ref Range    WBC 9.8 4.6 - 13.2 K/uL    RBC 5.10 4.35 - 5.65 M/uL    Hemoglobin 14.8 13.0 - 16.0 g/dL    Hematocrit 44.1 36.0 - 48.0 %    MCV 86.5 78.0 - 100.0 FL    MCH 29.0 24.0 - 34.0 PG    MCHC 33.6 31.0 - 37.0 g/dL    RDW 11.8 11.6 - 14.5 %    Platelets 278 135 - 420 K/uL    MPV 9.3 9.2 - 11.8 FL    Nucleated RBCs 0.0 0  WBC    nRBC 0.00 0.00 - 0.01 K/uL    Neutrophils % 39 (L) 40.0 - 73.0 %    Lymphocytes % 49 21.0 - 52.0 %    Monocytes % 9 3.0 - 10.0 %    Eosinophils % 3 0.0 - 5.0 %    Basophils % 0 0.0 - 2.0 %    Immature Granulocytes % 0 0.0 - 0.5 %    Neutrophils Absolute 3.82 1.80 - 8.00 K/UL    Lymphocytes Absolute 4.81 (H) 0.90 - 3.60 K/UL    Monocytes Absolute 0.88 0.05 - 1.20 K/UL    Eosinophils Absolute 0.29 0.00 - 0.40 K/UL    Basophils Absolute 0.00 0.00 - 0.10 K/UL    Immature Granulocytes Absolute 0.00 0.00 - 0.04 K/UL    Differential Type MANUAL      Platelet Comment ADEQUATE PLATELETS      RBC Comment NORMOCYTIC, NORMOCHROMIC      WBC Comment REACTIVE LYMPHS     Basic Metabolic 
Patient is medically cleared for psych evaluation [NF]   0256 Spoke to tele psych who got collateral from the grandmother.  Recommends admission at this time. [NF]      ED Course User Index  [NF] Devin Hernandez MD           Is this patient to be included in the SEP-1 core measure? No Exclusion criteria - the patient is NOT to be included for SEP-1 Core Measure due to: 2+ SIRS criteria are not met      Procedures:  Procedures      Rhythm interpretation from monitor: Sinus rhythm      Social Determinants of Health: none       Supplemental Historians include: none       Documentation/Prior Results Review:  Old medical records.  Recently admitted to psych facility in January 2025      Devin Hernandez MD    2:53 AM        Diagnosis and Disposition     CLINICAL IMPRESSION:  No diagnosis found.     Medication List        ASK your doctor about these medications      ARIPiprazole  MG Srer  Commonly known as: ABILIFY MAINTENA     buPROPion 150 MG extended release tablet  Commonly known as: WELLBUTRIN XL  Take 1 tablet by mouth 2 times daily              Disposition: admit to psych    Patient condition at time of disposition: Stable            Dragon Disclaimer     Please note that this dictation was completed with [employer], the computer voice recognition software.  Quite often unanticipated grammatical, syntax, homophones, and other interpretive errors are inadvertently transcribed by the computer software.  Please disregard these errors.  Please excuse any errors that have escaped final proofreading.    MD David Evans Nikea, MD  03/18/25 0258

## 2025-03-18 NOTE — VIRTUAL HEALTH
this time. He will benefit from treatment    Dx:   Chronic paranoid schizophrenia    Plan:  Collateral: spoke with grandmother by phone Vilma Hernandez [phone]; aunt Rosmery Castorena [phone] (no answer)  Inpatient psychiatric admission at appropriate care level facility, once medically cleared and stable  Safety plan created and reviewed with patient, see below for details  Re-consult for any new changes or concerns. Thank you for this consult.  Discussed recommendations with Dr. Hernandez at time of consult completion.    TelePsych recommendations:Inpatient psychiatric admission      Safety Plan:  Patient admitted        Electronically signed by OKSANA Lombardo on 3/18/2025 at 1:42 AM.     Nolan Rodriguez, was evaluated through a synchronous (real-time) audio-video encounter. The patient (and/or guardian if applicable) is aware that this is a billable service, which includes applicable co-pays. This virtual visit was conducted with patient's (and/or legal guardian's) consent. Patient identification was verified, and a caregiver was present when appropriate.  The patient was located at Facility (Appt Department): [facility] EMERGENCY DEPARTMENT  [address] [facility] [address]  Loc: [phone]  The provider was located at Home (City/State): [place]  Confirm you are appropriately licensed, registered, or certified to deliver care in the state where the patient is located as indicated above. If you are not or unsure, please re-schedule the visit: Yes, I confirm.   [place] Consult to Tele-Psych  Consult performed by: Negra Alford LISW  Consult ordered by: Devin Hernandez MD  Reason for consult: psychosis           Total time spent on this encounter: Not billed by time    --OKSANA Lombardo on 3/18/2025 at 1:42 AM    An electronic signature was used to authenticate this note.

## 2025-03-18 NOTE — BSMART NOTE
Crisis Note: ADOLPH Wylie, [phone], informed this writer that patient has been accepted at [facility] for inpatient  services. Dr. Hernandez and ED-RN made aware.    Accepting Physician: Dr. Elie Wright    Number to call report: [address] [place] [address], [phone], ext: [phone].    [facility] will provide transportation.    03/18/25 @ 05:25 am  Spoke with ADOLPH Wylie, regarding the alternative facility form for [facility] to deliver patient to [facility]. Dejan verbalized that the form will be submitted to crisis on the next shift d/t his system is not properly working.    Also, Dejan stated that patient should arrive at [facility] after 7:00 am.
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
## 2025-03-18 NOTE — ED NOTES
Transfer Center Handoff for Behavioral Health Transfers      Patient's Current Location: [facility] EMERGENCY DEPARTMENT     Chief Complaint   Patient presents with    Mental Health Problem     ECO with officer in attendance.       Current or History of Violent Behavior: No    Currently in Restraints Now or During this Encounter: No  (Specify if Agitation or self harm is noted in ED?)  If yes, please describe behaviors requiring restraint:             Medical Clearance Documented and Verified in the Chart: No    No Known Allergies     Can Patient Tolerate Lying Flat: No    Able to Perform ADLs:  Yes  (Specify if able to ambulate or uses any mobility devices such as cane or walker)  Activity:    Level of Assistance:    Assistive Device:    Miscellaneous Devices:      LABS    CBC:   Lab Results   Component Value Date/Time    WBC 9.8 03/17/2025 10:20 PM    RBC 5.10 03/17/2025 10:20 PM    HGB 14.8 03/17/2025 10:20 PM    HCT 44.1 03/17/2025 10:20 PM    MCV 86.5 03/17/2025 10:20 PM    MCH 29.0 03/17/2025 10:20 PM    MCHC 33.6 03/17/2025 10:20 PM    RDW 11.8 03/17/2025 10:20 PM     03/17/2025 10:20 PM    MPV 9.3 03/17/2025 10:20 PM     CMP:   Lab Results   Component Value Date/Time     03/17/2025 10:20 PM    K 3.6 03/17/2025 10:20 PM     03/17/2025 10:20 PM    CO2 32 03/17/2025 10:20 PM    BUN 12 03/17/2025 10:20 PM    CREATININE 1.18 03/17/2025 10:20 PM    GFRAA >60 08/20/2022 03:50 AM    AGRATIO 1.1 08/20/2022 03:50 AM    LABGLOM 86 03/17/2025 10:20 PM    GLUCOSE 98 03/17/2025 10:20 PM    CALCIUM 8.6 03/17/2025 10:20 PM    BILITOT 0.3 01/22/2025 01:20 AM    ALKPHOS 78 01/22/2025 01:20 AM    ALKPHOS 79 08/20/2022 03:50 AM    AST 16 01/22/2025 01:20 AM    ALT 38 01/22/2025 01:20 AM     Drug Panel:   Lab Results   Component Value Date/Time    AMPHETAMUR Negative 03/17/2025 09:42 PM    BARBITURATUR Negative 03/17/2025 09:42 PM    COCAINEUR Negative 03/17/2025 09:42 PM    METHADU Negative

## 2025-03-19 LAB
EKG ATRIAL RATE: 64 BPM
EKG DIAGNOSIS: NORMAL
EKG P AXIS: 59 DEGREES
EKG P-R INTERVAL: 172 MS
EKG Q-T INTERVAL: 386 MS
EKG QRS DURATION: 86 MS
EKG QTC CALCULATION (BAZETT): 398 MS
EKG R AXIS: 94 DEGREES
EKG T AXIS: 69 DEGREES
EKG VENTRICULAR RATE: 64 BPM

## 2025-03-19 PROCEDURE — 93010 ELECTROCARDIOGRAM REPORT: CPT | Performed by: INTERNAL MEDICINE

## 2025-03-21 ENCOUNTER — HOSPITAL ENCOUNTER (OUTPATIENT)
Facility: HOSPITAL | Age: 30
Setting detail: INFUSION SERIES
End: 2025-03-21

## 2025-03-21 DIAGNOSIS — F25.9 SCHIZOAFFECTIVE DISORDER, UNSPECIFIED TYPE: Primary | ICD-10-CM

## 2025-04-11 ENCOUNTER — HOSPITAL ENCOUNTER (OUTPATIENT)
Facility: HOSPITAL | Age: 30
Setting detail: INFUSION SERIES
End: 2025-04-11
Payer: MEDICAID

## 2025-04-15 ENCOUNTER — HOSPITAL ENCOUNTER (EMERGENCY)
Facility: HOSPITAL | Age: 30
Discharge: HOME | End: 2025-04-15
Payer: MEDICAID

## 2025-04-15 VITALS
HEIGHT: 69 IN | RESPIRATION RATE: 14 BRPM | HEART RATE: 100 BPM | SYSTOLIC BLOOD PRESSURE: 107 MMHG | OXYGEN SATURATION: 100 % | TEMPERATURE: 98 F | DIASTOLIC BLOOD PRESSURE: 72 MMHG | WEIGHT: 170 LBS | BODY MASS INDEX: 25.18 KG/M2

## 2025-04-15 DIAGNOSIS — K04.7 DENTAL ABSCESS: Primary | ICD-10-CM

## 2025-04-15 PROCEDURE — 41800 DRAINAGE OF GUM LESION: CPT

## 2025-04-15 PROCEDURE — 99283 EMERGENCY DEPT VISIT LOW MDM: CPT

## 2025-04-15 RX ORDER — CHLORHEXIDINE GLUCONATE ORAL RINSE 1.2 MG/ML
15 SOLUTION DENTAL 2 TIMES DAILY
Qty: 420 ML | Refills: 0 | Status: SHIPPED | OUTPATIENT
Start: 2025-04-15 | End: 2025-04-29

## 2025-04-15 RX ORDER — LIDOCAINE HYDROCHLORIDE 20 MG/ML
15 SOLUTION OROPHARYNGEAL
Qty: 100 ML | Refills: 0 | Status: SHIPPED | OUTPATIENT
Start: 2025-04-15

## 2025-04-15 RX ORDER — OXYCODONE AND ACETAMINOPHEN 5; 325 MG/1; MG/1
1 TABLET ORAL EVERY 8 HOURS PRN
Qty: 9 TABLET | Refills: 0 | Status: SHIPPED | OUTPATIENT
Start: 2025-04-15 | End: 2025-04-18

## 2025-04-15 RX ORDER — IBUPROFEN 800 MG/1
800 TABLET, FILM COATED ORAL EVERY 8 HOURS PRN
Qty: 30 TABLET | Refills: 1 | Status: SHIPPED | OUTPATIENT
Start: 2025-04-15

## 2025-04-15 ASSESSMENT — PAIN - FUNCTIONAL ASSESSMENT
PAIN_FUNCTIONAL_ASSESSMENT: ACTIVITIES ARE NOT PREVENTED
PAIN_FUNCTIONAL_ASSESSMENT: 0-10

## 2025-04-15 ASSESSMENT — PAIN DESCRIPTION - ORIENTATION: ORIENTATION: RIGHT

## 2025-04-15 ASSESSMENT — PAIN DESCRIPTION - ONSET: ONSET: AWAKENED FROM SLEEP

## 2025-04-15 ASSESSMENT — PAIN DESCRIPTION - LOCATION: LOCATION: JAW

## 2025-04-15 ASSESSMENT — PAIN DESCRIPTION - FREQUENCY: FREQUENCY: INTERMITTENT

## 2025-04-15 ASSESSMENT — PAIN DESCRIPTION - PAIN TYPE: TYPE: ACUTE PAIN

## 2025-04-15 ASSESSMENT — PAIN SCALES - GENERAL: PAINLEVEL_OUTOF10: 3

## 2025-04-15 NOTE — ED TRIAGE NOTES
Arrived via EMS from home c/o RIGHT  sided jaw pain x yesterday   Denies trauma- worse overnight   Able to eat and drink

## 2025-04-15 NOTE — DISCHARGE INSTRUCTIONS
Follow-up with one of the dental clinics below:  Call this office first: [facility] [phone]  [facility] [phone]  [facility] [phone]  [facility] [phone]  [facility] [phone]  [facility] [phone]  [facility] [phone]      Call to schedule an appointment.

## 2025-04-15 NOTE — ED PROVIDER NOTES
of pt's questions and concerns were addressed.  Alarm symptoms and return precautions associated with chief complaint and evaluation were reviewed with the patient in detail.  The patient demonstrated adequate understanding.  Patient was instructed to follow up with PCP, as well as given strict return precautions to the ED upon further deterioration. Patient is ready for discharge home.          Dragon Disclaimer     Please note that this dictation was completed with [employer], the computer voice recognition software.  Quite often unanticipated grammatical, syntax, homophones, and other interpretive errors are inadvertently transcribed by the computer software.  Please disregard these errors.  Please excuse any errors that have escaped final proofreading.      YOLANDA Proctor Jana L, PA  04/15/25 [id number]

## 2025-04-16 ENCOUNTER — HOSPITAL ENCOUNTER (OUTPATIENT)
Facility: HOSPITAL | Age: 30
Setting detail: INFUSION SERIES
Discharge: HOME | End: 2025-04-19
Payer: MEDICAID

## 2025-04-16 VITALS
HEART RATE: 99 BPM | DIASTOLIC BLOOD PRESSURE: 76 MMHG | RESPIRATION RATE: 16 BRPM | OXYGEN SATURATION: 100 % | TEMPERATURE: 98.5 F | SYSTOLIC BLOOD PRESSURE: 121 MMHG

## 2025-04-16 DIAGNOSIS — F25.9 SCHIZOAFFECTIVE DISORDER, UNSPECIFIED TYPE (HCC): Primary | ICD-10-CM

## 2025-04-16 PROCEDURE — 96372 THER/PROPH/DIAG INJ SC/IM: CPT

## 2025-04-16 PROCEDURE — 6360000002 HC RX W HCPCS: Performed by: NURSE PRACTITIONER

## 2025-04-16 RX ORDER — HYDROCORTISONE SODIUM SUCCINATE 100 MG/2ML
100 INJECTION INTRAMUSCULAR; INTRAVENOUS
OUTPATIENT
Start: 2025-04-21

## 2025-04-16 RX ORDER — ALBUTEROL SULFATE 90 UG/1
4 INHALANT RESPIRATORY (INHALATION) PRN
OUTPATIENT
Start: 2025-04-21

## 2025-04-16 RX ORDER — ACETAMINOPHEN 325 MG/1
650 TABLET ORAL
OUTPATIENT
Start: 2025-04-21

## 2025-04-16 RX ORDER — DIPHENHYDRAMINE HYDROCHLORIDE 50 MG/ML
50 INJECTION, SOLUTION INTRAMUSCULAR; INTRAVENOUS
OUTPATIENT
Start: 2025-04-21

## 2025-04-16 RX ORDER — EPINEPHRINE 1 MG/ML
0.3 INJECTION, SOLUTION, CONCENTRATE INTRAVENOUS PRN
OUTPATIENT
Start: 2025-04-21

## 2025-04-16 RX ORDER — SODIUM CHLORIDE 9 MG/ML
INJECTION, SOLUTION INTRAVENOUS CONTINUOUS
OUTPATIENT
Start: 2025-04-21

## 2025-04-16 RX ORDER — ONDANSETRON 2 MG/ML
8 INJECTION INTRAMUSCULAR; INTRAVENOUS
OUTPATIENT
Start: 2025-04-21

## 2025-04-16 RX ADMIN — PALIPERIDONE PALMITATE 156 MG: 156 INJECTION INTRAMUSCULAR at 10:10

## 2025-04-16 ASSESSMENT — PAIN - FUNCTIONAL ASSESSMENT: PAIN_FUNCTIONAL_ASSESSMENT: NONE - DENIES PAIN

## 2025-04-16 NOTE — PROGRESS NOTES
[facility] Progress Note    Date: 2025    Name: Nolan Rodriguez    MRN: [id number]         : 1995    Mr. Rodriguez arrived in the [facility] today at 0955, ambulatory and in stable condition, here for his INVEGA SUSTENNA INJECTION (EVERY 3 WEEKS). He was assessed and education was provided.       INITIAL INVEGA SUSTENNA INJECTION HERE IN OUR [facility] TODAY. (He previously received ABILIFY injections, and his last dose of Abilify was back on 25)      Mr. Rodriguez's vitals were reviewed.    Vitals:    25 0955   BP: 121/76   Pulse: 99   Resp: 16   Temp: 98.5 °F (36.9 °C)   TempSrc: Oral   SpO2: 100%           Mr. Rodriguez stated that he was doing well, and he voiced no complaints today. However, his RIGHT JAW was noted to be slightly swollen and he stated that he had recently been to the ER for this issue on yesterday, and was told he had some dental issues. He voiced no complaint of pain in the jaw today. He was strongly encouraged to follow up with a dentist, and he verbalized understanding.       Mr. Rodriguez stated that he had received one dose of the INVEGA injection at his referring provider's office without any issues or problems. But he was still provided with verbal patient education on the associated potential side effects and adverse reactions and he verbalized understanding.         Paliperidone Palmitate (INVEGA SUSTENNA) 156 mg, was administered IM, in his RIGHT DELTOID at 1010, per order and without incident.        Mr. Rodriguez politely declined to stay for observation.         Mr. Rodriguez tolerated well, and voiced no complaints.     [facility] condition at 1015.     He is to return in 3 weeks, on Wednesday, 25, at 0930, for his next appointment, for his next INVEGA SUSTENNA Injection.          Brandon Lazo RN  2025  10:05 AM

## 2025-04-23 ENCOUNTER — APPOINTMENT (OUTPATIENT)
Facility: HOSPITAL | Age: 30
End: 2025-04-23
Payer: MEDICAID

## 2025-05-07 ENCOUNTER — HOSPITAL ENCOUNTER (OUTPATIENT)
Facility: HOSPITAL | Age: 30
Setting detail: INFUSION SERIES
Discharge: HOME | End: 2025-05-10
Payer: MEDICAID

## 2025-05-07 VITALS
SYSTOLIC BLOOD PRESSURE: 115 MMHG | DIASTOLIC BLOOD PRESSURE: 71 MMHG | OXYGEN SATURATION: 98 % | RESPIRATION RATE: 16 BRPM | HEART RATE: 92 BPM | TEMPERATURE: 98.5 F

## 2025-05-07 DIAGNOSIS — F25.9 SCHIZOAFFECTIVE DISORDER, UNSPECIFIED TYPE (HCC): Primary | ICD-10-CM

## 2025-05-07 PROCEDURE — 6360000002 HC RX W HCPCS: Performed by: NURSE PRACTITIONER

## 2025-05-07 PROCEDURE — 96372 THER/PROPH/DIAG INJ SC/IM: CPT

## 2025-05-07 RX ORDER — GUANFACINE 2 MG/1
2 TABLET, EXTENDED RELEASE ORAL 2 TIMES DAILY
COMMUNITY
Start: 2025-05-06

## 2025-05-07 RX ORDER — DIPHENHYDRAMINE HYDROCHLORIDE 50 MG/ML
50 INJECTION, SOLUTION INTRAMUSCULAR; INTRAVENOUS
OUTPATIENT
Start: 2025-05-28

## 2025-05-07 RX ORDER — SODIUM CHLORIDE 9 MG/ML
INJECTION, SOLUTION INTRAVENOUS CONTINUOUS
OUTPATIENT
Start: 2025-05-28

## 2025-05-07 RX ORDER — EPINEPHRINE 1 MG/ML
0.3 INJECTION, SOLUTION, CONCENTRATE INTRAVENOUS PRN
OUTPATIENT
Start: 2025-05-28

## 2025-05-07 RX ORDER — ALBUTEROL SULFATE 90 UG/1
4 INHALANT RESPIRATORY (INHALATION) PRN
OUTPATIENT
Start: 2025-05-28

## 2025-05-07 RX ORDER — ONDANSETRON 2 MG/ML
8 INJECTION INTRAMUSCULAR; INTRAVENOUS
OUTPATIENT
Start: 2025-05-28

## 2025-05-07 RX ORDER — ACETAMINOPHEN 325 MG/1
650 TABLET ORAL
OUTPATIENT
Start: 2025-05-28

## 2025-05-07 RX ORDER — HYDROCORTISONE SODIUM SUCCINATE 100 MG/2ML
100 INJECTION INTRAMUSCULAR; INTRAVENOUS
OUTPATIENT
Start: 2025-05-28

## 2025-05-07 RX ADMIN — PALIPERIDONE PALMITATE 156 MG: 156 INJECTION INTRAMUSCULAR at 09:44

## 2025-05-07 NOTE — PROGRESS NOTES
[facility] Progress Note    Date: May 7, 2025    Name: Nolan Rodriguez    MRN: [id number]         : 1995    Mr. Rodriguez arrived in the [facility] today at 0935, ambulatory and in stable condition, here for his INVEGA SUSTENNA INJECTION (EVERY 3 WEEKS). He was assessed and education was provided.     Mr. Cutler's stated he did well with his last injection.  No issues noted.    Mr. Quintanas vitals were reviewed.    Vitals:    25 0930   BP: 115/71   Pulse: 92   Resp: 16   Temp: 98.5 °F (36.9 °C)   TempSrc: Oral   SpO2: 98%     Paliperidone Palmitate (INVEGA SUSTENNA) 156 mg, was administered IM, in his LEFT DELTOID, per order and without incident.     Mr. Rodriguez tolerated well, and voiced no complaints.     [facility]. [facility] was [facility] in stable condition at 0945.     He is to return in 3 weeks, on Wednesday, 25, at 0900, for his next INVEGA SUSTENNA Injection appointment.         ZEFERINO CRUMP RN  May 7, 2025  10:59 AM

## 2025-05-22 ENCOUNTER — HOSPITAL ENCOUNTER (OUTPATIENT)
Facility: HOSPITAL | Age: 30
Setting detail: SPECIMEN
Discharge: HOME | End: 2025-05-25

## 2025-05-22 LAB — LABCORP SPECIMEN COLLECTION: NORMAL

## 2025-05-22 PROCEDURE — 99001 SPECIMEN HANDLING PT-LAB: CPT

## 2025-05-28 ENCOUNTER — HOSPITAL ENCOUNTER (OUTPATIENT)
Facility: HOSPITAL | Age: 30
Setting detail: INFUSION SERIES
Discharge: HOME | End: 2025-05-31
Payer: MEDICAID

## 2025-05-28 VITALS
RESPIRATION RATE: 16 BRPM | TEMPERATURE: 98.3 F | HEART RATE: 80 BPM | SYSTOLIC BLOOD PRESSURE: 118 MMHG | OXYGEN SATURATION: 99 % | DIASTOLIC BLOOD PRESSURE: 74 MMHG

## 2025-05-28 DIAGNOSIS — F25.9 SCHIZOAFFECTIVE DISORDER, UNSPECIFIED TYPE (HCC): Primary | ICD-10-CM

## 2025-05-28 PROCEDURE — 96372 THER/PROPH/DIAG INJ SC/IM: CPT

## 2025-05-28 PROCEDURE — 6360000002 HC RX W HCPCS: Performed by: NURSE PRACTITIONER

## 2025-05-28 RX ORDER — SODIUM CHLORIDE 9 MG/ML
INJECTION, SOLUTION INTRAVENOUS CONTINUOUS
OUTPATIENT
Start: 2025-06-18

## 2025-05-28 RX ORDER — HYDROCORTISONE SODIUM SUCCINATE 100 MG/2ML
100 INJECTION INTRAMUSCULAR; INTRAVENOUS
OUTPATIENT
Start: 2025-06-18

## 2025-05-28 RX ORDER — ACETAMINOPHEN 325 MG/1
650 TABLET ORAL
OUTPATIENT
Start: 2025-06-18

## 2025-05-28 RX ORDER — ALBUTEROL SULFATE 90 UG/1
4 INHALANT RESPIRATORY (INHALATION) PRN
OUTPATIENT
Start: 2025-06-18

## 2025-05-28 RX ORDER — ONDANSETRON 2 MG/ML
8 INJECTION INTRAMUSCULAR; INTRAVENOUS
OUTPATIENT
Start: 2025-06-18

## 2025-05-28 RX ORDER — DIPHENHYDRAMINE HYDROCHLORIDE 50 MG/ML
50 INJECTION, SOLUTION INTRAMUSCULAR; INTRAVENOUS
OUTPATIENT
Start: 2025-06-18

## 2025-05-28 RX ORDER — EPINEPHRINE 1 MG/ML
0.3 INJECTION, SOLUTION, CONCENTRATE INTRAVENOUS PRN
OUTPATIENT
Start: 2025-06-18

## 2025-05-28 RX ADMIN — PALIPERIDONE PALMITATE 156 MG: 156 INJECTION INTRAMUSCULAR at 08:35

## 2025-05-28 NOTE — PROGRESS NOTES
[facility] Progress Note    Date: May 28, 2025    Name: Nolan Rodriguez    MRN: [id number]         : 1995      INVEGA INJECTIONS Q3 WEEKS      Mr. Rodriguez was roomed for his [facility] appt at 0830.    Mr. Rodriguez was assessed and education was provided.     Pt denies complaints.    Mr. Rodriguez's vitals were reviewed.  Vitals:    25 0830   BP: 118/74   Pulse: 80   Resp: 16   Temp: 98.3 °F (36.8 °C)   SpO2: 99%       Paliperidone palmitate (INVEGA SUSTENNA) 156mg was administered as ordered IM in patient's R deltoid muscle. Gauze/ band-aid applied to site.     Mr. Rodriguez tolerated well without complaints.    Discharge/ follow-up instructions discussed w/ pt. Pt verbalized understanding.    Pt armband removed & shredded.    [facility]. [facility] was [facility] in stable condition at 0840. He is scheduled to return on 25 at 0900 for his next appointment.    Grisel Rockwell RN  May 28, 2025

## 2025-06-18 ENCOUNTER — HOSPITAL ENCOUNTER (OUTPATIENT)
Facility: HOSPITAL | Age: 30
Setting detail: INFUSION SERIES
Discharge: HOME | End: 2025-06-21
Payer: MEDICAID

## 2025-06-18 VITALS
OXYGEN SATURATION: 97 % | RESPIRATION RATE: 16 BRPM | HEART RATE: 86 BPM | TEMPERATURE: 99.3 F | DIASTOLIC BLOOD PRESSURE: 64 MMHG | SYSTOLIC BLOOD PRESSURE: 101 MMHG

## 2025-06-18 DIAGNOSIS — F25.9 SCHIZOAFFECTIVE DISORDER, UNSPECIFIED TYPE (HCC): Primary | ICD-10-CM

## 2025-06-18 PROCEDURE — 96372 THER/PROPH/DIAG INJ SC/IM: CPT

## 2025-06-18 PROCEDURE — 6360000002 HC RX W HCPCS: Performed by: NURSE PRACTITIONER

## 2025-06-18 RX ORDER — SODIUM CHLORIDE 9 MG/ML
INJECTION, SOLUTION INTRAVENOUS CONTINUOUS
OUTPATIENT
Start: 2025-07-09

## 2025-06-18 RX ORDER — EPINEPHRINE 1 MG/ML
0.3 INJECTION, SOLUTION, CONCENTRATE INTRAVENOUS PRN
OUTPATIENT
Start: 2025-07-09

## 2025-06-18 RX ORDER — HYDROCORTISONE SODIUM SUCCINATE 100 MG/2ML
100 INJECTION INTRAMUSCULAR; INTRAVENOUS
OUTPATIENT
Start: 2025-07-09

## 2025-06-18 RX ORDER — DIPHENHYDRAMINE HYDROCHLORIDE 50 MG/ML
50 INJECTION, SOLUTION INTRAMUSCULAR; INTRAVENOUS
OUTPATIENT
Start: 2025-07-09

## 2025-06-18 RX ORDER — ALBUTEROL SULFATE 90 UG/1
4 INHALANT RESPIRATORY (INHALATION) PRN
OUTPATIENT
Start: 2025-07-09

## 2025-06-18 RX ORDER — ONDANSETRON 2 MG/ML
8 INJECTION INTRAMUSCULAR; INTRAVENOUS
OUTPATIENT
Start: 2025-07-09

## 2025-06-18 RX ORDER — ACETAMINOPHEN 325 MG/1
650 TABLET ORAL
OUTPATIENT
Start: 2025-07-09

## 2025-06-18 RX ADMIN — PALIPERIDONE PALMITATE 156 MG: 156 INJECTION INTRAMUSCULAR at 08:56

## 2025-06-18 NOTE — PROGRESS NOTES
[facility] Progress Note    Date: 2025    Name: Nolan Rodriguez    MRN: [id number]         : 1995    Mr. Rodriguez arrived in the [facility] today at 0850, ambulatory and in stable condition, here for his INVEGA SUSTENNA INJECTION (EVERY 3 WEEKS). He was assessed and education was provided.     Mr. Cutler's stated he did well with his last injection.  No issues noted.    Mr. Quintanas vitals were reviewed.    Vitals:    25 0851   BP: 101/64   Pulse: 86   Resp: 16   Temp: 99.3 °F (37.4 °C)   TempSrc: Oral   SpO2: 97%     Paliperidone Palmitate (INVEGA SUSTENNA) 156 mg, was administered IM, in his RIGHT DELTOID, per order and without incident.     Mr. Rodriguez tolerated well, and voiced no complaints.     [facility]. [facility] was [facility] in stable condition at 0900.     He is to return in 3 weeks, on Wednesday, 25, at 0900, for his next INVEGA SUSTENNA Injection appointment.         ZEFERINO CRUMP RN  2025  9:22 AM

## 2025-07-09 ENCOUNTER — HOSPITAL ENCOUNTER (OUTPATIENT)
Facility: HOSPITAL | Age: 30
Setting detail: INFUSION SERIES
Discharge: HOME | End: 2025-07-12
Payer: MEDICAID

## 2025-07-09 VITALS
RESPIRATION RATE: 16 BRPM | DIASTOLIC BLOOD PRESSURE: 79 MMHG | HEART RATE: 96 BPM | SYSTOLIC BLOOD PRESSURE: 114 MMHG | OXYGEN SATURATION: 99 % | TEMPERATURE: 98.4 F

## 2025-07-09 DIAGNOSIS — F25.9 SCHIZOAFFECTIVE DISORDER, UNSPECIFIED TYPE (HCC): Primary | ICD-10-CM

## 2025-07-09 PROCEDURE — 96372 THER/PROPH/DIAG INJ SC/IM: CPT

## 2025-07-09 PROCEDURE — 6360000002 HC RX W HCPCS: Performed by: NURSE PRACTITIONER

## 2025-07-09 RX ORDER — ALBUTEROL SULFATE 90 UG/1
4 INHALANT RESPIRATORY (INHALATION) PRN
OUTPATIENT
Start: 2025-07-30

## 2025-07-09 RX ORDER — DIPHENHYDRAMINE HYDROCHLORIDE 50 MG/ML
50 INJECTION, SOLUTION INTRAMUSCULAR; INTRAVENOUS
OUTPATIENT
Start: 2025-07-30

## 2025-07-09 RX ORDER — EPINEPHRINE 1 MG/ML
0.3 INJECTION, SOLUTION, CONCENTRATE INTRAVENOUS PRN
OUTPATIENT
Start: 2025-07-30

## 2025-07-09 RX ORDER — HYDROCORTISONE SODIUM SUCCINATE 100 MG/2ML
100 INJECTION INTRAMUSCULAR; INTRAVENOUS
OUTPATIENT
Start: 2025-07-30

## 2025-07-09 RX ORDER — SODIUM CHLORIDE 9 MG/ML
INJECTION, SOLUTION INTRAVENOUS CONTINUOUS
OUTPATIENT
Start: 2025-07-30

## 2025-07-09 RX ORDER — ONDANSETRON 2 MG/ML
8 INJECTION INTRAMUSCULAR; INTRAVENOUS
OUTPATIENT
Start: 2025-07-30

## 2025-07-09 RX ORDER — ACETAMINOPHEN 325 MG/1
650 TABLET ORAL
OUTPATIENT
Start: 2025-07-30

## 2025-07-09 RX ADMIN — PALIPERIDONE PALMITATE 156 MG: 156 INJECTION INTRAMUSCULAR at 08:34

## 2025-07-09 ASSESSMENT — PAIN - FUNCTIONAL ASSESSMENT: PAIN_FUNCTIONAL_ASSESSMENT: NONE - DENIES PAIN

## 2025-07-09 NOTE — PROGRESS NOTES
[facility] Progress Note    Date: 2025    Name: Nolan Rodriguez    MRN: [id number]         : 1995    Mr. Rodriguez arrived in the [facility] today at 0825, ambulatory and in stable condition, here for his INVEGA SUSTENNA INJECTION (EVERY 3 WEEKS). He was assessed and education was provided.     Mr. Cutler's stated he did well with his last injection.  No issues noted.    Mr. Quintanas vitals were reviewed.    Vitals:    25 0825   BP: 114/79   Pulse: 96   Resp: 16   Temp: 98.4 °F (36.9 °C)   TempSrc: Oral   SpO2: 99%     Paliperidone Palmitate (INVEGA SUSTENNA) 156 mg, was administered IM, in his LEFT DELTOID, per order and without incident.     Mr. Rodriguez tolerated well, and voiced no complaints.     Mr. Rodriguez was [facility] in stable condition at 0840.     He is to return in 3 weeks, on Wednesday, 25, at 0930, for his next INVEGA SUSTENNA Injection appointment.         Adrienne Navarro RN  2025  12:03 PM

## 2025-07-30 ENCOUNTER — HOSPITAL ENCOUNTER (OUTPATIENT)
Facility: HOSPITAL | Age: 30
Setting detail: INFUSION SERIES
Discharge: HOME | End: 2025-08-02
Payer: MEDICAID

## 2025-07-30 VITALS
HEART RATE: 84 BPM | TEMPERATURE: 98.2 F | DIASTOLIC BLOOD PRESSURE: 81 MMHG | OXYGEN SATURATION: 98 % | SYSTOLIC BLOOD PRESSURE: 114 MMHG | RESPIRATION RATE: 16 BRPM

## 2025-07-30 DIAGNOSIS — F25.9 SCHIZOAFFECTIVE DISORDER, UNSPECIFIED TYPE (HCC): Primary | ICD-10-CM

## 2025-07-30 PROCEDURE — 96372 THER/PROPH/DIAG INJ SC/IM: CPT

## 2025-07-30 PROCEDURE — 6360000002 HC RX W HCPCS: Performed by: NURSE PRACTITIONER

## 2025-07-30 RX ORDER — ALBUTEROL SULFATE 90 UG/1
4 INHALANT RESPIRATORY (INHALATION) PRN
OUTPATIENT
Start: 2025-07-30

## 2025-07-30 RX ORDER — EPINEPHRINE 1 MG/ML
0.3 INJECTION, SOLUTION, CONCENTRATE INTRAVENOUS PRN
OUTPATIENT
Start: 2025-07-30

## 2025-07-30 RX ORDER — SODIUM CHLORIDE 9 MG/ML
INJECTION, SOLUTION INTRAVENOUS CONTINUOUS
OUTPATIENT
Start: 2025-07-30

## 2025-07-30 RX ORDER — ACETAMINOPHEN 325 MG/1
650 TABLET ORAL
OUTPATIENT
Start: 2025-07-30

## 2025-07-30 RX ORDER — HYDROCORTISONE SODIUM SUCCINATE 100 MG/2ML
100 INJECTION INTRAMUSCULAR; INTRAVENOUS
OUTPATIENT
Start: 2025-07-30

## 2025-07-30 RX ORDER — DIPHENHYDRAMINE HYDROCHLORIDE 50 MG/ML
50 INJECTION, SOLUTION INTRAMUSCULAR; INTRAVENOUS
OUTPATIENT
Start: 2025-07-30

## 2025-07-30 RX ORDER — ONDANSETRON 2 MG/ML
8 INJECTION INTRAMUSCULAR; INTRAVENOUS
OUTPATIENT
Start: 2025-07-30

## 2025-07-30 RX ADMIN — PALIPERIDONE PALMITATE 156 MG: 156 INJECTION INTRAMUSCULAR at 09:45

## 2025-07-30 ASSESSMENT — PAIN - FUNCTIONAL ASSESSMENT: PAIN_FUNCTIONAL_ASSESSMENT: NONE - DENIES PAIN

## 2025-07-30 NOTE — PROGRESS NOTES
[facility] Progress Note    Date: 2025    Name: Nolan Rodriguez    MRN: [id number]         : 1995    Mr. Rodriguez arrived in the [facility] today at 0935, ambulatory and in stable condition, here for his INVEGA SUSTENNA INJECTION (EVERY 3 WEEKS). He was assessed and education was provided.     PER HIS CURRENT WRITTEN ORDER DATED 3-6-25, INVEGA SUSTENNA INJECTIONS HAVE BEEN ORDERED TO BE ADMINISTERED EVERY 3 WEEKS X 6 TOTAL DOSES.       TODAY IS DOSE # 6 OF 6 ON THIS CURRENT ORDER.       Mr. Rodriguez's vitals were reviewed.    Vitals:    25 0935   BP: 114/81   Pulse: 84   Resp: 16   Temp: 98.2 °F (36.8 °C)   TempSrc: Oral   SpO2: 98%           Mr. Rodriguez stated that he was doing well, and he voiced no complaints today, and stated that he has tolerated all past INVEGA injections well and without any side effects or issues.         Paliperidone Palmitate (INVEGA SUSTENNA) 156 mg, was administered IM, in his RIGHT DELTOID at 0945, per order and without incident.        .         Mr. Rodriguez tolerated well, and voiced no complaints.     [facility]. [facility] was [facility] in stable condition at 0950.     He is to return in 3 weeks, on Wednesday, 25, at 0930, for his next appointment, for his next INVEGA SUSTENNA Injection.          Brandon Lazo RN  2025  9:43 AM

## 2025-08-20 ENCOUNTER — HOSPITAL ENCOUNTER (OUTPATIENT)
Facility: HOSPITAL | Age: 30
Setting detail: INFUSION SERIES
Discharge: HOME | End: 2025-08-23
Payer: MEDICAID

## 2025-08-20 VITALS
OXYGEN SATURATION: 99 % | SYSTOLIC BLOOD PRESSURE: 112 MMHG | DIASTOLIC BLOOD PRESSURE: 78 MMHG | RESPIRATION RATE: 16 BRPM | TEMPERATURE: 98.3 F | HEART RATE: 83 BPM

## 2025-08-20 DIAGNOSIS — F25.9 SCHIZOAFFECTIVE DISORDER, UNSPECIFIED TYPE (HCC): Primary | ICD-10-CM

## 2025-08-20 PROCEDURE — 96372 THER/PROPH/DIAG INJ SC/IM: CPT

## 2025-08-20 PROCEDURE — 6360000002 HC RX W HCPCS: Performed by: NURSE PRACTITIONER

## 2025-08-20 RX ORDER — SODIUM CHLORIDE 9 MG/ML
INJECTION, SOLUTION INTRAVENOUS CONTINUOUS
OUTPATIENT
Start: 2025-08-20

## 2025-08-20 RX ORDER — DIPHENHYDRAMINE HYDROCHLORIDE 50 MG/ML
50 INJECTION, SOLUTION INTRAMUSCULAR; INTRAVENOUS
OUTPATIENT
Start: 2025-08-20

## 2025-08-20 RX ORDER — ALBUTEROL SULFATE 90 UG/1
4 INHALANT RESPIRATORY (INHALATION) PRN
OUTPATIENT
Start: 2025-08-20

## 2025-08-20 RX ORDER — ONDANSETRON 2 MG/ML
8 INJECTION INTRAMUSCULAR; INTRAVENOUS
OUTPATIENT
Start: 2025-08-20

## 2025-08-20 RX ORDER — HYDROCORTISONE SODIUM SUCCINATE 100 MG/2ML
100 INJECTION INTRAMUSCULAR; INTRAVENOUS
OUTPATIENT
Start: 2025-08-20

## 2025-08-20 RX ORDER — EPINEPHRINE 1 MG/ML
0.3 INJECTION, SOLUTION, CONCENTRATE INTRAVENOUS PRN
OUTPATIENT
Start: 2025-08-20

## 2025-08-20 RX ORDER — ACETAMINOPHEN 325 MG/1
650 TABLET ORAL
OUTPATIENT
Start: 2025-08-20

## 2025-08-20 RX ADMIN — PALIPERIDONE PALMITATE 156 MG: 156 INJECTION INTRAMUSCULAR at 11:11

## 2025-08-20 ASSESSMENT — PAIN SCALES - GENERAL: PAINLEVEL_OUTOF10: 0
